# Patient Record
Sex: MALE | Race: WHITE | NOT HISPANIC OR LATINO
[De-identification: names, ages, dates, MRNs, and addresses within clinical notes are randomized per-mention and may not be internally consistent; named-entity substitution may affect disease eponyms.]

---

## 2019-04-29 PROBLEM — Z00.00 ENCOUNTER FOR PREVENTIVE HEALTH EXAMINATION: Status: ACTIVE | Noted: 2019-04-29

## 2019-05-06 ENCOUNTER — APPOINTMENT (OUTPATIENT)
Dept: OTOLARYNGOLOGY | Facility: CLINIC | Age: 84
End: 2019-05-06

## 2019-06-10 ENCOUNTER — INPATIENT (INPATIENT)
Facility: HOSPITAL | Age: 84
LOS: 17 days | Discharge: NURSING, REHAB OR LONG-TERM CARE | DRG: 643 | End: 2019-06-28
Attending: INTERNAL MEDICINE | Admitting: INTERNAL MEDICINE
Payer: MEDICARE

## 2019-06-10 VITALS
HEIGHT: 71 IN | SYSTOLIC BLOOD PRESSURE: 101 MMHG | DIASTOLIC BLOOD PRESSURE: 66 MMHG | OXYGEN SATURATION: 99 % | RESPIRATION RATE: 18 BRPM | TEMPERATURE: 98 F | WEIGHT: 138.01 LBS | HEART RATE: 70 BPM

## 2019-06-10 DIAGNOSIS — R19.7 DIARRHEA, UNSPECIFIED: ICD-10-CM

## 2019-06-10 LAB
ALBUMIN SERPL ELPH-MCNC: 3.8 G/DL — SIGNIFICANT CHANGE UP (ref 3.3–5)
ALP SERPL-CCNC: 91 U/L — SIGNIFICANT CHANGE UP (ref 40–120)
ALT FLD-CCNC: 16 U/L — SIGNIFICANT CHANGE UP (ref 10–45)
ANION GAP SERPL CALC-SCNC: 17 MMOL/L — SIGNIFICANT CHANGE UP (ref 5–17)
APTT BLD: 32.1 SEC — SIGNIFICANT CHANGE UP (ref 27.5–36.3)
AST SERPL-CCNC: 34 U/L — SIGNIFICANT CHANGE UP (ref 10–40)
BASOPHILS # BLD AUTO: 0 K/UL — SIGNIFICANT CHANGE UP (ref 0–0.2)
BASOPHILS NFR BLD AUTO: 0.3 % — SIGNIFICANT CHANGE UP (ref 0–2)
BILIRUB SERPL-MCNC: 0.3 MG/DL — SIGNIFICANT CHANGE UP (ref 0.2–1.2)
BLD GP AB SCN SERPL QL: NEGATIVE — SIGNIFICANT CHANGE UP
BUN SERPL-MCNC: 62 MG/DL — HIGH (ref 7–23)
CALCIUM SERPL-MCNC: 10.7 MG/DL — HIGH (ref 8.4–10.5)
CHLORIDE SERPL-SCNC: 109 MMOL/L — HIGH (ref 96–108)
CO2 SERPL-SCNC: 11 MMOL/L — LOW (ref 22–31)
CREAT SERPL-MCNC: 2.76 MG/DL — HIGH (ref 0.5–1.3)
EOSINOPHIL # BLD AUTO: 0 K/UL — SIGNIFICANT CHANGE UP (ref 0–0.5)
EOSINOPHIL NFR BLD AUTO: 0.3 % — SIGNIFICANT CHANGE UP (ref 0–6)
GLUCOSE SERPL-MCNC: 117 MG/DL — HIGH (ref 70–99)
HCT VFR BLD CALC: 30.8 % — LOW (ref 39–50)
HGB BLD-MCNC: 10.2 G/DL — LOW (ref 13–17)
INR BLD: 0.97 RATIO — SIGNIFICANT CHANGE UP (ref 0.88–1.16)
LYMPHOCYTES # BLD AUTO: 1.9 K/UL — SIGNIFICANT CHANGE UP (ref 1–3.3)
LYMPHOCYTES # BLD AUTO: 28.1 % — SIGNIFICANT CHANGE UP (ref 13–44)
MCHC RBC-ENTMCNC: 32.9 PG — SIGNIFICANT CHANGE UP (ref 27–34)
MCHC RBC-ENTMCNC: 33.3 GM/DL — SIGNIFICANT CHANGE UP (ref 32–36)
MCV RBC AUTO: 98.7 FL — SIGNIFICANT CHANGE UP (ref 80–100)
MONOCYTES # BLD AUTO: 0.6 K/UL — SIGNIFICANT CHANGE UP (ref 0–0.9)
MONOCYTES NFR BLD AUTO: 8.1 % — SIGNIFICANT CHANGE UP (ref 2–14)
NEUTROPHILS # BLD AUTO: 4.3 K/UL — SIGNIFICANT CHANGE UP (ref 1.8–7.4)
NEUTROPHILS NFR BLD AUTO: 63.2 % — SIGNIFICANT CHANGE UP (ref 43–77)
PLATELET # BLD AUTO: 204 K/UL — SIGNIFICANT CHANGE UP (ref 150–400)
POTASSIUM SERPL-MCNC: 4.2 MMOL/L — SIGNIFICANT CHANGE UP (ref 3.5–5.3)
POTASSIUM SERPL-SCNC: 4.2 MMOL/L — SIGNIFICANT CHANGE UP (ref 3.5–5.3)
PROT SERPL-MCNC: 6.7 G/DL — SIGNIFICANT CHANGE UP (ref 6–8.3)
PROTHROM AB SERPL-ACNC: 11.2 SEC — SIGNIFICANT CHANGE UP (ref 10–12.9)
RBC # BLD: 3.12 M/UL — LOW (ref 4.2–5.8)
RBC # FLD: 15.6 % — HIGH (ref 10.3–14.5)
RH IG SCN BLD-IMP: POSITIVE — SIGNIFICANT CHANGE UP
RH IG SCN BLD-IMP: POSITIVE — SIGNIFICANT CHANGE UP
SODIUM SERPL-SCNC: 137 MMOL/L — SIGNIFICANT CHANGE UP (ref 135–145)
WBC # BLD: 6.8 K/UL — SIGNIFICANT CHANGE UP (ref 3.8–10.5)
WBC # FLD AUTO: 6.8 K/UL — SIGNIFICANT CHANGE UP (ref 3.8–10.5)

## 2019-06-10 PROCEDURE — 99223 1ST HOSP IP/OBS HIGH 75: CPT

## 2019-06-10 PROCEDURE — 93010 ELECTROCARDIOGRAM REPORT: CPT

## 2019-06-10 PROCEDURE — 99285 EMERGENCY DEPT VISIT HI MDM: CPT | Mod: 25

## 2019-06-10 PROCEDURE — 71045 X-RAY EXAM CHEST 1 VIEW: CPT | Mod: 26

## 2019-06-10 RX ORDER — SODIUM CHLORIDE 9 MG/ML
1000 INJECTION, SOLUTION INTRAVENOUS ONCE
Refills: 0 | Status: COMPLETED | OUTPATIENT
Start: 2019-06-10 | End: 2019-06-10

## 2019-06-10 RX ADMIN — SODIUM CHLORIDE 1000 MILLILITER(S): 9 INJECTION, SOLUTION INTRAVENOUS at 23:16

## 2019-06-10 NOTE — ED PROVIDER NOTE - OBJECTIVE STATEMENT
85 yo male with PMHx of carcinoid tumor (lung) s/p resection (2007) p/w diarrhea and weakness.  Patient reports that he has had persistent watery diarrhea since april.  Diarrhea is associated with poor appetite and 20lb unintentional weight loss.  Patient had a follow up chest ct at the end of may which showed liver lesions suspicious for mets.  PET scan last weak again showing the same thing.  no primary identified.  Follow up with GI, Dr. Guerrero who sent the patient to the ER for admission for liver biopsy and further work up. Patient denies fevers/chills, CP, SOB, abd pain, nausea, vomiting, dysuria.

## 2019-06-10 NOTE — ED ADULT NURSE REASSESSMENT NOTE - NS ED NURSE REASSESS COMMENT FT1
Patient report received from Ananya URBINA. Patient is a/ox4, c/o 7/10 lower back and knee pain. Patient O2 sat dropping to 88% on room air. Patient states when he is having a crisis his oxygen saturation drops. Denies shortness of breath or chest pain. Patient admitted awaiting bed at this time. Safety maintained. Placed on 2L NC o2.

## 2019-06-10 NOTE — ED PROVIDER NOTE - PHYSICAL EXAMINATION
Gen: AAO x 3, NAD  Skin: No rashes or lesions  HEENT: NC/AT, PERRLA, EOMI, dry mucosa  Resp: unlabored CTAB  Cardiac: rrr s1s2, no murmurs, rubs or gallops  GI: ND, +BS, Soft, NT  Ext: no pedal edema, FROM in all extremities  Neuro: no focal deficits

## 2019-06-10 NOTE — ED PROVIDER NOTE - ATTENDING CONTRIBUTION TO CARE
Dr Crowder Note: 87 yo M w pmhx carcinoid tumor to lung s/p resection (2007) p/w diarrhea and weakness, sent in for admission.  Pt has had diarrhea x 2 months, with decreased appetite and weight loss.  Recent CT chest showed liver lesions suspicious for mets, recent PET scan with same findings. Dr Cesar GAFFNEY sent pt in for further workup, and admission for liver biopsy.      Pt denies any cp, sob, abdominal pain, fevers, chills, N/V    Gen: no acute distress non toxic alert and coherent, no cyanosis   HEENT: atraumatic,  no scleral icterus  EOMI   Neck: no midline tenderness, supple  Lungs: Air entry good, clear to auscultation and percussion   CVS: reg HR S1/S2 no murmur no gallop   ABD: +BS in all 4 quadrants, soft, non tender,  non distended, non palpable liver and spleen and no other masses. no hernias.  Back: no midline tenderness   Extremities: No deformities, no edema, no calf tenderness  Neuro: Awake, alert no focal deficits    Plan for ekg, labs, fluids, admission   Pt and family aware of plan and need for admission for further workup of chronic diarrhea and new lesions on liver   Pt stable in ED

## 2019-06-10 NOTE — ED CLERICAL - NS ED CLERK NOTE PRE-ARRIVAL INFORMATION; ADDITIONAL PRE-ARRIVAL INFORMATION
CC/Reason For referral: dehydration, liver biopsy, carcinoid tumor.   Preferred Consultant(if applicable):  Who admits for you (if needed): abrudescu  Do you have documents you would like to fax over?  Would you still like to speak to an ED attending? no

## 2019-06-10 NOTE — ED ADULT NURSE NOTE - OBJECTIVE STATEMENT
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 Patient was sent by GI MD to have workup and liver biopsy. Patient is a/ox4, family at bedside. Reports he has had diarrhea since April, nonbloody, no pain associated with symptoms. Patient also reports 20lb weight loss. Patient reports 6 episodes over 24hrs. Patient also stating he has shortness of breath x 2 months. No labored breathing, dyspnea, or pursed lip breathing. Patient denies fever/chills, chest pain, abdomen soft NT/ND. No n/v. Patient reports he self catheterizes himself to void. Patient oral mucosa moist.

## 2019-06-10 NOTE — ED PROVIDER NOTE - NS ED ATTENDING STATEMENT MOD
Patient was just recently discharged from [facility] the day prior to presentation and was found to be dyspneic at the nursing home with hypoxia.  She was transferred to this facility where her initial ambient air oxygen saturation was 77%.  She was placed on NIPPV with BPAP with improvement of her oxygen saturation.  CXR w/ pulmonary edema. She was only mildly hypertensive so unlikely hypertensive emergency.  An ABG was obtained which was revealing for 7.302  61.0  44  30.2 on BPAP 10  5  40%.  She did have a elevated BNP of 796 which is higher than her previous measurement.  She also has pulmonary hypertension with a EPAP of 74 mmHg.  Started intravenous diuresis with careful monitoring of her intake/output.  Of note, she is DNR. Fluid restriction added 4/12/2019 and diuretics increased with a significant improvement in negative fluid balance. Appeared euvolemic and transitioned to PO regimen 4/15/2019.  Doing better, but remains on 5L oxygen.  Will try to wean down as much as possible and SNF soon.   I have personally performed a face to face diagnostic evaluation on this patient. I have reviewed the ACP note and agree with the history, exam and plan of care, except as noted.

## 2019-06-11 DIAGNOSIS — E87.2 ACIDOSIS: ICD-10-CM

## 2019-06-11 DIAGNOSIS — E34.0 CARCINOID SYNDROME: ICD-10-CM

## 2019-06-11 DIAGNOSIS — K76.9 LIVER DISEASE, UNSPECIFIED: ICD-10-CM

## 2019-06-11 DIAGNOSIS — E83.52 HYPERCALCEMIA: ICD-10-CM

## 2019-06-11 DIAGNOSIS — Z29.9 ENCOUNTER FOR PROPHYLACTIC MEASURES, UNSPECIFIED: ICD-10-CM

## 2019-06-11 DIAGNOSIS — N17.9 ACUTE KIDNEY FAILURE, UNSPECIFIED: ICD-10-CM

## 2019-06-11 DIAGNOSIS — N18.9 CHRONIC KIDNEY DISEASE, UNSPECIFIED: ICD-10-CM

## 2019-06-11 DIAGNOSIS — I31.3 PERICARDIAL EFFUSION (NONINFLAMMATORY): ICD-10-CM

## 2019-06-11 DIAGNOSIS — R33.9 RETENTION OF URINE, UNSPECIFIED: ICD-10-CM

## 2019-06-11 DIAGNOSIS — Z86.39 PERSONAL HISTORY OF OTHER ENDOCRINE, NUTRITIONAL AND METABOLIC DISEASE: Chronic | ICD-10-CM

## 2019-06-11 DIAGNOSIS — Z90.79 ACQUIRED ABSENCE OF OTHER GENITAL ORGAN(S): Chronic | ICD-10-CM

## 2019-06-11 LAB
24R-OH-CALCIDIOL SERPL-MCNC: 9.7 NG/ML — LOW (ref 30–80)
ANION GAP SERPL CALC-SCNC: 13 MMOL/L — SIGNIFICANT CHANGE UP (ref 5–17)
APPEARANCE UR: ABNORMAL
APTT BLD: 30.9 SEC — SIGNIFICANT CHANGE UP (ref 27.5–36.3)
BILIRUB UR-MCNC: NEGATIVE — SIGNIFICANT CHANGE UP
BUN SERPL-MCNC: 62 MG/DL — HIGH (ref 7–23)
CALCIUM SERPL-MCNC: 10.4 MG/DL — SIGNIFICANT CHANGE UP (ref 8.4–10.5)
CALCIUM SERPL-MCNC: 10.4 MG/DL — SIGNIFICANT CHANGE UP (ref 8.4–10.5)
CHLORIDE SERPL-SCNC: 109 MMOL/L — HIGH (ref 96–108)
CO2 SERPL-SCNC: 13 MMOL/L — LOW (ref 22–31)
COLOR SPEC: YELLOW — SIGNIFICANT CHANGE UP
CREAT ?TM UR-MCNC: 141 MG/DL — SIGNIFICANT CHANGE UP
CREAT SERPL-MCNC: 2.74 MG/DL — HIGH (ref 0.5–1.3)
DIFF PNL FLD: ABNORMAL
GAS PNL BLDV: SIGNIFICANT CHANGE UP
GLUCOSE SERPL-MCNC: 92 MG/DL — SIGNIFICANT CHANGE UP (ref 70–99)
GLUCOSE UR QL: NEGATIVE — SIGNIFICANT CHANGE UP
INR BLD: 0.99 RATIO — SIGNIFICANT CHANGE UP (ref 0.88–1.16)
KETONES UR-MCNC: NEGATIVE — SIGNIFICANT CHANGE UP
LACTATE BLDV-MCNC: 1.6 MMOL/L — SIGNIFICANT CHANGE UP (ref 0.7–2)
LEUKOCYTE ESTERASE UR-ACNC: ABNORMAL
NITRITE UR-MCNC: NEGATIVE — SIGNIFICANT CHANGE UP
OSMOLALITY UR: 508 MOSM/KG — SIGNIFICANT CHANGE UP (ref 50–1200)
PH UR: 5.5 — SIGNIFICANT CHANGE UP (ref 5–8)
POTASSIUM SERPL-MCNC: 4.1 MMOL/L — SIGNIFICANT CHANGE UP (ref 3.5–5.3)
POTASSIUM SERPL-SCNC: 4.1 MMOL/L — SIGNIFICANT CHANGE UP (ref 3.5–5.3)
POTASSIUM UR-SCNC: 17 MMOL/L — SIGNIFICANT CHANGE UP
PROT ?TM UR-MCNC: 96 MG/DL — HIGH (ref 0–12)
PROT UR-MCNC: ABNORMAL
PROT/CREAT UR-RTO: 0.7 RATIO — HIGH (ref 0–0.2)
PROTHROM AB SERPL-ACNC: 11.2 SEC — SIGNIFICANT CHANGE UP (ref 10–13.1)
PTH-INTACT FLD-MCNC: 15 PG/ML — SIGNIFICANT CHANGE UP (ref 15–65)
SODIUM SERPL-SCNC: 135 MMOL/L — SIGNIFICANT CHANGE UP (ref 135–145)
SODIUM UR-SCNC: <20 MMOL/L — SIGNIFICANT CHANGE UP
SP GR SPEC: 1.02 — SIGNIFICANT CHANGE UP (ref 1.01–1.02)
UROBILINOGEN FLD QL: SIGNIFICANT CHANGE UP

## 2019-06-11 PROCEDURE — 76770 US EXAM ABDO BACK WALL COMP: CPT | Mod: 26

## 2019-06-11 PROCEDURE — 74150 CT ABDOMEN W/O CONTRAST: CPT | Mod: 26

## 2019-06-11 RX ORDER — ERGOCALCIFEROL 1.25 MG/1
50000 CAPSULE ORAL
Refills: 0 | Status: DISCONTINUED | OUTPATIENT
Start: 2019-06-11 | End: 2019-06-28

## 2019-06-11 RX ORDER — SODIUM BICARBONATE 1 MEQ/ML
650 SYRINGE (ML) INTRAVENOUS EVERY 6 HOURS
Refills: 0 | Status: DISCONTINUED | OUTPATIENT
Start: 2019-06-11 | End: 2019-06-16

## 2019-06-11 RX ORDER — OCTREOTIDE ACETATE 200 UG/ML
100 INJECTION, SOLUTION INTRAVENOUS; SUBCUTANEOUS THREE TIMES A DAY
Refills: 0 | Status: DISCONTINUED | OUTPATIENT
Start: 2019-06-11 | End: 2019-06-13

## 2019-06-11 RX ORDER — SODIUM CHLORIDE 9 MG/ML
1000 INJECTION INTRAMUSCULAR; INTRAVENOUS; SUBCUTANEOUS
Refills: 0 | Status: DISCONTINUED | OUTPATIENT
Start: 2019-06-11 | End: 2019-06-12

## 2019-06-11 RX ORDER — SODIUM CHLORIDE 9 MG/ML
1000 INJECTION, SOLUTION INTRAVENOUS
Refills: 0 | Status: DISCONTINUED | OUTPATIENT
Start: 2019-06-11 | End: 2019-06-11

## 2019-06-11 RX ORDER — HEPARIN SODIUM 5000 [USP'U]/ML
5000 INJECTION INTRAVENOUS; SUBCUTANEOUS EVERY 8 HOURS
Refills: 0 | Status: DISCONTINUED | OUTPATIENT
Start: 2019-06-11 | End: 2019-06-28

## 2019-06-11 RX ADMIN — OCTREOTIDE ACETATE 100 MICROGRAM(S): 200 INJECTION, SOLUTION INTRAVENOUS; SUBCUTANEOUS at 03:06

## 2019-06-11 RX ADMIN — OCTREOTIDE ACETATE 100 MICROGRAM(S): 200 INJECTION, SOLUTION INTRAVENOUS; SUBCUTANEOUS at 22:42

## 2019-06-11 RX ADMIN — SODIUM CHLORIDE 75 MILLILITER(S): 9 INJECTION INTRAMUSCULAR; INTRAVENOUS; SUBCUTANEOUS at 11:43

## 2019-06-11 RX ADMIN — ERGOCALCIFEROL 50000 UNIT(S): 1.25 CAPSULE ORAL at 18:47

## 2019-06-11 RX ADMIN — HEPARIN SODIUM 5000 UNIT(S): 5000 INJECTION INTRAVENOUS; SUBCUTANEOUS at 22:37

## 2019-06-11 RX ADMIN — OCTREOTIDE ACETATE 100 MICROGRAM(S): 200 INJECTION, SOLUTION INTRAVENOUS; SUBCUTANEOUS at 14:44

## 2019-06-11 RX ADMIN — SODIUM CHLORIDE 125 MILLILITER(S): 9 INJECTION, SOLUTION INTRAVENOUS at 03:04

## 2019-06-11 NOTE — H&P ADULT - PROBLEM SELECTOR PLAN 7
VTE ppx: IMPROVE score of 4 (age, dec mobility, suspected malignancy) start heparin subQ  Activity: OOB with assistance, fall precaution  Diet: regular

## 2019-06-11 NOTE — PROGRESS NOTE ADULT - ASSESSMENT
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    Carcinoid syndrome.  -  Patient's flusing and diarrhea are likely due to his carcinoid syndrome, serotonin secretion, and potentially other vasoactive substances.   - Measure urine excretion of 5-HIAA.   - Check chromogranin levels  - octreotide 100mg subQ TID to treat flushing and diarrhea.    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    Hypercalcemia.  - Patient noted to have mild hypercalcemia, may be related to suspected carcinoid tumor. Check PTH, vitamin D level.  - Will continue IVF.  - Repeat calcium in AM.     CONSTANZA (acute kidney injury).   - Patient was noted here to have elevated SCr of 2.6, which is high compared to outpatient SCr of 1.91.  - CONSTANZA is likely pre-renal related to hypovolemia from excessive GI losses.  - check UA, urine protein: creatinine, bladder and kidney US since patient has known history of bladder dysfunction (patient states he has a "weak bladder") to assess for obstruction.   - urology evaluation    Pericardial effusion.  - Patient was found on outpatient CT scan to have a pericardial effusion and was recommended for TTE.  - Currently, the patient is hemodynamically WNL. He denies palpitations and denies lightheadedness while lying supine.  - Will obtain TTE.     Metabolic acidosis, normal anion gap (NAG).   - Patient found to have metabolic acidosis with normal anion gap, hyperchloremia likely due to GI losses.   - Will start LR at 125cc/hr to avoid further worsening of hyperchloremic acidosis.  - Follow up BMP.    Prophylactic measure.  - VTE ppx with start heparin subQ    Activity: OOB with assistance, fall precaution  Diet: regular.   Further action as per clinical course  Phong Dash MD pager [phone] This patient is an 86yoM with 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    Carcinoid syndrome.  -  Patient's flusing and diarrhea are likely due to his carcinoid syndrome, serotonin secretion, and potentially other vasoactive substances.   - Measure urine excretion of 5-HIAA.   - Check chromogranin levels  - octreotide 100mg subQ TID to treat flushing and diarrhea.    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    Hypercalcemia.  - Patient noted to have mild hypercalcemia, may be related to suspected carcinoid tumor. Check PTH, vitamin D level.  - Will continue IVF.  - Repeat calcium in AM.     CONSTANZA (acute kidney injury).   - Patient was noted here to have elevated SCr of 2.6, which is high compared to outpatient SCr of 1.91.  - CONSTANZA is likely pre-renal related to hypovolemia from excessive GI losses.  - check UA, urine protein: creatinine, bladder and kidney US since patient has known history of bladder dysfunction (patient states he has a "weak bladder") to assess for obstruction.   - urology evaluation    Pericardial effusion.  - Patient was found on outpatient CT scan to have a pericardial effusion and was recommended for TTE.  - Currently, the patient is hemodynamically WNL. He denies palpitations and denies lightheadedness while lying supine.  - Will obtain TTE.     Metabolic acidosis, normal anion gap (NAG).   - Patient found to have metabolic acidosis with normal anion gap, hyperchloremia likely due to GI losses.   - continue IVF.  - Follow up BMP.    Prophylactic measure.  - VTE ppx with start heparin subQ    Activity: OOB with assistance, fall precaution  Diet: regular.   Further action as per clinical course  d/w patient and wife  Phong Dash MD pager [phone]

## 2019-06-11 NOTE — CONSULT NOTE ADULT - ASSESSMENT
86yM with neurogenic bladder, urinary retention, BPH    -Can continue clean intermittent catheterization while inpatient  -Hydration  -Pain control  -Bowel regimen  -Avoid anticholinergics, antihistamines, opioids  -Will need outpatient urologic follow up  -Thank you for the consult. Please call with questions 86yM with neurogenic bladder, urinary retention, BPH, renal mass    -Renal mass is either renal cell carcinoma primary versus metastasis to kidney. CT-guided Renal biopsy would be the definitive test to distinguish between these 2 diagnoses. Given evidence of other lesions, more likely metastasis to kidney. Can consider renal mass biopsy as outpatient   -Can continue clean intermittent catheterization while inpatient. If renal function worsening, would place calzada catheter for strict ins/outs and once renal function returns to baseline can return to [facility] as outpatient  -Hydration  -Pain control  -Bowel regimen  -Avoid anticholinergics, antihistamines, opioids  -Will need outpatient urologic follow up  -Thank you for the consult. Please call with questions

## 2019-06-11 NOTE — CONSULT NOTE ADULT - PROBLEM SELECTOR RECOMMENDATION 9
cardiology evaluation  echo to assess extent of perixcardial effusion and see if any cardiac issues associated with carcinoid

## 2019-06-11 NOTE — H&P ADULT - PROBLEM SELECTOR PLAN 5
Patient was found on outpatient CT scan to have a pericardial effusion and was recommended for TTE.  Currently, the patient is hemodynamically WNL. He denies palpitations and denies lightheadedness while lying supine.  Will obtain TTE.

## 2019-06-11 NOTE — H&P ADULT - PROBLEM SELECTOR PLAN 4
Patient was noted here to have elevated SCr of 2.6, which is high compared to outpatient SCr of 1.91.  CONSTANZA is likely pre-renal related to hypovolemia from excessive GI losses.  Will check UA, urine protein: creatinine, bladder and kidney US since patient has known history of bladder dysfunction (patient states he has a "weak bladder") to assess for obstruction.

## 2019-06-11 NOTE — H&P ADULT - ASSESSMENT
Patient was seen and examined at 11:50PM on 6/10/2019.     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 The patient has had about 20 lbs of unintentional weight loss since April 2019, with decline in energy, chills, early satiety and poor appetite and generalized weakness. He has had 7 episodes of watery non-bloody diarrhea daily, and endorses nausea but denies fever, chest pain, palpitations, abdominal pain or vomiting. He has had difficulty ambulating due to his progressive weakness. He notes that he had visited Dr. Houston and Dr. Guerrero for his persistent diarrhea. He had stool testing which was reportedly negative for bacteria and parasites. He developed jen complexion, facial puffiness and excessive eye lacrimation. He Has not had SOB or cough. The patient had outpatient CT and PEG, chromogramin levels checked. He was noted to be hypotensive to 91/49 in the office, and patient had an unsuccessful attempts at venipuncture for labs and hydration, thus recommended to visit ED.     The patient had left lower lobe resection of carcinoid tumor in 2007 at [facility].     In the ED, T 97.5F, HR 70, /66, RR 18, SpO2 99%RA   Pt was given 1 L LR. Patient was seen and examined at 11:50PM on 6/10/2019.     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

## 2019-06-11 NOTE — H&P ADULT - PROBLEM SELECTOR PLAN 6
Patient found to have metabolic acidosis with normal anion gap, hyperchloremia likely due to GI losses.   Will start LR at 125cc/hr to avoid further worsening of hyperchloremic acidosis.  Follow up BMP.

## 2019-06-11 NOTE — H&P ADULT - NSHPREVIEWOFSYSTEMS_GEN_ALL_CORE
REVIEW OF SYSTEMS  CONSTITUTIONAL: No fever, + chills, + fatigue  EYES: No eye pain, no vision changes  ENMT:  No difficulty hearing, no throat pain  RESPIRATORY: No cough, no wheezing, no sputum production; No shortness of breath  CARDIOVASCULAR: No chest pain, no palpitations, no TRAN, no leg swelling  GASTROINTESTINAL: No abdominal pain, no nausea, no vomiting, no hematemesis, no diarrhea, no constipation, no melena, no hematochezia.  GENITOURINARY: No dysuria, no hematuria  NEUROLOGICAL: No headaches, no loss of strength, no numbness  SKIN: No itching, no rashes, no lesions   MUSCULOSKELETAL: No joint pain, no joint swelling; No muscle pain  HEME/LYMPH: No easy bruising, bleeding REVIEW OF SYSTEMS  CONSTITUTIONAL: No fever, + chills, + fatigue  EYES: No eye pain, no vision changes, +excessive tearing.   ENMT:  + very hard of hearing, no throat pain  RESPIRATORY: + cough, no wheezing, + sputum production; No shortness of breath  CARDIOVASCULAR: No chest pain, no palpitations, no leg swelling  GASTROINTESTINAL: No abdominal pain, + nausea, no vomiting, + diarrhea, no constipation, no melena, no hematochezia.  GENITOURINARY: No dysuria, no hematuria, +bladder dysfunction  NEUROLOGICAL: No headaches, no loss of strength, no numbness  SKIN: + facial redness, +facial puffiness,   MUSCULOSKELETAL: No joint pain, no joint swelling; No muscle pain  HEME/LYMPH: No easy bruising, bleeding

## 2019-06-11 NOTE — H&P ADULT - PROBLEM SELECTOR PLAN 1
Patient's flusinh and diarrhea are likely due to his carcinoid syndrome, serotonin secretion, and potentially other vasoactive substances.   Measure urine excretion of 5-HIAA, urine serotonin,   Check chromogranin levels  Will start octreotide 100mg subQ TID to treat flushing and diarrhea. Can likely uptitrate in AM, if needed- discuss with oncology  Consult Dr. Houston in AM. 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 Patient's flusing and diarrhea are likely due to his carcinoid syndrome, serotonin secretion, and potentially other vasoactive substances.   Measure urine excretion of 5-HIAA, urine serotonin,   Check chromogranin levels  Will start octreotide 100mg subQ TID to treat flushing and diarrhea. Can likely uptitrate in AM, if needed- discuss with oncology  Consult Dr. Houston in AM. 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 Patient's flusing and diarrhea are likely due to his carcinoid syndrome, serotonin secretion, and potentially other vasoactive substances.   Measure urine excretion of 5-HIAA. May need to send out urine serotonin in AM.   Check chromogranin levels  Will start octreotide 100mg subQ TID to treat flushing and diarrhea. Can likely uptitrate in AM, if needed- discuss with oncology in AM.   Consult Dr. Houston in AM. 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

## 2019-06-11 NOTE — H&P ADULT - NSICDXPASTMEDICALHX_GEN_ALL_CORE_FT
PAST MEDICAL HISTORY:  History of carcinoid syndrome     Kidney lesion partial resction- reportedly "not active lesion"    Neck mass was resected, reportedly bening

## 2019-06-11 NOTE — CONSULT NOTE ADULT - PROBLEM SELECTOR RECOMMENDATION 2
ct octreotide injection  monitor for side effects  we are working on getting im sandostatin long acting approved as outpatient  live lesion photopenic on pet dotatate - plan for biopsy to confirm lesion due to carcinoid vs alternate etiology

## 2019-06-11 NOTE — CONSULT NOTE ADULT - SUBJECTIVE AND OBJECTIVE BOX
Patient is a 86y old  Male who presents with a chief complaint of diarrhea (11 Jun 2019 00:16)      HPI:  Patient was seen and examined at 11:50PM on 6/10/2019.     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 The patient has had about 20 lbs of unintentional weight loss since April 2019, with decline in energy, chills, early satiety and poor appetite and generalized weakness. He has had 7 episodes of watery non-bloody diarrhea daily, and endorses nausea but denies fever, chest pain, palpitations, abdominal pain or vomiting. He has had difficulty ambulating due to his progressive weakness. He notes that he had visited Dr. Houston and Dr. Guerrero for his persistent diarrhea. He had stool testing which was reportedly negative for bacteria and parasites. He developed jen complexion, facial puffiness and excessive eye lacrimation. He Has not had SOB or cough. The patient had outpatient CT and PEG, chromogramin levels checked. He was noted to be hypotensive to 91/49 in the office, and patient had an unsuccessful attempts at venipuncture for labs and hydration, thus recommended to visit ED.     The patient had left lower lobe resection of carcinoid tumor in 2007 at [facility].     In the ED, T 97.5F, HR 70, /66, RR 18, SpO2 99%RA   Pt was given 1 L LR. (11 Jun 2019 00:16)       ROS:  Negative except for:    PAST MEDICAL & SURGICAL HISTORY:  Neck mass: was resected, reportedly bening  Kidney lesion: partial resction- reportedly &quot;not active lesion&quot;  History of carcinoid syndrome  H/O carcinoid syndrome  S/P TURP      SOCIAL HISTORY:    FAMILY HISTORY:  FH: lung cancer: sister  Family history of nasopharyngeal cancer: father      MEDICATIONS  (STANDING):  lactated ringers. 1000 milliLiter(s) (125 mL/Hr) IV Continuous <Continuous>  octreotide  Injectable 100 MICROGram(s) SubCutaneous three times a day    MEDICATIONS  (PRN):      Allergies    penicillin (Swelling)    Intolerances        Vital Signs Last 24 Hrs  T(C): 36.8 (11 Jun 2019 07:50), Max: 36.8 (11 Jun 2019 01:43)  T(F): 98.2 (11 Jun 2019 07:50), Max: 98.3 (11 Jun 2019 01:43)  HR: 81 (11 Jun 2019 07:50) (70 - 84)  BP: 101/63 (11 Jun 2019 07:50) (96/60 - 113/70)  BP(mean): --  RR: 18 (11 Jun 2019 07:50) (16 - 19)  SpO2: 98% (11 Jun 2019 07:50) (98% - 100%)    REVIEW OF SYSTEMS:    CONSTITUTIONAL: No weakness, fevers or chills  EYES/ENT: No visual changes;  No vertigo or throat pain   NECK: No pain or stiffness  RESPIRATORY: No cough, wheezing, hemoptysis; No shortness of breath  CARDIOVASCULAR: No chest pain or palpitations  GASTROINTESTINAL: No abdominal or epigastric pain. No nausea, vomiting, or hematemesis; No diarrhea or constipation. No melena or hematochezia.  GENITOURINARY: No dysuria, frequency or hematuria  NEUROLOGICAL: No numbness or weakness  SKIN: No itching, burning, rashes, or lesions     PHYSICAL EXAM  General: adult in NAD  HEENT: clear oropharynx, anicteric sclera, pink conjunctiva  Neck: supple  CV: normal S1/S2 with no murmur rubs or gallops  Lungs: positive air movement b/l ant lungs,clear to auscultation, no wheezes, no rales  Abdomen: soft non-tender non-distended, no hepatosplenomegaly  Ext: no clubbing cyanosis or edema  Skin: no rashes and no petechiae  Neuro: alert and oriented X 4, no focal deficits      LABS:                          10.2   6.8   )-----------( 204      ( 10 Jean 2019 21:14 )             30.8         Mean Cell Volume : 98.7 fl  Mean Cell Hemoglobin : 32.9 pg  Mean Cell Hemoglobin Concentration : 33.3 gm/dL  Auto Neutrophil # : 4.3 K/uL  Auto Lymphocyte # : 1.9 K/uL  Auto Monocyte # : 0.6 K/uL  Auto Eosinophil # : 0.0 K/uL  Auto Basophil # : 0.0 K/uL  Auto Neutrophil % : 63.2 %  Auto Lymphocyte % : 28.1 %  Auto Monocyte % : 8.1 %  Auto Eosinophil % : 0.3 %  Auto Basophil % : 0.3 %      06-11    135  |  109<H>  |  62<H>  ----------------------------<  92  4.1   |  13<L>  |  2.74<H>    Ca    10.4      11 Jun 2019 06:17    TPro  6.7  /  Alb  3.8  /  TBili  0.3  /  DBili  x   /  AST  34  /  ALT  16  /  AlkPhos  91  06-10      PT/INR - ( 11 Jun 2019 08:20 )   PT: 11.2 sec;   INR: 0.99 ratio         PTT - ( 11 Jun 2019 08:20 )  PTT:30.9 sec Patient is a 86y old  Male who presents with a chief complaint of diarrhea (11 Jun 2019 00:16)      HPI:  Patient was seen and examined at 11:50PM on 6/10/2019.     This patient is an 86yoM with PMH of carcinoid syndrome s/p left lower lobe resection, bladder disease requiring intermittent straight cath who presents to the ED with complaint of persistent diarrhea, fatigue, decreased po intake  and weight loss. The patient has had about 20 lbs of unintentional weight loss since April 2019, with decline in energy, chills, early satiety and poor appetite and generalized weakness. He has had 7 episodes of watery non-bloody diarrhea daily, and endorses nausea but denies fever, chest pain, palpitations, abdominal pain or vomiting. He has had difficulty ambulating due to his progressive weakness. He notes that he had visited Dr. Houston and Dr. Guerrero for his persistent diarrhea. He had stool testing which was reportedly negative for bacteria and parasites. He developed jen complexion, facial puffiness and excessive eye lacrimation. He Has not had SOB or cough. The patient had outpatient CT 5/15/19 - right hepatic lobe lesion with multiple small lesions in liver, small to moderate pericardial effusion.  PET dotatate scan 5/16/19 - photopenic defects corresponding to large right hepatic, chromogramin levels checked. He was noted to be hypotensive to 91/49 in the office, and patient had an unsuccessful attempts at venipuncture for labs and hydration, thus recommended to visit ED.     The patient had left lower lobe resection of carcinoid tumor in 2007 at [facility].     In the ED, T 97.5F, HR 70, /66, RR 18, SpO2 99%RA   Pt was given 1 L LR. (11 Jun 2019 00:16)       ROS:  Negative except for:    PAST MEDICAL & SURGICAL HISTORY:  Neck mass: was resected, reportedly bening  Kidney lesion: partial resction- reportedly &quot;not active lesion&quot;  History of carcinoid syndrome  H/O carcinoid syndrome  S/P TURP      SOCIAL HISTORY:    FAMILY HISTORY:  FH: lung cancer: sister  Family history of nasopharyngeal cancer: father      MEDICATIONS  (STANDING):  lactated ringers. 1000 milliLiter(s) (125 mL/Hr) IV Continuous <Continuous>  octreotide  Injectable 100 MICROGram(s) SubCutaneous three times a day    MEDICATIONS  (PRN):      Allergies    penicillin (Swelling)    Intolerances        Vital Signs Last 24 Hrs  T(C): 36.8 (11 Jun 2019 07:50), Max: 36.8 (11 Jun 2019 01:43)  T(F): 98.2 (11 Jun 2019 07:50), Max: 98.3 (11 Jun 2019 01:43)  HR: 81 (11 Jun 2019 07:50) (70 - 84)  BP: 101/63 (11 Jun 2019 07:50) (96/60 - 113/70)  BP(mean): --  RR: 18 (11 Jun 2019 07:50) (16 - 19)  SpO2: 98% (11 Jun 2019 07:50) (98% - 100%)    REVIEW OF SYSTEMS:    CONSTITUTIONAL: No weakness, fevers or chills  EYES/ENT: No visual changes;  No vertigo or throat pain   NECK: No pain or stiffness  RESPIRATORY: No cough, wheezing, hemoptysis; No shortness of breath  CARDIOVASCULAR: No chest pain or palpitations  GASTROINTESTINAL: No abdominal or epigastric pain. No nausea, vomiting, or hematemesis; No diarrhea or constipation. No melena or hematochezia.  GENITOURINARY: No dysuria, frequency or hematuria  NEUROLOGICAL: No numbness or weakness  SKIN: No itching, burning, rashes, or lesions     PHYSICAL EXAM  General: adult in NAD  HEENT: clear oropharynx, anicteric sclera, pink conjunctiva  Neck: supple  CV: normal S1/S2 with no murmur rubs or gallops  Lungs: positive air movement b/l ant lungs,clear to auscultation, no wheezes, no rales  Abdomen: soft non-tender non-distended, no hepatosplenomegaly  Ext: no clubbing cyanosis or edema  Skin: no rashes and no petechiae  Neuro: alert and oriented X 4, no focal deficits      LABS:                          10.2   6.8   )-----------( 204      ( 10 Jean 2019 21:14 )             30.8         Mean Cell Volume : 98.7 fl  Mean Cell Hemoglobin : 32.9 pg  Mean Cell Hemoglobin Concentration : 33.3 gm/dL  Auto Neutrophil # : 4.3 K/uL  Auto Lymphocyte # : 1.9 K/uL  Auto Monocyte # : 0.6 K/uL  Auto Eosinophil # : 0.0 K/uL  Auto Basophil # : 0.0 K/uL  Auto Neutrophil % : 63.2 %  Auto Lymphocyte % : 28.1 %  Auto Monocyte % : 8.1 %  Auto Eosinophil % : 0.3 %  Auto Basophil % : 0.3 %      06-11    135  |  109<H>  |  62<H>  ----------------------------<  92  4.1   |  13<L>  |  2.74<H>    Ca    10.4      11 Jun 2019 06:17    TPro  6.7  /  Alb  3.8  /  TBili  0.3  /  DBili  x   /  AST  34  /  ALT  16  /  AlkPhos  91  06-10      PT/INR - ( 11 Jun 2019 08:20 )   PT: 11.2 sec;   INR: 0.99 ratio         PTT - ( 11 Jun 2019 08:20 )  PTT:30.9 sec Patient is a 86y old  Male who presents with a chief complaint of diarrhea (11 Jun 2019 00:16)      HPI:  Patient was seen and examined at 11:50PM on 6/10/2019.     This patient is an 86yoM with PMH of carcinoid syndrome s/p left lower lobe resection, bladder disease requiring intermittent straight cath who presents to the ED with complaint of persistent diarrhea, fatigue, decreased po intake  and weight loss. The patient has had about 20 lbs of unintentional weight loss since April 2019, with decline in energy, chills, early satiety and poor appetite and generalized weakness. He has had 7 episodes of watery non-bloody diarrhea daily, and endorses nausea but denies fever, chest pain, palpitations, abdominal pain or vomiting. He has had difficulty ambulating due to his progressive weakness. He notes that he had visited Dr. Houston and Dr. Guerrero for his persistent diarrhea. He had stool testing which was reportedly negative for bacteria and parasites. He developed jen complexion, facial puffiness and excessive eye lacrimation. He Has not had SOB or cough. The patient had outpatient CT 5/15/19 - right hepatic lobe lesion with multiple small lesions in liver, small to moderate pericardial effusion.  PET dotatate scan 5/16/19 - photopenic defects corresponding to large right hepatic, chromogramin levels  320, 5HIAA 47, creatinine 1.9,normal CEA 5/30/19. He was noted to be hypotensive to 91/49 in the office, and patient had an unsuccessful attempts at venipuncture for labs and hydration, thus recommended to visit ED.     The patient had left lower lobe resection of carcinoid tumor in 2007 at [facility].   reports no diarrhea since yeterday       ROS:  Negative except for:    PAST MEDICAL & SURGICAL HISTORY:  Neck mass: was resected, reportedly bening  Kidney lesion: partial resction- reportedly &quot;not active lesion&quot;  History of carcinoid syndrome  H/O carcinoid syndrome  S/P TURP      SOCIAL HISTORY:    FAMILY HISTORY:  FH: lung cancer: sister  Family history of nasopharyngeal cancer: father      MEDICATIONS  (STANDING):  lactated ringers. 1000 milliLiter(s) (125 mL/Hr) IV Continuous <Continuous>  octreotide  Injectable 100 MICROGram(s) SubCutaneous three times a day    MEDICATIONS  (PRN):      Allergies    penicillin (Swelling)    Intolerances        Vital Signs Last 24 Hrs  T(C): 36.8 (11 Jun 2019 07:50), Max: 36.8 (11 Jun 2019 01:43)  T(F): 98.2 (11 Jun 2019 07:50), Max: 98.3 (11 Jun 2019 01:43)  HR: 81 (11 Jun 2019 07:50) (70 - 84)  BP: 101/63 (11 Jun 2019 07:50) (96/60 - 113/70)  BP(mean): --  RR: 18 (11 Jun 2019 07:50) (16 - 19)  SpO2: 98% (11 Jun 2019 07:50) (98% - 100%)    REVIEW OF SYSTEMS:    CONSTITUTIONAL: No weakness, fevers or chills  EYES/ENT: No visual changes;  No vertigo or throat pain   NECK: No pain or stiffness  RESPIRATORY: No cough, wheezing, hemoptysis; No shortness of breath  CARDIOVASCULAR: No chest pain or palpitations  GASTROINTESTINAL: No abdominal or epigastric pain. No nausea, vomiting, or hematemesis; No diarrhea or constipation. No melena or hematochezia.  GENITOURINARY: No dysuria, frequency or hematuria  NEUROLOGICAL: No numbness or weakness  SKIN: No itching, burning, rashes, or lesions     PHYSICAL EXAM  General: adult in NAD  HEENT: clear oropharynx, anicteric sclera, pink conjunctiva  Neck: supple  CV: normal S1/S2 with no murmur rubs or gallops  Lungs: positive air movement b/l ant lungs,clear to auscultation, no wheezes, no rales  Abdomen: soft non-tender non-distended, no hepatosplenomegaly  Ext: no clubbing cyanosis or edema  Skin: no rashes and no petechiae  Neuro: alert and oriented X 4, no focal deficits      LABS:                          10.2   6.8   )-----------( 204      ( 10 Jean 2019 21:14 )             30.8         Mean Cell Volume : 98.7 fl  Mean Cell Hemoglobin : 32.9 pg  Mean Cell Hemoglobin Concentration : 33.3 gm/dL  Auto Neutrophil # : 4.3 K/uL  Auto Lymphocyte # : 1.9 K/uL  Auto Monocyte # : 0.6 K/uL  Auto Eosinophil # : 0.0 K/uL  Auto Basophil # : 0.0 K/uL  Auto Neutrophil % : 63.2 %  Auto Lymphocyte % : 28.1 %  Auto Monocyte % : 8.1 %  Auto Eosinophil % : 0.3 %  Auto Basophil % : 0.3 %      06-11    135  |  109<H>  |  62<H>  ----------------------------<  92  4.1   |  13<L>  |  2.74<H>    Ca    10.4      11 Jun 2019 06:17    TPro  6.7  /  Alb  3.8  /  TBili  0.3  /  DBili  x   /  AST  34  /  ALT  16  /  AlkPhos  91  06-10      PT/INR - ( 11 Jun 2019 08:20 )   PT: 11.2 sec;   INR: 0.99 ratio         PTT - ( 11 Jun 2019 08:20 )  PTT:30.9 sec Patient is a 86y old  Male who presents with a chief complaint of diarrhea (11 Jun 2019 00:16)      HPI:  Patient was seen and examined at 11:50PM on 6/10/2019.     This patient is an 86yoM with PMH of carcinoid syndrome s/p left lower lobe resection, bladder disease requiring intermittent straight cath who presents to the ED with complaint of persistent diarrhea, fatigue, decreased po intake  and weight loss. The patient has had about 20 lbs of unintentional weight loss since April 2019, with decline in energy, chills, early satiety and poor appetite and generalized weakness. He has had 7 episodes of watery non-bloody diarrhea daily, and endorses nausea but denies fever, chest pain, palpitations, abdominal pain or vomiting. He has had difficulty ambulating due to his progressive weakness. He notes that he had visited Dr. Houston and Dr. Guerrero for his persistent diarrhea. He had stool testing which was reportedly negative for bacteria and parasites. He developed jen complexion, facial puffiness and excessive eye lacrimation. He Has not had SOB or cough. The patient had outpatient CT 5/15/19 - right hepatic lobe lesion with multiple small lesions in liver, small to moderate pericardial effusion.  PET dotatate scan 5/16/19 - photopenic defects corresponding to large right hepatic, chromogramin levels  320, 5HIAA 47, creatinine 1.9,normal CEA 5/30/19. He was noted to be hypotensive to 91/49 in the office, and patient had an unsuccessful attempts at venipuncture for labs and hydration, thus recommended to visit ED.     The patient had left lower lobe resection of carcinoid tumor in 2007 at [facility].   reports no diarrhea since yeterday  c/o poor voiding - negative result of straight /self catheterization       PAST MEDICAL & SURGICAL HISTORY:  Neck mass: was resected, reportedly benign  Kidney lesion: partial rescetion  lung resection - carcinoid  S/P TURP      SOCIAL HISTORY:  lives with wife  non smoker    FAMILY HISTORY:  FH: lung cancer: sister  Family history of nasopharyngeal cancer: father      MEDICATIONS  (STANDING):  lactated ringers. 1000 milliLiter(s) (125 mL/Hr) IV Continuous <Continuous>  octreotide  Injectable 100 MICROGram(s) SubCutaneous three times a day    MEDICATIONS  (PRN):      Allergies    penicillin (Swelling)    Intolerances        Vital Signs Last 24 Hrs  T(C): 36.8 (11 Jun 2019 07:50), Max: 36.8 (11 Jun 2019 01:43)  T(F): 98.2 (11 Jun 2019 07:50), Max: 98.3 (11 Jun 2019 01:43)  HR: 81 (11 Jun 2019 07:50) (70 - 84)  BP: 101/63 (11 Jun 2019 07:50) (96/60 - 113/70)  BP(mean): --  RR: 18 (11 Jun 2019 07:50) (16 - 19)  SpO2: 98% (11 Jun 2019 07:50) (98% - 100%)    REVIEW OF SYSTEMS:    CONSTITUTIONAL: weakness +,no fevers or chills  EYES/ENT: No visual changes;  No vertigo or throat pain   NECK: No pain or stiffness  RESPIRATORY: No cough, wheezing, hemoptysis; chr shortness of breath  CARDIOVASCULAR: No chest pain or palpitations  GASTROINTESTINAL: No abdominal or epigastric pain. No nausea, vomiting, or hematemesis; diarrhea , anorexia + No melena or hematochezia.  GENITOURINARY: No dysuria, frequency or hematuria. poor voiding +  NEUROLOGICAL: No numbness or weakness  SKIN: No itching, burning, rashes, or lesions . flushing +    PHYSICAL EXAM  General: adult chronically ill, face flushed  HEENT: clear oropharynx, anicteric sclera, pink conjunctiva  Neck: supple  CV: normal S1/S2 with no murmur rubs or gallops  Lungs: positive air movement b/l ant lungs,clear to auscultation, no wheezes, no rales  Abdomen: soft non-tender non-distended,  hepatomegaly +  Ext: no clubbing cyanosis, 1+ edema  Skin: no rashes and no petechiae  Neuro: alert and oriented X 4, no focal deficits      LABS:                          10.2   6.8   )-----------( 204      ( 10 Jean 2019 21:14 )             30.8         Mean Cell Volume : 98.7 fl  Mean Cell Hemoglobin : 32.9 pg  Mean Cell Hemoglobin Concentration : 33.3 gm/dL  Auto Neutrophil # : 4.3 K/uL  Auto Lymphocyte # : 1.9 K/uL  Auto Monocyte # : 0.6 K/uL  Auto Eosinophil # : 0.0 K/uL  Auto Basophil # : 0.0 K/uL  Auto Neutrophil % : 63.2 %  Auto Lymphocyte % : 28.1 %  Auto Monocyte % : 8.1 %  Auto Eosinophil % : 0.3 %  Auto Basophil % : 0.3 %      06-11    135  |  109<H>  |  62<H>  ----------------------------<  92  4.1   |  13<L>  |  2.74<H>    Ca    10.4      11 Jun 2019 06:17    TPro  6.7  /  Alb  3.8  /  TBili  0.3  /  DBili  x   /  AST  34  /  ALT  16  /  AlkPhos  91  06-10      PT/INR - ( 11 Jun 2019 08:20 )   PT: 11.2 sec;   INR: 0.99 ratio         PTT - ( 11 Jun 2019 08:20 )  PTT:30.9 sec

## 2019-06-11 NOTE — H&P ADULT - PROBLEM SELECTOR PLAN 3
Patient noted to have mild hypercalcemia, may be related to suspected carcinoid tumor. Check PTH, vitamin D level.  Will continue IVF.  Repeat calcium in AM

## 2019-06-11 NOTE — CONSULT NOTE ADULT - SUBJECTIVE AND OBJECTIVE BOX
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 The patient has had about 20 lbs of unintentional weight loss since April 2019, with decline in energy, chills, early satiety and poor appetite and generalized weakness. He has had 7 episodes of watery non-bloody diarrhea daily, and endorses nausea but denies fever, chest pain, palpitations, abdominal pain or vomiting. He has had difficulty ambulating due to his progressive weakness. He notes that he had visited Dr. Houston and Dr. Guerrero for his persistent diarrhea. He had stool testing which was reportedly negative for bacteria and parasites. He developed jen complexion, facial puffiness and excessive eye lacrimation. He Has not had SOB or cough. The patient had outpatient CT 5/15/19 - right hepatic lobe lesion with multiple small lesions in liver, small to moderate pericardial effusion.  PET dotatate scan 5/16/19 - photopenic defects corresponding to large right hepatic, chromogramin levels  320, 5HIAA 47, creatinine 1.9,normal CEA 5/30/19. He was noted to be relative hypotensive to 91/49 in the office, and patient had an unsuccessful attempts at venipuncture for labs and hydration, thus recommended to visit ED.  noticed with cr elevated. renal consult called. in addition had renal us and noticed to have  L renal mass as well. as documented baseline cr 1.9 in past     PAST HISTORY  --------------------------------------------------------------------------------  PAST MEDICAL & SURGICAL HISTORY:  Neck mass: was resected, reportedly bening  Kidney lesion: partial resction- reportedly &quot;not active lesion&quot;  History of carcinoid syndrome  H/O carcinoid syndrome  S/P TURP    FAMILY HISTORY:  FH: lung cancer: sister  Family history of nasopharyngeal cancer: father    PAST SOCIAL HISTORY: no tobacco use     ALLERGIES & MEDICATIONS  --------------------------------------------------------------------------------  Allergies    penicillin (Swelling)    Intolerances      Standing Inpatient Medications  ergocalciferol 26078 Unit(s) Oral every week  heparin  Injectable 5000 Unit(s) SubCutaneous every 8 hours  octreotide  Injectable 100 MICROGram(s) SubCutaneous three times a day  sodium chloride 0.9%. 1000 milliLiter(s) IV Continuous <Continuous>    PRN Inpatient Medications      REVIEW OF SYSTEMS  --------------------------------------------------------------------------------  Gen: No  fevers/chills  + fatigue   Skin: No rashes  Head/Eyes/Ears/Mouth: No headache; + decrease  hearing;  No sinus pain/discomfort, sore throat  Respiratory: No dyspnea, cough, wheezing, hemoptysis  CV: No chest pain, orthopnea  GI: No abdominal pain, + diarrhea,  but no nausea, vomiting, melena, decrease po intake and weight loss   : No dysuria, decrease urination  +  hesitancy urinating  - hematuria, nocturia -   MSK: No joint pain/swelling; no back pain  Neuro: No dizziness/lightheadedness,   also with no edema     All other systems were reviewed and are negative, except as noted.    VITALS/PHYSICAL EXAM  --------------------------------------------------------------------------------  T(C): 36.5 (06-11-19 @ 15:54), Max: 36.9 (06-11-19 @ 14:22)  HR: 80 (06-11-19 @ 15:54) (80 - 84)  BP: 98/60 (06-11-19 @ 15:54) (85/52 - 102/61)  RR: 18 (06-11-19 @ 15:54) (16 - 18)  SpO2: 96% (06-11-19 @ 15:54) (96% - 100%)  Wt(kg): --  Height (cm): 180.34 (06-10-19 @ 16:06)  Weight (kg): 62.6 (06-10-19 @ 16:06)  BMI (kg/m2): 19.2 (06-10-19 @ 16:06)  BSA (m2): 1.8 (06-10-19 @ 16:06)      06-10-19 @ 07:01  -  06-11-19 @ 07:00  --------------------------------------------------------  IN: 0 mL / OUT: 20 mL / NET: -20 mL    06-11-19 @ 07:01  -  06-11-19 @ 23:20  --------------------------------------------------------  IN: 1565 mL / OUT: 650 mL / NET: 915 mL      Physical Exam:  	Gen: Non toxic comfortable appearing  thin muscle wasting   	no jvd ,  	Pulm: decrease bs  no rales or ronchi or wheezing  	CV: RRR, S1S2; no rub  	Back: No CVA tenderness;  	Abd: +BS, soft, nontender/nondistended  	: No suprapubic tenderness  	UE: Warm, no cyanosis  no clubbing,  no edema  	LE: Warm, no cyanosis  no clubbing, no edema  	Neuro: alert and oriented. speech coherent   	skin decrease skin turgor    	    LABS/STUDIES  --------------------------------------------------------------------------------              10.2   6.8   >-----------<  204      [06-10-19 @ 21:14]              30.8     135  |  109  |  62  ----------------------------<  92      [06-11-19 @ 06:17]  4.1   |  13  |  2.74        Ca     10.4     [06-11-19 @ 06:17]    TPro  6.7  /  Alb  3.8  /  TBili  0.3  /  DBili  x   /  AST  34  /  ALT  16  /  AlkPhos  91  [06-10-19 @ 21:14]    PT/INR: PT 11.2 , INR 0.99       [06-11-19 @ 08:20]  PTT: 30.9       [06-11-19 @ 08:20]      Creatinine Trend:  SCr 2.74 [06-11 @ 06:17]  SCr 2.76 [06-10 @ 21:14]    Urinalysis - [06-11-19 @ 07:30]      Color Yellow / Appearance Turbid / SG 1.020 / pH 5.5      Gluc Negative / Ketone Negative  / Bili Negative / Urobili <2 mg/dL       Blood Large / Protein >600 mg/dL / Leuk Est Large / Nitrite Negative      RBC 10 / WBC >720 / Hyaline  / Gran  / Sq Epi  / Non Sq Epi 2 / Bacteria Moderate    Urine Creatinine 141      [06-11-19 @ 11:32]  Urine Protein 96      [06-11-19 @ 11:32]  Urine Sodium <20      [06-11-19 @ 11:32]  Urine Potassium 17      [06-11-19 @ 11:32]  Urine Osmolality 508      [06-11-19 @ 13:38]    PTH -- (Ca 10.4)      [06-11-19 @ 09:51]   15  Vitamin D (25OH) 9.7      [06-11-19 @ 10:02]    < from: CT Abdomen No Cont (06.11.19 @ 21:03) >    ******PRELIMINARY REPORT******    ******PRELIMINARY REPORT******          EXAM:  CT ABDOMEN ONLY                            PROCEDURE DATE:  06/11/2019      ******PRELIMINARY REPORT******    ******PRELIMINARY REPORT******              INTERPRETATION:  High density embolic coils in the region of the right   upper pole of the kidney consistent with 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 Abdominopelvic ascites and mesenteric   infiltration noted. Innumerable hepatic hypodensities suspicious for   metastases. Bilateral renal cysts and nodules.          < end of copied text >    < from: US Kidney and Bladder (06.11.19 @ 16:24) >  EXAM:  US KIDNEYS AND BLADDER                            PROCEDURE DATE:  06/11/2019            INTERPRETATION:  CLINICAL INFORMATION: History of right partial   nephrectomy. Bladder dysfunction, acute renal insufficiency.    COMPARISON: CT chest abdomen pelvis dated 5/16/2019.    TECHNIQUE: Sonography of the kidneys and bladder.     FINDINGS:    Right kidney:  9.0 cm. Status post partial nephrectomy. Upper pole   cortical cyst measures 6.9 x 4.1 cm. Partially exophytic solid mass on   lateral aspect of interpolar region measures 1.5 cm, corresponds to   finding at recent CT. No hydronephrosis.    Left kidney:  11.6 cm. No renal mass, hydronephrosis or calculi. 3.9 cm   simple cortical cyst lower pole.    Urinary bladder: Incompletely distended, mural thickening.    Prostate: 15 cc.    IMPRESSION:     No evidence of postrenal obstruction.    Mural thickening urinary bladder, question chronic cystitis.    1.5 cm solid mass mid right kidney suspicious for malignancy, unchanged   from CT of 5/16/2019.                        BJORN BLISS M.D., ATTENDING RADIOLOGIST  This document has been electronically signed. Jun 11 2019  4:33PM        < end of copied text >

## 2019-06-11 NOTE — H&P ADULT - HISTORY OF PRESENT ILLNESS
Patient was seen and examined at 11:50PM on 6/10/2019.     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 The patient has had about 20 lbs of unintentional weight loss since April 2019, with decline in energy, chills and poor appetite. He has had 7 episodes of watery non-bloody diarrhea daily, and endorses nausea but denies abdominal pain or vomiting. He also complains of persistent c    He had CT abd/pelvis with IV contrast which found a R hepatic lobe 5 cm low-attenuation lesion with numerous small-low attenuation lesionsthrough the liver highly suspicious for metastatic disease. Minimal ascites noted. Small 1.7cm suspected R renal interpolar enhancing lesion noted. Advjacent R renal surgical clips.  Small to moderate pericardial effusion noted. Few scattered tiny pulmonary nodules compatible with calcified granulomas.  PET scan found heterogeneous radiotracer activity in the liver with photopenic defect corresponding to the large right hepatic lobe lesion, not consistent with somatostatin receptor rich tumor, and tissue diagnosis was recommended. Multiple small focal areas of skeletal uptake noted suggesting somatostatin receptor rich osseous metastases Patient was seen and examined at 11:50PM on 6/10/2019.     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 The patient has had about 20 lbs of unintentional weight loss since April 2019, with decline in energy, chills, early satiety and poor appetite and generalized weakness. He has had 7 episodes of watery non-bloody diarrhea daily, and endorses nausea but denies fever, chest pain, palpitations, abdominal pain or vomiting. He has had difficulty ambulating due to his progressive weakness. He notes that he had visited Dr. Houston and Dr. Guerrero for his persistent diarrhea. He had stool testing which was reportedly negative for bacteria and parasites. He developed jen complexion, facial puffiness and excessive eye lacrimation. He Has not had SOB or cough. The patient had outpatient CT and PEG, chromogramin levels checked. He was noted to be hypotensive to 91/49 in the office, and patient had an unsuccessful attempts at venipuncture for labs and hydration, thus recommended to visit ED.     The patient had left lower lobe resection of carcinoid tumor in 2007 at [facility].     In the ED, T 97.5F, HR 70, /66, RR 18, SpO2 99%RA   Pt was given 1 L LR.

## 2019-06-11 NOTE — PROGRESS NOTE ADULT - SUBJECTIVE AND OBJECTIVE BOX
Patient is a 86y old  Male who presents with a chief complaint of diarrhea (11 Jun 2019 09:08)      SUBJECTIVE / OVERNIGHT EVENTS: comfortable. decreased urine output.  Review of Systems  chest pain no  palpitations no  sob no  nausea no  headache no    MEDICATIONS  (STANDING):  lactated ringers. 1000 milliLiter(s) (125 mL/Hr) IV Continuous <Continuous>  octreotide  Injectable 100 MICROGram(s) SubCutaneous three times a day    MEDICATIONS  (PRN):      Vital Signs Last 24 Hrs  T(C): 36.8 (11 Jun 2019 07:50), Max: 36.8 (11 Jun 2019 01:43)  T(F): 98.2 (11 Jun 2019 07:50), Max: 98.3 (11 Jun 2019 01:43)  HR: 81 (11 Jun 2019 07:50) (70 - 84)  BP: 101/63 (11 Jun 2019 07:50) (96/60 - 113/70)  BP(mean): --  RR: 18 (11 Jun 2019 07:50) (16 - 19)  SpO2: 98% (11 Jun 2019 07:50) (98% - 100%)    PHYSICAL EXAM:  GENERAL: NAD, well-developed  HEAD:  Atraumatic, Normocephalic  EYES: EOMI, PERRLA, conjunctiva and sclera clear  NECK: Supple, No JVD  CHEST/LUNG: Clear to auscultation bilaterally; No wheeze  HEART: Regular rate and rhythm; No murmurs, rubs, or gallops  ABDOMEN: Soft, Nontender, Nondistended; Bowel sounds present  EXTREMITIES:  2+ Peripheral Pulses, No clubbing, cyanosis, or edema  PSYCH: AAOx3  NEUROLOGY: non-focal  SKIN: flushing      LABS:                        10.2   6.8   )-----------( 204      ( 10 Jean 2019 21:14 )             30.8     06-11    135  |  109<H>  |  62<H>  ----------------------------<  92  4.1   |  13<L>  |  2.74<H>    Ca    10.4      11 Jun 2019 06:17    TPro  6.7  /  Alb  3.8  /  TBili  0.3  /  DBili  x   /  AST  34  /  ALT  16  /  AlkPhos  91  06-10    PT/INR - ( 11 Jun 2019 08:20 )   PT: 11.2 sec;   INR: 0.99 ratio         PTT - ( 11 Jun 2019 08:20 )  PTT:30.9 sec            RADIOLOGY & ADDITIONAL TESTS:    Imaging Personally Reviewed:    Consultant(s) Notes Reviewed:      Care Discussed with Consultants/Other Providers:

## 2019-06-11 NOTE — CONSULT NOTE ADULT - SUBJECTIVE AND OBJECTIVE BOX
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 Has neurogenic bladder needing straight caths. H/o BPH s/p TURP. Sleeping comfortably this evening.    PAST MEDICAL & SURGICAL HISTORY:  Neck mass: was resected, reportedly bening  Kidney lesion: partial resction- reportedly &quot;not active lesion&quot;  History of carcinoid syndrome  H/O carcinoid syndrome  S/P TURP      FAMILY HISTORY:  FH: lung cancer: sister  Family history of nasopharyngeal cancer: father      Allergies    penicillin (Swelling)    Intolerances        Social History:  Denies tobacco or alcohol use    Review of Systems:   Constitutional: No weight loss, no weakness  HEENT: No visual loss, no hearing loss, no sneezing  Skin: No rash or itching  CV: No chest pain, no chest pressure  Pulm: No shortness of breath, cough, or sputum  GI: No melena, no constipation  : Per HPI  Neuro: No headache, dizziness  MSK: No muscle pain, no joint pain  Heme: No anemia, bruising, or bleeding  Lymphatics: No enlarged nodes, no history of splenectomy  Psych: No depression of anxiety  Endo: No cold or heat intolerance  Allergies: No asthma, hives    Physical Exam:  Vital signs  T(C): 36.5 (19 @ 15:54), Max: 36.9 (19 @ 14:22)  HR: 80 (19 @ 15:54)  BP: 98/60 (19 @ 15:54)  SpO2: 96% (19 @ 15:54)  Wt(kg): --    Gen: No acute distress. Normal mood  HEENT: Normocephalic, neck supple  CV: Hemodynamically stable  Pulm: No increased work of breathing  Abd: soft, non-tender, non-distended  Back: No CVA tenderness, no midline pain  Extremities: No significant deformity or joint abnormality  Neuro: Alert & oriented x3, No focal deficits  Skin: Skin normal color, normal texture  Psych: Normal affect, normal behavior  : Nonpalpable bladder      Labs:       @ 06:17    WBC --    / Hct --    / SCr 2.74     06-10 @ 21:14    WBC 6.8   / Hct 30.8  / SCr 2.76     06-11    135  |  109<H>  |  62<H>  ----------------------------<  92  4.1   |  13<L>  |  2.74<H>    Ca    10.4      2019 06:17    TPro  6.7  /  Alb  3.8  /  TBili  0.3  /  DBili  x   /  AST  34  /  ALT  16  /  AlkPhos  91  06-10    PT/INR - ( 2019 08:20 )   PT: 11.2 sec;   INR: 0.99 ratio         PTT - ( 2019 08:20 )  PTT:30.9 sec  Urinalysis Basic - ( 2019 07:30 )    Color: Yellow / Appearance: Turbid / S.020 / pH: x  Gluc: x / Ketone: Negative  / Bili: Negative / Urobili: <2 mg/dL   Blood: x / Protein: >600 mg/dL / Nitrite: Negative   Leuk Esterase: Large / RBC: 10 /HPF / WBC >720 /HPF   Sq Epi: x / Non Sq Epi: 2 /HPF / Bacteria: Moderate Urology Consult Note    Chief Complaint:  urinary retention, neurogenic bladder    History of Present Illness:  86yoM with 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 Has neurogenic bladder needing straight caths. H/o BPH s/p TURP. Sleeping comfortably this evening. Also has a recently identified renal mass.    PAST MEDICAL & SURGICAL HISTORY:  Neck mass: was resected, reportedly bening  Kidney lesion: partial resction- reportedly &quot;not active lesion&quot;  History of carcinoid syndrome  H/O carcinoid syndrome  S/P TURP      FAMILY HISTORY:  FH: lung cancer: sister  Family history of nasopharyngeal cancer: father      Allergies    penicillin (Swelling)    Intolerances        Social History:  Denies tobacco or alcohol use    Review of Systems:   Constitutional: No weight loss, no weakness  HEENT: No visual loss, no hearing loss, no sneezing  Skin: No rash or itching  CV: No chest pain, no chest pressure  Pulm: No shortness of breath, cough, or sputum  GI: No melena, no constipation  : Per HPI  Neuro: No headache, dizziness  MSK: No muscle pain, no joint pain  Heme: No anemia, bruising, or bleeding  Lymphatics: No enlarged nodes, no history of splenectomy  Psych: No depression of anxiety  Endo: No cold or heat intolerance  Allergies: No asthma, hives    Physical Exam:  Vital signs  T(C): 36.5 (19 @ 15:54), Max: 36.9 (19 @ 14:22)  HR: 80 (19 @ 15:54)  BP: 98/60 (19 @ 15:54)  SpO2: 96% (19 @ 15:54)  Wt(kg): --    Gen: No acute distress. Normal mood  HEENT: Normocephalic, neck supple  CV: Hemodynamically stable  Pulm: No increased work of breathing  Abd: soft, non-tender, non-distended  Back: No CVA tenderness, no midline pain  Extremities: No significant deformity or joint abnormality  Neuro: Alert & oriented x3, No focal deficits  Skin: Skin normal color, normal texture  Psych: Normal affect, normal behavior  : Nonpalpable bladder      Labs:      06-11 @ 06:17    WBC --    / Hct --    / SCr 2.74     06-10 @ 21:14    WBC 6.8   / Hct 30.8  / SCr 2.76         135  |  109<H>  |  62<H>  ----------------------------<  92  4.1   |  13<L>  |  2.74<H>    Ca    10.4      2019 06:17    TPro  6.7  /  Alb  3.8  /  TBili  0.3  /  DBili  x   /  AST  34  /  ALT  16  /  AlkPhos  91  06-10    PT/INR - ( 2019 08:20 )   PT: 11.2 sec;   INR: 0.99 ratio         PTT - ( 2019 08:20 )  PTT:30.9 sec  Urinalysis Basic - ( 2019 07:30 )    Color: Yellow / Appearance: Turbid / S.020 / pH: x  Gluc: x / Ketone: Negative  / Bili: Negative / Urobili: <2 mg/dL   Blood: x / Protein: >600 mg/dL / Nitrite: Negative   Leuk Esterase: Large / RBC: 10 /HPF / WBC >720 /HPF   Sq Epi: x / Non Sq Epi: 2 /HPF / Bacteria: Moderate    Renal US:  1.5 cm solid mass mid right kidney suspicious for malignancy, unchanged   from CT of 2019.

## 2019-06-11 NOTE — H&P ADULT - NSHPSOCIALHISTORY_GEN_ALL_CORE
He denies alcohol or tobacco use.  He lives with his wife.  He does not require use of cane or walker.

## 2019-06-11 NOTE — H&P ADULT - PROBLEM SELECTOR PLAN 2
Patient has palpable hepatomegaly and early satiety, likely related to his hepatic mass. PET scan results were not consistent with somatostatin receptor rich tumor.  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  Obtain CDs of PET and CT to upload for radiology  Will consult IR team in AM to evaluate the patient for IR guided biopsy.  Consult Dr. Guerrero in AM

## 2019-06-11 NOTE — ED ADULT NURSE REASSESSMENT NOTE - NS ED NURSE REASSESS COMMENT FT1
8 Chad Hinojosa contacted about bed assignment. RN told she will call when room is clean and transport can be in progress.

## 2019-06-11 NOTE — H&P ADULT - NSHPLABSRESULTS_GEN_ALL_CORE
Labs, imaging and EKG personally reviewed by me.     CBC found Hgb of 10.2. Coags are WNL.   CMP is notable for chloride elevated at 109.   Serum HCO3 is very low at 11.   Pt BUN and SCr elevated, at 62 and 2.76 respectively.   Calcium is mildly elevated at 10.7.     LABS:                        10.2   6.8   )-----------( 204      ( 10 Jean 2019 21:14 )             30.8     Hgb Trend: 10.2<--  06-10    137  |  109<H>  |  62<H>  ----------------------------<  117<H>  4.2   |  11<L>  |  2.76<H>    Ca    10.7<H>      10 Jean 2019 21:14    TPro  6.7  /  Alb  3.8  /  TBili  0.3  /  DBili  x   /  AST  34  /  ALT  16  /  AlkPhos  91  06-10    Creatinine Trend: 2.76<--  PT/INR - ( 10 Jean 2019 21:14 )   PT: 11.2 sec;   INR: 0.97 ratio         PTT - ( 10 Jean 2019 21:14 )  PTT:32.1 sec Labs, imaging and EKG personally reviewed by me.     CBC found Hgb of 10.2. Coags are WNL.   CMP is notable for chloride elevated at 109.   Serum HCO3 is very low at 11.   Pt BUN and SCr elevated, at 62 and 2.76 respectively.   Calcium is mildly elevated at 10.7.     LABS:                        10.2   6.8   )-----------( 204      ( 10 Jean 2019 21:14 )             30.8     Hgb Trend: 10.2<--  06-10    137  |  109<H>  |  62<H>  ----------------------------<  117<H>  4.2   |  11<L>  |  2.76<H>    Ca    10.7<H>      10 Jean 2019 21:14    TPro  6.7  /  Alb  3.8  /  TBili  0.3  /  DBili  x   /  AST  34  /  ALT  16  /  AlkPhos  91  06-10    Creatinine Trend: 2.76<--  PT/INR - ( 10 Jean 2019 21:14 )   PT: 11.2 sec;   INR: 0.97 ratio         PTT - ( 10 Jean 2019 21:14 )  PTT:32.1 sec    REVIEW OF OUTPATIENT IMAGING:   He had CT abd/pelvis with IV contrast which found a R hepatic lobe 5 cm low-attenuation lesion with numerous small-low attenuation lesions through the liver highly suspicious for metastatic disease. Minimal ascites noted. Small 1.7cm suspected R renal interpolar enhancing lesion noted. Adjacent R renal surgical clips.  Small to moderate pericardial effusion noted. Few scattered tiny pulmonary nodules compatible with calcified granulomas.  PET scan found heterogeneous radiotracer activity in the liver with photopenic defect corresponding to the large right hepatic lobe lesion, not consistent with somatostatin receptor rich tumor, and tissue diagnosis was recommended. Multiple small focal areas of skeletal uptake noted suggesting somatostatin receptor rich osseous metastases      Chromogranin 320, serotonin 8, 5 HIAA 47 Labs, imaging and EKG personally reviewed by me.     CBC found Hgb of 10.2. Coags are WNL.   CMP is notable for chloride elevated at 109.   Serum HCO3 is very low at 11.   Pt BUN and SCr elevated, at 62 and 2.76 respectively.   Calcium is mildly elevated at 10.7.     EKG sinus HR 80s, LAD, 1st deg AVB, widened QRS    LABS:                        10.2   6.8   )-----------( 204      ( 10 Jean 2019 21:14 )             30.8     Hgb Trend: 10.2<--  06-10    137  |  109<H>  |  62<H>  ----------------------------<  117<H>  4.2   |  11<L>  |  2.76<H>    Ca    10.7<H>      10 Jean 2019 21:14    TPro  6.7  /  Alb  3.8  /  TBili  0.3  /  DBili  x   /  AST  34  /  ALT  16  /  AlkPhos  91  06-10    Creatinine Trend: 2.76<--  PT/INR - ( 10 Jean 2019 21:14 )   PT: 11.2 sec;   INR: 0.97 ratio         PTT - ( 10 Jean 2019 21:14 )  PTT:32.1 sec    REVIEW OF OUTPATIENT IMAGING:   He had CT abd/pelvis with IV contrast which found a R hepatic lobe 5 cm low-attenuation lesion with numerous small-low attenuation lesions through the liver highly suspicious for metastatic disease. Minimal ascites noted. Small 1.7cm suspected R renal interpolar enhancing lesion noted. Adjacent R renal surgical clips.  Small to moderate pericardial effusion noted. Few scattered tiny pulmonary nodules compatible with calcified granulomas.  PET scan found heterogeneous radiotracer activity in the liver with photopenic defect corresponding to the large right hepatic lobe lesion, not consistent with somatostatin receptor rich tumor, and tissue diagnosis was recommended. Multiple small focal areas of skeletal uptake noted suggesting somatostatin receptor rich osseous metastases      Chromogranin 320, serotonin 8, 5 HIAA 47

## 2019-06-11 NOTE — CONSULT NOTE ADULT - ASSESSMENT
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   diarrhea is improving  given metastatic disease renal lesion also likely met disease however, if needed to clarify further between renal cell ca . if no contraindication then MRI abd with bernardino may help delineate this lesion further [ to have once cr improves ]   add sodium bicarbonate 650 mg qid  d/w family at bedside

## 2019-06-11 NOTE — H&P ADULT - NSHPPHYSICALEXAM_GEN_ALL_CORE
Vital Signs Last 24 Hrs  T(C): 36.5 (10 Jean 2019 20:56), Max: 36.5 (10 Jean 2019 17:38)  T(F): 97.7 (10 Jean 2019 20:56), Max: 97.7 (10 Jean 2019 17:38)  HR: 76 (10 Jean 2019 20:56) (70 - 84)  BP: 113/70 (10 Jean 2019 20:56) (96/60 - 113/70)  BP(mean): --  RR: 18 (10 Jean 2019 20:56) (18 - 19)  SpO2: 99% (10 Jean 2019 20:56) (99% - 100%)    PHYSICAL EXAM:  GENERAL: NAD, well-groomed, well-developed  HEAD:  Atraumatic, Normocephalic, + facial erythema, + facial edema  EYES: EOMI, PERRLA, conjunctiva and sclera clear, +excessive lacrimation  ENMT: + very hard of hearing, No oropharyngeal exudates, erythema or lesions,  Moist mucous membranes  NECK: Supple, no cervical lymphadenopathy  NERVOUS SYSTEM:  Alert & Oriented X3, CN II-XII intact, 5/5 BUE and BLE motor strength, full sensation to light touch   CHEST/LUNG: Clear to auscultation bilaterally; No rales, no  rhonchi, no wheezing  HEART: Regular rate and rhythm; No murmurs, rubs, or gallops  ABDOMEN: Soft, Nontender, Nondistended, +Hepatomegaly  EXTREMITIES:  2+ Peripheral Pulses, No clubbing, cyanosis, or edema  LYMPH: No cervical lymphadenopathy noted  SKIN: + nonblanching macular erythematous rash at ankle bilaterally (patient reports this rash is chronic and had been worked up by dermatologist in the past)

## 2019-06-12 ENCOUNTER — TRANSCRIPTION ENCOUNTER (OUTPATIENT)
Age: 84
End: 2019-06-12

## 2019-06-12 DIAGNOSIS — N28.9 DISORDER OF KIDNEY AND URETER, UNSPECIFIED: ICD-10-CM

## 2019-06-12 PROBLEM — Z86.39 PERSONAL HISTORY OF OTHER ENDOCRINE, NUTRITIONAL AND METABOLIC DISEASE: Chronic | Status: ACTIVE | Noted: 2019-06-11

## 2019-06-12 PROBLEM — R22.1 LOCALIZED SWELLING, MASS AND LUMP, NECK: Chronic | Status: ACTIVE | Noted: 2019-06-11

## 2019-06-12 LAB
ANION GAP SERPL CALC-SCNC: 12 MMOL/L — SIGNIFICANT CHANGE UP (ref 5–17)
APPEARANCE UR: ABNORMAL
BILIRUB UR-MCNC: NEGATIVE — SIGNIFICANT CHANGE UP
BUN SERPL-MCNC: 62 MG/DL — HIGH (ref 7–23)
CALCIUM SERPL-MCNC: 9.3 MG/DL — SIGNIFICANT CHANGE UP (ref 8.4–10.5)
CHLORIDE SERPL-SCNC: 112 MMOL/L — HIGH (ref 96–108)
CO2 SERPL-SCNC: 15 MMOL/L — LOW (ref 22–31)
COLOR SPEC: YELLOW — SIGNIFICANT CHANGE UP
CREAT SERPL-MCNC: 2.63 MG/DL — HIGH (ref 0.5–1.3)
DIFF PNL FLD: SIGNIFICANT CHANGE UP
GLUCOSE SERPL-MCNC: 126 MG/DL — HIGH (ref 70–99)
GLUCOSE UR QL: NEGATIVE — SIGNIFICANT CHANGE UP
KETONES UR-MCNC: NEGATIVE — SIGNIFICANT CHANGE UP
LEUKOCYTE ESTERASE UR-ACNC: ABNORMAL
NITRITE UR-MCNC: POSITIVE
PH UR: 6 — SIGNIFICANT CHANGE UP (ref 5–8)
POTASSIUM SERPL-MCNC: 3.9 MMOL/L — SIGNIFICANT CHANGE UP (ref 3.5–5.3)
POTASSIUM SERPL-SCNC: 3.9 MMOL/L — SIGNIFICANT CHANGE UP (ref 3.5–5.3)
PROT UR-MCNC: ABNORMAL
SODIUM SERPL-SCNC: 139 MMOL/L — SIGNIFICANT CHANGE UP (ref 135–145)
SP GR SPEC: 1.02 — SIGNIFICANT CHANGE UP (ref 1.01–1.02)
UROBILINOGEN FLD QL: SIGNIFICANT CHANGE UP

## 2019-06-12 RX ORDER — SODIUM CHLORIDE 9 MG/ML
1000 INJECTION INTRAMUSCULAR; INTRAVENOUS; SUBCUTANEOUS
Refills: 0 | Status: DISCONTINUED | OUTPATIENT
Start: 2019-06-12 | End: 2019-06-16

## 2019-06-12 RX ADMIN — ERGOCALCIFEROL 50000 UNIT(S): 1.25 CAPSULE ORAL at 17:16

## 2019-06-12 RX ADMIN — OCTREOTIDE ACETATE 100 MICROGRAM(S): 200 INJECTION, SOLUTION INTRAVENOUS; SUBCUTANEOUS at 17:16

## 2019-06-12 RX ADMIN — OCTREOTIDE ACETATE 100 MICROGRAM(S): 200 INJECTION, SOLUTION INTRAVENOUS; SUBCUTANEOUS at 22:40

## 2019-06-12 RX ADMIN — HEPARIN SODIUM 5000 UNIT(S): 5000 INJECTION INTRAVENOUS; SUBCUTANEOUS at 22:43

## 2019-06-12 RX ADMIN — HEPARIN SODIUM 5000 UNIT(S): 5000 INJECTION INTRAVENOUS; SUBCUTANEOUS at 17:15

## 2019-06-12 RX ADMIN — OCTREOTIDE ACETATE 100 MICROGRAM(S): 200 INJECTION, SOLUTION INTRAVENOUS; SUBCUTANEOUS at 06:56

## 2019-06-12 RX ADMIN — Medication 650 MILLIGRAM(S): at 17:16

## 2019-06-12 NOTE — PROGRESS NOTE ADULT - ASSESSMENT
85 yo M with PMH of carcinoid syndrome s/p left lower lobe resection, bladder disease requiring intermittent straight cath who presents to the ED with complaint of persistent diarrhea, fatigue, decreased po intake  and weight loss along with borderline hypotension    1- mia on ckd  2- borderline hypotension  3- carcinoid   4- diarrhea  5- acidosis     mia in setting of volume depletion likely  cr steady at this high level   cont with straight cath   bladder scan  if retention then calzada  urine suspected of UTI    UCx pending   diarrhea  given metastatic disease renal lesion also likely met disease however, if needed to clarify further between renal cell ca . if no contraindication then MRI abd with bernardino may help delineate this lesion further [ to have once cr improves ]    sodium bicarbonate 650 mg qid  d/w family at bedside

## 2019-06-12 NOTE — PROGRESS NOTE ADULT - PROBLEM SELECTOR PLAN 3
Renal consult  Apparently was present and treated several years ago (2007). patient not aware of tissue diagnosis. Would attempt to get records regarding this.  May be a metastasis or second primary.

## 2019-06-12 NOTE — PROVIDER CONTACT NOTE (OTHER) - SITUATION
Pt unable to tolerate calzada catheter insertion  Catheter bubble unable to inflate, resistance met, pt with pain

## 2019-06-12 NOTE — PROGRESS NOTE ADULT - SUBJECTIVE AND OBJECTIVE BOX
Patient is a 86y old  Male who presents with a chief complaint of diarrhea (2019 16:20)      SUBJECTIVE / OVERNIGHT EVENTS: Comfortable without new complaints. Diarrhea improving Wife at bedside.  Review of Systems  chest pain no  palpitations no  sob no  nausea no  headache no    MEDICATIONS  (STANDING):  ergocalciferol 63350 Unit(s) Oral every week  heparin  Injectable 5000 Unit(s) SubCutaneous every 8 hours  octreotide  Injectable 100 MICROGram(s) SubCutaneous three times a day  sodium bicarbonate 650 milliGRAM(s) Oral every 6 hours  sodium chloride 0.9%. 1000 milliLiter(s) (75 mL/Hr) IV Continuous <Continuous>    MEDICATIONS  (PRN):      Vital Signs Last 24 Hrs  T(C): 36.8 (2019 16:51), Max: 36.8 (2019 00:06)  T(F): 98.2 (2019 16:51), Max: 98.3 (2019 00:06)  HR: 76 (2019 16:51) (68 - 83)  BP: 97/59 (2019 16:51) (85/41 - 107/70)  BP(mean): --  RR: 18 (2019 16:51) (18 - 18)  SpO2: 99% (2019 16:51) (97% - 99%)    PHYSICAL EXAM:  GENERAL: NAD, well-developed  HEAD:  Atraumatic, Normocephalic  EYES: EOMI, PERRLA, conjunctiva and sclera clear  NECK: Supple, No JVD  CHEST/LUNG: Clear to auscultation bilaterally; No wheeze  HEART: Regular rate and rhythm; No murmurs, rubs, or gallops  ABDOMEN: Soft, Nontender, Nondistended; Bowel sounds present  EXTREMITIES:  2+ Peripheral Pulses, No clubbing, cyanosis, or edema  PSYCH: AAOx3  NEUROLOGY: non-focal  SKIN: No rashes or lesions    LABS:                        10.2   6.8   )-----------( 204      ( 10 Jean 2019 21:14 )             30.8     06-12    139  |  112<H>  |  62<H>  ----------------------------<  126<H>  3.9   |  15<L>  |  2.63<H>    Ca    9.3      2019 06:48    TPro  6.7  /  Alb  3.8  /  TBili  0.3  /  DBili  x   /  AST  34  /  ALT  16  /  AlkPhos  91  06-10    PT/INR - ( 2019 08:20 )   PT: 11.2 sec;   INR: 0.99 ratio         PTT - ( 2019 08:20 )  PTT:30.9 sec      Urinalysis Basic - ( 2019 09:34 )    Color: Yellow / Appearance: Slightly Turbid / S.019 / pH: x  Gluc: x / Ketone: Negative  / Bili: Negative / Urobili: <2 mg/dL   Blood: x / Protein: 100 mg/dL / Nitrite: Positive   Leuk Esterase: Large / RBC: 6 /HPF /  /HPF   Sq Epi: x / Non Sq Epi: 5 /HPF / Bacteria: TNTC          RADIOLOGY & ADDITIONAL TESTS:    Imaging Personally Reviewed:    Consultant(s) Notes Reviewed:      Care Discussed with Consultants/Other Providers:

## 2019-06-12 NOTE — PROGRESS NOTE ADULT - SUBJECTIVE AND OBJECTIVE BOX
[facility]   [phone]  RENAL FOLLOW UP NOTE  --------------------------------------------------------------------------------  Chief Complaint:    24 hour events/subjective:    seen earlier. wife at bedside   c/o hesitancy urinating    PAST HISTORY  --------------------------------------------------------------------------------  No significant changes to PMH, PSH, FHx, SHx, unless otherwise noted    ALLERGIES & MEDICATIONS  --------------------------------------------------------------------------------  Allergies    penicillin (Swelling)    Intolerances      Standing Inpatient Medications  ergocalciferol 47960 Unit(s) Oral every week  heparin  Injectable 5000 Unit(s) SubCutaneous every 8 hours  octreotide  Injectable 100 MICROGram(s) SubCutaneous three times a day  sodium bicarbonate 650 milliGRAM(s) Oral every 6 hours  sodium chloride 0.9%. 1000 milliLiter(s) IV Continuous <Continuous>    PRN Inpatient Medications      REVIEW OF SYSTEMS  --------------------------------------------------------------------------------    Gen: denies fatigue, fevers/chills,  CVS: denies chest pain/palpitations  Resp: denies SOB/Cough  GI: Denies N/V/Abd pain  : Denies dysuria/hematuria    All other systems were reviewed and are negative, except as noted.    VITALS/PHYSICAL EXAM  --------------------------------------------------------------------------------  T(C): 36.8 (06-12-19 @ 16:51), Max: 36.8 (06-12-19 @ 00:06)  HR: 76 (06-12-19 @ 16:51) (68 - 83)  BP: 97/59 (06-12-19 @ 16:51) (85/41 - 107/70)  RR: 18 (06-12-19 @ 16:51) (18 - 18)  SpO2: 99% (06-12-19 @ 16:51) (97% - 99%)  Wt(kg): --        06-11-19 @ 07:01  -  06-12-19 @ 07:00  --------------------------------------------------------  IN: 2345 mL / OUT: 750 mL / NET: 1595 mL    06-12-19 @ 07:01  -  06-12-19 @ 22:17  --------------------------------------------------------  IN: 120 mL / OUT: 550 mL / NET: -430 mL      Physical Exam:  	  Gen: Non toxic comfortable appearing  thin muscle wasting   	no jvd ,  	Pulm: decrease bs  no rales or ronchi or wheezing  	CV: RRR, S1S2; no rub  	Back: No CVA tenderness;  	Abd: +BS, soft, nontender/nondistended  	: No suprapubic tenderness  	UE: Warm, no cyanosis  no clubbing,  no edema  	LE: Warm, no cyanosis  no clubbing, no edema  	Neuro: alert and oriented. speech coherent       	  LABS/STUDIES  --------------------------------------------------------------------------------    139  |  112  |  62  ----------------------------<  126      [06-12-19 @ 06:48]  3.9   |  15  |  2.63        Ca     9.3     [06-12-19 @ 06:48]      PT/INR: PT 11.2 , INR 0.99       [06-11-19 @ 08:20]  PTT: 30.9       [06-11-19 @ 08:20]      Creatinine Trend:  SCr 2.63 [06-12 @ 06:48]  SCr 2.74 [06-11 @ 06:17]  SCr 2.76 [06-10 @ 21:14]              Urinalysis - [06-12-19 @ 09:34]      Color Yellow / Appearance Slightly Turbid / SG 1.019 / pH 6.0      Gluc Negative / Ketone Negative  / Bili Negative / Urobili <2 mg/dL       Blood Trace / Protein 100 mg/dL / Leuk Est Large / Nitrite Positive      RBC 6 /  / Hyaline 0 / Gran 5 / Sq Epi  / Non Sq Epi 5 / Bacteria TNTC    Urine Creatinine 141      [06-11-19 @ 11:32]  Urine Protein 96      [06-11-19 @ 11:32]  Urine Sodium <20      [06-11-19 @ 11:32]  Urine Potassium 17      [06-11-19 @ 11:32]  Urine Osmolality 508      [06-11-19 @ 13:38]    PTH -- (Ca 10.4)      [06-11-19 @ 09:51]   15  Vitamin D (25OH) 9.7      [06-11-19 @ 10:02]

## 2019-06-12 NOTE — PROGRESS NOTE ADULT - SUBJECTIVE AND OBJECTIVE BOX
Pt is seen and examined  pt is awake and lying in bed/  No BM x 2 days  Very weak, has not been out of bed  Has difficulty with urination, requires self catheterization.  pt seems comfortable and denies any complaints at this time        MEDICATIONS  (STANDING):  ergocalciferol 34004 Unit(s) Oral every week  heparin  Injectable 5000 Unit(s) SubCutaneous every 8 hours  octreotide  Injectable 100 MICROGram(s) SubCutaneous three times a day  sodium bicarbonate 650 milliGRAM(s) Oral every 6 hours  sodium chloride 0.9%. 1000 milliLiter(s) (75 mL/Hr) IV Continuous <Continuous>    MEDICATIONS  (PRN):      Allergies    penicillin (Swelling)    Intolerances        Vital Signs Last 24 Hrs  T(C): 36.8 (12 Jun 2019 08:37), Max: 36.8 (12 Jun 2019 00:06)  T(F): 98.3 (12 Jun 2019 08:37), Max: 98.3 (12 Jun 2019 00:06)  HR: 83 (12 Jun 2019 08:37) (68 - 83)  BP: 107/70 (12 Jun 2019 08:37) (85/41 - 107/70)  BP(mean): --  RR: 18 (12 Jun 2019 08:37) (18 - 18)  SpO2: 97% (12 Jun 2019 08:37) (97% - 98%)    PHYSICAL EXAM  General: adult in NAD  HEENT: , anicteric sclera, pink conjunctiva  Neck: supple  CV: normal S1/S2 with no murmur rubs or gallops  Lungs: positive air movement b/l ant lungs,clear to auscultation, no wheezes, no rales  Abdomen: soft non-tender non-distended, no hepatosplenomegaly  Ext: no clubbing cyanosis or edema  Skin: no rashes and no petechiae  Neuro: alert, hard of hearing and  no focal deficits  LABS:                          10.2   6.8   )-----------( 204      ( 10 Jean 2019 21:14 )             30.8         Mean Cell Volume : 98.7 fl  Mean Cell Hemoglobin : 32.9 pg  Mean Cell Hemoglobin Concentration : 33.3 gm/dL  Auto Neutrophil # : 4.3 K/uL  Auto Lymphocyte # : 1.9 K/uL  Auto Monocyte # : 0.6 K/uL  Auto Eosinophil # : 0.0 K/uL  Auto Basophil # : 0.0 K/uL  Auto Neutrophil % : 63.2 %  Auto Lymphocyte % : 28.1 %  Auto Monocyte % : 8.1 %  Auto Eosinophil % : 0.3 %  Auto Basophil % : 0.3 %      06-12    139  |  112<H>  |  62<H>  ----------------------------<  126<H>  3.9   |  15<L>  |  2.63<H>    Ca    9.3      12 Jun 2019 06:48    TPro  6.7  /  Alb  3.8  /  TBili  0.3  /  DBili  x   /  AST  34  /  ALT  16  /  AlkPhos  91  06-10      PT/INR - ( 11 Jun 2019 08:20 )   PT: 11.2 sec;   INR: 0.99 ratio         PTT - ( 11 Jun 2019 08:20 )  PTT:30.9 sec      RADIOLOGY & ADDITIONAL STUDIES:  < from: CT Abdomen No Cont (06.11.19 @ 21:03) >  EXAM:  CT ABDOMEN ONLY                            PROCEDURE DATE:  06/11/2019            INTERPRETATION:  CLINICAL INFORMATION: Carcinoid tumor status post lung   resection. Recent outpatient CT showed liver lesions. Pending biopsy.     COMPARISON:Outside CT dated 5/16/2019,  PET CT 6/4/2019. Renal   ultrasound 6/11/2019.    PROCEDURE:   CT of the Abdomen was performed without intravenous contrast.   Intravenous contrast: None.  Oral contrast: None.  Sagittal and coronal reformats were performed.    FINDINGS:    Evaluation of the solid organs and vessels is limited without use of IV   contrast.    LOWER CHEST: Bilateral lower lobe subsegmental atelectasis. Aortic valve   calcifications. Moderate pericardial effusion. Hypodense blood pool with   respect to the interventricular septum, suggestive of anemia.    LIVER: Innumerable hypodense foci throughout the liver compatible with   metastatic disease, increased in size and number since 5/16/2019.  BILE DUCTS: Normal caliber.  GALLBLADDER: Within normal limits.  SPLEEN: Within normal limits.  PANCREAS: Mild fatty atrophy.  ADRENALS: Within normal limits.  KIDNEYS/URETERS: Linear metallic density within a vessel supplying the   right upper pole compatible with prior embolization. Solidright renal   mass again noted in the right kidney, better evaluated on ultrasound   dated 6/11/2019. Multiple bilateral renal cysts the largest in the right   upper pole measuring 7 cm.    VISUALIZED PORTIONS:    BOWEL: Visualized portions demonstrate no evidence of obstruction.   PERITONEUM: Trace ascites.  VESSELS: Atherosclerotic changes.  RETROPERITONEUM: No lymphadenopathy.    ABDOMINAL WALL: Within normal limits.  BONES: Multilevel degenerative changes of the spine predominantly   involving the lower thoracic and lumbar vertebral bodies.    IMPRESSION:     Evaluation of the solid organs and vessels is limited without use of IV   contrast.    Innumerable hypodense lesions throughout the liver compatible with   metastatic disease, with progression of disease since 5/16/2019.    Previously noted solid right renal mass is not well evaluated without   intravenous contrast.    Moderate pericardial effusion.    < from: US Kidney and Bladder (06.11.19 @ 16:24) >  EXAM:  US KIDNEYS AND BLADDER                            PROCEDURE DATE:  06/11/2019            INTERPRETATION:  CLINICAL INFORMATION: History of right partial   nephrectomy. Bladder dysfunction, acute renal insufficiency.    COMPARISON: CT chest abdomen pelvis dated 5/16/2019.    TECHNIQUE: Sonography of the kidneys and bladder.     FINDINGS:    Right kidney:  9.0 cm. Status post partial nephrectomy. Upper pole   cortical cyst measures 6.9 x 4.1 cm. Partially exophytic solid mass on   lateral aspect of interpolar region measures 1.5 cm, corresponds to   finding at recent CT. No hydronephrosis.    Left kidney:  11.6 cm. No renal mass, hydronephrosis or calculi. 3.9 cm   simple cortical cyst lower pole.    Urinary bladder: Incompletely distended, mural thickening.    Prostate: 15 cc.    IMPRESSION:     No evidence of postrenal obstruction.    Mural thickening urinary bladder, question chronic cystitis.    1.5 cm solid mass mid right kidney suspicious for malignancy, unchanged   from CT of 5/16/2019.    < from: US Kidney and Bladder (06.11.19 @ 16:24) >  EXAM:  US KIDNEYS AND BLADDER                            PROCEDURE DATE:  06/11/2019            INTERPRETATION:  CLINICAL INFORMATION: History of right partial   nephrectomy. Bladder dysfunction, acute renal insufficiency.    COMPARISON: CT chest abdomen pelvis dated 5/16/2019.    TECHNIQUE: Sonography of the kidneys and bladder.     FINDINGS:    Right kidney:  9.0 cm. Status post partial nephrectomy. Upper pole   cortical cyst measures 6.9 x 4.1 cm. Partially exophytic solid mass on   lateral aspect of interpolar region measures 1.5 cm, corresponds to   finding at recent CT. No hydronephrosis.    Left kidney:  11.6 cm. No renal mass, hydronephrosis or calculi. 3.9 cm   simple cortical cyst lower pole.    Urinary bladder: Incompletely distended, mural thickening.    Prostate: 15 cc.    IMPRESSION:     No evidence of postrenal obstruction.    Mural thickening urinary bladder, question chronic cystitis.    1.5 cm solid mass mid right kidney suspicious for malignancy, unchanged   from CT of 5/16/2019.

## 2019-06-12 NOTE — CHART NOTE - NSCHARTNOTEFT_GEN_A_CORE
Pt is currently retaining, and gets continuous straight cath regularly. Attempted Rae today. However, when nurse inflated balloon in the Rae, patient complained of severe pain, and Rae had to be removed  In the meantime, pt's last straight cath was ~450 mL.   Dr. Goldberg aware, and will have his PA or an associate come and evaluate him for Rae evaluation.    Marta Calvillo, PA-C  #[id number]

## 2019-06-12 NOTE — PROGRESS NOTE ADULT - PROBLEM SELECTOR PLAN 2
Needs liver biopsy as Ct scan suggesting rapid progression and dodatate PET scan was not positive for the liver lesion which are atypical for carcinoid tumors

## 2019-06-12 NOTE — PROGRESS NOTE ADULT - ASSESSMENT
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    Carcinoid syndrome.  -  Patient's flusing and diarrhea are likely due to his carcinoid syndrome, serotonin secretion, and potentially other vasoactive substances.   - octreotide 100mg subQ TID to treat flushing and diarrhea.    - oncology follow.    Liver masses  - Liver bx pending     Hypercalcemia.  - Patient noted to have mild hypercalcemia, may be related to suspected carcinoid tumor. Check PTH, vitamin D level.  - Will continue IVF.    CONSTANZA (acute kidney injury).   - Patient was noted here to have elevated SCr of 2.6, which is high compared to outpatient SCr of 1.91.  - CONSTANZA is likely pre-renal related to hypovolemia from excessive GI losses.  - check UA, urine protein: creatinine, bladder and kidney US since patient has known history of bladder dysfunction (patient states he has a "weak bladder") to assess for obstruction.   - urology evaluation noted.  - Rae.    Pericardial effusion.  - Patient was found on outpatient CT scan to have a pericardial effusion and was recommended for TTE.  - Currently, the patient is hemodynamically WNL. He denies palpitations and denies lightheadedness while lying supine.  - TTE pending .     Metabolic acidosis, normal anion gap (NAG).   - Patient found to have metabolic acidosis with normal anion gap, hyperchloremia likely due to GI losses.   - continue IVF.  - Follow up BMP.  - nephrology follow.    Prophylactic measure.  - VTE ppx with start heparin subQ    Activity: OOB with assistance, fall precaution  Diet: regular.   Further action as per clinical course  d/w patient and wife at bedside  Phong Dash MD pager [phone] This patient is an 86yoM with 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    Carcinoid syndrome.  -  Patient's flusing and diarrhea are likely due to his carcinoid syndrome, serotonin secretion, and potentially other vasoactive substances.   - octreotide 100mg subQ TID to treat flushing and diarrhea.    - oncology follow.    Liver masses  - Liver bx pending     Hypercalcemia.  - Patient noted to have mild hypercalcemia, may be related to suspected carcinoid tumor. Check PTH, vitamin D level.  - Will continue IVF.    CONSTANZA (acute kidney injury).   - Patient was noted here to have elevated SCr of 2.6, which is high compared to outpatient SCr of 1.91.  - CONSTANZA is likely pre-renal related to hypovolemia from excessive GI losses.  - check UA, urine protein: creatinine, bladder and kidney US since patient has known history of bladder dysfunction (patient states he has a "weak bladder") to assess for obstruction.   - urology evaluation noted.  - Rae.    UTI  - chronic. Observe off antibiotics.    Pericardial effusion.  - Patient was found on outpatient CT scan to have a pericardial effusion and was recommended for TTE.  - Currently, the patient is hemodynamically WNL. He denies palpitations and denies lightheadedness while lying supine.  - TTE pending .     Metabolic acidosis, normal anion gap (NAG).   - Patient found to have metabolic acidosis with normal anion gap, hyperchloremia likely due to GI losses.   - continue IVF.  - Follow up BMP.  - nephrology follow.    Prophylactic measure.  - VTE ppx with start heparin subQ    Activity: OOB with assistance, fall precaution  Diet: regular.   Further action as per clinical course  d/w patient and wife at bedside  Phong Dash MD pager [phone]

## 2019-06-13 ENCOUNTER — APPOINTMENT (OUTPATIENT)
Dept: CT IMAGING | Facility: HOSPITAL | Age: 84
End: 2019-06-13

## 2019-06-13 ENCOUNTER — RESULT REVIEW (OUTPATIENT)
Age: 84
End: 2019-06-13

## 2019-06-13 LAB
ANION GAP SERPL CALC-SCNC: 14 MMOL/L — SIGNIFICANT CHANGE UP (ref 5–17)
BLD GP AB SCN SERPL QL: NEGATIVE — SIGNIFICANT CHANGE UP
BUN SERPL-MCNC: 58 MG/DL — HIGH (ref 7–23)
CALCIUM SERPL-MCNC: 9.4 MG/DL — SIGNIFICANT CHANGE UP (ref 8.4–10.5)
CGA FLD-MCNC: HIGH NG/ML
CHLORIDE SERPL-SCNC: 111 MMOL/L — HIGH (ref 96–108)
CO2 SERPL-SCNC: 15 MMOL/L — LOW (ref 22–31)
CREAT SERPL-MCNC: 2.64 MG/DL — HIGH (ref 0.5–1.3)
GLUCOSE SERPL-MCNC: 97 MG/DL — SIGNIFICANT CHANGE UP (ref 70–99)
HCT VFR BLD CALC: 26.8 % — LOW (ref 39–50)
HCT VFR BLD CALC: 28.6 % — LOW (ref 39–50)
HGB BLD-MCNC: 8.7 G/DL — LOW (ref 13–17)
HGB BLD-MCNC: 9.5 G/DL — LOW (ref 13–17)
MCHC RBC-ENTMCNC: 31.8 PG — SIGNIFICANT CHANGE UP (ref 27–34)
MCHC RBC-ENTMCNC: 32.5 GM/DL — SIGNIFICANT CHANGE UP (ref 32–36)
MCHC RBC-ENTMCNC: 32.8 PG — SIGNIFICANT CHANGE UP (ref 27–34)
MCHC RBC-ENTMCNC: 33.2 GM/DL — SIGNIFICANT CHANGE UP (ref 32–36)
MCV RBC AUTO: 97.8 FL — SIGNIFICANT CHANGE UP (ref 80–100)
MCV RBC AUTO: 98.6 FL — SIGNIFICANT CHANGE UP (ref 80–100)
NRBC # BLD: SIGNIFICANT CHANGE UP (ref 0–0)
PLATELET # BLD AUTO: 165 K/UL — SIGNIFICANT CHANGE UP (ref 150–400)
PLATELET # BLD AUTO: 168 K/UL — SIGNIFICANT CHANGE UP (ref 150–400)
POTASSIUM SERPL-MCNC: 3.8 MMOL/L — SIGNIFICANT CHANGE UP (ref 3.5–5.3)
POTASSIUM SERPL-SCNC: 3.8 MMOL/L — SIGNIFICANT CHANGE UP (ref 3.5–5.3)
RBC # BLD: 2.74 M/UL — LOW (ref 4.2–5.8)
RBC # BLD: 2.9 M/UL — LOW (ref 4.2–5.8)
RBC # FLD: 15.3 % — HIGH (ref 10.3–14.5)
RBC # FLD: 17.8 % — HIGH (ref 10.3–14.5)
RH IG SCN BLD-IMP: POSITIVE — SIGNIFICANT CHANGE UP
SODIUM SERPL-SCNC: 140 MMOL/L — SIGNIFICANT CHANGE UP (ref 135–145)
WBC # BLD: 6.47 K/UL — SIGNIFICANT CHANGE UP (ref 3.8–10.5)
WBC # BLD: 7.5 K/UL — SIGNIFICANT CHANGE UP (ref 3.8–10.5)
WBC # FLD AUTO: 6.47 K/UL — SIGNIFICANT CHANGE UP (ref 3.8–10.5)
WBC # FLD AUTO: 7.5 K/UL — SIGNIFICANT CHANGE UP (ref 3.8–10.5)

## 2019-06-13 PROCEDURE — 88342 IMHCHEM/IMCYTCHM 1ST ANTB: CPT | Mod: 26,59

## 2019-06-13 PROCEDURE — 88305 TISSUE EXAM BY PATHOLOGIST: CPT | Mod: 26

## 2019-06-13 PROCEDURE — 88360 TUMOR IMMUNOHISTOCHEM/MANUAL: CPT | Mod: 26

## 2019-06-13 PROCEDURE — 88307 TISSUE EXAM BY PATHOLOGIST: CPT | Mod: 26

## 2019-06-13 PROCEDURE — 76942 ECHO GUIDE FOR BIOPSY: CPT | Mod: 26

## 2019-06-13 PROCEDURE — 47000 NEEDLE BIOPSY OF LIVER PERQ: CPT

## 2019-06-13 PROCEDURE — 88341 IMHCHEM/IMCYTCHM EA ADD ANTB: CPT | Mod: 26,59

## 2019-06-13 RX ORDER — ATROPA BELLADONNA AND OPIUM 16.2; 6 MG/1; MG/1
1 SUPPOSITORY RECTAL ONCE
Refills: 0 | Status: DISCONTINUED | OUTPATIENT
Start: 2019-06-13 | End: 2019-06-13

## 2019-06-13 RX ORDER — OCTREOTIDE ACETATE 200 UG/ML
100 INJECTION, SOLUTION INTRAVENOUS; SUBCUTANEOUS
Refills: 0 | Status: DISCONTINUED | OUTPATIENT
Start: 2019-06-13 | End: 2019-06-15

## 2019-06-13 RX ORDER — ACETAMINOPHEN 500 MG
650 TABLET ORAL ONCE
Refills: 0 | Status: COMPLETED | OUTPATIENT
Start: 2019-06-13 | End: 2019-06-13

## 2019-06-13 RX ORDER — AZTREONAM 2 G
1000 VIAL (EA) INJECTION EVERY 12 HOURS
Refills: 0 | Status: DISCONTINUED | OUTPATIENT
Start: 2019-06-13 | End: 2019-06-19

## 2019-06-13 RX ORDER — AZTREONAM 2 G
1000 VIAL (EA) INJECTION ONCE
Refills: 0 | Status: COMPLETED | OUTPATIENT
Start: 2019-06-13 | End: 2019-06-13

## 2019-06-13 RX ORDER — AZTREONAM 2 G
VIAL (EA) INJECTION
Refills: 0 | Status: DISCONTINUED | OUTPATIENT
Start: 2019-06-13 | End: 2019-06-19

## 2019-06-13 RX ADMIN — SODIUM CHLORIDE 75 MILLILITER(S): 9 INJECTION INTRAMUSCULAR; INTRAVENOUS; SUBCUTANEOUS at 18:00

## 2019-06-13 RX ADMIN — Medication 650 MILLIGRAM(S): at 18:01

## 2019-06-13 RX ADMIN — Medication 650 MILLIGRAM(S): at 22:52

## 2019-06-13 RX ADMIN — SODIUM CHLORIDE 75 MILLILITER(S): 9 INJECTION INTRAMUSCULAR; INTRAVENOUS; SUBCUTANEOUS at 12:30

## 2019-06-13 RX ADMIN — OCTREOTIDE ACETATE 100 MICROGRAM(S): 200 INJECTION, SOLUTION INTRAVENOUS; SUBCUTANEOUS at 06:35

## 2019-06-13 RX ADMIN — HEPARIN SODIUM 5000 UNIT(S): 5000 INJECTION INTRAVENOUS; SUBCUTANEOUS at 22:52

## 2019-06-13 RX ADMIN — OCTREOTIDE ACETATE 100 MICROGRAM(S): 200 INJECTION, SOLUTION INTRAVENOUS; SUBCUTANEOUS at 18:54

## 2019-06-13 RX ADMIN — Medication 50 MILLIGRAM(S): at 18:00

## 2019-06-13 RX ADMIN — Medication 50 MILLIGRAM(S): at 12:30

## 2019-06-13 RX ADMIN — Medication 650 MILLIGRAM(S): at 23:11

## 2019-06-13 NOTE — CONSULT NOTE ADULT - SUBJECTIVE AND OBJECTIVE BOX
HPI:   Patient is a 86y male with    REVIEW OF SYSTEMS:  All other review of systems negative (Comprehensive ROS)    PAST MEDICAL & SURGICAL HISTORY:  Neck mass: was resected, reportedly bening  Kidney lesion: partial resction- reportedly &quot;not active lesion&quot;  History of carcinoid syndrome  H/O carcinoid syndrome  S/P TURP      Allergies    penicillin (Swelling)    Intolerances        Antimicrobials Day #      Other Medications:  ergocalciferol 09139 Unit(s) Oral every week  heparin  Injectable 5000 Unit(s) SubCutaneous every 8 hours  octreotide  Injectable 100 MICROGram(s) SubCutaneous three times a day  sodium bicarbonate 650 milliGRAM(s) Oral every 6 hours  sodium chloride 0.9%. 1000 milliLiter(s) IV Continuous <Continuous>      FAMILY HISTORY:  FH: lung cancer: sister  Family history of nasopharyngeal cancer: father      SOCIAL HISTORY:  Smoking:     ETOH:     Drug Use:     Single     T(F): 98 (19 @ 07:45), Max: 98.4 (19 @ 04:49)  HR: 85 (19 @ 07:45)  BP: 104/67 (19 @ 07:45)  RR: 18 (19 @ 07:45)  SpO2: 97% (19 @ 07:45)  Wt(kg): --    PHYSICAL EXAM:  General: alert, no acute distress  Eyes:  anicteric, no conjunctival injection, no discharge  Oropharynx: no lesions or injection 	  Neck: supple, without adenopathy  Lungs: clear to auscultation  Heart: regular rate and rhythm; no murmur, rubs or gallops  Abdomen: soft, nondistended, nontender, without mass or organomegaly  Skin: no lesions  Extremities: no clubbing, cyanosis, or edema  Neurologic: alert, oriented, moves all extremities    LAB RESULTS:        140  |  111<H>  |  58<H>  ----------------------------<  97  3.8   |  15<L>  |  2.64<H>    Ca    9.4      2019 06:53        Urinalysis Basic - ( 2019 09:34 )    Color: Yellow / Appearance: Slightly Turbid / S.019 / pH: x  Gluc: x / Ketone: Negative  / Bili: Negative / Urobili: <2 mg/dL   Blood: x / Protein: 100 mg/dL / Nitrite: Positive   Leuk Esterase: Large / RBC: 6 /HPF /  /HPF   Sq Epi: x / Non Sq Epi: 5 /HPF / Bacteria: TNTC        MICROBIOLOGY REVIEWED:    RADIOLOGY REVIEWED: HPI:   Patient is a 86y male with hx carcinoid syndrome, LLL resection, retention, requiring intremittent straight cath, presnted with diarrhe aand weight loss, weakness, difficulty ambulating and facial flushing. Pt was seen by urology for neurogenic bladder, hx BPH, TURP, pt now unable to void and had multiple attempts in bladder catheterization and now awaiting cystoscopy. Urine cult sent and now with GNR. Pt has hx of PCN allergy    REVIEW OF SYSTEMS:  All other review of systems negative (Comprehensive ROS)    PAST MEDICAL & SURGICAL HISTORY:  Neck mass: was resected, reportedly bening  Kidney lesion: partial resction- reportedly &quot;not active lesion&quot;  History of carcinoid syndrome  H/O carcinoid syndrome  S/P TURP      Allergies    penicillin (Swelling)    Intolerances        Antimicrobials Day #      Other Medications:  ergocalciferol 16909 Unit(s) Oral every week  heparin  Injectable 5000 Unit(s) SubCutaneous every 8 hours  octreotide  Injectable 100 MICROGram(s) SubCutaneous three times a day  sodium bicarbonate 650 milliGRAM(s) Oral every 6 hours  sodium chloride 0.9%. 1000 milliLiter(s) IV Continuous <Continuous>      FAMILY HISTORY:  FH: lung cancer: sister  Family history of nasopharyngeal cancer: father      SOCIAL HISTORY:  Smoking:     ETOH:     Drug Use:     Single     T(F): 98 (19 @ 07:45), Max: 98.4 (19 @ 04:49)  HR: 85 (19 @ 07:45)  BP: 104/67 (19 @ 07:45)  RR: 18 (19 @ 07:45)  SpO2: 97% (19 @ 07:45)  Wt(kg): --    PHYSICAL EXAM:  General: alert, no acute distress, +facial redness  Eyes:  anicteric, no conjunctival injection, no discharge  Oropharynx: no lesions or injection 	  Neck: supple, without adenopathy  Lungs: clear to auscultation  Heart: regular rate and rhythm; no murmur, rubs or gallops  Abdomen: soft, nondistended, nontender, without mass or organomegaly  Skin: no lesions  Extremities: no clubbing, cyanosis, or edema  Neurologic: alert, oriented, moves all extremities  : dark blood at the meatus    LAB RESULTS:        140  |  111<H>  |  58<H>  ----------------------------<  97  3.8   |  15<L>  |  2.64<H>    Ca    9.4      2019 06:53        Urinalysis Basic - ( 2019 09:34 )    Color: Yellow / Appearance: Slightly Turbid / S.019 / pH: x  Gluc: x / Ketone: Negative  / Bili: Negative / Urobili: <2 mg/dL   Blood: x / Protein: 100 mg/dL / Nitrite: Positive   Leuk Esterase: Large / RBC: 6 /HPF /  /HPF   Sq Epi: x / Non Sq Epi: 5 /HPF / Bacteria: [facility]        MICROBIOLOGY REVIEWED:  Culture Results:   >100,000 CFU/ml Gram Negative Rods (19 @ 09:45)      RADIOLOGY REVIEWED:  < from: CT Abdomen No Cont (19 @ 21:03) >  Innumerable hypodense lesions throughout the liver compatible with   metastatic disease, with progression of disease since 2019.    Previously noted solid right renal mass is not well evaluated without   intravenous contrast.    Moderate pericardial effusion.    < end of copied text >

## 2019-06-13 NOTE — PROGRESS NOTE ADULT - PROBLEM SELECTOR PLAN 1
Diarrhea resolved on Octreotide  Will decrease Octreotide to 100mcg q 12h  Long acting octreotide available in our office - will begin once he is discharged.

## 2019-06-13 NOTE — PROGRESS NOTE ADULT - SUBJECTIVE AND OBJECTIVE BOX
Vascular & Interventional Radiology Post-Procedure Note    Pre-Procedure Diagnosis:  Liver mass  Post-Procedure Diagnosis: Same as pre.  Indications for Procedure:  Liver mass    : HERI Cerda  Assistant(s):    Procedure Details/Findings:   ultrasound guided liver mass core needle biopsy    Complications:  none  Estimated Blood Loss: Minimal  Specimen:  none  Contrast:  none  Sedation:  per anaesthesia  Patient Condition/Disposition:  IRS, floor    Plan:  - f/u results  - 2 hrs in IRS

## 2019-06-13 NOTE — PROGRESS NOTE ADULT - ASSESSMENT
87 yo M with PMH of carcinoid syndrome s/p left lower lobe resection, bladder disease requiring intermittent straight cath who presents to the ED with complaint of persistent diarrhea, fatigue, decreased po intake  and weight loss along with borderline hypotension    1- mia on ckd  2- borderline hypotension  3- carcinoid   4- diarrhea  5- acidosis       cr still elevated   calzada placed good uo   trend cr   cont nahco3- 650mg qid   trend hb

## 2019-06-13 NOTE — PROGRESS NOTE ADULT - SUBJECTIVE AND OBJECTIVE BOX
[facility]   [phone]  RENAL FOLLOW UP NOTE  --------------------------------------------------------------------------------  Chief Complaint:    24 hour events/subjective:    seen earlier.  resting   daughter at bedside   + had stricture uretheral     PAST HISTORY  --------------------------------------------------------------------------------  No significant changes to PMH, PSH, FHx, SHx, unless otherwise noted    ALLERGIES & MEDICATIONS  --------------------------------------------------------------------------------  Allergies    penicillin (Swelling)    Intolerances      Standing Inpatient Medications  aztreonam  IVPB 1000 milliGRAM(s) IV Intermittent every 12 hours  aztreonam  IVPB      ergocalciferol 46162 Unit(s) Oral every week  heparin  Injectable 5000 Unit(s) SubCutaneous every 8 hours  octreotide  Injectable 100 MICROGram(s) SubCutaneous two times a day  sodium bicarbonate 650 milliGRAM(s) Oral every 6 hours  sodium chloride 0.9%. 1000 milliLiter(s) IV Continuous <Continuous>    PRN Inpatient Medications  acetaminophen   Tablet .. 650 milliGRAM(s) Oral once PRN      REVIEW OF SYSTEMS  --------------------------------------------------------------------------------    Gen: denies fevers/chills,  CVS: denies chest pain/palpitations  Resp: denies SOB/Cough  GI: Denies N/V/Abd pain  : Denies dysuria    All other systems were reviewed and are negative, except as noted.    VITALS/PHYSICAL EXAM  --------------------------------------------------------------------------------  T(C): 36.6 (06-13-19 @ 17:11), Max: 36.9 (06-13-19 @ 04:49)  HR: 78 (06-13-19 @ 17:11) (75 - 85)  BP: 99/60 (06-13-19 @ 17:11) (94/59 - 105/63)  RR: 18 (06-13-19 @ 17:11) (18 - 18)  SpO2: 98% (06-13-19 @ 17:11) (97% - 98%)  Wt(kg): --        06-12-19 @ 07:01  -  06-13-19 @ 07:00  --------------------------------------------------------  IN: 1020 mL / OUT: 550 mL / NET: 470 mL    06-13-19 @ 07:01  -  06-13-19 @ 21:40  --------------------------------------------------------  IN: 200 mL / OUT: 0 mL / NET: 200 mL      Physical Exam:  	  Gen: Non toxic comfortable appearing  thin muscle wasting   	no jvd ,  	Pulm: decrease bs  no rales or ronchi or wheezing  	CV: RRR, S1S2; no rub  	Abd: +BS, soft, nontender/nondistended  	: No suprapubic tenderness  	UE: Warm, no cyanosis  no clubbing,  no edema  	LE: Warm, no cyanosis  no clubbing, no edema  	Neuro: alert and oriented. speech coherent         LABS/STUDIES  --------------------------------------------------------------------------------              9.5    7.5   >-----------<  168      [06-13-19 @ 20:16]              28.6     140  |  111  |  58  ----------------------------<  97      [06-13-19 @ 06:53]  3.8   |  15  |  2.64        Ca     9.4     [06-13-19 @ 06:53]            Creatinine Trend:  SCr 2.64 [06-13 @ 06:53]  SCr 2.63 [06-12 @ 06:48]  SCr 2.74 [06-11 @ 06:17]  SCr 2.76 [06-10 @ 21:14]              Urinalysis - [06-12-19 @ 09:34]      Color Yellow / Appearance Slightly Turbid / SG 1.019 / pH 6.0      Gluc Negative / Ketone Negative  / Bili Negative / Urobili <2 mg/dL       Blood Trace / Protein 100 mg/dL / Leuk Est Large / Nitrite Positive      RBC 6 /  / Hyaline 0 / Gran 5 / Sq Epi  / Non Sq Epi 5 / Bacteria TNTC    Urine Creatinine 141      [06-11-19 @ 11:32]  Urine Protein 96      [06-11-19 @ 11:32]  Urine Sodium <20      [06-11-19 @ 11:32]  Urine Potassium 17      [06-11-19 @ 11:32]  Urine Osmolality 508      [06-11-19 @ 13:38]    PTH -- (Ca 10.4)      [06-11-19 @ 09:51]   15  Vitamin D (25OH) 9.7      [06-11-19 @ 10:02]

## 2019-06-13 NOTE — PROGRESS NOTE ADULT - SUBJECTIVE AND OBJECTIVE BOX
Vascular & Interventional Radiology Pre-Procedure Note    Procedure Name: Liver mass biopsy    HPI: 86y Male with innumerable liver lesions and carcinoid syndrome, referred for biopsy.    Allergies: penicillin (Swelling)    Medications:  aztreonam  IVPB: 50 mL/Hr IV Intermittent (06-13 @ 12:30)  heparin  Injectable: 5000 Unit(s) SubCutaneous (06-12 @ 22:43)    Data:    T(C): 36.7  HR: 85  BP: 104/67  RR: 18  SpO2: 97%    -WBC 6.47 / HgB 8.7 / Hct 26.8 / Plt 165  -Na 140 / Cl 111 / BUN 58 / Glucose 97  -K 3.8 / CO2 15 / Cr 2.64  -ALT -- / Alk Phos -- / T.Bili --  -INR0.99    Imaging: CT a/p noncontrast - innumerable hypodense lesions throughout liver    Plan:   86y Male presents for liver mass biopsy.  -Risks/Benefits/alternatives explained with the patient and witnessed informed consent obtained.

## 2019-06-13 NOTE — PROGRESS NOTE ADULT - SUBJECTIVE AND OBJECTIVE BOX
Pt is seen and examined  pt is awake and lying in bed/able to ambulate short distances with assistance  No BM x 3 days.  pt seems comfortable and denies any complaints at this time        MEDICATIONS  (STANDING):  aztreonam  IVPB 1000 milliGRAM(s) IV Intermittent every 12 hours  aztreonam  IVPB      ergocalciferol 28859 Unit(s) Oral every week  heparin  Injectable 5000 Unit(s) SubCutaneous every 8 hours  octreotide  Injectable 100 MICROGram(s) SubCutaneous three times a day  sodium bicarbonate 650 milliGRAM(s) Oral every 6 hours  sodium chloride 0.9%. 1000 milliLiter(s) (75 mL/Hr) IV Continuous <Continuous>    MEDICATIONS  (PRN):      Allergies    penicillin (Swelling)    Intolerances        Vital Signs Last 24 Hrs  T(C): 36.7 (13 Jun 2019 07:45), Max: 36.9 (13 Jun 2019 04:49)  T(F): 98 (13 Jun 2019 07:45), Max: 98.4 (13 Jun 2019 04:49)  HR: 85 (13 Jun 2019 07:45) (75 - 85)  BP: 104/67 (13 Jun 2019 07:45) (94/59 - 105/63)  BP(mean): --  RR: 18 (13 Jun 2019 07:45) (18 - 18)  SpO2: 97% (13 Jun 2019 07:45) (97% - 99%)    PHYSICAL EXAM  General: adult in NAD  HEENT:, anicteric sclera, pink conjunctiva  Neck: supple  CV: normal S1/S2 with no murmur rubs or gallops  Lungs: positive air movement b/l ant lungs,clear to auscultation, no wheezes, no rales  Abdomen: soft non-tender non-distended, no hepatosplenomegaly  Ext: no clubbing cyanosis or edema  Skin: no rashes and no petechiae  Neuro: alert and oriented X 4, no focal deficits  LABS:            06-13    140  |  111<H>  |  58<H>  ----------------------------<  97  3.8   |  15<L>  |  2.64<H>    Ca    9.4      13 Jun 2019 06:53    RADIOLOGY & ADDITIONAL STUDIES:  Scans reviewed - significant progression of liver lesions over 1 month.  Renal lesion largely unchanged

## 2019-06-13 NOTE — PROGRESS NOTE ADULT - ASSESSMENT
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    Carcinoid syndrome.  -  Patient's flusing and diarrhea are likely due to his carcinoid syndrome, serotonin secretion, and potentially other vasoactive substances.   - octreotide 100mg subQ TID to treat flushing and diarrhea.    - oncology follow.    Liver masses  - Liver bx pending     Hypercalcemia.  - Patient noted to have mild hypercalcemia, may be related to suspected carcinoid tumor. Check PTH, vitamin D level.  - Will continue IVF.    CONSTANZA (acute kidney injury).   - Patient was noted here to have elevated SCr of 2.6, which is high compared to outpatient SCr of 1.91.  - CONSTANZA is likely pre-renal related to hypovolemia from excessive GI losses.  - check UA, urine protein: creatinine, bladder and kidney US since patient has known history of bladder dysfunction (patient states he has a "weak bladder") to assess for obstruction.   - urology evaluation noted.  - Rae.    UTI  - antibiotics.    Pericardial effusion.  - Patient was found on outpatient CT scan to have a pericardial effusion and was recommended for TTE.  - Currently, the patient is hemodynamically WNL. He denies palpitations and denies lightheadedness while lying supine.  - TTE pending .     Metabolic acidosis, normal anion gap (NAG).   - Patient found to have metabolic acidosis with normal anion gap, hyperchloremia likely due to GI losses.   - continue IVF.  - Follow up BMP.  - nephrology follow.    Prophylactic measure.  - VTE ppx with start heparin subQ    Activity: OOB with assistance, fall precaution  Diet: regular.   d/w patient and wife at bedside  Phong Dash MD pager [phone]

## 2019-06-13 NOTE — PROGRESS NOTE ADULT - SUBJECTIVE AND OBJECTIVE BOX
Patient is a 86y old  Male who presents with a chief complaint of diarrhea (2019 14:39)      SUBJECTIVE / OVERNIGHT EVENTS: No new complaints.   Review of Systems  chest pain no  palpitations no  sob no  nausea no  headache no    MEDICATIONS  (STANDING):  aztreonam  IVPB 1000 milliGRAM(s) IV Intermittent every 12 hours  aztreonam  IVPB      ergocalciferol 05121 Unit(s) Oral every week  heparin  Injectable 5000 Unit(s) SubCutaneous every 8 hours  octreotide  Injectable 100 MICROGram(s) SubCutaneous two times a day  sodium bicarbonate 650 milliGRAM(s) Oral every 6 hours  sodium chloride 0.9%. 1000 milliLiter(s) (75 mL/Hr) IV Continuous <Continuous>    MEDICATIONS  (PRN):  acetaminophen   Tablet .. 650 milliGRAM(s) Oral once PRN Mild Pain (1 - 3)      Vital Signs Last 24 Hrs  T(C): 36.7 (2019 07:45), Max: 36.9 (2019 04:49)  T(F): 98 (2019 07:45), Max: 98.4 (2019 04:49)  HR: 85 (2019 07:45) (75 - 85)  BP: 104/67 (2019 07:45) (94/59 - 105/63)  BP(mean): --  RR: 18 (2019 07:45) (18 - 18)  SpO2: 97% (2019 07:45) (97% - 99%)    PHYSICAL EXAM:  GENERAL: NAD  HEAD:  Atraumatic, Normocephalic  EYES: EOMI, PERRLA, conjunctiva and sclera clear  NECK: Supple, No JVD  CHEST/LUNG: Clear to auscultation bilaterally; No wheeze  HEART: Regular rate and rhythm; No murmurs, rubs, or gallops  ABDOMEN: Soft, Nontender, Nondistended; Bowel sounds present  EXTREMITIES:  2+ Peripheral Pulses, No clubbing, cyanosis, or edema  PSYCH: AAOx3  NEUROLOGY: non-focal  SKIN: No rashes or lesions    LABS:                        8.7    6.47  )-----------( 165      ( 2019 09:41 )             26.8     06-13    140  |  111<H>  |  58<H>  ----------------------------<  97  3.8   |  15<L>  |  2.64<H>    Ca    9.4      2019 06:53            Urinalysis Basic - ( 2019 09:34 )    Color: Yellow / Appearance: Slightly Turbid / S.019 / pH: x  Gluc: x / Ketone: Negative  / Bili: Negative / Urobili: <2 mg/dL   Blood: x / Protein: 100 mg/dL / Nitrite: Positive   Leuk Esterase: Large / RBC: 6 /HPF /  /HPF   Sq Epi: x / Non Sq Epi: 5 /HPF / Bacteria: TNTC        Culture - Urine (collected 2019 09:45)  Source: .Urine  Preliminary Report (2019 07:07):    >100,000 CFU/ml Gram Negative Rods        RADIOLOGY & ADDITIONAL TESTS:    Imaging Personally Reviewed:    Consultant(s) Notes Reviewed:      Care Discussed with Consultants/Other Providers:

## 2019-06-14 LAB
-  AMIKACIN: SIGNIFICANT CHANGE UP
-  AMPICILLIN/SULBACTAM: SIGNIFICANT CHANGE UP
-  AMPICILLIN: SIGNIFICANT CHANGE UP
-  AZTREONAM: SIGNIFICANT CHANGE UP
-  CEFAZOLIN: SIGNIFICANT CHANGE UP
-  CEFEPIME: SIGNIFICANT CHANGE UP
-  CEFOXITIN: SIGNIFICANT CHANGE UP
-  CEFTRIAXONE: SIGNIFICANT CHANGE UP
-  CIPROFLOXACIN: SIGNIFICANT CHANGE UP
-  ERTAPENEM: SIGNIFICANT CHANGE UP
-  GENTAMICIN: SIGNIFICANT CHANGE UP
-  IMIPENEM: SIGNIFICANT CHANGE UP
-  LEVOFLOXACIN: SIGNIFICANT CHANGE UP
-  MEROPENEM: SIGNIFICANT CHANGE UP
-  NITROFURANTOIN: SIGNIFICANT CHANGE UP
-  PIPERACILLIN/TAZOBACTAM: SIGNIFICANT CHANGE UP
-  TIGECYCLINE: SIGNIFICANT CHANGE UP
-  TOBRAMYCIN: SIGNIFICANT CHANGE UP
-  TRIMETHOPRIM/SULFAMETHOXAZOLE: SIGNIFICANT CHANGE UP
ANION GAP SERPL CALC-SCNC: 9 MMOL/L — SIGNIFICANT CHANGE UP (ref 5–17)
BUN SERPL-MCNC: 54 MG/DL — HIGH (ref 7–23)
CALCIUM SERPL-MCNC: 9.2 MG/DL — SIGNIFICANT CHANGE UP (ref 8.4–10.5)
CHLORIDE SERPL-SCNC: 112 MMOL/L — HIGH (ref 96–108)
CO2 SERPL-SCNC: 17 MMOL/L — LOW (ref 22–31)
CREAT SERPL-MCNC: 2.54 MG/DL — HIGH (ref 0.5–1.3)
CULTURE RESULTS: SIGNIFICANT CHANGE UP
GLUCOSE SERPL-MCNC: 122 MG/DL — HIGH (ref 70–99)
HCT VFR BLD CALC: 26.6 % — LOW (ref 39–50)
HGB BLD-MCNC: 8.6 G/DL — LOW (ref 13–17)
MCHC RBC-ENTMCNC: 32 PG — SIGNIFICANT CHANGE UP (ref 27–34)
MCHC RBC-ENTMCNC: 32.3 GM/DL — SIGNIFICANT CHANGE UP (ref 32–36)
MCV RBC AUTO: 98.9 FL — SIGNIFICANT CHANGE UP (ref 80–100)
METHOD TYPE: SIGNIFICANT CHANGE UP
NRBC # BLD: SIGNIFICANT CHANGE UP (ref 0–0)
ORGANISM # SPEC MICROSCOPIC CNT: SIGNIFICANT CHANGE UP
ORGANISM # SPEC MICROSCOPIC CNT: SIGNIFICANT CHANGE UP
PLATELET # BLD AUTO: 146 K/UL — LOW (ref 150–400)
POTASSIUM SERPL-MCNC: 3.7 MMOL/L — SIGNIFICANT CHANGE UP (ref 3.5–5.3)
POTASSIUM SERPL-SCNC: 3.7 MMOL/L — SIGNIFICANT CHANGE UP (ref 3.5–5.3)
RBC # BLD: 2.69 M/UL — LOW (ref 4.2–5.8)
RBC # FLD: 18 % — HIGH (ref 10.3–14.5)
SODIUM SERPL-SCNC: 138 MMOL/L — SIGNIFICANT CHANGE UP (ref 135–145)
SPECIMEN SOURCE: SIGNIFICANT CHANGE UP
WBC # BLD: 6.81 K/UL — SIGNIFICANT CHANGE UP (ref 3.8–10.5)
WBC # FLD AUTO: 6.81 K/UL — SIGNIFICANT CHANGE UP (ref 3.8–10.5)

## 2019-06-14 PROCEDURE — 99231 SBSQ HOSP IP/OBS SF/LOW 25: CPT

## 2019-06-14 PROCEDURE — 93306 TTE W/DOPPLER COMPLETE: CPT | Mod: 26

## 2019-06-14 PROCEDURE — 99223 1ST HOSP IP/OBS HIGH 75: CPT | Mod: GC

## 2019-06-14 RX ORDER — SODIUM CHLORIDE 9 MG/ML
1000 INJECTION INTRAMUSCULAR; INTRAVENOUS; SUBCUTANEOUS
Refills: 0 | Status: DISCONTINUED | OUTPATIENT
Start: 2019-06-14 | End: 2019-06-16

## 2019-06-14 RX ADMIN — Medication 650 MILLIGRAM(S): at 12:36

## 2019-06-14 RX ADMIN — Medication 50 MILLIGRAM(S): at 05:13

## 2019-06-14 RX ADMIN — ATROPA BELLADONNA AND OPIUM 1 SUPPOSITORY(S): 16.2; 6 SUPPOSITORY RECTAL at 00:30

## 2019-06-14 RX ADMIN — HEPARIN SODIUM 5000 UNIT(S): 5000 INJECTION INTRAVENOUS; SUBCUTANEOUS at 12:36

## 2019-06-14 RX ADMIN — OCTREOTIDE ACETATE 100 MICROGRAM(S): 200 INJECTION, SOLUTION INTRAVENOUS; SUBCUTANEOUS at 18:17

## 2019-06-14 RX ADMIN — Medication 650 MILLIGRAM(S): at 22:07

## 2019-06-14 RX ADMIN — Medication 650 MILLIGRAM(S): at 05:13

## 2019-06-14 RX ADMIN — OCTREOTIDE ACETATE 100 MICROGRAM(S): 200 INJECTION, SOLUTION INTRAVENOUS; SUBCUTANEOUS at 05:13

## 2019-06-14 RX ADMIN — HEPARIN SODIUM 5000 UNIT(S): 5000 INJECTION INTRAVENOUS; SUBCUTANEOUS at 22:06

## 2019-06-14 RX ADMIN — Medication 50 MILLIGRAM(S): at 18:18

## 2019-06-14 RX ADMIN — Medication 650 MILLIGRAM(S): at 01:11

## 2019-06-14 RX ADMIN — ATROPA BELLADONNA AND OPIUM 1 SUPPOSITORY(S): 16.2; 6 SUPPOSITORY RECTAL at 01:00

## 2019-06-14 RX ADMIN — Medication 650 MILLIGRAM(S): at 18:18

## 2019-06-14 RX ADMIN — HEPARIN SODIUM 5000 UNIT(S): 5000 INJECTION INTRAVENOUS; SUBCUTANEOUS at 05:12

## 2019-06-14 NOTE — PROGRESS NOTE ADULT - ASSESSMENT
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    Carcinoid syndrome.  -  Patient's flusing and diarrhea are likely due to his carcinoid syndrome, serotonin secretion, and potentially other vasoactive substances.   - octreotide 100mg subQ TID to treat flushing and diarrhea.    - oncology follow.    Liver masses  - s/p Liver bx  - Path pending      Hypercalcemia.  - Patient noted to have mild hypercalcemia, may be related to suspected carcinoid tumor.  - Will continue IVF.    CONSTANZA (acute kidney injury).   - Patient was noted here to have elevated SCr of 2.6, which is high compared to outpatient SCr of 1.91.  - CONSTANZA is likely pre-renal related to hypovolemia from excessive GI losses.   - urology evaluation noted. S/P cysto and stricture of urethra dilatation.  - Rae.    UTI  - antibiotics. ID follow    Pericardial effusion.  - Patient was found on outpatient CT scan to have a pericardial effusion and was recommended for TTE.  - Currently, the patient is hemodynamically WNL. He denies palpitations and denies lightheadedness while lying supine.  - TTE noted. For repeat in am.  - Cardiology evaluation noted     Metabolic acidosis, normal anion gap (NAG).   - Patient found to have metabolic acidosis with normal anion gap, hyperchloremia likely due to GI losses.   - continue IVF.  - Follow up BMP.  - nephrology follow.    Prophylactic measure.  - VTE ppx with start heparin subQ    Activity: OOB with assistance, fall precaution  Diet: regular.   d/w patient and wife at bedside  Phong Dash MD pager [phone]

## 2019-06-14 NOTE — PROGRESS NOTE ADULT - SUBJECTIVE AND OBJECTIVE BOX
Interventional Radiology Follow- Up Note      This is a 86y Male s/p liver bx on 6/13/19 in Interventional Radiology with Dr. Cerda.      Patient seen and examined @ bedside. Denies abdominal pain, fever, chills. Tolerating diet.     PAST MEDICAL & SURGICAL HISTORY:  Neck mass: was resected, reportedly bening  Kidney lesion: partial resction- reportedly &quot;not active lesion&quot;  History of carcinoid syndrome  H/O carcinoid syndrome  S/P TURP    Allergies: penicillin (Swelling)    LABS:                        9.5    7.5   )-----------( 168      ( 13 Jun 2019 20:16 )             28.6     06-14    138  |  112<H>  |  54<H>  ----------------------------<  122<H>  3.7   |  17<L>  |  2.54<H>    Ca    9.2      14 Jun 2019 09:06    Liver bx results: pending     Vitals: T(F): 97.8 (06-14-19 @ 08:21), Max: 98.6 (06-13-19 @ 23:02)  HR: 83 (06-14-19 @ 08:21) (78 - 88)  BP: 96/60 (06-14-19 @ 08:21) (96/60 - 112/60)  RR: 18 (06-14-19 @ 08:21) (18 - 18)  SpO2: 97% (06-14-19 @ 08:21) (95% - 98%)    PHYSICAL EXAM:  General: Nontoxic, in NAD, A&O x3  Abd: ND, soft, NTTP, bx site with dressing c/d/i (now removed), site soft w/o evidence of hematoma. no purulent d/c or erythema noted.     A/P: 86y Male with h/o carcinoid syndrome s/p left lower lobe resection, bladder disease requiring intermittent straight cath who presented with persistent diarrhea and weight loss. Patient found to have hepatic mass now s/p Liver bx on 6/13/19 in IR with Dr. Cerda.   -Follow up liver bx results  -trend vitals, labs  -Continue global care per primary team  -will discuss with IR attending     Please call IR at extension [phone] with any questions, concerns, or issues regarding above.

## 2019-06-14 NOTE — PROGRESS NOTE ADULT - SUBJECTIVE AND OBJECTIVE BOX
CC: f/u for  cystitis  Patient reports  he is flushed  REVIEW OF SYSTEMS:  All other review of systems negative (Comprehensive ROS)    Antimicrobials Day #  :2/5  aztreonam  IVPB 1000 milliGRAM(s) IV Intermittent every 12 hours  aztreonam  IVPB        Other Medications Reviewed    T(F): 97.8 (06-14-19 @ 08:21), Max: 98.6 (06-13-19 @ 23:02)  HR: 83 (06-14-19 @ 08:21)  BP: 96/60 (06-14-19 @ 08:21)  RR: 18 (06-14-19 @ 08:21)  SpO2: 97% (06-14-19 @ 08:21)  Wt(kg): --    PHYSICAL EXAM:  General: alert, no acute distress, face flushed  Eyes:  anicteric, no conjunctival injection, no discharge  Oropharynx: no lesions or injection 	  Neck: supple, without adenopathy  Lungs: clear to auscultation  Heart: regular rate and rhythm; no murmur, rubs or gallops  Abdomen: soft, nondistended, nontender, without mass or organomegaly  Skin: no lesions  Extremities: no clubbing, cyanosis, or edema  Neurologic: alert, oriented, moves all extremities    LAB RESULTS:                        9.5    7.5   )-----------( 168      ( 13 Jun 2019 20:16 )             28.6     06-14    138  |  112<H>  |  54<H>  ----------------------------<  122<H>  3.7   |  17<L>  |  2.54<H>    Ca    9.2      14 Jun 2019 09:06          MICROBIOLOGY:  RECENT CULTURES:  06-12 @ 09:45 .Urine Citrobacter freundii    >100,000 CFU/ml Citrobacter freundii      Urine Microscopic-Add On (NC) (06.12.19 @ 09:34)    Red Blood Cell - Urine: 6 /HPF    White Blood Cell - Urine: 500 /HPF    Hyaline Casts: 0 /LPF    Bacteria: TNTC    Epithelial Cells: 5 /HPF    Granular Cast: 5 /LPF        RADIOLOGY REVIEWED:  < from: CT Abdomen No Cont (06.11.19 @ 21:03) >  IMPRESSION:     Evaluation of the solid organs and vessels is limited without use of IV   contrast.    Innumerable hypodense lesions throughout the liver compatible with   metastatic disease, with progression of disease since 5/16/2019.    Previously noted solid right renal mass is not well evaluated without   intravenous contrast.    Moderate pericardial effusion.        < end of copied text >      Assessment:  patient with carcinoid syndrome found to have liver mets and renal mass s/p liver bx, had urinary retention and required calzada via cystoscopy and dilation of a urethral sticture. Has component of cystitis  Plan:  complete 3 more days of aztreonam  can change to po cipro 250mg po bid if ready to go home sooner

## 2019-06-14 NOTE — PROGRESS NOTE ADULT - SUBJECTIVE AND OBJECTIVE BOX
Pt is seen and examined  pt is awake and lying in bed  had a BM, no diarrhea. Ambulates short distances with assistance  had liver biopsy, had cystoscopy with calzada placement   pt seems comfortable and denies any complaints at this time        MEDICATIONS  (STANDING):  aztreonam  IVPB 1000 milliGRAM(s) IV Intermittent every 12 hours  aztreonam  IVPB      ergocalciferol 34882 Unit(s) Oral every week  heparin  Injectable 5000 Unit(s) SubCutaneous every 8 hours  octreotide  Injectable 100 MICROGram(s) SubCutaneous two times a day  sodium bicarbonate 650 milliGRAM(s) Oral every 6 hours  sodium chloride 0.9%. 1000 milliLiter(s) (75 mL/Hr) IV Continuous <Continuous>    MEDICATIONS  (PRN):      Allergies    penicillin (Swelling)    Intolerances        Vital Signs Last 24 Hrs  T(C): 36.6 (14 Jun 2019 08:21), Max: 37 (13 Jun 2019 23:02)  T(F): 97.8 (14 Jun 2019 08:21), Max: 98.6 (13 Jun 2019 23:02)  HR: 83 (14 Jun 2019 08:21) (78 - 88)  BP: 96/60 (14 Jun 2019 08:21) (96/60 - 112/60)  BP(mean): --  RR: 18 (14 Jun 2019 08:21) (18 - 18)  SpO2: 97% (14 Jun 2019 08:21) (95% - 98%)    PHYSICAL EXAM  General: adult in NAD  HEENT: , anicteric sclera, pink conjunctiva  CV: normal S1/S2 with no murmur rubs or gallops  Lungs: positive air movement b/l ant lungs,clear to auscultation, no wheezes, no rales  Abdomen: soft non-tender non-distended, no hepatosplenomegaly  Ext: no clubbing cyanosis or edema  Skin: no rashes and no petechiae  Neuro: alert and moderate dementia no focal deficits  LABS:                          9.5    7.5   )-----------( 168      ( 13 Jun 2019 20:16 )             28.6         Mean Cell Volume : 98.6 fl  Mean Cell Hemoglobin : 32.8 pg  Mean Cell Hemoglobin Concentration : 33.2 gm/dL  Auto Neutrophil # : x  Auto Lymphocyte # : x  Auto Monocyte # : x  Auto Eosinophil # : x  Auto Basophil # : x  Auto Neutrophil % : x  Auto Lymphocyte % : x  Auto Monocyte % : x  Auto Eosinophil % : x  Auto Basophil % : x      06-14    138  |  112<H>  |  54<H>  ----------------------------<  122<H>  3.7   |  17<L>  |  2.54<H>    Ca    9.2      14 Jun 2019 09:06    RADIOLOGY & ADDITIONAL STUDIES:

## 2019-06-14 NOTE — PROGRESS NOTE ADULT - PROBLEM SELECTOR PLAN 1
Diarrhea resolved on Octreotide  Will decrease Octreotide to 100mcg q 12h  Long acting octreotide available in our office - will begin once he is discharged.  Discussed with daughter, idealy daily octreotide should overlap first dose long acting octreotide by 2 weeks. Would have to be administered at home once discharged.

## 2019-06-14 NOTE — PROGRESS NOTE ADULT - SUBJECTIVE AND OBJECTIVE BOX
[facility]   [phone]  RENAL FOLLOW UP NOTE  --------------------------------------------------------------------------------  Chief Complaint:    24 hour events/subjective:    seen earlier. states diarrhea has improved     PAST HISTORY  --------------------------------------------------------------------------------  No significant changes to PMH, PSH, FHx, SHx, unless otherwise noted    ALLERGIES & MEDICATIONS  --------------------------------------------------------------------------------  Allergies    penicillin (Swelling)    Intolerances      Standing Inpatient Medications  aztreonam  IVPB 1000 milliGRAM(s) IV Intermittent every 12 hours  aztreonam  IVPB      ergocalciferol 09391 Unit(s) Oral every week  heparin  Injectable 5000 Unit(s) SubCutaneous every 8 hours  octreotide  Injectable 100 MICROGram(s) SubCutaneous two times a day  sodium bicarbonate 650 milliGRAM(s) Oral every 6 hours  sodium chloride 0.9%. 1000 milliLiter(s) IV Continuous <Continuous>    PRN Inpatient Medications      REVIEW OF SYSTEMS  --------------------------------------------------------------------------------    Gen: denies  fevers/chills,  CVS: denies chest pain/palpitations  Resp: denies SOB/Cough  GI: Denies N/V/Abd pain  : Denies dysuria    All other systems were reviewed and are negative, except as noted.    VITALS/PHYSICAL EXAM  --------------------------------------------------------------------------------  T(C): 37.2 (06-14-19 @ 20:33), Max: 37.2 (06-14-19 @ 20:33)  HR: 88 (06-14-19 @ 20:33) (78 - 88)  BP: 101/59 (06-14-19 @ 20:33) (96/60 - 112/60)  RR: 18 (06-14-19 @ 20:33) (17 - 18)  SpO2: 99% (06-14-19 @ 20:33) (95% - 99%)  Wt(kg): --        06-13-19 @ 07:01  -  06-14-19 @ 07:00  --------------------------------------------------------  IN: 200 mL / OUT: 1130 mL / NET: -930 mL    06-14-19 @ 07:01  -  06-14-19 @ 21:24  --------------------------------------------------------  IN: 0 mL / OUT: 375 mL / NET: -375 mL      Physical Exam:  	  Gen: Non toxic comfortable appearing  thin muscle wasting   	no jvd ,  	Pulm: decrease bs  no rales or ronchi or wheezing  	CV: RRR, S1S2; no rub  	Abd: +BS, soft, nontender/nondistended  	: No suprapubic tenderness  	UE: Warm, no cyanosis  no clubbing,  no edema  	LE: Warm, no cyanosis  no clubbing, no edema  	Neuro: alert and oriented. speech coherent   	      LABS/STUDIES  --------------------------------------------------------------------------------              8.6    6.81  >-----------<  146      [06-14-19 @ 10:23]              26.6     138  |  112  |  54  ----------------------------<  122      [06-14-19 @ 09:06]  3.7   |  17  |  2.54        Ca     9.2     [06-14-19 @ 09:06]            Creatinine Trend:  SCr 2.54 [06-14 @ 09:06]  SCr 2.64 [06-13 @ 06:53]  SCr 2.63 [06-12 @ 06:48]  SCr 2.74 [06-11 @ 06:17]  SCr 2.76 [06-10 @ 21:14]              Urinalysis - [06-12-19 @ 09:34]      Color Yellow / Appearance Slightly Turbid / SG 1.019 / pH 6.0      Gluc Negative / Ketone Negative  / Bili Negative / Urobili <2 mg/dL       Blood Trace / Protein 100 mg/dL / Leuk Est Large / Nitrite Positive      RBC 6 /  / Hyaline 0 / Gran 5 / Sq Epi  / Non Sq Epi 5 / Bacteria TNTC    Urine Creatinine 141      [06-11-19 @ 11:32]  Urine Protein 96      [06-11-19 @ 11:32]  Urine Sodium <20      [06-11-19 @ 11:32]  Urine Potassium 17      [06-11-19 @ 11:32]  Urine Osmolality 508      [06-11-19 @ 13:38]    PTH -- (Ca 10.4)      [06-11-19 @ 09:51]   15  Vitamin D (25OH) 9.7      [06-11-19 @ 10:02]

## 2019-06-14 NOTE — CONSULT NOTE ADULT - SUBJECTIVE AND OBJECTIVE BOX
Patient is a 86y old  Male who presents with a chief complaint of diarrhea    HISTORY OF PRESENT ILLNESS:   86yoM with PMH of carcinoid syndrome s/p left lower lobe resection, bladder disease requiring intermittent straight cath who presented with persistent diarrhea and weight loss and generalized weakness. This was attributed his carcinoid syndrome which is currently being treated with octreotide. Patient notes significant improvement in his symptoms since starting the medications. Today echo was done which showed evidence of pericardial effusion with increased pressures but no evidence of tamponade. Patient denies any CP, SOB, palpitations, dizziness, syncope. He has no prior cardiac history. Notes decreased appetite and PO intake recently.      Allergies    penicillin (Swelling)    Intolerances    	    MEDICATIONS:  heparin  Injectable 5000 Unit(s) SubCutaneous every 8 hours    aztreonam  IVPB 1000 milliGRAM(s) IV Intermittent every 12 hours  aztreonam  IVPB      octreotide  Injectable 100 MICROGram(s) SubCutaneous two times a day  ergocalciferol 32665 Unit(s) Oral every week  sodium bicarbonate 650 milliGRAM(s) Oral every 6 hours  sodium chloride 0.9%. 1000 milliLiter(s) IV Continuous <Continuous>      PAST MEDICAL & SURGICAL HISTORY:  Neck mass: was resected, reportedly bening  Kidney lesion: partial resction- reportedly &quot;not active lesion&quot;  History of carcinoid syndrome  H/O carcinoid syndrome  S/P TURP      FAMILY HISTORY:  FH: lung cancer: sister  Family history of nasopharyngeal cancer: father    REVIEW OF SYSTEMS:    CONSTITUTIONAL: No weakness, fevers or chills  EYES/ENT: No visual changes;  No dysphagia  RESPIRATORY: No cough, wheezing, hemoptysis; No shortness of breath  CARDIOVASCULAR: No chest pain or palpitations; No lower extremity edema  GASTROINTESTINAL: No abdominal or epigastric pain. No nausea, vomiting, or hematemesis, +diahrrea  GENITOURINARY: No dysuria, frequency or hematuria  NEUROLOGICAL: No numbness or weakness  SKIN: No itching, burning, rashes, or lesions  HEME: No bleeding or bruising  MSK: No joint pains or muscle pains  All other review of systems is negative unless indicated above.    PHYSICAL EXAM:  T(C): 37 (06-14-19 @ 15:52), Max: 37 (06-13-19 @ 23:02)  HR: 78 (06-14-19 @ 15:52) (78 - 88)  BP: 99/63 (06-14-19 @ 15:52) (96/60 - 112/60)  RR: 17 (06-14-19 @ 15:52) (17 - 18)  SpO2: 95% (06-14-19 @ 15:52) (95% - 97%)  Wt(kg): --  I&O's Summary    13 Jun 2019 07:01  -  14 Jun 2019 07:00  --------------------------------------------------------  IN: 200 mL / OUT: 1130 mL / NET: -930 mL        Appearance: Normal	  HEENT:   Normal oral mucosa  Cardiovascular: Normal S1 S2, No JVD, No murmurs, No edema  Respiratory: Lungs clear to auscultation	  Psychiatry: A & O x 3, Mood & affect appropriate  Gastrointestinal:  Soft, Non-tender, + BS	  Skin: No rashes, No ecchymoses, No cyanosis	  Neurologic: Non-focal  Extremities: Normal range of motion, No clubbing, cyanosis or edema        LABS:	 	    CBC Full  -  ( 14 Jun 2019 10:23 )  WBC Count : 6.81 K/uL  Hemoglobin : 8.6 g/dL  Hematocrit : 26.6 %  Platelet Count - Automated : 146 K/uL  Mean Cell Volume : 98.9 fl  Mean Cell Hemoglobin : 32.0 pg  Mean Cell Hemoglobin Concentration : 32.3 gm/dL  Auto Neutrophil # : x  Auto Lymphocyte # : x  Auto Monocyte # : x  Auto Eosinophil # : x  Auto Basophil # : x  Auto Neutrophil % : x  Auto Lymphocyte % : x  Auto Monocyte % : x  Auto Eosinophil % : x  Auto Basophil % : x    06-14    138  |  112<H>  |  54<H>  ----------------------------<  122<H>  3.7   |  17<L>  |  2.54<H>  06-13    140  |  111<H>  |  58<H>  ----------------------------<  97  3.8   |  15<L>  |  2.64<H>    Ca    9.2      14 Jun 2019 09:06  Ca    9.4      13 Jun 2019 06:53	    PREVIOUS DIAGNOSTIC TESTING:    Echo 6/14/19  Conclusions:  1. Peak transaortic valve gradient equals 25 mm Hg, mean  transaortic valve gradient equals 12 mm Hg, aortic valve  velocity time integral equals 47 cm, consistent with mild  aortic stenosis. Mild-moderate aortic regurgitation.  2. Normal left ventricular internal dimensions and wall  thicknesses.  3. Hyperdynamic left ventricular systolic function.  4. Reversal of the E-A  waves of the mitral inflow pattern  is consistent with diastolic LV dysfunction.  5. Normal right ventricular size and function.  6. Moderate tricuspid regurgitation.  7. Estimated pulmonary artery systolic pressure equals 46  mm Hg, assuming right atrial pressure equals 8 mm Hg,  consistent with mild pulmonary pressures.  8. There is a moderate, circumferential pericardial  effusion. There is evidence of right atrial invagination  during atrial diastole, but no obvious right ventricular  diastolic inversion (notably, patient with mild pulmonary  hypertension). No evidence of significant respirophasic  variation in inflow velocities. IVC appears to collapse  with respiration.  9. Discussed with Dr. Schumacher    	  ASSESSMENT/PLAN: 	  86yoM with PMH of carcinoid syndrome s/p left lower lobe resection, bladder disease requiring intermittent straight cath who presented with persistent diarrhea and weight loss and generalized weakness, undergoing treatment for carcinoid, now with pericardial effusion.    Pericardial effusion  - evidence of increased pressures on echo but no signs of tamponade  - pt is asymptomatic, hemodynamically stable  - no indication for drainage currently, would repeat echo in AM  - recommend hydration w/ IVF      Zuri Lowery MD  Cardiology Fellow   [phone], M-F 7:30A-5P    All Cardiology service information can be found on [email or web address], password: [email or web address]. Patient is a 86y old  Male who presents with a chief complaint of diarrhea    HISTORY OF PRESENT ILLNESS:   86yoM with PMH of carcinoid syndrome s/p left lower lobe resection, bladder disease requiring intermittent straight cath who presented with persistent diarrhea and weight loss and generalized weakness. This was attributed his carcinoid syndrome which is currently being treated with octreotide. Patient notes significant improvement in his symptoms since starting the medications. Today echo was done which showed evidence of pericardial effusion with increased pressures but no evidence of tamponade. Patient denies any CP, SOB, palpitations, dizziness, syncope. He has no prior cardiac history. Notes decreased appetite and PO intake recently.      Allergies    penicillin (Swelling)    Intolerances    	    MEDICATIONS:  heparin  Injectable 5000 Unit(s) SubCutaneous every 8 hours    aztreonam  IVPB 1000 milliGRAM(s) IV Intermittent every 12 hours  aztreonam  IVPB      octreotide  Injectable 100 MICROGram(s) SubCutaneous two times a day  ergocalciferol 01733 Unit(s) Oral every week  sodium bicarbonate 650 milliGRAM(s) Oral every 6 hours  sodium chloride 0.9%. 1000 milliLiter(s) IV Continuous <Continuous>      PAST MEDICAL & SURGICAL HISTORY:  Neck mass: was resected, reportedly bening  Kidney lesion: partial resction- reportedly &quot;not active lesion&quot;  History of carcinoid syndrome  H/O carcinoid syndrome  S/P TURP      FAMILY HISTORY:  FH: lung cancer: sister  Family history of nasopharyngeal cancer: father    REVIEW OF SYSTEMS:    CONSTITUTIONAL: No weakness, fevers or chills  EYES/ENT: No visual changes;  No dysphagia  RESPIRATORY: No cough, wheezing, hemoptysis; No shortness of breath  CARDIOVASCULAR: No chest pain or palpitations; No lower extremity edema  GASTROINTESTINAL: No abdominal or epigastric pain. No nausea, vomiting, or hematemesis, +diahrrea  GENITOURINARY: No dysuria, frequency or hematuria  NEUROLOGICAL: No numbness or weakness  SKIN: No itching, burning, rashes, or lesions  HEME: No bleeding or bruising  MSK: No joint pains or muscle pains  All other review of systems is negative unless indicated above.    PHYSICAL EXAM:  T(C): 37 (06-14-19 @ 15:52), Max: 37 (06-13-19 @ 23:02)  HR: 78 (06-14-19 @ 15:52) (78 - 88)  BP: 99/63 (06-14-19 @ 15:52) (96/60 - 112/60)  RR: 17 (06-14-19 @ 15:52) (17 - 18)  SpO2: 95% (06-14-19 @ 15:52) (95% - 97%)  Wt(kg): --  I&O's Summary    13 Jun 2019 07:01  -  14 Jun 2019 07:00  --------------------------------------------------------  IN: 200 mL / OUT: 1130 mL / NET: -930 mL        Appearance: Normal	  HEENT:   Normal oral mucosa  Cardiovascular: Normal S1 S2, No JVD, No murmurs, No edema  Respiratory: Lungs clear to auscultation	  Psychiatry: A & O x 3, Mood & affect appropriate  Gastrointestinal:  Soft, Non-tender, + BS	  Skin: No rashes, No ecchymoses, No cyanosis	  Neurologic: Non-focal  Extremities: Normal range of motion, No clubbing, cyanosis or edema        LABS:	 	Labs Personally Reviewed 6/14/2019      CBC Full  -  ( 14 Jun 2019 10:23 )  WBC Count : 6.81 K/uL  Hemoglobin : 8.6 g/dL  Hematocrit : 26.6 %  Platelet Count - Automated : 146 K/uL  Mean Cell Volume : 98.9 fl  Mean Cell Hemoglobin : 32.0 pg  Mean Cell Hemoglobin Concentration : 32.3 gm/dL  Auto Neutrophil # : x  Auto Lymphocyte # : x  Auto Monocyte # : x  Auto Eosinophil # : x  Auto Basophil # : x  Auto Neutrophil % : x  Auto Lymphocyte % : x  Auto Monocyte % : x  Auto Eosinophil % : x  Auto Basophil % : x    06-14    138  |  112<H>  |  54<H>  ----------------------------<  122<H>  3.7   |  17<L>  |  2.54<H>  06-13    140  |  111<H>  |  58<H>  ----------------------------<  97  3.8   |  15<L>  |  2.64<H>    Ca    9.2      14 Jun 2019 09:06  Ca    9.4      13 Jun 2019 06:53	    PREVIOUS DIAGNOSTIC TESTING:    Echo 6/14/19  Conclusions:  1. Peak transaortic valve gradient equals 25 mm Hg, mean  transaortic valve gradient equals 12 mm Hg, aortic valve  velocity time integral equals 47 cm, consistent with mild  aortic stenosis. Mild-moderate aortic regurgitation.  2. Normal left ventricular internal dimensions and wall  thicknesses.  3. Hyperdynamic left ventricular systolic function.  4. Reversal of the E-A  waves of the mitral inflow pattern  is consistent with diastolic LV dysfunction.  5. Normal right ventricular size and function.  6. Moderate tricuspid regurgitation.  7. Estimated pulmonary artery systolic pressure equals 46  mm Hg, assuming right atrial pressure equals 8 mm Hg,  consistent with mild pulmonary pressures.  8. There is a moderate, circumferential pericardial  effusion. There is evidence of right atrial invagination  during atrial diastole, but no obvious right ventricular  diastolic inversion (notably, patient with mild pulmonary  hypertension). No evidence of significant respirophasic  variation in inflow velocities. IVC appears to collapse  with respiration.  9. Discussed with Dr. Schumacher    	  ASSESSMENT/PLAN: 	  86yoM with PMH of carcinoid syndrome s/p left lower lobe resection, bladder disease requiring intermittent straight cath who presented with persistent diarrhea and weight loss and generalized weakness, undergoing treatment for carcinoid, now with pericardial effusion.    Pericardial effusion  - evidence of increased pressures on echo but no signs of tamponade  - pt is asymptomatic, hemodynamically stable  - no indication for drainage currently, would repeat echo in AM  - recommend hydration w/ IVF      Zuri Lowery MD  Cardiology Fellow   [phone], M-F 7:30A-5P    All Cardiology service information can be found on [email or web address], password: [email or web address].

## 2019-06-14 NOTE — PROGRESS NOTE ADULT - SUBJECTIVE AND OBJECTIVE BOX
Patient is a 86y old  Male who presents with a chief complaint of diarrhea (14 Jun 2019 21:23)      SUBJECTIVE / OVERNIGHT EVENTS: Comfortable without new complaints.   Review of Systems  chest pain no  palpitations no  sob no  nausea no  headache no    MEDICATIONS  (STANDING):  aztreonam  IVPB 1000 milliGRAM(s) IV Intermittent every 12 hours  aztreonam  IVPB      ergocalciferol 13651 Unit(s) Oral every week  heparin  Injectable 5000 Unit(s) SubCutaneous every 8 hours  octreotide  Injectable 100 MICROGram(s) SubCutaneous two times a day  sodium bicarbonate 650 milliGRAM(s) Oral every 6 hours  sodium chloride 0.9%. 1000 milliLiter(s) (75 mL/Hr) IV Continuous <Continuous>  sodium chloride 0.9%. 1000 milliLiter(s) (75 mL/Hr) IV Continuous <Continuous>    MEDICATIONS  (PRN):      Vital Signs Last 24 Hrs  T(C): 37.2 (14 Jun 2019 20:33), Max: 37.2 (14 Jun 2019 20:33)  T(F): 99 (14 Jun 2019 20:33), Max: 99 (14 Jun 2019 20:33)  HR: 88 (14 Jun 2019 20:33) (78 - 88)  BP: 101/59 (14 Jun 2019 20:33) (96/60 - 112/60)  BP(mean): --  RR: 18 (14 Jun 2019 20:33) (17 - 18)  SpO2: 99% (14 Jun 2019 20:33) (95% - 99%)    PHYSICAL EXAM:  GENERAL: NAD  HEAD:  Atraumatic, Normocephalic Flushed  EYES: EOMI, PERRLA, conjunctiva and sclera clear  NECK: Supple, No JVD  CHEST/LUNG: Clear to auscultation bilaterally; No wheeze  HEART: Regular rate and rhythm; No murmurs, rubs, or gallops  ABDOMEN: Soft, Nontender, Nondistended; Bowel sounds present Rae  EXTREMITIES:  2+bipedal edema  PSYCH: AAOx3  NEUROLOGY: non-focal  SKIN: No rashes or lesions    LABS:                        8.6    6.81  )-----------( 146      ( 14 Jun 2019 10:23 )             26.6     06-14    138  |  112<H>  |  54<H>  ----------------------------<  122<H>  3.7   |  17<L>  |  2.54<H>    Ca    9.2      14 Jun 2019 09:06                Culture - Urine (collected 12 Jun 2019 09:45)  Source: .Urine  Final Report (14 Jun 2019 08:36):    >100,000 CFU/ml Citrobacter freundii  Organism: Citrobacter freundii (14 Jun 2019 08:36)  Organism: Citrobacter freundii (14 Jun 2019 08:36)        RADIOLOGY & ADDITIONAL TESTS:    Imaging Personally Reviewed:    Consultant(s) Notes Reviewed:      Care Discussed with Consultants/Other Providers:

## 2019-06-14 NOTE — PROGRESS NOTE ADULT - SUBJECTIVE AND OBJECTIVE BOX
no overnight complaints  calzada without difficulty  vss afeb    calzada clear urine with minimal debris

## 2019-06-14 NOTE — PROGRESS NOTE ADULT - PROBLEM SELECTOR PLAN 3
Renal consult regarding chronic renal disease  hope for improvement now with better urin output.  reviewed scan with radiology, renal lesion unchanged from prior scans

## 2019-06-14 NOTE — PROGRESS NOTE ADULT - ASSESSMENT
87 yo M with PMH of carcinoid syndrome s/p left lower lobe resection, bladder disease requiring intermittent straight cath who presents to the ED with complaint of persistent diarrhea, fatigue, decreased po intake  and weight loss along with borderline hypotension    1- mia on ckd  2- borderline hypotension  3- carcinoid   4- diarrhea  5- acidosis   6- pericardial effusion       cr still elevated  certainly possible to superimposed worsened by pericardial effusion   gentle ivf   calzada good uo   trend cr   cont nahco3- 650mg qid   trend hb   cards consult noted

## 2019-06-15 LAB
ANION GAP SERPL CALC-SCNC: 13 MMOL/L — SIGNIFICANT CHANGE UP (ref 5–17)
BUN SERPL-MCNC: 57 MG/DL — HIGH (ref 7–23)
CALCIUM SERPL-MCNC: 9.4 MG/DL — SIGNIFICANT CHANGE UP (ref 8.4–10.5)
CHLORIDE SERPL-SCNC: 110 MMOL/L — HIGH (ref 96–108)
CO2 SERPL-SCNC: 16 MMOL/L — LOW (ref 22–31)
CREAT SERPL-MCNC: 2.76 MG/DL — HIGH (ref 0.5–1.3)
GLUCOSE SERPL-MCNC: 103 MG/DL — HIGH (ref 70–99)
HCT VFR BLD CALC: 26.3 % — LOW (ref 39–50)
HGB BLD-MCNC: 8.3 G/DL — LOW (ref 13–17)
MCHC RBC-ENTMCNC: 31.6 GM/DL — LOW (ref 32–36)
MCHC RBC-ENTMCNC: 32.3 PG — SIGNIFICANT CHANGE UP (ref 27–34)
MCV RBC AUTO: 102.3 FL — HIGH (ref 80–100)
NRBC # BLD: 0 /100 WBCS — SIGNIFICANT CHANGE UP (ref 0–0)
PLATELET # BLD AUTO: 149 K/UL — LOW (ref 150–400)
POTASSIUM SERPL-MCNC: 3.6 MMOL/L — SIGNIFICANT CHANGE UP (ref 3.5–5.3)
POTASSIUM SERPL-SCNC: 3.6 MMOL/L — SIGNIFICANT CHANGE UP (ref 3.5–5.3)
RBC # BLD: 2.57 M/UL — LOW (ref 4.2–5.8)
RBC # FLD: 18 % — HIGH (ref 10.3–14.5)
SODIUM SERPL-SCNC: 139 MMOL/L — SIGNIFICANT CHANGE UP (ref 135–145)
WBC # BLD: 7.55 K/UL — SIGNIFICANT CHANGE UP (ref 3.8–10.5)
WBC # FLD AUTO: 7.55 K/UL — SIGNIFICANT CHANGE UP (ref 3.8–10.5)

## 2019-06-15 PROCEDURE — 93321 DOPPLER ECHO F-UP/LMTD STD: CPT | Mod: 26

## 2019-06-15 PROCEDURE — 99232 SBSQ HOSP IP/OBS MODERATE 35: CPT | Mod: GC

## 2019-06-15 PROCEDURE — 93308 TTE F-UP OR LMTD: CPT | Mod: 26

## 2019-06-15 RX ORDER — OCTREOTIDE ACETATE 200 UG/ML
100 INJECTION, SOLUTION INTRAVENOUS; SUBCUTANEOUS THREE TIMES A DAY
Refills: 0 | Status: DISCONTINUED | OUTPATIENT
Start: 2019-06-15 | End: 2019-06-16

## 2019-06-15 RX ORDER — ACETAMINOPHEN 500 MG
650 TABLET ORAL EVERY 6 HOURS
Refills: 0 | Status: DISCONTINUED | OUTPATIENT
Start: 2019-06-15 | End: 2019-06-28

## 2019-06-15 RX ADMIN — Medication 50 MILLIGRAM(S): at 18:08

## 2019-06-15 RX ADMIN — HEPARIN SODIUM 5000 UNIT(S): 5000 INJECTION INTRAVENOUS; SUBCUTANEOUS at 14:46

## 2019-06-15 RX ADMIN — Medication 650 MILLIGRAM(S): at 06:28

## 2019-06-15 RX ADMIN — Medication 50 MILLIGRAM(S): at 06:29

## 2019-06-15 RX ADMIN — OCTREOTIDE ACETATE 100 MICROGRAM(S): 200 INJECTION, SOLUTION INTRAVENOUS; SUBCUTANEOUS at 14:47

## 2019-06-15 RX ADMIN — OCTREOTIDE ACETATE 100 MICROGRAM(S): 200 INJECTION, SOLUTION INTRAVENOUS; SUBCUTANEOUS at 06:28

## 2019-06-15 RX ADMIN — Medication 650 MILLIGRAM(S): at 18:09

## 2019-06-15 RX ADMIN — Medication 650 MILLIGRAM(S): at 22:33

## 2019-06-15 RX ADMIN — HEPARIN SODIUM 5000 UNIT(S): 5000 INJECTION INTRAVENOUS; SUBCUTANEOUS at 22:32

## 2019-06-15 RX ADMIN — HEPARIN SODIUM 5000 UNIT(S): 5000 INJECTION INTRAVENOUS; SUBCUTANEOUS at 06:28

## 2019-06-15 RX ADMIN — Medication 650 MILLIGRAM(S): at 00:54

## 2019-06-15 RX ADMIN — OCTREOTIDE ACETATE 100 MICROGRAM(S): 200 INJECTION, SOLUTION INTRAVENOUS; SUBCUTANEOUS at 22:32

## 2019-06-15 RX ADMIN — Medication 650 MILLIGRAM(S): at 14:46

## 2019-06-15 NOTE — PROGRESS NOTE ADULT - ASSESSMENT
86yM with urethral stricture    -Maintain calzada catheter till Monday then can resume CIC as he was doing prior to admission  -Hydration  -Pain control

## 2019-06-15 NOTE — PROGRESS NOTE ADULT - ASSESSMENT
86y male with hx carcinoid syndrome, LLL resection, retention, requiring intremittent straight cath, presnted with diarrhea and weight loss, weakness, difficulty ambulating and facial flushing.   Pt was seen by urology for neurogenic bladder, hx BPH, TURP, pt now unable to void and had multiple attempts in bladder catheterization and now s/p cystoscopy, stricture dilation with calzada insertion.   Urine cult with Citrobacter, antibiogram reviewed  CT with hypodense lesions in the liver and solid R renal mass, mod pericardial effusion   Pt has hx of PCN allergy    PLAN:    complete 2 more days of aztreonam  can change to po cipro 250mg po bid if ready to go home to complete course

## 2019-06-15 NOTE — PROGRESS NOTE ADULT - ATTENDING COMMENTS
Patient seen and examined; agree with Cardiology Fellow assessment and plan.  --BPs relatively stable albeit on the lower end (high 90s systolic).  --Moderate pericardial effusion with evidence of increased pericardial pressure but no obvious tamponade physiology.  --Please monitor BPs very frequently as well as HR.  --If noted to be more hypotensive or tachycardic, will require stat TTE.  --Check TTE this AM and monitor closely; will follow. Patient seen and examined; agree with Cardiology Fellow assessment and plan.  --BPs relatively stable albeit on the lower end (high 90s systolic).  --Moderate pericardial effusion with evidence of increased pericardial pressure but no obvious tamponade physiology.  --Please monitor BPs very frequently as well as HR.  --If noted to be more hypotensive or tachycardic, will require stat TTE.  --Check repeat limited TTE this AM to assess stability of effusion and monitor closely; will follow.

## 2019-06-15 NOTE — PROGRESS NOTE ADULT - ASSESSMENT
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    Carcinoid syndrome.  -  Patient's flusing and diarrhea are likely due to his carcinoid syndrome, serotonin secretion, and potentially other vasoactive substances.   - octreotide 100mg subQ TID to treat flushing and diarrhea.    - oncology follow.    Liver masses  - s/p Liver bx  - Path pending      Hypercalcemia.  - Patient noted to have mild hypercalcemia, may be related to suspected carcinoid tumor.  - Will continue IVF.    CONSTANZA (acute kidney injury).   - Patient was noted here to have elevated SCr of 2.6, which is high compared to outpatient SCr of 1.91.  - CONSTANZA is likely pre-renal related to hypovolemia from excessive GI losses.   - urology evaluation noted. S/P cysto and stricture of urethra dilatation.  - Rae.    UTI  - antibiotics. ID follow    Pericardial effusion.  - Patient was found on outpatient CT scan to have a pericardial effusion and was recommended for TTE.  - Currently, the patient is hemodynamically WNL. He denies palpitations and denies lightheadedness while lying supine.  - TTE repeat pending   - Cardiology follow     Metabolic acidosis, normal anion gap (NAG).   - Patient found to have metabolic acidosis with normal anion gap, hyperchloremia likely due to GI losses.   - continue IVF.  - Follow up BMP.  - nephrology follow.    Prophylactic measure.  - VTE ppx with start heparin subQ    Activity: OOB with assistance, fall precaution  Diet: regular.   d/w patient and wife at bedside  Phong Dash MD pager [phone]

## 2019-06-15 NOTE — PROGRESS NOTE ADULT - PROBLEM SELECTOR PLAN 3
Renal consult regarding chronic renal disease  hope for improvement now with better urin output.  Renal lesion unchanged from prior scans

## 2019-06-15 NOTE — PROGRESS NOTE ADULT - PROBLEM SELECTOR PLAN 4
Asymptomatic  reviewed echocardiogram - pericardial effusion, no e/o tamponade   cardiology input noted and appreciated  Due for repeat stat echo this AM

## 2019-06-15 NOTE — PHYSICAL THERAPY INITIAL EVALUATION ADULT - FOLLOWS COMMANDS/ANSWERS QUESTIONS, REHAB EVAL
100% of the time/Cahto, communicated with writing down messages as his hearing aids were getting new batteries delivered today

## 2019-06-15 NOTE — PROGRESS NOTE ADULT - SUBJECTIVE AND OBJECTIVE BOX
[facility]   [phone]  RENAL FOLLOW UP NOTE  --------------------------------------------------------------------------------  Chief Complaint:    24 hour events/subjective:    seen   states no sob diarrhea has worsened again     PAST HISTORY  --------------------------------------------------------------------------------  No significant changes to PMH, PSH, FHx, SHx, unless otherwise noted    ALLERGIES & MEDICATIONS  --------------------------------------------------------------------------------  Allergies    penicillin (Swelling)    Intolerances      Standing Inpatient Medications  aztreonam  IVPB 1000 milliGRAM(s) IV Intermittent every 12 hours  aztreonam  IVPB      ergocalciferol 56184 Unit(s) Oral every week  heparin  Injectable 5000 Unit(s) SubCutaneous every 8 hours  octreotide  Injectable 100 MICROGram(s) SubCutaneous three times a day  sodium bicarbonate 650 milliGRAM(s) Oral every 6 hours  sodium chloride 0.9%. 1000 milliLiter(s) IV Continuous <Continuous>  sodium chloride 0.9%. 1000 milliLiter(s) IV Continuous <Continuous>    PRN Inpatient Medications  acetaminophen   Tablet .. 650 milliGRAM(s) Oral every 6 hours PRN      REVIEW OF SYSTEMS  --------------------------------------------------------------------------------    Gen: denies fevers/chills,  CVS: denies chest pain/palpitations  Resp: denies SOB/Cough  GI: Denies N/V/Abd pain  : Denies dysuria/oliguria/hematuria    All other systems were reviewed and are negative, except as noted.    VITALS/PHYSICAL EXAM  --------------------------------------------------------------------------------  T(C): 36.7 (06-15-19 @ 07:23), Max: 37.2 (06-14-19 @ 20:33)  HR: 87 (06-15-19 @ 07:23) (78 - 89)  BP: 97/59 (06-15-19 @ 07:23) (97/59 - 101/59)  RR: 18 (06-15-19 @ 07:23) (17 - 18)  SpO2: 98% (06-15-19 @ 07:23) (95% - 99%)  Wt(kg): --        06-14-19 @ 07:01  -  06-15-19 @ 07:00  --------------------------------------------------------  IN: 860 mL / OUT: 476 mL / NET: 384 mL      Physical Exam:  Gen: Non toxic comfortable appearing  thin muscle wasting   	no jvd ,  	Pulm: decrease bs  no rales or ronchi or wheezing  	CV: RRR, S1S2; no rub  	Abd: +BS, soft, nontender/nondistended  	: No suprapubic tenderness  	UE: Warm, no cyanosis  no clubbing,  no edema  	LE: Warm, no cyanosis  no clubbing, no edema  	Neuro: alert and oriented. speech coherent   			        LABS/STUDIES  --------------------------------------------------------------------------------              8.3    7.55  >-----------<  149      [06-15-19 @ 10:22]              26.3     139  |  110  |  57  ----------------------------<  103      [06-15-19 @ 07:09]  3.6   |  16  |  2.76        Ca     9.4     [06-15-19 @ 07:09]            Creatinine Trend:  SCr 2.76 [06-15 @ 07:09]  SCr 2.54 [06-14 @ 09:06]  SCr 2.64 [06-13 @ 06:53]  SCr 2.63 [06-12 @ 06:48]  SCr 2.74 [06-11 @ 06:17]              Urinalysis - [06-12-19 @ 09:34]      Color Yellow / Appearance Slightly Turbid / SG 1.019 / pH 6.0      Gluc Negative / Ketone Negative  / Bili Negative / Urobili <2 mg/dL       Blood Trace / Protein 100 mg/dL / Leuk Est Large / Nitrite Positive      RBC 6 /  / Hyaline 0 / Gran 5 / Sq Epi  / Non Sq Epi 5 / Bacteria TNTC    Urine Creatinine 141      [06-11-19 @ 11:32]  Urine Protein 96      [06-11-19 @ 11:32]  Urine Sodium <20      [06-11-19 @ 11:32]  Urine Potassium 17      [06-11-19 @ 11:32]  Urine Osmolality 508      [06-11-19 @ 13:38]    PTH -- (Ca 10.4)      [06-11-19 @ 09:51]   15  Vitamin D (25OH) 9.7      [06-11-19 @ 10:02]

## 2019-06-15 NOTE — PROGRESS NOTE ADULT - ASSESSMENT
85 yo M with PMH of carcinoid syndrome s/p left lower lobe resection, bladder disease requiring intermittent straight cath who presents to the ED with complaint of persistent diarrhea, fatigue, decreased po intake  and weight loss along with borderline hypotension    1- mia on ckd  2- borderline hypotension  3- carcinoid   4- diarrhea  5- acidosis   6- pericardial effusion       cr still elevated  certainly possible to superimposed worsened by pericardial effusion /diarrhea  gentle ivf  to cont   calzada good uo   trend cr   cont nahco3- 650mg qid   trend hb   For repeat echo

## 2019-06-15 NOTE — PROGRESS NOTE ADULT - SUBJECTIVE AND OBJECTIVE BOX
Pt is seen and examined  pt is awake and lying in bed  Patient with episode of watery diarrhea last night and again this AM.   Ambulates short distances with assistance  s/p liver biopsy 6/13 - path pending  s/p cystoscopy with calzada placement; remains on antibiotics for cystitis   pt seems comfortable and denies any new complaints at this time. Denies any abdominal pain, fevers, chills, cp, sob, nausea/vomiting, headaches.       MEDICATIONS  (STANDING):  aztreonam  IVPB 1000 milliGRAM(s) IV Intermittent every 12 hours  aztreonam  IVPB      ergocalciferol 35340 Unit(s) Oral every week  heparin  Injectable 5000 Unit(s) SubCutaneous every 8 hours  octreotide  Injectable 100 MICROGram(s) SubCutaneous two times a day  sodium bicarbonate 650 milliGRAM(s) Oral every 6 hours  sodium chloride 0.9%. 1000 milliLiter(s) (75 mL/Hr) IV Continuous <Continuous>  sodium chloride 0.9%. 1000 milliLiter(s) (75 mL/Hr) IV Continuous <Continuous>    MEDICATIONS  (PRN):  acetaminophen   Tablet .. 650 milliGRAM(s) Oral every 6 hours PRN Temp greater or equal to 38C (100.4F), Moderate Pain (4 - 6)      Allergies    penicillin (Swelling)    Intolerances      Vital Signs Last 24 Hrs  T(C): 36.7 (15 Jean 2019 07:23), Max: 37.2 (14 Jun 2019 20:33)  T(F): 98 (15 Jean 2019 07:23), Max: 99 (14 Jun 2019 20:33)  HR: 87 (15 Jean 2019 07:23) (78 - 89)  BP: 97/59 (15 Jean 2019 07:23) (97/59 - 101/59)  BP(mean): --  RR: 18 (15 Jean 2019 07:23) (17 - 18)  SpO2: 98% (15 Jaen 2019 07:23) (95% - 99%)      PHYSICAL EXAM  General: adult in NAD  HEENT: , anicteric sclera, pink conjunctiva  CV: normal S1/S2 with no murmur rubs or gallops  Lungs: positive air movement b/l ant lungs, clear to auscultation, no wheezes, no rales  Abdomen: soft non-tender non-distended, no hepatosplenomegaly  Ext: no clubbing cyanosis or edema  Skin: no rashes and no petechiae  Neuro: alert and moderate dementia no focal deficits      LABS:                                8.6    6.81  )-----------( 146      ( 14 Jun 2019 10:23 )             26.6     Mean Cell Volume : 98.9 fl  Mean Cell Hemoglobin : 32.0 pg  Mean Cell Hemoglobin Concentration : 32.3 gm/dL  Auto Neutrophil # : x  Auto Lymphocyte # : x  Auto Monocyte # : x  Auto Eosinophil # : x  Auto Basophil # : x  Auto Neutrophil % : x  Auto Lymphocyte % : x  Auto Monocyte % : x  Auto Eosinophil % : x  Auto Basophil % : x    06-15    139  |  110<H>  |  57<H>  ----------------------------<  103<H>  3.6   |  16<L>  |  2.76<H>    Ca    9.4      15 Jean 2019 07:09        06-14    138  |  112<H>  |  54<H>  ----------------------------<  122<H>  3.7   |  17<L>  |  2.54<H>    Ca    9.2      14 Jun 2019 09:06    RADIOLOGY & ADDITIONAL STUDIES:    Echo 6/14/19  Conclusions:  1. Peak transaortic valve gradient equals 25 mm Hg, mean  transaortic valve gradient equals 12 mm Hg, aortic valve  velocity time integral equals 47 cm, consistent with mild  aortic stenosis. Mild-moderate aortic regurgitation.  2. Normal left ventricular internal dimensions and wall  thicknesses.  3. Hyperdynamic left ventricular systolic function.  4. Reversal of the E-A  waves of the mitral inflow pattern  is consistent with diastolic LV dysfunction.  5. Normal right ventricular size and function.  6. Moderate tricuspid regurgitation.  7. Estimated pulmonary artery systolic pressure equals 46  mm Hg, assuming right atrial pressure equals 8 mm Hg,  consistent with mild pulmonary pressures.  8. There is a moderate, circumferential pericardial  effusion. There is evidence of right atrial invagination  during atrial diastole, but no obvious right ventricular  diastolic inversion (notably, patient with mild pulmonary  hypertension). No evidence of significant respirophasic  variation in inflow velocities. IVC appears to collapse  with respiration.  9. Discussed with Dr. Schumacher

## 2019-06-15 NOTE — PROGRESS NOTE ADULT - SUBJECTIVE AND OBJECTIVE BOX
Patient is a 86y old  Male who presents with a chief complaint of diarrhea (15 Jean 2019 14:26)      SUBJECTIVE / OVERNIGHT EVENTS: No new complaints. Wife at bedside.  Review of Systems  chest pain no  palpitations no  sob no  nausea no  headache no    MEDICATIONS  (STANDING):  aztreonam  IVPB 1000 milliGRAM(s) IV Intermittent every 12 hours  aztreonam  IVPB      ergocalciferol 26088 Unit(s) Oral every week  heparin  Injectable 5000 Unit(s) SubCutaneous every 8 hours  octreotide  Injectable 100 MICROGram(s) SubCutaneous three times a day  sodium bicarbonate 650 milliGRAM(s) Oral every 6 hours  sodium chloride 0.9%. 1000 milliLiter(s) (75 mL/Hr) IV Continuous <Continuous>  sodium chloride 0.9%. 1000 milliLiter(s) (75 mL/Hr) IV Continuous <Continuous>    MEDICATIONS  (PRN):  acetaminophen   Tablet .. 650 milliGRAM(s) Oral every 6 hours PRN Temp greater or equal to 38C (100.4F), Moderate Pain (4 - 6)      Vital Signs Last 24 Hrs  T(C): 36.6 (15 Jean 2019 15:35), Max: 37.2 (14 Jun 2019 20:33)  T(F): 97.8 (15 Jean 2019 15:35), Max: 99 (14 Jun 2019 20:33)  HR: 85 (15 Jean 2019 15:35) (85 - 89)  BP: 98/62 (15 Jean 2019 15:35) (97/59 - 101/59)  BP(mean): --  RR: 18 (15 Jean 2019 15:35) (17 - 18)  SpO2: 99% (15 Jean 2019 15:35) (95% - 99%)    PHYSICAL EXAM:  GENERAL: NAD  HEAD:  Atraumatic, Normocephalic Flushed.  EYES: EOMI, PERRLA, conjunctiva and sclera clear  NECK: Supple, No JVD  CHEST/LUNG: Clear to auscultation bilaterally; No wheeze  HEART: Regular rate and rhythm; No murmurs, rubs, or gallops  ABDOMEN: Soft, Nontender, Nondistended; Bowel sounds present Rae  EXTREMITIES:  2+ bipedal edema  PSYCH: AAOx3  NEUROLOGY: non-focal  SKIN: No rashes or lesions    LABS:                        8.3    7.55  )-----------( 149      ( 15 Jean 2019 10:22 )             26.3     06-15    139  |  110<H>  |  57<H>  ----------------------------<  103<H>  3.6   |  16<L>  |  2.76<H>    Ca    9.4      15 Jean 2019 07:09                  RADIOLOGY & ADDITIONAL TESTS:    Imaging Personally Reviewed:    Consultant(s) Notes Reviewed:      Care Discussed with Consultants/Other Providers:

## 2019-06-15 NOTE — PROGRESS NOTE ADULT - SUBJECTIVE AND OBJECTIVE BOX
CC: f/u for cystitis  Patient reports no new c/o, no fevers  REVIEW OF SYSTEMS:  All other review of systems negative (Comprehensive ROS)    Antimicrobials Day #  :3/5  aztreonam  IVPB 1000 milliGRAM(s) IV Intermittent every 12 hours  aztreonam  IVPB        Other Medications Reviewed      Vital Signs Last 24 Hrs  T(C): 36.7 (15 Jean 2019 07:23), Max: 37.2 (14 Jun 2019 20:33)  T(F): 98 (15 Jean 2019 07:23), Max: 99 (14 Jun 2019 20:33)  HR: 87 (15 Jean 2019 07:23) (78 - 89)  BP: 97/59 (15 Jean 2019 07:23) (97/59 - 101/59)  BP(mean): --  RR: 18 (15 Jean 2019 07:23) (17 - 18)  SpO2: 98% (15 Jean 2019 07:23) (95% - 99%)  PHYSICAL EXAM:  General: alert, no acute distress, face flushed  Eyes:  anicteric, no conjunctival injection, no discharge  Oropharynx: no lesions or injection 	  Neck: supple, without adenopathy  Lungs: clear to auscultation  Heart: regular rate and rhythm; no murmur, rubs or gallops  Abdomen: soft, nondistended, nontender, without mass or organomegaly  Skin: no lesions  Extremities: no clubbing, cyanosis, or edema  Neurologic: alert, oriented, moves all extremities    LAB RESULTS:                          8.6    6.81  )-----------( 146      ( 14 Jun 2019 10:23 )             26.6   06-15    139  |  110<H>  |  57<H>  ----------------------------<  103<H>  3.6   |  16<L>  |  2.76<H>    Ca    9.4      15 Jean 2019 07:09              MICROBIOLOGY:  RECENT CULTURES:  06-12 @ 09:45 .Urine Citrobacter freundii    >100,000 CFU/ml Citrobacter freundii    Culture - Urine (06.12.19 @ 09:45)    -  Amikacin: S <=16    -  Ampicillin: R <=8 These ampicillin results predict results for amoxicillin    -  Ampicillin/Sulbactam: R <=8/4    -  Aztreonam: S <=4    -  Cefazolin: R <=8    -  Cefepime: S <=4    -  Cefoxitin: R 16    -  Ceftriaxone: S <=1 Enterobacter, Citrobacter, and Serratia may develop resistance during prolonged therapy    -  Ciprofloxacin: S <=1    -  Ertapenem: S <=1    -  Gentamicin: S <=4    -  Imipenem: S <=1    -  Levofloxacin: S <=2    -  Meropenem: S <=1    -  Nitrofurantoin: S <=32 Should not be used to treat pyelonephritis    -  Piperacillin/Tazobactam: S <=16    -  Tigecycline: S <=2    -  Tobramycin: S <=4    -  Trimethoprim/Sulfamethoxazole: S <=2/38    Specimen Source: .Urine    Culture Results:   >100,000 CFU/ml Citrobacter freundii    Organism Identification: Citrobacter freundii    Organism: Citrobacter freundii    Method Type: MAYRA      Urine Microscopic-Add On (NC) (06.12.19 @ 09:34)    Red Blood Cell - Urine: 6 /HPF    White Blood Cell - Urine: 500 /HPF    Hyaline Casts: 0 /LPF    Bacteria: TNTC    Epithelial Cells: 5 /HPF    Granular Cast: 5 /LPF        RADIOLOGY REVIEWED:  < from: CT Abdomen No Cont (06.11.19 @ 21:03) >  IMPRESSION:     Evaluation of the solid organs and vessels is limited without use of IV   contrast.    Innumerable hypodense lesions throughout the liver compatible with   metastatic disease, with progression of disease since 5/16/2019.    Previously noted solid right renal mass is not well evaluated without   intravenous contrast.    Moderate pericardial effusion.        < end of copied text >

## 2019-06-15 NOTE — PHYSICAL THERAPY INITIAL EVALUATION ADULT - PERTINENT HX OF CURRENT PROBLEM, REHAB EVAL
as per chart review: hx carcinoid syndrome, LLL resection, retention, requiring intermittent straight cath, presented with diarrhea and weight loss, weakness, difficulty ambulating and facial flushing.

## 2019-06-15 NOTE — PROGRESS NOTE ADULT - SUBJECTIVE AND OBJECTIVE BOX
Patient seen and examined at bedside.    Overnight Events:         Medications:  acetaminophen   Tablet .. 650 milliGRAM(s) Oral every 6 hours PRN  aztreonam  IVPB 1000 milliGRAM(s) IV Intermittent every 12 hours  aztreonam  IVPB      ergocalciferol 33167 Unit(s) Oral every week  heparin  Injectable 5000 Unit(s) SubCutaneous every 8 hours  octreotide  Injectable 100 MICROGram(s) SubCutaneous two times a day  sodium bicarbonate 650 milliGRAM(s) Oral every 6 hours  sodium chloride 0.9%. 1000 milliLiter(s) IV Continuous <Continuous>  sodium chloride 0.9%. 1000 milliLiter(s) IV Continuous <Continuous>      PAST MEDICAL & SURGICAL HISTORY:  Neck mass: was resected, reportedly bening  Kidney lesion: partial resction- reportedly &quot;not active lesion&quot;  History of carcinoid syndrome  H/O carcinoid syndrome  S/P TURP        Vitals:  T(F): 98 (06-15), Max: 99 (06-14)  HR: 87 (06-15) (78 - 89)  BP: 97/59 (06-15) (97/59 - 101/59)  RR: 18 (06-15)  SpO2: 98% (06-15)  I&O's Summary    14 Jun 2019 07:01  -  15 Jean 2019 07:00  --------------------------------------------------------  IN: 860 mL / OUT: 476 mL / NET: 384 mL        Physical Exam:  Appearance: No acute distress; well appearing  Eyes: PERRL, EOMI, pink conjunctiva  HENT: Normal oral mucosa  Cardiovascular: RRR, S1, S2, no murmurs, rubs, or gallops; no edema; no JVD  Respiratory: Clear to auscultation bilaterally  Gastrointestinal: soft, non-tender, non-distended with normal bowel sounds  Musculoskeletal: No clubbing; no joint deformity   Neurologic: Non-focal  Lymphatic: No lymphadenopathy  Psychiatry: AAOx3, mood & affect appropriate  Skin: No rashes, ecchymoses, or cyanosis                          8.6    6.81  )-----------( 146      ( 14 Jun 2019 10:23 )             26.6     06-15    139  |  110<H>  |  57<H>  ----------------------------<  103<H>  3.6   |  16<L>  |  2.76<H>    Ca    9.4      15 Jean 2019 07:09                    New ECG(s): Personally reviewed    Echo:    Stress Testing:     Cath:    Imaging:    Interpretation of Telemetry:    	  ASSESSMENT/PLAN: 	    86yoM with PMH of carcinoid syndrome s/p left lower lobe resection, bladder disease requiring intermittent straight cath who presented with persistent diarrhea and weight loss and generalized weakness, undergoing treatment for carcinoid, now with pericardial effusion.   Transthoracic echo showed mod pericardial effusion w/ elevated R sided pressures but no obvious evidence of tamponade.  Pt is clinically stable.     RECS  - cont current management  - repeat TTE this morning to re-evaluate; if stable, no need for further repeats.       LAMONT Gaona MD  [phone] Patient seen and examined at bedside.    Overnight Events:         Medications:  acetaminophen   Tablet .. 650 milliGRAM(s) Oral every 6 hours PRN  aztreonam  IVPB 1000 milliGRAM(s) IV Intermittent every 12 hours  aztreonam  IVPB      ergocalciferol 48178 Unit(s) Oral every week  heparin  Injectable 5000 Unit(s) SubCutaneous every 8 hours  octreotide  Injectable 100 MICROGram(s) SubCutaneous two times a day  sodium bicarbonate 650 milliGRAM(s) Oral every 6 hours  sodium chloride 0.9%. 1000 milliLiter(s) IV Continuous <Continuous>  sodium chloride 0.9%. 1000 milliLiter(s) IV Continuous <Continuous>      PAST MEDICAL & SURGICAL HISTORY:  Neck mass: was resected, reportedly bening  Kidney lesion: partial resction- reportedly &quot;not active lesion&quot;  History of carcinoid syndrome  H/O carcinoid syndrome  S/P TURP        Vitals:  T(F): 98 (06-15), Max: 99 (06-14)  HR: 87 (06-15) (78 - 89)  BP: 97/59 (06-15) (97/59 - 101/59)  RR: 18 (06-15)  SpO2: 98% (06-15)  I&O's Summary    14 Jun 2019 07:01  -  15 Jean 2019 07:00  --------------------------------------------------------  IN: 860 mL / OUT: 476 mL / NET: 384 mL        Physical Exam:  Appearance: No acute distress; well appearing  Eyes: PERRL, EOMI, pink conjunctiva  HENT: Normal oral mucosa  Cardiovascular: RRR, S1, S2, no murmurs, rubs, or gallops; no edema; no JVD  Respiratory: Clear to auscultation bilaterally  Gastrointestinal: soft, non-tender, non-distended with normal bowel sounds  Musculoskeletal: No clubbing; no joint deformity   Neurologic: Non-focal  Lymphatic: No lymphadenopathy  Psychiatry: AAOx3, mood & affect appropriate  Skin: No rashes, ecchymoses, or cyanosis                          8.6    6.81  )-----------( 146      ( 14 Jun 2019 10:23 )             26.6     06-15    139  |  110<H>  |  57<H>  ----------------------------<  103<H>  3.6   |  16<L>  |  2.76<H>    Ca    9.4      15 Jean 2019 07:09                    New ECG(s): Personally reviewed    Echo:    Stress Testing:     Cath:    Imaging:    Interpretation of Telemetry:    	  ASSESSMENT/PLAN: 	    86yoM with PMH of carcinoid syndrome s/p left lower lobe resection, bladder disease requiring intermittent straight cath who presented with persistent diarrhea and weight loss and generalized weakness, undergoing treatment for carcinoid, now with pericardial effusion.   Transthoracic echo showed mod pericardial effusion w/ elevated R sided pressures but no obvious evidence of tamponade.  Pt is clinically stable.     RECS  - cont current management  - repeat limited TTE this morning to re-evaluate stability.      LAMONT Gaona MD  [phone]

## 2019-06-15 NOTE — PROGRESS NOTE ADULT - PROBLEM SELECTOR PLAN 1
Octreotide dose reduced to 100 q12h yesterday  Patient with 2 episodes of watery diarrhea since last night  Will increase Octreotide back to previous dose 100mcg q8h  Long acting octreotide available in our office - will begin once he is discharged.  As per Dr Contreras's discussion with daughter, idealy daily octreotide should overlap first dose long acting octreotide by 2 weeks. Would have to be administered at home once discharged.

## 2019-06-15 NOTE — PROGRESS NOTE ADULT - SUBJECTIVE AND OBJECTIVE BOX
Urology Progress Note    Overnight Events:  No acute urologic events overnight. Rae in place with yellow urine    Review of Systems:   Constitutional: No weight loss, no weakness  CV: No chest pain, no chest pressure  Pulm: No shortness of breath, cough, or sputum    Physical Exam:  Vital signs  T(C): 36.7 (06-15-19 @ 07:23), Max: 37.2 (06-14-19 @ 20:33)  HR: 87 (06-15-19 @ 07:23)  BP: 97/59 (06-15-19 @ 07:23)  SpO2: 98% (06-15-19 @ 07:23)  Wt(kg): --    Gen: No acute distress. Normal mood  Abd: soft, non-tender, non-distended  : Non-palpable bladder    Labs:      06-15 @ 07:09    WBC --    / Hct --    / SCr 2.76     06-14 @ 10:23    WBC 6.81  / Hct 26.6  / SCr --       06-15    139  |  110<H>  |  57<H>  ----------------------------<  103<H>  3.6   |  16<L>  |  2.76<H>    Ca    9.4      15 Jean 2019 07:09

## 2019-06-16 LAB
ANION GAP SERPL CALC-SCNC: 11 MMOL/L — SIGNIFICANT CHANGE UP (ref 5–17)
BUN SERPL-MCNC: 59 MG/DL — HIGH (ref 7–23)
CALCIUM SERPL-MCNC: 9.4 MG/DL — SIGNIFICANT CHANGE UP (ref 8.4–10.5)
CHLORIDE SERPL-SCNC: 110 MMOL/L — HIGH (ref 96–108)
CO2 SERPL-SCNC: 12 MMOL/L — LOW (ref 22–31)
CREAT SERPL-MCNC: 2.85 MG/DL — HIGH (ref 0.5–1.3)
GLUCOSE SERPL-MCNC: 126 MG/DL — HIGH (ref 70–99)
HCT VFR BLD CALC: 26.7 % — LOW (ref 39–50)
HGB BLD-MCNC: 8.6 G/DL — LOW (ref 13–17)
MCHC RBC-ENTMCNC: 32 PG — SIGNIFICANT CHANGE UP (ref 27–34)
MCHC RBC-ENTMCNC: 32.2 GM/DL — SIGNIFICANT CHANGE UP (ref 32–36)
MCV RBC AUTO: 99.3 FL — SIGNIFICANT CHANGE UP (ref 80–100)
NRBC # BLD: 0 /100 WBCS — SIGNIFICANT CHANGE UP (ref 0–0)
PLATELET # BLD AUTO: 160 K/UL — SIGNIFICANT CHANGE UP (ref 150–400)
POTASSIUM SERPL-MCNC: 3.3 MMOL/L — LOW (ref 3.5–5.3)
POTASSIUM SERPL-SCNC: 3.3 MMOL/L — LOW (ref 3.5–5.3)
RBC # BLD: 2.69 M/UL — LOW (ref 4.2–5.8)
RBC # FLD: 18.4 % — HIGH (ref 10.3–14.5)
SODIUM SERPL-SCNC: 133 MMOL/L — LOW (ref 135–145)
WBC # BLD: 8.34 K/UL — SIGNIFICANT CHANGE UP (ref 3.8–10.5)
WBC # FLD AUTO: 8.34 K/UL — SIGNIFICANT CHANGE UP (ref 3.8–10.5)

## 2019-06-16 PROCEDURE — 99232 SBSQ HOSP IP/OBS MODERATE 35: CPT | Mod: GC

## 2019-06-16 PROCEDURE — 93321 DOPPLER ECHO F-UP/LMTD STD: CPT | Mod: 26

## 2019-06-16 PROCEDURE — 93308 TTE F-UP OR LMTD: CPT | Mod: 26

## 2019-06-16 RX ORDER — OCTREOTIDE ACETATE 200 UG/ML
100 INJECTION, SOLUTION INTRAVENOUS; SUBCUTANEOUS
Refills: 0 | Status: DISCONTINUED | OUTPATIENT
Start: 2019-06-16 | End: 2019-06-22

## 2019-06-16 RX ORDER — SODIUM CHLORIDE 9 MG/ML
500 INJECTION INTRAMUSCULAR; INTRAVENOUS; SUBCUTANEOUS ONCE
Refills: 0 | Status: COMPLETED | OUTPATIENT
Start: 2019-06-16 | End: 2019-06-16

## 2019-06-16 RX ORDER — POTASSIUM CHLORIDE 20 MEQ
40 PACKET (EA) ORAL ONCE
Refills: 0 | Status: COMPLETED | OUTPATIENT
Start: 2019-06-16 | End: 2019-06-16

## 2019-06-16 RX ORDER — SODIUM BICARBONATE 1 MEQ/ML
0.08 SYRINGE (ML) INTRAVENOUS
Qty: 75 | Refills: 0 | Status: DISCONTINUED | OUTPATIENT
Start: 2019-06-16 | End: 2019-06-28

## 2019-06-16 RX ORDER — SODIUM CHLORIDE 9 MG/ML
1000 INJECTION INTRAMUSCULAR; INTRAVENOUS; SUBCUTANEOUS
Refills: 0 | Status: DISCONTINUED | OUTPATIENT
Start: 2019-06-16 | End: 2019-06-16

## 2019-06-16 RX ADMIN — Medication 50 MILLIGRAM(S): at 19:44

## 2019-06-16 RX ADMIN — Medication 650 MILLIGRAM(S): at 05:42

## 2019-06-16 RX ADMIN — Medication 70 MEQ/KG/HR: at 13:08

## 2019-06-16 RX ADMIN — Medication 1 TABLET(S): at 13:10

## 2019-06-16 RX ADMIN — OCTREOTIDE ACETATE 100 MICROGRAM(S): 200 INJECTION, SOLUTION INTRAVENOUS; SUBCUTANEOUS at 05:42

## 2019-06-16 RX ADMIN — Medication 70 MEQ/KG/HR: at 23:46

## 2019-06-16 RX ADMIN — OCTREOTIDE ACETATE 100 MICROGRAM(S): 200 INJECTION, SOLUTION INTRAVENOUS; SUBCUTANEOUS at 23:44

## 2019-06-16 RX ADMIN — HEPARIN SODIUM 5000 UNIT(S): 5000 INJECTION INTRAVENOUS; SUBCUTANEOUS at 23:44

## 2019-06-16 RX ADMIN — OCTREOTIDE ACETATE 100 MICROGRAM(S): 200 INJECTION, SOLUTION INTRAVENOUS; SUBCUTANEOUS at 19:44

## 2019-06-16 RX ADMIN — HEPARIN SODIUM 5000 UNIT(S): 5000 INJECTION INTRAVENOUS; SUBCUTANEOUS at 15:22

## 2019-06-16 RX ADMIN — Medication 50 MILLIGRAM(S): at 05:43

## 2019-06-16 RX ADMIN — Medication 40 MILLIEQUIVALENT(S): at 13:09

## 2019-06-16 RX ADMIN — SODIUM CHLORIDE 1000 MILLILITER(S): 9 INJECTION INTRAMUSCULAR; INTRAVENOUS; SUBCUTANEOUS at 11:46

## 2019-06-16 RX ADMIN — HEPARIN SODIUM 5000 UNIT(S): 5000 INJECTION INTRAVENOUS; SUBCUTANEOUS at 05:43

## 2019-06-16 RX ADMIN — OCTREOTIDE ACETATE 100 MICROGRAM(S): 200 INJECTION, SOLUTION INTRAVENOUS; SUBCUTANEOUS at 13:09

## 2019-06-16 NOTE — PROGRESS NOTE ADULT - ATTENDING COMMENTS
Patient seen and examined; agree with Cardiology Fellow assessment and plan.  --BPs in the 80s today, relatively lower compared to last few days--Check stat TTE now to rule out tamponade physiology.  --Moderate pericardial effusion with evidence of increased pericardial pressure on repeat TTE from yesterday but no obvious tamponade physiology.  --Please monitor BPs very frequently as well as HR.  --If any evidence of hemodynamic compromise, will require higher level of monitoring in ICU.  --If noted to be more hypotensive or tachycardic, will require stat TTE again.

## 2019-06-16 NOTE — DIETITIAN INITIAL EVALUATION ADULT. - SIGNS/SYMPTOMS
50% weight loss in 2 months, po intake <50% of needs x 2 months, severe muscle/fat depletion 15% weight loss in 2 months, po intake <50% of needs x 2 months, severe muscle/fat depletion

## 2019-06-16 NOTE — DIETITIAN INITIAL EVALUATION ADULT. - NS AS NUTRI INTERV ED CONTENT
Reviewed foods that may help alleviate diarrhea. Recommended eating nutrient dense foods first, in setting of poor appetite./Other (specify)

## 2019-06-16 NOTE — PROGRESS NOTE ADULT - SUBJECTIVE AND OBJECTIVE BOX
Pt is seen and examined  pt is awake and lying in bed  Patient reports 7 episodes of diarrhea yesterday, mostly loose, watery. Also had multiple BM this morning, but not watery.   Octreotide increased back to TID.   Ambulates short distances with assistance  s/p liver biopsy 6/13 - path pending  s/p cystoscopy with calzada placement; remains on antibiotics for cystitis   pt seems comfortable and denies any new complaints at this time. Denies any abdominal pain, fevers, chills, cp, sob, nausea/vomiting, headaches.     MEDICATIONS  (STANDING):  aztreonam  IVPB 1000 milliGRAM(s) IV Intermittent every 12 hours  aztreonam  IVPB      ergocalciferol 82511 Unit(s) Oral every week  heparin  Injectable 5000 Unit(s) SubCutaneous every 8 hours  octreotide  Injectable 100 MICROGram(s) SubCutaneous three times a day  sodium bicarbonate 650 milliGRAM(s) Oral every 6 hours  sodium chloride 0.9%. 1000 milliLiter(s) (75 mL/Hr) IV Continuous <Continuous>  sodium chloride 0.9%. 1000 milliLiter(s) (75 mL/Hr) IV Continuous <Continuous>    MEDICATIONS  (PRN):  acetaminophen   Tablet .. 650 milliGRAM(s) Oral every 6 hours PRN Temp greater or equal to 38C (100.4F), Moderate Pain (4 - 6)      Allergies    penicillin (Swelling)    Intolerances      Vital Signs Last 24 Hrs  T(C): 36.8 (16 Jun 2019 04:51), Max: 37.2 (15 Jean 2019 23:32)  T(F): 98.3 (16 Jun 2019 04:51), Max: 98.9 (15 Jean 2019 23:32)  HR: 106 (16 Jun 2019 04:51) (85 - 106)  BP: 103/60 (16 Jun 2019 04:51) (91/51 - 105/62)  BP(mean): --  RR: 17 (16 Jun 2019 04:51) (17 - 18)  SpO2: 98% (16 Jun 2019 04:51) (95% - 99%)    PHYSICAL EXAM  General: adult in NAD, facial erythema  HEENT: , anicteric sclera, pink conjunctiva  CV: normal S1/S2 with no murmur rubs or gallops  Lungs: positive air movement b/l ant lungs, clear to auscultation, no wheezes, no rales  Abdomen: soft non-tender non-distended, no hepatosplenomegaly  Ext: no clubbing cyanosis or edema  Skin: no rashes and no petechiae  Neuro: alert and moderate dementia no focal deficits      LABS:                             8.3    7.55  )-----------( 149      ( 15 Jean 2019 10:22 )             26.3     Mean Cell Volume : 102.3 fl  Mean Cell Hemoglobin : 32.3 pg  Mean Cell Hemoglobin Concentration : 31.6 gm/dL  Auto Neutrophil # : x  Auto Lymphocyte # : x  Auto Monocyte # : x  Auto Eosinophil # : x  Auto Basophil # : x  Auto Neutrophil % : x  Auto Lymphocyte % : x  Auto Monocyte % : x  Auto Eosinophil % : x  Auto Basophil % : x    06-16    133<L>  |  110<H>  |  59<H>  ----------------------------<  126<H>  3.3<L>   |  12<L>  |  2.85<H>    Ca    9.4      16 Jun 2019 06:42        06-15    139  |  110<H>  |  57<H>  ----------------------------<  103<H>  3.6   |  16<L>  |  2.76<H>    Ca    9.4      15 Jean 2019 07:09        06-14    138  |  112<H>  |  54<H>  ----------------------------<  122<H>  3.7   |  17<L>  |  2.54<H>    Ca    9.2      14 Jun 2019 09:06    RADIOLOGY & ADDITIONAL STUDIES:    Echo 6/14/19  Conclusions:  1. Peak transaortic valve gradient equals 25 mm Hg, mean  transaortic valve gradient equals 12 mm Hg, aortic valve  velocity time integral equals 47 cm, consistent with mild  aortic stenosis. Mild-moderate aortic regurgitation.  2. Normal left ventricular internal dimensions and wall  thicknesses.  3. Hyperdynamic left ventricular systolic function.  4. Reversal of the E-A  waves of the mitral inflow pattern  is consistent with diastolic LV dysfunction.  5. Normal right ventricular size and function.  6. Moderate tricuspid regurgitation.  7. Estimated pulmonary artery systolic pressure equals 46  mm Hg, assuming right atrial pressure equals 8 mm Hg,  consistent with mild pulmonary pressures.  8. There is a moderate, circumferential pericardial  effusion. There is evidence of right atrial invagination  during atrial diastole, but no obvious right ventricular  diastolic inversion (notably, patient with mild pulmonary  hypertension). No evidence of significant respirophasic  variation in inflow velocities. IVC appears to collapse  with respiration.  9. Discussed with Dr. Schumacher        Limited FRANCESCA 6/15/19    Conclusions:  1. Normal left ventricular systolic function. No segmental  wall motion abnormalities.  2. Normal right ventricular size and function.  3. Normal tricuspid valve. Moderate tricuspid  regurgitation.  4. Estimated pulmonary artery systolic pressure equals 44  mm Hg, assuming right atrial pressure equals 8 mm Hg,  consistent with mild pulmonary pressures.  5. Circumferential pericardial effusion which is moderate  in size at the apex, anterior to the right ventricle,  lateral to the right ventricle, and small otherwise.  Thickened pericardium. No echocardiographic evidence of  cardiac tamponade.

## 2019-06-16 NOTE — PROGRESS NOTE ADULT - ASSESSMENT
86yoM with PMH of carcinoid syndrome s/p left lower lobe resection, bladder disease requiring intermittent straight cath who presented with persistent diarrhea and weight loss and generalized weakness, undergoing treatment for carcinoid, now with medium sized pericardial effusion. Patient continues to be hypotensive but has had 7 BMs overnight and is being treated for UTI.     RECS  - repeat TTE today for f/u of pericardial effusion      Papito Porter MD  Cardiology Fellow   [phone]  For all Cardiology service contact information, go to [email or web address] and use "[email or web address]" to login.

## 2019-06-16 NOTE — PROGRESS NOTE ADULT - ASSESSMENT
86y male with hx carcinoid syndrome, LLL resection, retention, requiring intremittent straight cath, presnted with diarrhea and weight loss, weakness, difficulty ambulating and facial flushing.   Pt was seen by urology for neurogenic bladder, hx BPH, TURP, pt now unable to void and had multiple attempts in bladder catheterization and now s/p cystoscopy, stricture dilation with calzada insertion.   Urine cult with Citrobacter, antibiogram reviewed  CT with hypodense lesions in the liver and solid R renal mass, mod pericardial effusion   Pt has hx of PCN allergy    PLAN:    complete 1 more days of aztreonam  can change to po cipro 250mg po bid if ready to go home to complete course  urology f/u appreciated 86y male with hx carcinoid syndrome, LLL resection, retention, requiring intremittent straight cath, presnted with diarrhea and weight loss, weakness, difficulty ambulating and facial flushing.   Pt was seen by urology for neurogenic bladder, hx BPH, TURP, pt now unable to void and had multiple attempts in bladder catheterization and now s/p cystoscopy, stricture dilation with calzada insertion.   Urine cult with Citrobacter, antibiogram reviewed  CT with hypodense lesions in the liver and solid R renal mass, mod pericardial effusion   Pt has hx of PCN allergy    PLAN:    complete 1 more days of aztreonam  can change to po cipro 250mg po bid if ready to go home to complete course  urology f/u appreciated  d/w pt and spouse at bedside

## 2019-06-16 NOTE — DIETITIAN INITIAL EVALUATION ADULT. - PROBLEM SELECTOR PLAN 1
Patient's flusing and diarrhea are likely due to his carcinoid syndrome, serotonin secretion, and potentially other vasoactive substances.   Measure urine excretion of 5-HIAA. May need to send out urine serotonin in AM.   Check chromogranin levels  Will start octreotide 100mg subQ TID to treat flushing and diarrhea. Can likely uptitrate in AM, if needed- discuss with oncology in AM.   Consult Dr. Houston in AM. 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

## 2019-06-16 NOTE — DIETITIAN INITIAL EVALUATION ADULT. - PERTINENT MEDS FT
MEDICATIONS  (STANDING):  aztreonam  IVPB 1000 milliGRAM(s) IV Intermittent every 12 hours  aztreonam  IVPB      ergocalciferol 70357 Unit(s) Oral every week  heparin  Injectable 5000 Unit(s) SubCutaneous every 8 hours  octreotide  Injectable 100 MICROGram(s) SubCutaneous four times a day  potassium chloride    Tablet ER 40 milliEquivalent(s) Oral once  sodium bicarbonate 650 milliGRAM(s) Oral every 6 hours  sodium chloride 0.9%. 1000 milliLiter(s) (75 mL/Hr) IV Continuous <Continuous>  sodium chloride 0.9%. 1000 milliLiter(s) (75 mL/Hr) IV Continuous <Continuous>    MEDICATIONS  (PRN):  acetaminophen   Tablet .. 650 milliGRAM(s) Oral every 6 hours PRN Temp greater or equal to 38C (100.4F), Moderate Pain (4 - 6)

## 2019-06-16 NOTE — PROGRESS NOTE ADULT - ASSESSMENT
85 yo M with PMH of carcinoid syndrome s/p left lower lobe resection, bladder disease requiring intermittent straight cath who presents to the ED with complaint of persistent diarrhea, fatigue, decreased po intake  and weight loss along with borderline hypotension    1- mia on ckd  2-  hypotension  3- carcinoid   4- diarrhea  5- acidosis   6- pericardial effusion       cr still elevated  certainly possible to superimposed worsened by pericardial effusion /diarrhea and now hypotension   NS bolus 500 cc   change ivf to 1/ns + 75 meq bicarbonate /L at 75 cc/hr  calzada to cont    trend cr   trend hb   For repeat echo  d/w cards for follow up

## 2019-06-16 NOTE — PROGRESS NOTE ADULT - PROBLEM SELECTOR PLAN 1
Patient with multiple episodes of loose, watery diarrhea yesterday.   Octreotide dose increased back to 100 mg TID  Stool less watery today as per patient  Will continue to monitor on current octreotide dose and adjust as needed if diarrhea does not improve  Long acting octreotide available in our office - will begin once he is discharged.  As per Dr Contreras's discussion with daughter, ideally daily octreotide should overlap first dose long acting octreotide by 2 weeks. Would have to be administered at home once discharged.

## 2019-06-16 NOTE — DIETITIAN INITIAL EVALUATION ADULT. - NUTRITION INTERVENTION
Medical Food Supplements/Collaboration and Referral of Nutrition Care/Vitamin/Nutrition Education/Meals and Snack

## 2019-06-16 NOTE — DIETITIAN INITIAL EVALUATION ADULT. - NS AS NUTRI INTERV VITAMIN
Recommend add multivitamin as medically appropriate, in setting of chronic diarrhea and concern for malabsorption/Multivitamin/mineral

## 2019-06-16 NOTE — PROGRESS NOTE ADULT - PROBLEM SELECTOR PLAN 3
Renal consult regarding chronic renal disease  Creat increased to 2.85 (baseline from outside labs 1.9); CONSTANZA likely pre-renal related to hypovolemia from excessive GI losses  hope for improvement now with better urin output.  Renal lesion unchanged from prior scans

## 2019-06-16 NOTE — DIETITIAN INITIAL EVALUATION ADULT. - FACTORS AFF FOOD INTAKE
RN and wife report pt is consuming ~20% of meals and picking at food. Daily diarrhea noted; Octreotide Rx increased to 4x/daily. Pt is amenable to Ensure Enlive and high protein gelatin.

## 2019-06-16 NOTE — DIETITIAN INITIAL EVALUATION ADULT. - PHYSICAL APPEARANCE
underweight/emaciated/BMI 19.3Kg/m2. Nutrition Focused Physical Assessment performed with consent: pt noted with muscle wasting of temples, clavicle, deltoid, quadricep region, calf; fat depletion of orbital region, triceps/biceps.

## 2019-06-16 NOTE — PROGRESS NOTE ADULT - SUBJECTIVE AND OBJECTIVE BOX
[facility]   [phone]  RENAL FOLLOW UP NOTE  --------------------------------------------------------------------------------  Chief Complaint:    24 hour events/subjective:    states feels ok. had 6 bm yesterday. per RN small bm      PAST HISTORY  --------------------------------------------------------------------------------  No significant changes to PMH, PSH, FHx, SHx, unless otherwise noted    ALLERGIES & MEDICATIONS  --------------------------------------------------------------------------------  Allergies    penicillin (Swelling)    Intolerances      Standing Inpatient Medications  aztreonam  IVPB 1000 milliGRAM(s) IV Intermittent every 12 hours  aztreonam  IVPB      ergocalciferol 88553 Unit(s) Oral every week  heparin  Injectable 5000 Unit(s) SubCutaneous every 8 hours  multivitamin 1 Tablet(s) Oral daily  octreotide  Injectable 100 MICROGram(s) SubCutaneous four times a day  potassium chloride    Tablet ER 40 milliEquivalent(s) Oral once  sodium bicarbonate 650 milliGRAM(s) Oral every 6 hours  sodium bicarbonate  Infusion 0.084 mEq/kG/Hr IV Continuous <Continuous>    PRN Inpatient Medications  acetaminophen   Tablet .. 650 milliGRAM(s) Oral every 6 hours PRN      REVIEW OF SYSTEMS  --------------------------------------------------------------------------------    Gen: denies  fevers/chills,  CVS: denies chest pain/palpitations  Resp: denies SOB/Cough  GI: Denies N/V/Abd pain + diarrhea  : Denies dysuria    All other systems were reviewed and are negative, except as noted.    VITALS/PHYSICAL EXAM  --------------------------------------------------------------------------------  T(C): 36.7 (06-16-19 @ 09:12), Max: 37.2 (06-15-19 @ 23:32)  HR: 96 (06-16-19 @ 10:45) (85 - 106)  BP: 82/60 (06-16-19 @ 10:45) (82/60 - 105/62)  RR: 18 (06-16-19 @ 09:12) (17 - 18)  SpO2: 94% (06-16-19 @ 09:12) (94% - 99%)  Wt(kg): --        06-15-19 @ 07:01  -  06-16-19 @ 07:00  --------------------------------------------------------  IN: 0 mL / OUT: 455 mL / NET: -455 mL    06-16-19 @ 07:01  -  06-16-19 @ 12:18  --------------------------------------------------------  IN: 360 mL / OUT: 0 mL / NET: 360 mL      Physical Exam:  	  Gen: Non toxic comfortable appearing  thin muscle wasting   	no jvd ,  	Pulm: decrease bs  no rales or ronchi or wheezing  	CV: RRR, S1S2; no rub  	Abd: +BS, soft, nontender/nondistended  	: No suprapubic tenderness  	UE: Warm, no cyanosis  no clubbing,  no edema  	LE: Warm, no cyanosis  no clubbing, no edema  	Neuro: alert and oriented. speech coherent   			  	    LABS/STUDIES  --------------------------------------------------------------------------------              8.3    7.55  >-----------<  149      [06-15-19 @ 10:22]              26.3     133  |  110  |  59  ----------------------------<  126      [06-16-19 @ 06:42]  3.3   |  12  |  2.85        Ca     9.4     [06-16-19 @ 06:42]            Creatinine Trend:  SCr 2.85 [06-16 @ 06:42]  SCr 2.76 [06-15 @ 07:09]  SCr 2.54 [06-14 @ 09:06]  SCr 2.64 [06-13 @ 06:53]  SCr 2.63 [06-12 @ 06:48]              Urinalysis - [06-12-19 @ 09:34]      Color Yellow / Appearance Slightly Turbid / SG 1.019 / pH 6.0      Gluc Negative / Ketone Negative  / Bili Negative / Urobili <2 mg/dL       Blood Trace / Protein 100 mg/dL / Leuk Est Large / Nitrite Positive      RBC 6 /  / Hyaline 0 / Gran 5 / Sq Epi  / Non Sq Epi 5 / Bacteria TNTC    Urine Creatinine 141      [06-11-19 @ 11:32]  Urine Protein 96      [06-11-19 @ 11:32]  Urine Sodium <20      [06-11-19 @ 11:32]  Urine Potassium 17      [06-11-19 @ 11:32]  Urine Osmolality 508      [06-11-19 @ 13:38]    PTH -- (Ca 10.4)      [06-11-19 @ 09:51]   15  Vitamin D (25OH) 9.7      [06-11-19 @ 10:02]

## 2019-06-16 NOTE — PROGRESS NOTE ADULT - ASSESSMENT
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    Carcinoid syndrome.  - continue octreotide 100mg subQ TID to treat flushing and diarrhea.    - oncology follow.    Liver masses  - s/p Liver bx  - Path pending      Hypercalcemia.  - Patient noted to have mild hypercalcemia, may be related to suspected carcinoid tumor.  - Will continue IVF.    CONSTANZA (acute kidney injury).   - Patient was noted here to have elevated SCr of 2.6, which is high compared to outpatient SCr of 1.91.  - CONSTANZA is likely pre-renal related to hypovolemia from excessive GI losses.   - urology evaluation noted. S/P cysto and stricture of urethra dilatation.  - Rae.    UTI  - antibiotics. ID follow    Pericardial effusion.  - Currently, the patient is hemodynamically WNL. He denies palpitations and denies lightheadedness while lying supine.  - TTE repeat pending   - Cardiology follow   - if worse will need CCU evaluation.    Metabolic acidosis, normal anion gap (NAG).   - Patient found to have metabolic acidosis with normal anion gap, hyperchloremia likely due to GI losses.   - continue IVF.  - Follow up BMP.  - nephrology follow.    Prophylactic measure.  - VTE ppx with start heparin subQ    Activity: OOB with assistance, fall precaution  Diet: regular.   d/w patient and wife at bedside  Phong Dash MD pager [phone]

## 2019-06-16 NOTE — DIETITIAN INITIAL EVALUATION ADULT. - ORAL INTAKE PTA
Pt reports very poor po intake x 2 months with decreased appetite and chronic diarrhea. Pt was drinking Gatorade, but taking no other nutrition supplements; reports NKFA./poor

## 2019-06-16 NOTE — DIETITIAN INITIAL EVALUATION ADULT. - ENERGY NEEDS
ht: 5 feet 11 inches, wt: 138pounds, BMI: 19.3 Kg/m2, IBW: 172 pounds (+/- 10%), 80% IBW. Edema: 1+ left/right foot; Skin: no pressure injuries noted per nursing flowsheet.

## 2019-06-16 NOTE — PROGRESS NOTE ADULT - SUBJECTIVE AND OBJECTIVE BOX
Patient is a 86y old  Male who presents with a chief complaint of diarrhea (16 Jun 2019 13:35)      SUBJECTIVE / OVERNIGHT EVENTS: Comfortable without new complaints. Still with diarrhea. Wife at bedside.  Review of Systems  chest pain no  palpitations no  sob no  nausea no  headache no    MEDICATIONS  (STANDING):  aztreonam  IVPB 1000 milliGRAM(s) IV Intermittent every 12 hours  aztreonam  IVPB      ergocalciferol 98318 Unit(s) Oral every week  heparin  Injectable 5000 Unit(s) SubCutaneous every 8 hours  multivitamin 1 Tablet(s) Oral daily  octreotide  Injectable 100 MICROGram(s) SubCutaneous four times a day  sodium bicarbonate  Infusion 0.084 mEq/kG/Hr (70 mL/Hr) IV Continuous <Continuous>    MEDICATIONS  (PRN):  acetaminophen   Tablet .. 650 milliGRAM(s) Oral every 6 hours PRN Temp greater or equal to 38C (100.4F), Moderate Pain (4 - 6)      Vital Signs Last 24 Hrs  T(C): 36.7 (16 Jun 2019 09:12), Max: 37.2 (15 Jean 2019 23:32)  T(F): 98 (16 Jun 2019 09:12), Max: 98.9 (15 Jean 2019 23:32)  HR: 88 (16 Jun 2019 12:21) (87 - 106)  BP: 90/49 (16 Jun 2019 12:21) (82/60 - 105/62)  BP(mean): --  RR: 18 (16 Jun 2019 09:12) (17 - 18)  SpO2: 94% (16 Jun 2019 09:12) (94% - 98%)    PHYSICAL EXAM:  GENERAL: NAD  HEAD:  Atraumatic, Normocephalic Flushed  EYES: EOMI, PERRLA, conjunctiva and sclera clear  NECK: Supple, No JVD  CHEST/LUNG: Clear to auscultation bilaterally; No wheeze  HEART: Regular rate and rhythm; No murmurs, rubs, or gallops  ABDOMEN: Soft, Nontender, Nondistended; Bowel sounds present Rae  EXTREMITIES:  2+ Peripheral Pulses, No clubbing, cyanosis, or edema  PSYCH: AAOx3  NEUROLOGY: non-focal  SKIN: No rashes or lesions    LABS:                        8.6    8.34  )-----------( 160      ( 16 Jun 2019 09:57 )             26.7     06-16    133<L>  |  110<H>  |  59<H>  ----------------------------<  126<H>  3.3<L>   |  12<L>  |  2.85<H>    Ca    9.4      16 Jun 2019 06:42                  RADIOLOGY & ADDITIONAL TESTS:    Imaging Personally Reviewed:    Consultant(s) Notes Reviewed:      Care Discussed with Consultants/Other Providers:

## 2019-06-16 NOTE — DIETITIAN INITIAL EVALUATION ADULT. - OTHER INFO
Nutrition Consult for poor po intake, weight loss, and diarrhea received and appreciated. Per chart, "86yoM with 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" Per chart, pt noted with CONSTANZA, pre-renal, hypovolemic likely secondary to GI losses.

## 2019-06-16 NOTE — PROGRESS NOTE ADULT - SUBJECTIVE AND OBJECTIVE BOX
For all Cardiology service contact information, go to [email or web address] and use "[email or web address]" to login.      Overnight Events: Hypotensive this AM. Had 7 BMs overnight. Denies lightheadedness, CP, SOB.    Medications:  acetaminophen   Tablet .. 650 milliGRAM(s) Oral every 6 hours PRN  aztreonam  IVPB 1000 milliGRAM(s) IV Intermittent every 12 hours  aztreonam  IVPB      ergocalciferol 03957 Unit(s) Oral every week  heparin  Injectable 5000 Unit(s) SubCutaneous every 8 hours  multivitamin 1 Tablet(s) Oral daily  octreotide  Injectable 100 MICROGram(s) SubCutaneous four times a day  sodium bicarbonate  Infusion 0.084 mEq/kG/Hr IV Continuous <Continuous>      REVIEW OF SYSTEMS:  CONSTITUTIONAL: No weakness, fevers or chills  EYES/ENT: No visual changes;  No vertigo or throat pain   NECK: No pain or stiffness  RESPIRATORY: No cough, wheezing, hemoptysis; No shortness of breath  CARDIOVASCULAR: No chest pain or palpitations  GASTROINTESTINAL: No abdominal pain. No nausea, vomiting, or hematemesis; No diarrhea or constipation. No melena or hematochezia.  GENITOURINARY: No dysuria, frequency or hematuria  NEUROLOGICAL: No numbness or weakness  SKIN: No itching or rash  All other review of systems is negative unless indicated above    Vitals:  T(F): 98 (06-16), Max: 98.9 (06-15)  HR: 88 (06-16) (85 - 106)  BP: 90/49 (06-16) (82/60 - 105/62)  RR: 18 (06-16)  SpO2: 94% (06-16)  I&O's Summary    15 Jean 2019 07:01  -  16 Jun 2019 07:00  --------------------------------------------------------  IN: 0 mL / OUT: 455 mL / NET: -455 mL    16 Jun 2019 07:01  -  16 Jun 2019 13:36  --------------------------------------------------------  IN: 360 mL / OUT: 200 mL / NET: 160 mL      Physical Exam:  Appearance: No acute distress; well appearing  Eyes: PERRL, EOMI, pink conjunctiva  HENT: Normal oral muscosa  Cardiovascular: RRR, S1, S2, no murmurs, rubs, or gallops; no edema; no JVD  Respiratory: Clear to auscultation bilaterally  Gastrointestinal: soft, non-tender, non-distended with normal bowel sounds  Musculoskeletal: No clubbing; no joint deformity   Neurologic: Non-focal  Lymphatic: No lymphadenopathy  Psychiatry: AAOx3, mood & affect appropriate  Skin: No rashes, ecchymoses, or cyanosis                          8.6    8.34  )-----------( 160      ( 16 Jun 2019 09:57 )             26.7     06-16    133<L>  |  110<H>  |  59<H>  ----------------------------<  126<H>  3.3<L>   |  12<L>  |  2.85<H>    Ca    9.4      16 Jun 2019 06:42    Echo:  < from: Limited Transthoracic Echo (06.15.19 @ 10:35) >  1. Normal left ventricular systolic function. No segmental  wall motion abnormalities.  2. Normal right ventricular size and function.  3. Normal tricuspid valve. Moderate tricuspid  regurgitation.  4. Estimated pulmonary artery systolic pressure equals 44  mm Hg, assuming right atrial pressure equals 8 mm Hg,  consistent with mild pulmonary pressures.  5. Circumferential pericardial effusion which is moderate  in size at the apex, anterior to the right ventricle,  lateral to the right ventricle, and small otherwise.  Thickened pericardium. No echocardiographic evidence of  cardiac tamponade.    < end of copied text >

## 2019-06-16 NOTE — PROGRESS NOTE ADULT - PROBLEM SELECTOR PLAN 4
Asymptomatic  reviewed repeat FRANCESCA - pericardial effusion, no e/o tamponade   await cardiology input

## 2019-06-16 NOTE — DIETITIAN INITIAL EVALUATION ADULT. - PERTINENT LABORATORY DATA
06-16 @ 06:42: Sodium 133<L>, Potassium 3.3<L>, Chloride 110<H>, Calcium 9.4, Magnesium --, Phosphorus --, BUN 59<H>, Creatinine 2.85<H>, <H>

## 2019-06-16 NOTE — DIETITIAN INITIAL EVALUATION ADULT. - NS AS NUTRI INTERV MEDICAL AND FOOD SUPPLEMENTS
Add 3 servings Ensure Enlive (provides 350cal, 20Gm protein per 8oz serving) daily. RD will add high protein gelatin to meal trays,/Commercial beverage/Modified food

## 2019-06-16 NOTE — PROGRESS NOTE ADULT - SUBJECTIVE AND OBJECTIVE BOX
CC: f/u for cystitis  Patient reports no new c/o, no fevers  REVIEW OF SYSTEMS:  All other review of systems negative (Comprehensive ROS)    Antimicrobials Day #  :4/5  aztreonam  IVPB 1000 milliGRAM(s) IV Intermittent every 12 hours  aztreonam  IVPB              Other Medications Reviewed      Vital Signs Last 24 Hrs  T(C): 36.7 (15 Jean 2019 07:23), Max: 37.2 (14 Jun 2019 20:33)  T(F): 98 (15 Jean 2019 07:23), Max: 99 (14 Jun 2019 20:33)  HR: 87 (15 Jean 2019 07:23) (78 - 89)  BP: 97/59 (15 Jean 2019 07:23) (97/59 - 101/59)  BP(mean): --  RR: 18 (15 Jean 2019 07:23) (17 - 18)  SpO2: 98% (15 Jean 2019 07:23) (95% - 99%)  PHYSICAL EXAM:  General: alert, no acute distress, face flushed  Eyes:  anicteric, no conjunctival injection, no discharge  Oropharynx: no lesions or injection 	  Neck: supple, without adenopathy  Lungs: clear to auscultation  Heart: regular rate and rhythm; no murmur, rubs or gallops  Abdomen: soft, nondistended, nontender, without mass or organomegaly  Skin: no lesions  Extremities: no clubbing, cyanosis, or edema  Neurologic: alert, oriented, moves all extremities    LAB RESULTS:                          8.6    6.81  )-----------( 146      ( 14 Jun 2019 10:23 )             26.6   06-15    139  |  110<H>  |  57<H>  ----------------------------<  103<H>  3.6   |  16<L>  |  2.76<H>    Ca    9.4      15 Jean 2019 07:09              MICROBIOLOGY:  RECENT CULTURES:  06-12 @ 09:45 .Urine Citrobacter freundii    >100,000 CFU/ml Citrobacter freundii    Culture - Urine (06.12.19 @ 09:45)    -  Amikacin: S <=16    -  Ampicillin: R <=8 These ampicillin results predict results for amoxicillin    -  Ampicillin/Sulbactam: R <=8/4    -  Aztreonam: S <=4    -  Cefazolin: R <=8    -  Cefepime: S <=4    -  Cefoxitin: R 16    -  Ceftriaxone: S <=1 Enterobacter, Citrobacter, and Serratia may develop resistance during prolonged therapy    -  Ciprofloxacin: S <=1    -  Ertapenem: S <=1    -  Gentamicin: S <=4    -  Imipenem: S <=1    -  Levofloxacin: S <=2    -  Meropenem: S <=1    -  Nitrofurantoin: S <=32 Should not be used to treat pyelonephritis    -  Piperacillin/Tazobactam: S <=16    -  Tigecycline: S <=2    -  Tobramycin: S <=4    -  Trimethoprim/Sulfamethoxazole: S <=2/38    Specimen Source: .Urine    Culture Results:   >100,000 CFU/ml Citrobacter freundii    Organism Identification: Citrobacter freundii    Organism: Citrobacter freundii    Method Type: MAYRA      Urine Microscopic-Add On (NC) (06.12.19 @ 09:34)    Red Blood Cell - Urine: 6 /HPF    White Blood Cell - Urine: 500 /HPF    Hyaline Casts: 0 /LPF    Bacteria: TNTC    Epithelial Cells: 5 /HPF    Granular Cast: 5 /LPF        RADIOLOGY REVIEWED:  < from: CT Abdomen No Cont (06.11.19 @ 21:03) >  IMPRESSION:     Evaluation of the solid organs and vessels is limited without use of IV   contrast.    Innumerable hypodense lesions throughout the liver compatible with   metastatic disease, with progression of disease since 5/16/2019.    Previously noted solid right renal mass is not well evaluated without   intravenous contrast.    Moderate pericardial effusion.        < end of copied text >

## 2019-06-16 NOTE — CHART NOTE - NSCHARTNOTEFT_GEN_A_CORE
Upon Nutritional Assessment by the Registered Dietitian your patient was determined to meet criteria / has evidence of the following diagnosis/diagnoses:          [ ]  Mild Protein Calorie Malnutrition        [ ]  Moderate Protein Calorie Malnutrition        [X ] Severe Protein Calorie Malnutrition; in context of chronic illness        [ ] Unspecified Protein Calorie Malnutrition        [ ] Underweight / BMI <19        [ ] Morbid Obesity / BMI > 40      Findings as based on:  [X ] Comprehensive nutrition assessment   [ X] Nutrition Focused Physical Exam; muscle wasting of temples, clavicle, deltoid, quadriceps region, calf; fat depletion of orbital region, triceps/biceps.  [ X] Other: 15% weight loss in 2 months, po intake <50% of needs x 2 months        Nutrition Plan/Recommendations:    1. Add 3 servings Ensure Enlive (provides 350cal, 20Gm protein per 8oz serving)  2. Add multivitamin in setting of chronic diarrhea, concern for malabsorption  3. Continue Regular diet  4. RD will add high protein gelatin to meal trays      PROVIDER Section:     By signing this assessment you are acknowledging and agree with the diagnosis/diagnoses assigned by the Registered Dietitian    Comments:

## 2019-06-17 DIAGNOSIS — R19.7 DIARRHEA, UNSPECIFIED: ICD-10-CM

## 2019-06-17 LAB
ANION GAP SERPL CALC-SCNC: 11 MMOL/L — SIGNIFICANT CHANGE UP (ref 5–17)
BUN SERPL-MCNC: 61 MG/DL — HIGH (ref 7–23)
CALCIUM SERPL-MCNC: 9.3 MG/DL — SIGNIFICANT CHANGE UP (ref 8.4–10.5)
CHLORIDE SERPL-SCNC: 110 MMOL/L — HIGH (ref 96–108)
CO2 SERPL-SCNC: 13 MMOL/L — LOW (ref 22–31)
CREAT SERPL-MCNC: 2.85 MG/DL — HIGH (ref 0.5–1.3)
GLUCOSE SERPL-MCNC: 128 MG/DL — HIGH (ref 70–99)
HCT VFR BLD CALC: 27.6 % — LOW (ref 39–50)
HGB BLD-MCNC: 8.6 G/DL — LOW (ref 13–17)
MAGNESIUM SERPL-MCNC: 2.3 MG/DL — SIGNIFICANT CHANGE UP (ref 1.6–2.6)
MCHC RBC-ENTMCNC: 31.2 GM/DL — LOW (ref 32–36)
MCHC RBC-ENTMCNC: 31.4 PG — SIGNIFICANT CHANGE UP (ref 27–34)
MCV RBC AUTO: 100.7 FL — HIGH (ref 80–100)
NON-GYNECOLOGICAL CYTOLOGY STUDY: SIGNIFICANT CHANGE UP
NRBC # BLD: 0 /100 WBCS — SIGNIFICANT CHANGE UP (ref 0–0)
PHOSPHATE SERPL-MCNC: 2.6 MG/DL — SIGNIFICANT CHANGE UP (ref 2.5–4.5)
PLATELET # BLD AUTO: 177 K/UL — SIGNIFICANT CHANGE UP (ref 150–400)
POTASSIUM SERPL-MCNC: 3.5 MMOL/L — SIGNIFICANT CHANGE UP (ref 3.5–5.3)
POTASSIUM SERPL-SCNC: 3.5 MMOL/L — SIGNIFICANT CHANGE UP (ref 3.5–5.3)
RBC # BLD: 2.74 M/UL — LOW (ref 4.2–5.8)
RBC # FLD: 18 % — HIGH (ref 10.3–14.5)
SODIUM SERPL-SCNC: 134 MMOL/L — LOW (ref 135–145)
WBC # BLD: 8.16 K/UL — SIGNIFICANT CHANGE UP (ref 3.8–10.5)
WBC # FLD AUTO: 8.16 K/UL — SIGNIFICANT CHANGE UP (ref 3.8–10.5)

## 2019-06-17 PROCEDURE — 99223 1ST HOSP IP/OBS HIGH 75: CPT

## 2019-06-17 PROCEDURE — 99232 SBSQ HOSP IP/OBS MODERATE 35: CPT | Mod: GC

## 2019-06-17 RX ORDER — SODIUM CHLORIDE 9 MG/ML
250 INJECTION INTRAMUSCULAR; INTRAVENOUS; SUBCUTANEOUS ONCE
Refills: 0 | Status: COMPLETED | OUTPATIENT
Start: 2019-06-17 | End: 2019-06-17

## 2019-06-17 RX ORDER — SODIUM CHLORIDE 9 MG/ML
1000 INJECTION, SOLUTION INTRAVENOUS
Refills: 0 | Status: DISCONTINUED | OUTPATIENT
Start: 2019-06-17 | End: 2019-06-19

## 2019-06-17 RX ORDER — SODIUM CHLORIDE 9 MG/ML
1000 INJECTION INTRAMUSCULAR; INTRAVENOUS; SUBCUTANEOUS
Refills: 0 | Status: DISCONTINUED | OUTPATIENT
Start: 2019-06-17 | End: 2019-06-17

## 2019-06-17 RX ADMIN — Medication 70 MEQ/KG/HR: at 05:46

## 2019-06-17 RX ADMIN — HEPARIN SODIUM 5000 UNIT(S): 5000 INJECTION INTRAVENOUS; SUBCUTANEOUS at 13:08

## 2019-06-17 RX ADMIN — Medication 50 MILLIGRAM(S): at 18:12

## 2019-06-17 RX ADMIN — OCTREOTIDE ACETATE 100 MICROGRAM(S): 200 INJECTION, SOLUTION INTRAVENOUS; SUBCUTANEOUS at 23:51

## 2019-06-17 RX ADMIN — OCTREOTIDE ACETATE 100 MICROGRAM(S): 200 INJECTION, SOLUTION INTRAVENOUS; SUBCUTANEOUS at 18:12

## 2019-06-17 RX ADMIN — OCTREOTIDE ACETATE 100 MICROGRAM(S): 200 INJECTION, SOLUTION INTRAVENOUS; SUBCUTANEOUS at 13:08

## 2019-06-17 RX ADMIN — OCTREOTIDE ACETATE 100 MICROGRAM(S): 200 INJECTION, SOLUTION INTRAVENOUS; SUBCUTANEOUS at 05:45

## 2019-06-17 RX ADMIN — SODIUM CHLORIDE 75 MILLILITER(S): 9 INJECTION, SOLUTION INTRAVENOUS at 23:35

## 2019-06-17 RX ADMIN — HEPARIN SODIUM 5000 UNIT(S): 5000 INJECTION INTRAVENOUS; SUBCUTANEOUS at 05:45

## 2019-06-17 RX ADMIN — SODIUM CHLORIDE 125 MILLILITER(S): 9 INJECTION INTRAMUSCULAR; INTRAVENOUS; SUBCUTANEOUS at 12:00

## 2019-06-17 RX ADMIN — Medication 1 TABLET(S): at 13:08

## 2019-06-17 RX ADMIN — HEPARIN SODIUM 5000 UNIT(S): 5000 INJECTION INTRAVENOUS; SUBCUTANEOUS at 23:08

## 2019-06-17 RX ADMIN — Medication 50 MILLIGRAM(S): at 05:45

## 2019-06-17 NOTE — PROGRESS NOTE ADULT - SUBJECTIVE AND OBJECTIVE BOX
Patient is a 86y old  Male who presents with a chief complaint of diarrhea (16 Jun 2019 16:01)       Pt is seen and examined  pt is awake and lying in bed  pt seems comfortable and denies any complaints at this time; yesterday had 6-7 bm's; today since midnight has had 2 bm's; notes pain in RLQ over last 2 days after eating; as per pt, bm's loose still;       REVIEW OF SYSTEMS:    CONSTITUTIONAL: No weakness, fevers or chills  EYES/ENT: No visual changes;  No vertigo or throat pain   NECK: No pain or stiffness  RESPIRATORY: No cough, wheezing, hemoptysis; No shortness of breath  CARDIOVASCULAR: No chest pain or palpitations  GASTROINTESTINAL: No abdominal or epigastric pain. No nausea, vomiting, or hematemesis; No diarrhea or constipation. No melena or hematochezia.  GENITOURINARY: No dysuria, frequency or hematuria  NEUROLOGICAL: No numbness or weakness  SKIN: No itching, burning, rashes, or lesions     MEDICATIONS  (STANDING):  aztreonam  IVPB 1000 milliGRAM(s) IV Intermittent every 12 hours  aztreonam  IVPB      ergocalciferol 04550 Unit(s) Oral every week  heparin  Injectable 5000 Unit(s) SubCutaneous every 8 hours  multivitamin 1 Tablet(s) Oral daily  octreotide  Injectable 100 MICROGram(s) SubCutaneous four times a day  sodium bicarbonate  Infusion 0.084 mEq/kG/Hr (70 mL/Hr) IV Continuous <Continuous>    MEDICATIONS  (PRN):  acetaminophen   Tablet .. 650 milliGRAM(s) Oral every 6 hours PRN Temp greater or equal to 38C (100.4F), Moderate Pain (4 - 6)      Allergies    penicillin (Swelling)    Intolerances        Vital Signs Last 24 Hrs  T(C): 37 (17 Jun 2019 04:20), Max: 37 (17 Jun 2019 04:20)  T(F): 98.6 (17 Jun 2019 04:20), Max: 98.6 (17 Jun 2019 04:20)  HR: 93 (17 Jun 2019 04:20) (88 - 113)  BP: 93/56 (17 Jun 2019 04:20) (82/60 - 104/61)  BP(mean): --  RR: 17 (17 Jun 2019 04:20) (17 - 18)  SpO2: 97% (17 Jun 2019 04:20) (94% - 98%)    PHYSICAL EXAM  General: adult in NAD  HEENT: clear oropharynx, anicteric sclera, (+) left lateral scleral hemorrhage  Neck: supple  CV: normal S1/S2 with G2/6 Rt ICS murmur, no rubs or gallops  Lungs: positive air movement b/l ant lungs,clear to auscultation, no wheezes, no rales  Abdomen: soft non-tender non-distended, no hepatosplenomegaly  Ext: no clubbing cyanosis or edema  Skin: no rashes and no petechiae  Neuro: alert and oriented X 4, no focal deficits      LABS:                          8.6    8.34  )-----------( 160      ( 16 Jun 2019 09:57 )             26.7         Mean Cell Volume : 99.3 fl  Mean Cell Hemoglobin : 32.0 pg  Mean Cell Hemoglobin Concentration : 32.2 gm/dL      Serial CBC's  06-16 @ 09:57  Hct-26.7 / Hgb-8.6 / Plat-160 / RBC-2.69 / WBC-8.34    Serial CBC's  06-15 @ 10:22  Hct-26.3 / Hgb-8.3 / Plat-149 / RBC-2.57 / WBC-7.55    Serial CBC's  06-14 @ 10:23  Hct-26.6 / Hgb-8.6 / Plat-146 / RBC-2.69 / WBC-6.81    Serial CBC's  06-13 @ 20:16  Hct-28.6 / Hgb-9.5 / Plat-168 / RBC-2.90 / WBC-7.5      Serial CBC's  06-13 @ 09:41  Hct-26.8 / Hgb-8.7 / Plat-165 / RBC-2.74 / WBC-6.47        06-17    134<L>  |  110<H>  |  61<H>  ----------------------------<  128<H>  3.5   |  13<L>  |  2.85<H>    Ca    9.3      17 Jun 2019 07:26  Phos  2.6     06-17  Mg     2.3     06-17          RADIOLOGY & ADDITIONAL STUDIES:  EXAM:  CT ABDOMEN ONLY                            PROCEDURE DATE:  06/11/2019            INTERPRETATION:  CLINICAL INFORMATION: Carcinoid tumor status post lung   resection. Recent outpatient CT showed liver lesions. Pending biopsy.     COMPARISON:Outside CT dated 5/16/2019,  PET CT 6/4/2019. Renal   ultrasound 6/11/2019.    PROCEDURE:   CT of the Abdomen was performed without intravenous contrast.   Intravenous contrast: None.  Oral contrast: None.  Sagittal and coronal reformats were performed.    FINDINGS:    Evaluation of the solid organs and vessels is limited without use of IV   contrast.    LOWER CHEST: Bilateral lower lobe subsegmental atelectasis. Aortic valve   calcifications. Moderate pericardial effusion. Hypodense blood pool with   respect to the interventricular septum, suggestive of anemia.    LIVER: Innumerable hypodense foci throughout the liver compatible with   metastatic disease, increased in size and number since 5/16/2019.  BILE DUCTS: Normal caliber.  GALLBLADDER: Within normal limits.  SPLEEN: Within normal limits.  PANCREAS: Mild fatty atrophy.  ADRENALS: Within normal limits.  KIDNEYS/URETERS: Linear metallic density within a vessel supplying the   right upper pole compatible with prior embolization. Solid right renal   mass again noted in the right kidney, better evaluated on ultrasound   dated 6/11/2019. Multiple bilateral renal cysts the largest in the right   upper pole measuring 7 cm.    VISUALIZED PORTIONS:    BOWEL: Visualized portions demonstrate no evidence of obstruction.   PERITONEUM: Trace ascites.  VESSELS: Atherosclerotic changes.  RETROPERITONEUM: No lymphadenopathy.    ABDOMINAL WALL: Within normal limits.  BONES: Multilevel degenerative changes of the spine predominantly   involving the lower thoracic and lumbar vertebral bodies.    IMPRESSION:     Evaluation of the solid organs and vessels is limited without use of IV   contrast.    Innumerable hypodense lesions throughout the liver compatible with   metastatic disease, with progression of disease since 5/16/2019.    Previously noted solid right renal mass is not well evaluated without   intravenous contrast.    Moderate pericardial effusion.      CAMERON DALTON M.D., RADIOLOGIST RESIDENT  This document has beenelectronically signed.  BRADLEY CRESPO M.D., ATTENDING RADIOLOGIST  This document has been electronically signed. Jun 12 2019 11:02AM        ------------------------------------------------------------------------  PROCEDURE: Limited transthoracic echocardiogram with 2-D.  M-Mode and spectral and color flow Doppler.  INDICATION: Pericardial effusion (noninflammatory) (I31.3)  ------------------------------------------------------------------------  CONCLUSIONS:  Limited echo to re-evaluate effusion.  Estimated pulmonary artery systolic pressure equals 45  mm  Hg, assuming right atrial pressure equals 8 mm Hg,  consistent with mild pulmonary pressures.  Again, Circumferential pericardial effusion which is  moderate in size at the apex and anterior to the right  ventricle, small effusion is lateral to the right  ventricle, and elsewhere. Thickened pericardium. Raised  intrapericardial pressure with RA buckleing  but no RV  collapse seen- correlate clinically.  CCU fellow present at scan.  *** Compared with echocardiogram report of  6/15/2019,effusion is similar  ------------------------------------------------------------------------  Confirmed on  6/16/2019 - 15:08:52 by Jaymie Vieyra M.D.  ----------------------------------------------------------------------              Assessment

## 2019-06-17 NOTE — PROGRESS NOTE ADULT - SUBJECTIVE AND OBJECTIVE BOX
CC: f/u for  cystitis  Patient reports feels ok    REVIEW OF SYSTEMS:  All other review of systems negative (Comprehensive ROS)    Antimicrobials Day #  :5/5  aztreonam  IVPB 1000 milliGRAM(s) IV Intermittent every 12 hours  aztreonam  IVPB        Other Medications Reviewed    T(F): 98.2 (06-17-19 @ 09:23), Max: 98.6 (06-17-19 @ 04:20)  HR: 86 (06-17-19 @ 09:23)  BP: 82/50 (06-17-19 @ 09:22)  RR: 18 (06-17-19 @ 09:23)  SpO2: 98% (06-17-19 @ 09:23)  Wt(kg): --    PHYSICAL EXAM:  General: alert, no acute distress  Eyes:  anicteric, left subconjunctival hemorrhage, no discharge  Oropharynx: no lesions or injection 	  Neck: supple, without adenopathy  Lungs: clear to auscultation  Heart: regular rate and rhythm; no murmur, rubs or gallops  Abdomen: soft, nondistended, nontender, without mass or organomegaly  Skin: no lesions  Extremities: no clubbing, cyanosis, or edema  Neurologic: alert, oriented, moves all extremities    LAB RESULTS:                        8.6    8.34  )-----------( 160      ( 16 Jun 2019 09:57 )             26.7     06-17    134<L>  |  110<H>  |  61<H>  ----------------------------<  128<H>  3.5   |  13<L>  |  2.85<H>    Ca    9.3      17 Jun 2019 07:26  Phos  2.6     06-17  Mg     2.3     06-17          MICROBIOLOGY:  RECENT CULTURES:  Culture - Urine (06.12.19 @ 09:45)    -  Gentamicin: S <=4    -  Imipenem: S <=1    -  Levofloxacin: S <=2    -  Meropenem: S <=1    -  Nitrofurantoin: S <=32 Should not be used to treat pyelonephritis    -  Piperacillin/Tazobactam: S <=16    -  Tigecycline: S <=2    -  Tobramycin: S <=4    -  Trimethoprim/Sulfamethoxazole: S <=2/38    -  Amikacin: S <=16    -  Ampicillin: R <=8 These ampicillin results predict results for amoxicillin    -  Ampicillin/Sulbactam: R <=8/4    -  Aztreonam: S <=4    -  Cefazolin: R <=8    -  Cefepime: S <=4    -  Cefoxitin: R 16    -  Ceftriaxone: S <=1 Enterobacter, Citrobacter, and Serratia may develop resistance during prolonged therapy    -  Ciprofloxacin: S <=1    -  Ertapenem: S <=1    Specimen Source: .Urine    Culture Results:   >100,000 CFU/ml Citrobacter freundii    Organism Identification: Citrobacter freundii    Organism: Citrobacter freundii    Method Type: MAYRA        RADIOLOGY REVIEWED:  < from: CT Abdomen No Cont (06.11.19 @ 21:03) >  IMPRESSION:     Evaluation of the solid organs and vessels is limited without use of IV   contrast.    Innumerable hypodense lesions throughout the liver compatible with   metastatic disease, with progression of disease since 5/16/2019.    Previously noted solid right renal mass is not well evaluated without   intravenous contrast.    Moderate pericardial effusion.        < end of copied text >  Imp: Patient with carcinoid syndrome, urethral stricture with resultant retention, s/p cystoscopic dilation and calzada placement, finishes aztreonam for cystitis today    Plan: stop aztreonam after today's doses  removal of calzada and cic as per gu  call if further input is needed

## 2019-06-17 NOTE — PROGRESS NOTE ADULT - PROBLEM SELECTOR PLAN 1
--- continue octreotide 4 x / day  --- pt and wife state that he has been having loose and frequent movements since admission --- have requested nursing staff and pt to document amounts of bowel   movements so that meds can be adjusted  -- will d/w dr [place] plans for systemic therapy

## 2019-06-17 NOTE — PROGRESS NOTE ADULT - ASSESSMENT
87 yo M with PMH of carcinoid syndrome s/p left lower lobe resection, bladder disease requiring intermittent straight cath who presents to the ED with complaint of persistent diarrhea, fatigue, decreased po intake  and weight loss along with borderline hypotension    1- mia on ckd  2-  hypotension  3- carcinoid   4- diarrhea  5- acidosis   6- pericardial effusion       cr still elevated  certainly possible to superimposed worsened by pericardial effusion /diarrhea and hypotension   change ivf to d5w + 150 meq nahco3-/L @ 75 cc/hr   calzada to cont    trend cr   trend hb   cards follow up noted

## 2019-06-17 NOTE — PROGRESS NOTE ADULT - SUBJECTIVE AND OBJECTIVE BOX
[facility]   [phone]  RENAL FOLLOW UP NOTE  --------------------------------------------------------------------------------  Chief Complaint:    24 hour events/subjective:    seen earlier. wife and daughter at bedside  c/o diarrhea     PAST HISTORY  --------------------------------------------------------------------------------  No significant changes to PMH, PSH, FHx, SHx, unless otherwise noted    ALLERGIES & MEDICATIONS  --------------------------------------------------------------------------------  Allergies    penicillin (Swelling)    Intolerances      Standing Inpatient Medications  aztreonam  IVPB 1000 milliGRAM(s) IV Intermittent every 12 hours  aztreonam  IVPB      ergocalciferol 41410 Unit(s) Oral every week  heparin  Injectable 5000 Unit(s) SubCutaneous every 8 hours  multivitamin 1 Tablet(s) Oral daily  octreotide  Injectable 100 MICROGram(s) SubCutaneous four times a day  sodium bicarbonate  Infusion 0.084 mEq/kG/Hr IV Continuous <Continuous>    PRN Inpatient Medications  acetaminophen   Tablet .. 650 milliGRAM(s) Oral every 6 hours PRN      REVIEW OF SYSTEMS  --------------------------------------------------------------------------------    Gen: denies  fevers/chills,  CVS: denies chest pain/palpitations  Resp: denies SOB/Cough  GI: Denies N/V/Abd pain  : Denies dysuria/oliguria/hematuria    All other systems were reviewed and are negative, except as noted.    VITALS/PHYSICAL EXAM  --------------------------------------------------------------------------------  T(C): 36.4 (06-17-19 @ 16:12), Max: 37 (06-17-19 @ 04:20)  HR: 79 (06-17-19 @ 16:12) (79 - 113)  BP: 94/56 (06-17-19 @ 16:12) (82/50 - 104/61)  RR: 18 (06-17-19 @ 16:12) (17 - 18)  SpO2: 94% (06-17-19 @ 16:12) (94% - 98%)  Wt(kg): --        06-16-19 @ 07:01  -  06-17-19 @ 07:00  --------------------------------------------------------  IN: 1550 mL / OUT: 456 mL / NET: 1094 mL    06-17-19 @ 07:01  -  06-17-19 @ 22:31  --------------------------------------------------------  IN: 400 mL / OUT: 454 mL / NET: -54 mL      Physical Exam:  	  Gen: Non toxic comfortable appearing  thin muscle wasting   	no jvd ,  	Pulm: decrease bs  no rales or ronchi or wheezing  	CV: RRR, S1S2; no rub  	Abd: +BS, soft, nontender/nondistended  	: No suprapubic tenderness  	UE: Warm, no cyanosis  no clubbing,  no edema  	LE: Warm, no cyanosis  no clubbing, no edema  	Neuro: alert and oriented. speech coherent   			      LABS/STUDIES  --------------------------------------------------------------------------------              8.6    8.16  >-----------<  177      [06-17-19 @ 09:17]              27.6     134  |  110  |  61  ----------------------------<  128      [06-17-19 @ 07:26]  3.5   |  13  |  2.85        Ca     9.3     [06-17-19 @ 07:26]      Mg     2.3     [06-17-19 @ 07:26]      Phos  2.6     [06-17-19 @ 07:26]            Creatinine Trend:  SCr 2.85 [06-17 @ 07:26]  SCr 2.85 [06-16 @ 06:42]  SCr 2.76 [06-15 @ 07:09]  SCr 2.54 [06-14 @ 09:06]  SCr 2.64 [06-13 @ 06:53]              Urinalysis - [06-12-19 @ 09:34]      Color Yellow / Appearance Slightly Turbid / SG 1.019 / pH 6.0      Gluc Negative / Ketone Negative  / Bili Negative / Urobili <2 mg/dL       Blood Trace / Protein 100 mg/dL / Leuk Est Large / Nitrite Positive      RBC 6 /  / Hyaline 0 / Gran 5 / Sq Epi  / Non Sq Epi 5 / Bacteria TNTC    Urine Creatinine 141      [06-11-19 @ 11:32]  Urine Protein 96      [06-11-19 @ 11:32]  Urine Sodium <20      [06-11-19 @ 11:32]  Urine Potassium 17      [06-11-19 @ 11:32]  Urine Osmolality 508      [06-11-19 @ 13:38]    PTH -- (Ca 10.4)      [06-11-19 @ 09:51]   15  Vitamin D (25OH) 9.7      [06-11-19 @ 10:02]

## 2019-06-17 NOTE — PROGRESS NOTE ADULT - ASSESSMENT
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    Carcinoid syndrome.  - continue octreotide 100mg subQ QID to treat flushing and diarrhea.    - oncology follow.    Liver masses  - s/p Liver bx  - Path pending      Hypercalcemia.  - Patient noted to have mild hypercalcemia, may be related to suspected carcinoid tumor.  - Will continue IVF.    CONSTANZA (acute kidney injury).   - Patient was noted here to have elevated SCr of 2.6, which is high compared to outpatient SCr of 1.91.  - CONSTANZA is likely pre-renal related to hypovolemia from excessive GI losses.   - urology evaluation noted. S/P cysto and stricture of urethra dilatation.  - Rae.    UTI  - antibiotics. ID follow    Pericardial effusion.  - Currently, the patient is hemodynamically WNL. He denies palpitations and denies lightheadedness while lying supine.  - TTE repeat on 6/20.  - Cardiology follow   - if worse will need CCU evaluation.    Metabolic acidosis, normal anion gap (NAG).   - Patient found to have metabolic acidosis with normal anion gap, hyperchloremia likely due to GI losses.   - continue IVF.  - Follow up BMP.  - nephrology follow.    Prophylactic measure.  - VTE ppx with start heparin subQ    Activity: OOB with assistance, fall precaution  Diet: regular.   d/w patient , wife and daughter at bedside  Phong Dash MD pager [phone]

## 2019-06-17 NOTE — PROGRESS NOTE ADULT - PROBLEM SELECTOR PLAN 4
- eladio Nephrology input  - worsening Cr poss due to hypotension due to diarrhea, pericardial effusion  - s/p IVF bolus

## 2019-06-17 NOTE — PROGRESS NOTE ADULT - SUBJECTIVE AND OBJECTIVE BOX
Patient is a 86y old  Male who presents with a chief complaint of diarrhea (17 Jun 2019 11:01)      SUBJECTIVE / OVERNIGHT EVENTS: Comfortable without new complaints. Still with diarrhea. Wife and daughter at bedside.  Review of Systems  chest pain no  palpitations no  sob no  nausea no  headache no    MEDICATIONS  (STANDING):  aztreonam  IVPB 1000 milliGRAM(s) IV Intermittent every 12 hours  aztreonam  IVPB      ergocalciferol 85812 Unit(s) Oral every week  heparin  Injectable 5000 Unit(s) SubCutaneous every 8 hours  multivitamin 1 Tablet(s) Oral daily  octreotide  Injectable 100 MICROGram(s) SubCutaneous four times a day  sodium bicarbonate  Infusion 0.084 mEq/kG/Hr (70 mL/Hr) IV Continuous <Continuous>    MEDICATIONS  (PRN):  acetaminophen   Tablet .. 650 milliGRAM(s) Oral every 6 hours PRN Temp greater or equal to 38C (100.4F), Moderate Pain (4 - 6)      Vital Signs Last 24 Hrs  T(C): 36.4 (17 Jun 2019 16:12), Max: 37 (17 Jun 2019 04:20)  T(F): 97.6 (17 Jun 2019 16:12), Max: 98.6 (17 Jun 2019 04:20)  HR: 79 (17 Jun 2019 16:12) (79 - 113)  BP: 94/56 (17 Jun 2019 16:12) (82/50 - 104/61)  BP(mean): --  RR: 18 (17 Jun 2019 16:12) (17 - 18)  SpO2: 94% (17 Jun 2019 16:12) (94% - 98%)    PHYSICAL EXAM:  GENERAL: NAD, well-developed  HEAD:  Atraumatic, Normocephalic Flushed  EYES: EOMI, PERRLA, conjunctiva and sclera clear  NECK: Supple, No JVD  CHEST/LUNG: Clear to auscultation bilaterally; No wheeze  HEART: Regular rate and rhythm; No murmurs, rubs, or gallops  ABDOMEN: Soft, Nontender, Nondistended; Bowel sounds present  EXTREMITIES:  2+ Peripheral Pulses, No clubbing, cyanosis, or edema  PSYCH: AAOx3  NEUROLOGY: non-focal  SKIN: No rashes or lesions    LABS:                        8.6    8.16  )-----------( 177      ( 17 Jun 2019 09:17 )             27.6     06-17    134<L>  |  110<H>  |  61<H>  ----------------------------<  128<H>  3.5   |  13<L>  |  2.85<H>    Ca    9.3      17 Jun 2019 07:26  Phos  2.6     06-17  Mg     2.3     06-17                  RADIOLOGY & ADDITIONAL TESTS:    Imaging Personally Reviewed:    Consultant(s) Notes Reviewed:      Care Discussed with Consultants/Other Providers:

## 2019-06-17 NOTE — PROGRESS NOTE ADULT - SUBJECTIVE AND OBJECTIVE BOX
No events overnight. Denies CP, SOB, palp, dizziness. Does have increased diarrhea.    MEDICATIONS:  heparin  Injectable 5000 Unit(s) SubCutaneous every 8 hours  aztreonam  IVPB 1000 milliGRAM(s) IV Intermittent every 12 hours  aztreonam  IVPB      acetaminophen   Tablet .. 650 milliGRAM(s) Oral every 6 hours PRN  octreotide  Injectable 100 MICROGram(s) SubCutaneous four times a day  ergocalciferol 25725 Unit(s) Oral every week  multivitamin 1 Tablet(s) Oral daily  sodium bicarbonate  Infusion 0.084 mEq/kG/Hr IV Continuous <Continuous>  sodium chloride 0.9% Bolus 250 milliLiter(s) IV Bolus once      REVIEW OF SYSTEMS:    CONSTITUTIONAL: No weakness, fevers or chills  EYES/ENT: No visual changes;  No dysphagia  RESPIRATORY: No cough, wheezing, hemoptysis; No shortness of breath  CARDIOVASCULAR: No chest pain or palpitations; No lower extremity edema  GASTROINTESTINAL: No abdominal or epigastric pain. No nausea, vomiting, or hematemesis, +diarrhea  GENITOURINARY: No dysuria, frequency or hematuria  NEUROLOGICAL: No numbness or weakness  SKIN: No itching, burning, rashes, or lesions   HEME: No bleeding or bruising  MSK: No joint pains or muscle pains  All other review of systems is negative unless indicated above.    PHYSICAL EXAM:  T(C): 36.8 (06-17-19 @ 09:23), Max: 37 (06-17-19 @ 04:20)  HR: 86 (06-17-19 @ 09:23) (86 - 113)  BP: 82/50 (06-17-19 @ 09:22) (82/50 - 104/61)  RR: 18 (06-17-19 @ 09:23) (17 - 18)  SpO2: 98% (06-17-19 @ 09:23) (95% - 98%)  Wt(kg): --  I&O's Summary    16 Jun 2019 07:01  -  17 Jun 2019 07:00  --------------------------------------------------------  IN: 1550 mL / OUT: 456 mL / NET: 1094 mL    17 Jun 2019 07:01  -  17 Jun 2019 12:52  --------------------------------------------------------  IN: 0 mL / OUT: 2 mL / NET: -2 mL        Appearance: Normal, flushed skin  HEENT:   Normal oral mucosa  Cardiovascular: Normal S1 S2, No JVD, No murmurs, No edema  Respiratory: Lungs clear to auscultation	  Psychiatry: A & O x 3, Mood & affect appropriate  Gastrointestinal:  Soft, Non-tender, + BS	  Skin: No rashes, No ecchymoses, No cyanosis	  Neurologic: Non-focal  Extremities: Normal range of motion, No clubbing, cyanosis        LABS:	 	    CBC Full  -  ( 17 Jun 2019 09:17 )  WBC Count : 8.16 K/uL  Hemoglobin : 8.6 g/dL  Hematocrit : 27.6 %  Platelet Count - Automated : 177 K/uL  Mean Cell Volume : 100.7 fl  Mean Cell Hemoglobin : 31.4 pg  Mean Cell Hemoglobin Concentration : 31.2 gm/dL  Auto Neutrophil # : x  Auto Lymphocyte # : x  Auto Monocyte # : x  Auto Eosinophil # : x  Auto Basophil # : x  Auto Neutrophil % : x  Auto Lymphocyte % : x  Auto Monocyte % : x  Auto Eosinophil % : x  Auto Basophil % : x    06-17    134<L>  |  110<H>  |  61<H>  ----------------------------<  128<H>  3.5   |  13<L>  |  2.85<H>  06-16    133<L>  |  110<H>  |  59<H>  ----------------------------<  126<H>  3.3<L>   |  12<L>  |  2.85<H>    Ca    9.3      17 Jun 2019 07:26  Ca    9.4      16 Jun 2019 06:42  Phos  2.6     06-17  Mg     2.3     06-17    ASSESSMENT/PLAN: 	  86yoM with PMH of carcinoid syndrome s/p left lower lobe resection, bladder disease requiring intermittent straight cath who presented with persistent diarrhea and weight loss and generalized weakness, undergoing treatment for carcinoid, now with medium sized pericardial effusion. Patient continues to have diarrhea.    - TTE x3 w/ stable pericardial effusion  - patient asymptomatic  - recommend repeat TTE on 6/20 to assess size of effusion  - please call back if any changes on repeat TTE or changes in clinical status      Zuri Lowery MD  Cardiology Fellow, M-F 7:30A-5P  [phone]    All Cardiology service information can be found on [email or web address], password: [email or web address]. No events overnight. Denies CP, SOB, palp, dizziness. Does have increased diarrhea.      MEDICATIONS:  heparin  Injectable 5000 Unit(s) SubCutaneous every 8 hours  aztreonam  IVPB 1000 milliGRAM(s) IV Intermittent every 12 hours  aztreonam  IVPB      acetaminophen   Tablet .. 650 milliGRAM(s) Oral every 6 hours PRN  octreotide  Injectable 100 MICROGram(s) SubCutaneous four times a day  ergocalciferol 13319 Unit(s) Oral every week  multivitamin 1 Tablet(s) Oral daily  sodium bicarbonate  Infusion 0.084 mEq/kG/Hr IV Continuous <Continuous>  sodium chloride 0.9% Bolus 250 milliLiter(s) IV Bolus once      REVIEW OF SYSTEMS:  CONSTITUTIONAL: No weakness, fevers or chills  EYES/ENT: No visual changes;  No dysphagia  RESPIRATORY: No cough, wheezing, hemoptysis; No shortness of breath  CARDIOVASCULAR: No chest pain or palpitations; No lower extremity edema  GASTROINTESTINAL: No abdominal or epigastric pain. No nausea, vomiting, or hematemesis, +diarrhea  GENITOURINARY: No dysuria, frequency or hematuria  NEUROLOGICAL: No numbness or weakness  SKIN: No itching, burning, rashes, or lesions   HEME: No bleeding or bruising  MSK: No joint pains or muscle pains  All other review of systems is negative unless indicated above.      PHYSICAL EXAM:  T(C): 36.8 (06-17-19 @ 09:23), Max: 37 (06-17-19 @ 04:20)  HR: 86 (06-17-19 @ 09:23) (86 - 113)  BP: 82/50 (06-17-19 @ 09:22) (82/50 - 104/61)  RR: 18 (06-17-19 @ 09:23) (17 - 18)  SpO2: 98% (06-17-19 @ 09:23) (95% - 98%)      Appearance: Normal, flushed skin  HEENT:   Normal oral mucosa  Cardiovascular: Normal S1 S2, No JVD, No murmurs, No edema  Respiratory: Lungs clear to auscultation	  Psychiatry: A & O x 3, Mood & affect appropriate  Gastrointestinal:  Soft, Non-tender, + BS	  Skin: No rashes, No ecchymoses, No cyanosis	  Neurologic: Non-focal  Extremities: Normal range of motion, No clubbing, cyanosis      I&O's Summary    16 Jun 2019 07:01  -  17 Jun 2019 07:00  --------------------------------------------------------  IN: 1550 mL / OUT: 456 mL / NET: 1094 mL    17 Jun 2019 07:01  -  17 Jun 2019 12:52  --------------------------------------------------------  IN: 0 mL / OUT: 2 mL / NET: -2 mL      LABS:	 	    CBC Full  -  ( 17 Jun 2019 09:17 )  WBC Count : 8.16 K/uL  Hemoglobin : 8.6 g/dL  Hematocrit : 27.6 %  Platelet Count - Automated : 177 K/uL      06-17  134<L>  |  110<H>  |  61<H>  ----------------------------<  128<H>  3.5   |  13<L>  |  2.85<H>    06-16  133<L>  |  110<H>  |  59<H>  ----------------------------<  126<H>  3.3<L>   |  12<L>  |  2.85<H>    Ca    9.3      17 Jun 2019 07:26  Ca    9.4      16 Jun 2019 06:42    Phos  2.6     06-17  Mg     2.3     06-17      ASSESSMENT/PLAN: 	  86yoM with PMH of carcinoid syndrome s/p left lower lobe resection, bladder disease requiring intermittent straight cath.  Presented with persistent diarrhea and weight loss and generalized weakness, undergoing treatment for carcinoid.  Now with medium sized pericardial effusion. Patient continues to have diarrhea.    REC:  - TTE x3 w/ stable pericardial effusion  - patient asymptomatic  - recommend repeat TTE on 6/20 to assess size of effusion  - please call back if any changes on repeat TTE or changes in clinical status        Zuri Lowery MD  Cardiology Fellow, M-F 7:30A-5P  [phone]    Teodoro Shelton M.D.  Cardiology Attending, Consult Service  [phone]     All Cardiology service information can be found on [email or web address], password: [email or web address].

## 2019-06-18 DIAGNOSIS — N39.0 URINARY TRACT INFECTION, SITE NOT SPECIFIED: ICD-10-CM

## 2019-06-18 LAB
ANION GAP SERPL CALC-SCNC: 10 MMOL/L — SIGNIFICANT CHANGE UP (ref 5–17)
BUN SERPL-MCNC: 70 MG/DL — HIGH (ref 7–23)
CALCIUM SERPL-MCNC: 9.6 MG/DL — SIGNIFICANT CHANGE UP (ref 8.4–10.5)
CHLORIDE SERPL-SCNC: 111 MMOL/L — HIGH (ref 96–108)
CO2 SERPL-SCNC: 12 MMOL/L — LOW (ref 22–31)
CREAT SERPL-MCNC: 2.87 MG/DL — HIGH (ref 0.5–1.3)
GLUCOSE SERPL-MCNC: 122 MG/DL — HIGH (ref 70–99)
HCT VFR BLD CALC: 25.7 % — LOW (ref 39–50)
HGB BLD-MCNC: 8.1 G/DL — LOW (ref 13–17)
MCHC RBC-ENTMCNC: 31.5 GM/DL — LOW (ref 32–36)
MCHC RBC-ENTMCNC: 31.6 PG — SIGNIFICANT CHANGE UP (ref 27–34)
MCV RBC AUTO: 100.4 FL — HIGH (ref 80–100)
NRBC # BLD: SIGNIFICANT CHANGE UP (ref 0–0)
PLATELET # BLD AUTO: 178 K/UL — SIGNIFICANT CHANGE UP (ref 150–400)
POTASSIUM SERPL-MCNC: 3.3 MMOL/L — LOW (ref 3.5–5.3)
POTASSIUM SERPL-SCNC: 3.3 MMOL/L — LOW (ref 3.5–5.3)
RBC # BLD: 2.56 M/UL — LOW (ref 4.2–5.8)
RBC # FLD: 18.2 % — HIGH (ref 10.3–14.5)
SODIUM SERPL-SCNC: 133 MMOL/L — LOW (ref 135–145)
WBC # BLD: SIGNIFICANT CHANGE UP K/UL (ref 3.8–10.5)
WBC # FLD AUTO: SIGNIFICANT CHANGE UP K/UL (ref 3.8–10.5)

## 2019-06-18 RX ORDER — POTASSIUM CHLORIDE 20 MEQ
40 PACKET (EA) ORAL ONCE
Refills: 0 | Status: COMPLETED | OUTPATIENT
Start: 2019-06-18 | End: 2019-06-18

## 2019-06-18 RX ADMIN — Medication 1 TABLET(S): at 12:12

## 2019-06-18 RX ADMIN — OCTREOTIDE ACETATE 100 MICROGRAM(S): 200 INJECTION, SOLUTION INTRAVENOUS; SUBCUTANEOUS at 06:15

## 2019-06-18 RX ADMIN — Medication 1 DROP(S): at 21:39

## 2019-06-18 RX ADMIN — HEPARIN SODIUM 5000 UNIT(S): 5000 INJECTION INTRAVENOUS; SUBCUTANEOUS at 06:15

## 2019-06-18 RX ADMIN — SODIUM CHLORIDE 75 MILLILITER(S): 9 INJECTION, SOLUTION INTRAVENOUS at 12:13

## 2019-06-18 RX ADMIN — HEPARIN SODIUM 5000 UNIT(S): 5000 INJECTION INTRAVENOUS; SUBCUTANEOUS at 21:39

## 2019-06-18 RX ADMIN — Medication 50 MILLIGRAM(S): at 06:15

## 2019-06-18 RX ADMIN — Medication 50 MILLIGRAM(S): at 17:06

## 2019-06-18 RX ADMIN — SODIUM CHLORIDE 75 MILLILITER(S): 9 INJECTION, SOLUTION INTRAVENOUS at 21:39

## 2019-06-18 RX ADMIN — Medication 40 MILLIEQUIVALENT(S): at 12:13

## 2019-06-18 RX ADMIN — OCTREOTIDE ACETATE 100 MICROGRAM(S): 200 INJECTION, SOLUTION INTRAVENOUS; SUBCUTANEOUS at 12:13

## 2019-06-18 RX ADMIN — Medication 1 DROP(S): at 14:03

## 2019-06-18 RX ADMIN — HEPARIN SODIUM 5000 UNIT(S): 5000 INJECTION INTRAVENOUS; SUBCUTANEOUS at 14:04

## 2019-06-18 RX ADMIN — OCTREOTIDE ACETATE 100 MICROGRAM(S): 200 INJECTION, SOLUTION INTRAVENOUS; SUBCUTANEOUS at 17:05

## 2019-06-18 NOTE — PROGRESS NOTE ADULT - SUBJECTIVE AND OBJECTIVE BOX
Patient is a 86y old  Male who presents with a chief complaint of diarrhea (17 Jun 2019 22:30),        Pt is seen and examined  pt is awake and lying in bed;  pt seems comfortable and denies any complaints at this time; had less bowel movements over last 24 hrs;       REVIEW OF SYSTEMS:    CONSTITUTIONAL: No weakness, fevers or chills  EYES/ENT: No visual changes;  No vertigo or throat pain   NECK: No pain or stiffness  RESPIRATORY: No cough, wheezing, hemoptysis; No shortness of breath  CARDIOVASCULAR: No chest pain or palpitations  GASTROINTESTINAL: No abdominal or epigastric pain. No nausea, vomiting, or hematemesis; No diarrhea or constipation. No melena or hematochezia.  GENITOURINARY: No dysuria, frequency or hematuria  NEUROLOGICAL: No numbness or weakness  SKIN: No itching, burning, rashes, or lesions     MEDICATIONS  (STANDING):  aztreonam  IVPB 1000 milliGRAM(s) IV Intermittent every 12 hours  aztreonam  IVPB      dextrose 5% 1000 milliLiter(s) (75 mL/Hr) IV Continuous <Continuous>  ergocalciferol 72612 Unit(s) Oral every week  heparin  Injectable 5000 Unit(s) SubCutaneous every 8 hours  multivitamin 1 Tablet(s) Oral daily  octreotide  Injectable 100 MICROGram(s) SubCutaneous four times a day  sodium bicarbonate  Infusion 0.084 mEq/kG/Hr (70 mL/Hr) IV Continuous <Continuous>    MEDICATIONS  (PRN):  acetaminophen   Tablet .. 650 milliGRAM(s) Oral every 6 hours PRN Temp greater or equal to 38C (100.4F), Moderate Pain (4 - 6)      Allergies    penicillin (Swelling)    Intolerances        Vital Signs Last 24 Hrs  T(C): 36.4 (18 Jun 2019 00:21), Max: 36.8 (17 Jun 2019 09:23)  T(F): 97.6 (18 Jun 2019 00:21), Max: 98.2 (17 Jun 2019 09:23)  HR: 86 (18 Jun 2019 00:21) (79 - 87)  BP: 93/59 (18 Jun 2019 00:21) (82/50 - 94/56)  BP(mean): --  RR: 16 (18 Jun 2019 00:21) (16 - 18)  SpO2: 99% (18 Jun 2019 00:21) (94% - 99%)    PHYSICAL EXAM  General: adult in NAD  HEENT: clear oropharynx, anicteric sclera, pink conjunctiva  Neck: supple  CV: normal S1/S2 with no murmur rubs or gallops  Lungs: positive air movement b/l ant lungs,clear to auscultation, no wheezes, no rales  Abdomen: soft non-tender non-distended, no hepatosplenomegaly  Ext: no clubbing cyanosis or edema  Skin: no rashes and no petechiae  Neuro: alert and oriented X 4, no focal deficits    LABS:                          8.6    8.16  )-----------( 177      ( 17 Jun 2019 09:17 )             27.6         Mean Cell Volume : 100.7 fl  Mean Cell Hemoglobin : 31.4 pg  Mean Cell Hemoglobin Concentration : 31.2 gm/dL      Serial CBC's  06-17 @ 09:17  Hct-27.6 / Hgb-8.6 / Plat-177 / RBC-2.74 / WBC-8.16    Serial CBC's  06-16 @ 09:57  Hct-26.7 / Hgb-8.6 / Plat-160 / RBC-2.69 / WBC-8.34    Serial CBC's  06-15 @ 10:22  Hct-26.3 / Hgb-8.3 / Plat-149 / RBC-2.57 / WBC-7.55    Serial CBC's  06-14 @ 10:23  Hct-26.6 / Hgb-8.6 / Plat-146 / RBC-2.69 / WBC-6.81      06-18    133<L>  |  111<H>  |  70<H>  ----------------------------<  122<H>  3.3<L>   |  12<L>  |  2.87<H>    Ca    9.6      18 Jun 2019 07:23  Phos  2.6     06-17  Mg     2.3     06-17          Assessment

## 2019-06-18 NOTE — PROGRESS NOTE ADULT - ASSESSMENT
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    Carcinoid syndrome.  - continue octreotide 100mg subQ QID to treat flushing and diarrhea.    - oncology follow.    Liver masses  - s/p Liver bx  - Path pending      Hypercalcemia.  - Patient noted to have mild hypercalcemia, may be related to suspected carcinoid tumor.  - Will continue IVF.    CONSTANZA (acute kidney injury).   - Patient was noted here to have elevated SCr of 2.6, which is high compared to outpatient SCr of 1.91.  - CONSTANZA is likely pre-renal related to hypovolemia from excessive GI losses.   - urology evaluation noted. S/P cysto and stricture of urethra dilatation.  - Rae.    UTI  - antibiotics. ID follow    Pericardial effusion.  - Currently, the patient is hemodynamically WNL. He denies palpitations and denies lightheadedness while lying supine.  - TTE repeat on 6/20.  - Cardiology follow   - if worse will need CCU evaluation.    Metabolic acidosis, normal anion gap (NAG).   - Patient found to have metabolic acidosis with normal anion gap, hyperchloremia likely due to GI losses.   - continue IVF.  - Follow up BMP.  - nephrology follow.    Prophylactic measure.  - VTE ppx with start heparin subQ    Activity: OOB with assistance, fall precaution  Diet: regular.   d/w patient , wife  Phong Dash MD pager [phone] This patient is an 86yoM with 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    Carcinoid syndrome.  - continue octreotide 100mg subQ QID to treat flushing and diarrhea.    - oncology follow.    Liver masses  - s/p Liver bx Await Oncology recommendations     Hypercalcemia.  - Patient noted to have mild hypercalcemia, may be related to suspected carcinoid tumor.  - Will continue IVF.    CONSTANZA (acute kidney injury).   - Patient was noted here to have elevated SCr of 2.6, which is high compared to outpatient SCr of 1.91.  - CONSTANZA is likely pre-renal related to hypovolemia from excessive GI losses.   - urology evaluation noted. S/P cysto and stricture of urethra dilatation.  - Rae.    UTI  - antibiotics. ID follow    Pericardial effusion.  - Currently, the patient is hemodynamically WNL. He denies palpitations and denies lightheadedness while lying supine.  - TTE repeat on 6/20.  - Cardiology follow   - if worse will need CCU evaluation.    Metabolic acidosis, normal anion gap (NAG).   - Patient found to have metabolic acidosis with normal anion gap, hyperchloremia likely due to GI losses.   - continue IVF.  - Follow up BMP.  - nephrology follow.    Prophylactic measure.  - VTE ppx with start heparin subQ    Activity: OOB with assistance, fall precaution  Diet: regular.   d/w patient , wife  Phong Dash MD pager [phone]

## 2019-06-18 NOTE — PROGRESS NOTE ADULT - SUBJECTIVE AND OBJECTIVE BOX
[facility]   [phone]  RENAL FOLLOW UP NOTE  --------------------------------------------------------------------------------  Chief Complaint:    24 hour events/subjective:    seen earlier. still with diarrhea     PAST HISTORY  --------------------------------------------------------------------------------  No significant changes to PMH, PSH, FHx, SHx, unless otherwise noted    ALLERGIES & MEDICATIONS  --------------------------------------------------------------------------------  Allergies    penicillin (Swelling)    Intolerances      Standing Inpatient Medications  artificial tears (preservative free) Ophthalmic Solution 1 Drop(s) Both EYES three times a day  aztreonam  IVPB 1000 milliGRAM(s) IV Intermittent every 12 hours  aztreonam  IVPB      dextrose 5% 1000 milliLiter(s) IV Continuous <Continuous>  ergocalciferol 74215 Unit(s) Oral every week  heparin  Injectable 5000 Unit(s) SubCutaneous every 8 hours  multivitamin 1 Tablet(s) Oral daily  octreotide  Injectable 100 MICROGram(s) SubCutaneous four times a day  sodium bicarbonate  Infusion 0.084 mEq/kG/Hr IV Continuous <Continuous>    PRN Inpatient Medications  acetaminophen   Tablet .. 650 milliGRAM(s) Oral every 6 hours PRN      REVIEW OF SYSTEMS  --------------------------------------------------------------------------------    Gen: denies  fevers/chills,  CVS: denies chest pain/palpitations  Resp: denies SOB/Cough  GI: Denies N/V/Abd pain  : Denies dysuria/oliguria/hematuria    All other systems were reviewed and are negative, except as noted.    VITALS/PHYSICAL EXAM  --------------------------------------------------------------------------------  T(C): 36.8 (06-18-19 @ 20:43), Max: 36.8 (06-18-19 @ 20:43)  HR: 89 (06-18-19 @ 20:43) (77 - 89)  BP: 97/59 (06-18-19 @ 20:43) (89/56 - 110/63)  RR: 18 (06-18-19 @ 20:43) (16 - 18)  SpO2: 98% (06-18-19 @ 20:43) (97% - 99%)  Wt(kg): --        06-17-19 @ 07:01  -  06-18-19 @ 07:00  --------------------------------------------------------  IN: 1000 mL / OUT: 654 mL / NET: 346 mL    06-18-19 @ 07:01  -  06-18-19 @ 22:01  --------------------------------------------------------  IN: 1357 mL / OUT: 353 mL / NET: 1004 mL      Physical Exam:  	  	  Gen: Non toxic comfortable appearing  thin muscle wasting   	no jvd ,  	Pulm: decrease bs  no rales or ronchi or wheezing  	CV: RRR, S1S2; no rub  	Abd: +BS, soft, nontender/nondistended  	: No suprapubic tenderness  	UE: Warm, no cyanosis  no clubbing,  no edema  	LE: Warm, no cyanosis  no clubbing, no edema  	Neuro: alert and oriented. speech coherent   			  	    LABS/STUDIES  --------------------------------------------------------------------------------              8.1    Not measured >-----------<  178      [06-18-19 @ 10:05]              25.7     133  |  111  |  70  ----------------------------<  122      [06-18-19 @ 07:23]  3.3   |  12  |  2.87        Ca     9.6     [06-18-19 @ 07:23]      Mg     2.3     [06-17-19 @ 07:26]      Phos  2.6     [06-17-19 @ 07:26]            Creatinine Trend:  SCr 2.87 [06-18 @ 07:23]  SCr 2.85 [06-17 @ 07:26]  SCr 2.85 [06-16 @ 06:42]  SCr 2.76 [06-15 @ 07:09]  SCr 2.54 [06-14 @ 09:06]              Urinalysis - [06-12-19 @ 09:34]      Color Yellow / Appearance Slightly Turbid / SG 1.019 / pH 6.0      Gluc Negative / Ketone Negative  / Bili Negative / Urobili <2 mg/dL       Blood Trace / Protein 100 mg/dL / Leuk Est Large / Nitrite Positive      RBC 6 /  / Hyaline 0 / Gran 5 / Sq Epi  / Non Sq Epi 5 / Bacteria TNTC      PTH -- (Ca 10.4)      [06-11-19 @ 09:51]   15  Vitamin D (25OH) 9.7      [06-11-19 @ 10:02]

## 2019-06-18 NOTE — PROGRESS NOTE ADULT - SUBJECTIVE AND OBJECTIVE BOX
Patient is a 86y old  Male who presents with a chief complaint of diarrhea (18 Jun 2019 11:24)      SUBJECTIVE / OVERNIGHT EVENTS: No new complaints.   Review of Systems  chest pain no  palpitations no  sob no  nausea no  headache no    MEDICATIONS  (STANDING):  artificial tears (preservative free) Ophthalmic Solution 1 Drop(s) Both EYES three times a day  aztreonam  IVPB 1000 milliGRAM(s) IV Intermittent every 12 hours  aztreonam  IVPB      dextrose 5% 1000 milliLiter(s) (75 mL/Hr) IV Continuous <Continuous>  ergocalciferol 95059 Unit(s) Oral every week  heparin  Injectable 5000 Unit(s) SubCutaneous every 8 hours  multivitamin 1 Tablet(s) Oral daily  octreotide  Injectable 100 MICROGram(s) SubCutaneous four times a day  sodium bicarbonate  Infusion 0.084 mEq/kG/Hr (70 mL/Hr) IV Continuous <Continuous>    MEDICATIONS  (PRN):  acetaminophen   Tablet .. 650 milliGRAM(s) Oral every 6 hours PRN Temp greater or equal to 38C (100.4F), Moderate Pain (4 - 6)      Vital Signs Last 24 Hrs  T(C): 36.4 (18 Jun 2019 16:02), Max: 36.6 (18 Jun 2019 10:16)  T(F): 97.6 (18 Jun 2019 16:02), Max: 97.8 (18 Jun 2019 10:16)  HR: 77 (18 Jun 2019 16:02) (77 - 87)  BP: 110/63 (18 Jun 2019 16:02) (89/56 - 110/63)  BP(mean): --  RR: 18 (18 Jun 2019 16:02) (16 - 18)  SpO2: 98% (18 Jun 2019 16:02) (97% - 99%)    PHYSICAL EXAM:  GENERAL: NAD, well-developed  HEAD:  Atraumatic, Normocephalic flushed  EYES: EOMI, PERRLA, conjunctiva and sclera clear  NECK: Supple, No JVD  CHEST/LUNG: Clear to auscultation bilaterally; No wheeze  HEART: Regular rate and rhythm; No murmurs, rubs, or gallops  ABDOMEN: Soft, Nontender, Nondistended; Bowel sounds present Rae  EXTREMITIES:  2+ Peripheral Pulses, No clubbing, cyanosis, or edema  PSYCH: AAOx3  NEUROLOGY: non-focal  SKIN: No rashes or lesions    LABS:                        8.1    Not measured )-----------( 178      ( 18 Jun 2019 10:05 )             25.7     06-18    133<L>  |  111<H>  |  70<H>  ----------------------------<  122<H>  3.3<L>   |  12<L>  |  2.87<H>    Ca    9.6      18 Jun 2019 07:23  Phos  2.6     06-17  Mg     2.3     06-17                  RADIOLOGY & ADDITIONAL TESTS:    Imaging Personally Reviewed:    Consultant(s) Notes Reviewed:      Care Discussed with Consultants/Other Providers: Patient is a 86y old  Male who presents with a chief complaint of diarrhea (18 Jun 2019 11:24)      SUBJECTIVE / OVERNIGHT EVENTS: No new complaints.   Review of Systems  chest pain no  palpitations no  sob no  nausea no  headache no    MEDICATIONS  (STANDING):  artificial tears (preservative free) Ophthalmic Solution 1 Drop(s) Both EYES three times a day  aztreonam  IVPB 1000 milliGRAM(s) IV Intermittent every 12 hours  aztreonam  IVPB      dextrose 5% 1000 milliLiter(s) (75 mL/Hr) IV Continuous <Continuous>  ergocalciferol 73804 Unit(s) Oral every week  heparin  Injectable 5000 Unit(s) SubCutaneous every 8 hours  multivitamin 1 Tablet(s) Oral daily  octreotide  Injectable 100 MICROGram(s) SubCutaneous four times a day  sodium bicarbonate  Infusion 0.084 mEq/kG/Hr (70 mL/Hr) IV Continuous <Continuous>    MEDICATIONS  (PRN):  acetaminophen   Tablet .. 650 milliGRAM(s) Oral every 6 hours PRN Temp greater or equal to 38C (100.4F), Moderate Pain (4 - 6)      Vital Signs Last 24 Hrs  T(C): 36.4 (18 Jun 2019 16:02), Max: 36.6 (18 Jun 2019 10:16)  T(F): 97.6 (18 Jun 2019 16:02), Max: 97.8 (18 Jun 2019 10:16)  HR: 77 (18 Jun 2019 16:02) (77 - 87)  BP: 110/63 (18 Jun 2019 16:02) (89/56 - 110/63)  BP(mean): --  RR: 18 (18 Jun 2019 16:02) (16 - 18)  SpO2: 98% (18 Jun 2019 16:02) (97% - 99%)    PHYSICAL EXAM:  GENERAL: NAD, well-developed  HEAD:  Atraumatic, Normocephalic flushed  EYES: EOMI, PERRLA, conjunctiva and sclera clear  NECK: Supple, No JVD  CHEST/LUNG: Clear to auscultation bilaterally; No wheeze  HEART: Regular rate and rhythm; No murmurs, rubs, or gallops  ABDOMEN: Soft, Nontender, Nondistended; Bowel sounds present Rae  EXTREMITIES:  2+ Peripheral Pulses, No clubbing, cyanosis, or edema  PSYCH: AAOx3  NEUROLOGY: non-focal  SKIN: No rashes or lesions    LABS:                        8.1    Not measured )-----------( 178      ( 18 Jun 2019 10:05 )             25.7     06-18    133<L>  |  111<H>  |  70<H>  ----------------------------<  122<H>  3.3<L>   |  12<L>  |  2.87<H>    Ca    9.6      18 Jun 2019 07:23  Phos  2.6     06-17  Mg     2.3     06-17            Cytopathology - Non Gyn Report (06.13.19 @ 18:00)    Cytopathology - Non Gyn Report:   ACCESSION No:  [id number]    MIKE HARDY                      2        Cytopathology Addendum Report          Addendum  Immunostains results are as follows:  Synaptophysin, chromogranin, CD56: all are positive  Ki67: approximately 5%  Mitotic count:: <  than 2 in 10 HPF.    Based on the Ki67 labaling index the neoplasm is classified as :  Well differentiated neuroendocrine tumor, grade II.    Note:  This case was reviewed for  with GI pathology  staff  who concur(s) withthe diagnosis on  06/18/19.    Verified by: Kedar Jimenez M.D.  (Electronic Signature)  Reported on: 06/18/19 10:53 EDT, [address] [place]  [address]  _________________________________________________________________      Cytopathology Report            Final Diagnosis  LIVER, CORE BIOPSY  POSITIVE FOR NEOPLASM.      Touch Prep slides and core biopsy sections show a monotonously  uniform plasmacytoid cells in discohesive sheets and trabecular  pattern, with eccentric nuclei, coarsely stippled (salt and  pepper) chromatin, and finely granular cytoplasm.  Immunostains pending.    Screened by: Marcela PENA(ASCP)  Verified by: Kedar Jimenez M.D.  (Electronic Signature)  Reported on: 06/17/19 18:16 EDT, [address] [place]  [address]  Cytology technical processing performed at [address] [place] [id number]  _________________________________________________________________              MIKE HARDY                      2        Cytopathology Report            Specimen(s) Submitted  LIVER, CORE BIOPSY      Statement of Adequacy  Immediate cytologic study for adequacy of specimen using a Diff-  Quik stain was performed. FNA acceptable for further evaluation  by JEAN Sanches(ASCP).      Clinical Information  History of carcinoid tumor. Status post left lower lobe resection  in 2007. Liver lesion 5 cm. Rule out metastasis. R19.7      Gross Description  Core Biopsy:  Cytopathology - Non Gyn Report (06.13.19 @ 18:00)    Cytopathology - Non Gyn Report:   ACCESSION No:  [id number]    MIKE HARDY                      2        Cytopathology Addendum Report          Addendum  Immunostains results are as follows:  Synaptophysin, chromogranin, CD56: all are positive  Ki67: approximately 5%  Mitotic count:: <  than 2 in 10 HPF.    Based on the Ki67 labaling index the neoplasm is classified as :  Well differentiated neuroendocrine tumor, grade II.    Note:  This case was reviewed for  with GI pathology  staff  who concur(s) withthe diagnosis on  06/18/19.    Verified by: Kedar Jimenez M.D.  (Electronic Signature)  Reported on: 06/18/19 10:53 EDT, [address] [place] [address]  [id number]  _________________________________________________________________      Cytopathology Report            Final Diagnosis  LIVER, CORE BIOPSY  POSITIVE FOR NEOPLASM.      Touch Prep slides and core biopsy sections show a monotonously  uniform plasmacytoid cells in discohesive sheets and trabecular  pattern, with eccentric nuclei, coarsely stippled (salt and  pepper) chromatin, and finely granular cytoplasm.  Immunostains pending.    Screened by: Marcela PENA(ASCP)  Verified by: Kedar Jimenez M.D.  (Electronic Signature)  Reported on: 06/17/19 18:16 EDT, [address] [facility], [place]  [address]  Cytology technical processing performed at [address] [place] [id number]  _________________________________________________________________              F              RADIOLOGY & ADDITIONAL TESTS:    Imaging Personally Reviewed:    Consultant(s) Notes Reviewed:      Care Discussed with Consultants/Other Providers:

## 2019-06-19 LAB
ALBUMIN SERPL ELPH-MCNC: 2.9 G/DL — LOW (ref 3.3–5)
ALP SERPL-CCNC: 68 U/L — SIGNIFICANT CHANGE UP (ref 40–120)
ALT FLD-CCNC: 11 U/L — SIGNIFICANT CHANGE UP (ref 10–45)
ANION GAP SERPL CALC-SCNC: 14 MMOL/L — SIGNIFICANT CHANGE UP (ref 5–17)
AST SERPL-CCNC: 25 U/L — SIGNIFICANT CHANGE UP (ref 10–40)
BILIRUB SERPL-MCNC: 0.3 MG/DL — SIGNIFICANT CHANGE UP (ref 0.2–1.2)
BUN SERPL-MCNC: 67 MG/DL — HIGH (ref 7–23)
CALCIUM SERPL-MCNC: 9.3 MG/DL — SIGNIFICANT CHANGE UP (ref 8.4–10.5)
CHLORIDE SERPL-SCNC: 107 MMOL/L — SIGNIFICANT CHANGE UP (ref 96–108)
CO2 SERPL-SCNC: 14 MMOL/L — LOW (ref 22–31)
CREAT SERPL-MCNC: 2.7 MG/DL — HIGH (ref 0.5–1.3)
GLUCOSE SERPL-MCNC: 122 MG/DL — HIGH (ref 70–99)
HCT VFR BLD CALC: 25.1 % — LOW (ref 39–50)
HGB BLD-MCNC: 8 G/DL — LOW (ref 13–17)
LACTATE SERPL-SCNC: 2.4 MMOL/L — HIGH (ref 0.7–2)
MCHC RBC-ENTMCNC: 31.9 GM/DL — LOW (ref 32–36)
MCHC RBC-ENTMCNC: 31.9 PG — SIGNIFICANT CHANGE UP (ref 27–34)
MCV RBC AUTO: 100 FL — SIGNIFICANT CHANGE UP (ref 80–100)
NRBC # BLD: 0 /100 WBCS — SIGNIFICANT CHANGE UP (ref 0–0)
PLATELET # BLD AUTO: 171 K/UL — SIGNIFICANT CHANGE UP (ref 150–400)
POTASSIUM SERPL-MCNC: 3.5 MMOL/L — SIGNIFICANT CHANGE UP (ref 3.5–5.3)
POTASSIUM SERPL-SCNC: 3.5 MMOL/L — SIGNIFICANT CHANGE UP (ref 3.5–5.3)
PROT SERPL-MCNC: 5.6 G/DL — LOW (ref 6–8.3)
RBC # BLD: 2.51 M/UL — LOW (ref 4.2–5.8)
RBC # FLD: 18 % — HIGH (ref 10.3–14.5)
SODIUM SERPL-SCNC: 135 MMOL/L — SIGNIFICANT CHANGE UP (ref 135–145)
WBC # BLD: 7.22 K/UL — SIGNIFICANT CHANGE UP (ref 3.8–10.5)
WBC # FLD AUTO: 7.22 K/UL — SIGNIFICANT CHANGE UP (ref 3.8–10.5)

## 2019-06-19 PROCEDURE — 99497 ADVNCD CARE PLAN 30 MIN: CPT

## 2019-06-19 PROCEDURE — 99498 ADVNCD CARE PLAN ADDL 30 MIN: CPT

## 2019-06-19 PROCEDURE — 99233 SBSQ HOSP IP/OBS HIGH 50: CPT

## 2019-06-19 RX ORDER — SODIUM CHLORIDE 9 MG/ML
1000 INJECTION, SOLUTION INTRAVENOUS
Refills: 0 | Status: DISCONTINUED | OUTPATIENT
Start: 2019-06-19 | End: 2019-06-20

## 2019-06-19 RX ADMIN — OCTREOTIDE ACETATE 100 MICROGRAM(S): 200 INJECTION, SOLUTION INTRAVENOUS; SUBCUTANEOUS at 11:51

## 2019-06-19 RX ADMIN — Medication 1 DROP(S): at 05:10

## 2019-06-19 RX ADMIN — Medication 1 DROP(S): at 13:08

## 2019-06-19 RX ADMIN — Medication 1 DROP(S): at 20:59

## 2019-06-19 RX ADMIN — OCTREOTIDE ACETATE 100 MICROGRAM(S): 200 INJECTION, SOLUTION INTRAVENOUS; SUBCUTANEOUS at 05:08

## 2019-06-19 RX ADMIN — Medication 1 TABLET(S): at 11:51

## 2019-06-19 RX ADMIN — OCTREOTIDE ACETATE 100 MICROGRAM(S): 200 INJECTION, SOLUTION INTRAVENOUS; SUBCUTANEOUS at 17:23

## 2019-06-19 RX ADMIN — OCTREOTIDE ACETATE 100 MICROGRAM(S): 200 INJECTION, SOLUTION INTRAVENOUS; SUBCUTANEOUS at 00:28

## 2019-06-19 RX ADMIN — SODIUM CHLORIDE 75 MILLILITER(S): 9 INJECTION, SOLUTION INTRAVENOUS at 11:51

## 2019-06-19 RX ADMIN — OCTREOTIDE ACETATE 100 MICROGRAM(S): 200 INJECTION, SOLUTION INTRAVENOUS; SUBCUTANEOUS at 23:49

## 2019-06-19 RX ADMIN — HEPARIN SODIUM 5000 UNIT(S): 5000 INJECTION INTRAVENOUS; SUBCUTANEOUS at 20:58

## 2019-06-19 RX ADMIN — SODIUM CHLORIDE 100 MILLILITER(S): 9 INJECTION, SOLUTION INTRAVENOUS at 15:53

## 2019-06-19 RX ADMIN — HEPARIN SODIUM 5000 UNIT(S): 5000 INJECTION INTRAVENOUS; SUBCUTANEOUS at 05:10

## 2019-06-19 RX ADMIN — ERGOCALCIFEROL 50000 UNIT(S): 1.25 CAPSULE ORAL at 11:51

## 2019-06-19 RX ADMIN — HEPARIN SODIUM 5000 UNIT(S): 5000 INJECTION INTRAVENOUS; SUBCUTANEOUS at 13:07

## 2019-06-19 RX ADMIN — Medication 50 MILLIGRAM(S): at 05:08

## 2019-06-19 NOTE — PROGRESS NOTE ADULT - ASSESSMENT
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  certainly possible to superimposed worsened by pericardial effusion /diarrhea and hypotension   still with persistent acidosis d5w + 150 meq nahco3-/L @  cc/75hr   monitor bicarb and edema   calzada to cont      trend hb   d/w cards

## 2019-06-19 NOTE — PROGRESS NOTE ADULT - PROBLEM SELECTOR PLAN 2
--- improving with increased sandostatin Improving with increased sandostatin  Iv hydratio per nephrology - f/up per nephrology

## 2019-06-19 NOTE — PROGRESS NOTE ADULT - SUBJECTIVE AND OBJECTIVE BOX
Patient is a 86y old  Male who presents with a chief complaint of diarrhea (17 Jun 2019 22:30),        Pt is seen and examined  pt is awake and lying in bed;  pt seems comfortable and denies any complaints at this time; had less bowel movements over last 24 hrs;         REVIEW OF SYSTEMS:    CONSTITUTIONAL: weakness +,no fevers or chills  EYES/ENT: No visual changes;  No vertigo or throat pain   NECK: No pain or stiffness  RESPIRATORY: No cough, wheezing, hemoptysis; chr shortness of breath  CARDIOVASCULAR: No chest pain or palpitations  GASTROINTESTINAL: No abdominal or epigastric pain. No nausea, vomiting, or hematemesis; diarrhea , anorexia + No melena or hematochezia.  GENITOURINARY: No dysuria, frequency or hematuria. poor voiding +  NEUROLOGICAL: No numbness or weakness  SKIN: No itching, burning, rashes, or lesions . flushing +    PHYSICAL EXAM  General: adult chronically ill, face flushed  HEENT: clear oropharynx, anicteric sclera, pink conjunctiva  Neck: supple  CV: normal S1/S2 with no murmur rubs or gallops  Lungs: positive air movement b/l ant lungs,clear to auscultation, no wheezes, no rales  Abdomen: soft non-tender non-distended,  hepatomegaly +  Ext: no clubbing cyanosis, 1+ edema  Skin: no rashes and no petechiae  Neuro: alert and oriented X 4, no focal deficits        MEDICATIONS  (STANDING):  artificial tears (preservative free) Ophthalmic Solution 1 Drop(s) Both EYES three times a day  aztreonam  IVPB 1000 milliGRAM(s) IV Intermittent every 12 hours  aztreonam  IVPB      dextrose 5% 1000 milliLiter(s) (75 mL/Hr) IV Continuous <Continuous>  ergocalciferol 79622 Unit(s) Oral every week  heparin  Injectable 5000 Unit(s) SubCutaneous every 8 hours  multivitamin 1 Tablet(s) Oral daily  octreotide  Injectable 100 MICROGram(s) SubCutaneous four times a day  sodium bicarbonate  Infusion 0.084 mEq/kG/Hr (70 mL/Hr) IV Continuous <Continuous>    MEDICATIONS  (PRN):  acetaminophen   Tablet .. 650 milliGRAM(s) Oral every 6 hours PRN Temp greater or equal to 38C (100.4F), Moderate Pain (4 - 6)      Allergies    penicillin (Swelling)    Intolerances        Vital Signs Last 24 Hrs  T(C): 36.8 (19 Jun 2019 04:46), Max: 36.8 (18 Jun 2019 20:43)  T(F): 98.2 (19 Jun 2019 04:46), Max: 98.2 (18 Jun 2019 20:43)  HR: 89 (19 Jun 2019 04:46) (77 - 89)  BP: 102/64 (19 Jun 2019 04:46) (94/65 - 110/63)  BP(mean): --  RR: 18 (19 Jun 2019 04:46) (18 - 18)  SpO2: 97% (19 Jun 2019 04:46) (97% - 99%)      LABS:                          8.1    Not measured )-----------( 178      ( 18 Jun 2019 10:05 )             25.7         Mean Cell Volume : 100.4 fl  Mean Cell Hemoglobin : 31.6 pg  Mean Cell Hemoglobin Concentration : 31.5 gm/dL  Auto Neutrophil # : x  Auto Lymphocyte # : x  Auto Monocyte # : x  Auto Eosinophil # : x  Auto Basophil # : x  Auto Neutrophil % : x  Auto Lymphocyte % : x  Auto Monocyte % : x  Auto Eosinophil % : x  Auto Basophil % : x    Serial CBC's  06-18 @ 10:05  Hct-25.7 / Hgb-8.1 / Plat-178 / RBC-2.56 / WBC-Not measured          Serial CBC's  06-17 @ 09:17  Hct-27.6 / Hgb-8.6 / Plat-177 / RBC-2.74 / WBC-8.16          Serial CBC's  06-16 @ 09:57  Hct-26.7 / Hgb-8.6 / Plat-160 / RBC-2.69 / WBC-8.34          Serial CBC's  06-15 @ 10:22  Hct-26.3 / Hgb-8.3 / Plat-149 / RBC-2.57 / WBC-7.55            06-19    135  |  107  |  67<H>  ----------------------------<  122<H>  3.5   |  14<L>  |  2.70<H>    Ca    9.3      19 Jun 2019 06:34    TPro  5.6<L>  /  Alb  2.9<L>  /  TBili  0.3  /  DBili  x   /  AST  25  /  ALT  11  /  AlkPhos  68  06-19    Cytopathology - Non Gyn Report (06.13.19 @ 18:00)    Cytopathology - Non Gyn Report:   ACCESSION No:  [id number]    IGNACIAMIKE TABOR KATTY                      2        Cytopathology Addendum Report          Addendum  Immunostains results are as follows:  Synaptophysin, chromogranin, CD56: all are positive  Ki67: approximately 5%  Mitotic count:: <  than 2 in 10 HPF.    Based on the Ki67 labaling index the neoplasm is classified as :  Well differentiated neuroendocrine tumor, grade II.    Note:  This case was reviewed for  with GI pathology  staff  who concur(s) withthe diagnosis on  06/18/19.    Verified by: Kedar Jimenez M.D.  (Electronic Signature)  Reported on: 06/18/19 10:53 EDT, [address] [place] [address]  _________________________________________________________________      Cytopathology Report            Final Diagnosis  LIVER, CORE BIOPSY  POSITIVE FOR NEOPLASM.      Touch Prep slides and core biopsy sections show a monotonously  uniform plasmacytoid cells in discohesive sheets and trabecular  pattern, with eccentric nuclei, coarsely stippled (salt and  pepper) chromatin, and finely granular cytoplasm.  Immunostains pending.    Chromogranin A (06.11.19 @ 10:43)    Chromogranin A: 54988: Impaired renal or hepatic function or treatment with proton  pump inhibitors may result in artifactual elevations of  Chromogranin A. Patient is a 86y old  Male who presents with a chief complaint of diarrhea (17 Jun 2019 22:30),        Pt is seen and examined  pt is awake and lying in bed;  pt seems comfortable   4 bms last 24 hours  No dysuria  Left eye swelling +    REVIEW OF SYSTEMS:    CONSTITUTIONAL: weakness +,no fevers or chills  EYES/ENT: No visual changes;  No vertigo or throat pain   NECK: No pain or stiffness  RESPIRATORY: No cough, wheezing, hemoptysis; chr shortness of breath  CARDIOVASCULAR: No chest pain or palpitations  GASTROINTESTINAL: No abdominal or epigastric pain. No nausea, vomiting, or hematemesis; diarrhea , anorexia + No melena or hematochezia.  GENITOURINARY: No dysuria, frequency or hematuria. poor voiding +  NEUROLOGICAL: No numbness or weakness  SKIN: No itching, burning, rashes, or lesions . flushing +    PHYSICAL EXAM  General: adult chronically ill, face flushed  HEENT: clear oropharynx, anicteric sclera, left conjunctival injection +  Neck: supple  CV: normal S1/S2 with no murmur rubs or gallops  Lungs: positive air movement b/l ant lungs,clear to auscultation, no wheezes, no rales  Abdomen: soft non-tender non-distended,  hepatomegaly +  Ext: no clubbing cyanosis, 1+ edema  Skin: no rashes and no petechiae  Neuro: alert and oriented X 4, no focal deficits        MEDICATIONS  (STANDING):  artificial tears (preservative free) Ophthalmic Solution 1 Drop(s) Both EYES three times a day  aztreonam  IVPB 1000 milliGRAM(s) IV Intermittent every 12 hours  aztreonam  IVPB      dextrose 5% 1000 milliLiter(s) (75 mL/Hr) IV Continuous <Continuous>  ergocalciferol 24350 Unit(s) Oral every week  heparin  Injectable 5000 Unit(s) SubCutaneous every 8 hours  multivitamin 1 Tablet(s) Oral daily  octreotide  Injectable 100 MICROGram(s) SubCutaneous four times a day  sodium bicarbonate  Infusion 0.084 mEq/kG/Hr (70 mL/Hr) IV Continuous <Continuous>    MEDICATIONS  (PRN):  acetaminophen   Tablet .. 650 milliGRAM(s) Oral every 6 hours PRN Temp greater or equal to 38C (100.4F), Moderate Pain (4 - 6)      Allergies    penicillin (Swelling)    Intolerances        Vital Signs Last 24 Hrs  T(C): 36.8 (19 Jun 2019 04:46), Max: 36.8 (18 Jun 2019 20:43)  T(F): 98.2 (19 Jun 2019 04:46), Max: 98.2 (18 Jun 2019 20:43)  HR: 89 (19 Jun 2019 04:46) (77 - 89)  BP: 102/64 (19 Jun 2019 04:46) (94/65 - 110/63)  BP(mean): --  RR: 18 (19 Jun 2019 04:46) (18 - 18)  SpO2: 97% (19 Jun 2019 04:46) (97% - 99%)      LABS:                          8.1    Not measured )-----------( 178      ( 18 Jun 2019 10:05 )             25.7         Mean Cell Volume : 100.4 fl  Mean Cell Hemoglobin : 31.6 pg  Mean Cell Hemoglobin Concentration : 31.5 gm/dL    06-19    135  |  107  |  67<H>  ----------------------------<  122<H>  3.5   |  14<L>  |  2.70<H>    Ca    9.3      19 Jun 2019 06:34    TPro  5.6<L>  /  Alb  2.9<L>  /  TBili  0.3  /  DBili  x   /  AST  25  /  ALT  11  /  AlkPhos  68  06-19    Cytopathology - Non Gyn Report (06.13.19 @ 18:00)    Cytopathology - Non Gyn Report:   ACCESSION No:  [id number]    MIKE HARDY                      2        Cytopathology Addendum Report          Addendum  Immunostains results are as follows:  Synaptophysin, chromogranin, CD56: all are positive  Ki67: approximately 5%  Mitotic count:: <  than 2 in 10 HPF.    Based on the Ki67 labaling index the neoplasm is classified as :  Well differentiated neuroendocrine tumor, grade II.    Note:  This case was reviewed for  with GI pathology  staff  who concur(s) withthe diagnosis on  06/18/19.    Verified by: Kedar Jimenez M.D.  (Electronic Signature)  Reported on: 06/18/19 10:53 EDT, [address] [place] [address]  [id number]  _________________________________________________________________      Cytopathology Report            Final Diagnosis  LIVER, CORE BIOPSY  POSITIVE FOR NEOPLASM.          Chromogranin A (06.11.19 @ 10:43)    Chromogranin A: 99540: Impaired renal or hepatic function or treatment with proton  pump inhibitors may result in artifactual elevations of  Chromogranin A.

## 2019-06-19 NOTE — PROGRESS NOTE ADULT - PROBLEM SELECTOR PLAN 3
-- unchanged --  -- bp borderline  -- Cardiology input noted --- no need for drainage Cardiology input noted --- no need for drainage

## 2019-06-19 NOTE — PROGRESS NOTE ADULT - PROBLEM SELECTOR PLAN 4
-- eladio ID input  -- abx d/c'd  -- (?) if removal of calzada an option ID input noted  completing abx today

## 2019-06-19 NOTE — PROGRESS NOTE ADULT - SUBJECTIVE AND OBJECTIVE BOX
Patient is a 86y old  Male who presents with a chief complaint of diarrhea (19 Jun 2019 09:23)      SUBJECTIVE / OVERNIGHT EVENTS: still with diarrhea  Review of Systems  chest pain no  palpitations no  sob no  nausea no  headache no    MEDICATIONS  (STANDING):  artificial tears (preservative free) Ophthalmic Solution 1 Drop(s) Both EYES three times a day  dextrose 5% 1000 milliLiter(s) (100 mL/Hr) IV Continuous <Continuous>  ergocalciferol 69599 Unit(s) Oral every week  heparin  Injectable 5000 Unit(s) SubCutaneous every 8 hours  multivitamin 1 Tablet(s) Oral daily  octreotide  Injectable 100 MICROGram(s) SubCutaneous four times a day  sodium bicarbonate  Infusion 0.084 mEq/kG/Hr (70 mL/Hr) IV Continuous <Continuous>    MEDICATIONS  (PRN):  acetaminophen   Tablet .. 650 milliGRAM(s) Oral every 6 hours PRN Temp greater or equal to 38C (100.4F), Moderate Pain (4 - 6)      Vital Signs Last 24 Hrs  T(C): 36.5 (19 Jun 2019 16:23), Max: 36.8 (18 Jun 2019 20:43)  T(F): 97.7 (19 Jun 2019 16:23), Max: 98.2 (18 Jun 2019 20:43)  HR: 87 (19 Jun 2019 16:23) (63 - 89)  BP: 103/64 (19 Jun 2019 16:23) (97/59 - 107/60)  BP(mean): --  RR: 18 (19 Jun 2019 16:23) (18 - 18)  SpO2: 97% (19 Jun 2019 16:23) (95% - 98%)    PHYSICAL EXAM:  GENERAL: NAD  HEAD:  Atraumatic, Normocephalic Flushed  EYES: EOMI, PERRLA, conjunctiva and sclera clear  NECK: Supple, No JVD  CHEST/LUNG: Clear to auscultation bilaterally; No wheeze  HEART: Regular rate and rhythm; No murmurs, rubs, or gallops  ABDOMEN: Soft, Nontender, Nondistended; Bowel sounds present  EXTREMITIES:  2+ Peripheral Pulses, No clubbing, cyanosis, or edema  PSYCH: AAOx3  NEUROLOGY: non-focal  SKIN: No rashes or lesions    LABS:                        8.0    7.22  )-----------( 171      ( 19 Jun 2019 09:52 )             25.1     06-19    135  |  107  |  67<H>  ----------------------------<  122<H>  3.5   |  14<L>  |  2.70<H>    Ca    9.3      19 Jun 2019 06:34    TPro  5.6<L>  /  Alb  2.9<L>  /  TBili  0.3  /  DBili  x   /  AST  25  /  ALT  11  /  AlkPhos  68  06-19                RADIOLOGY & ADDITIONAL TESTS:    Imaging Personally Reviewed:    Consultant(s) Notes Reviewed:      Care Discussed with Consultants/Other Providers:

## 2019-06-19 NOTE — CHART NOTE - NSCHARTNOTEFT_GEN_A_CORE
Pt seen for malnutrition follow up. Pt with carcinoid syndrome admitted with persistent diarrhea and weight loss, pt also found to have hepatic mass-S/P biopsy. Pt initiated on sandostatin, diarrhea noted to be improving.     Source: Patient [ ]    Family [x ]     other [ ] Wife Essence at bedside, pt resting comfortably in bed    Diet : Regular diet with ensure enlive 3 daily       Per wife pt with no N+V, BMs improved from 6 6/17->4 6/18-> 3 thus far today     PO intake:  Per wife pt's intake has been gradually improving, pt still consuming <50% of meals but improved from low point per wife. Pt had a portion of beef fajita plate for lunch today and fruit for breakfast, stated pt will typically get hungry around 10pm and family will get pt food from [employer] at times. Per wife pt has not been taking much ensure enlive and prefers ensure original brought in by daughter, per wife willing to try alternate flavor.       Current Weight: 145.7 lbs (6/12), 168.4 lbs (6/19), weight increased from admission   % Weight Change    Pertinent Medications: MEDICATIONS  (STANDING):  artificial tears (preservative free) Ophthalmic Solution 1 Drop(s) Both EYES three times a day  dextrose 5% 1000 milliLiter(s) (75 mL/Hr) IV Continuous <Continuous>  ergocalciferol 13597 Unit(s) Oral every week  heparin  Injectable 5000 Unit(s) SubCutaneous every 8 hours  multivitamin 1 Tablet(s) Oral daily  octreotide  Injectable 100 MICROGram(s) SubCutaneous four times a day  sodium bicarbonate  Infusion 0.084 mEq/kG/Hr (70 mL/Hr) IV Continuous <Continuous>    MEDICATIONS  (PRN):  acetaminophen   Tablet .. 650 milliGRAM(s) Oral every 6 hours PRN Temp greater or equal to 38C (100.4F), Moderate Pain (4 - 6)    Pertinent Labs:  06-19 Na135 mmol/L Glu 122 mg/dL<H> K+ 3.5 mmol/L Cr  2.70 mg/dL<H> BUN 67 mg/dL<H> 06-17 Phos 2.6 mg/dL 06-19 Alb 2.9 g/dL<L>      Skin: No edema, skin intact    Estimated Needs:   [ x] no change since previous assessment  [ ] recalculated:       Previous Nutrition Diagnosis:     [ x] Malnutrition (severe)         Nutrition Diagnosis is [x ] ongoing  [ ] resolved [ ] not applicable          New Nutrition Diagnosis: [x ] not applicable       Interventions:     Recommend    1. Continue regular diet as ordered with ensure enlive 3 daily, will provide high protein gelatin once daily  2. Continue to encourage po intake and obtain/honor food preferences as able, RD reviewed binding foods to consume in order to alleviate diarrhea as well as emphasis on nutrient dense foods consisting of protein       Monitoring and Evaluation:     [ x] PO intake [x ] Tolerance to diet prescription [x ] weights [ x] follow up per protocol    [ ] other:

## 2019-06-19 NOTE — PROGRESS NOTE ADULT - SUBJECTIVE AND OBJECTIVE BOX
[facility]   [phone]  RENAL FOLLOW UP NOTE  --------------------------------------------------------------------------------  Chief Complaint:    24 hour events/subjective:    seen earlier. wife at bedside   diarrhea  improving     PAST HISTORY  --------------------------------------------------------------------------------  No significant changes to PMH, PSH, FHx, SHx, unless otherwise noted    ALLERGIES & MEDICATIONS  --------------------------------------------------------------------------------  Allergies    penicillin (Swelling)    Intolerances      Standing Inpatient Medications  artificial tears (preservative free) Ophthalmic Solution 1 Drop(s) Both EYES three times a day  dextrose 5% 1000 milliLiter(s) IV Continuous <Continuous>  ergocalciferol 75270 Unit(s) Oral every week  heparin  Injectable 5000 Unit(s) SubCutaneous every 8 hours  multivitamin 1 Tablet(s) Oral daily  octreotide  Injectable 100 MICROGram(s) SubCutaneous four times a day  sodium bicarbonate  Infusion 0.084 mEq/kG/Hr IV Continuous <Continuous>    PRN Inpatient Medications  acetaminophen   Tablet .. 650 milliGRAM(s) Oral every 6 hours PRN      REVIEW OF SYSTEMS  --------------------------------------------------------------------------------    Gen: denies  fevers/chills,  CVS: denies chest pain/palpitations  Resp: denies SOB/Cough  GI: Denies N/V/Abd pain  : Denies dysuria  All other systems were reviewed and are negative, except as noted.    VITALS/PHYSICAL EXAM  --------------------------------------------------------------------------------  T(C): 36.7 (06-19-19 @ 22:27), Max: 36.8 (06-19-19 @ 04:46)  HR: 89 (06-19-19 @ 22:27) (63 - 89)  BP: 102/62 (06-19-19 @ 22:27) (98/59 - 107/60)  RR: 18 (06-19-19 @ 22:27) (18 - 18)  SpO2: 99% (06-19-19 @ 22:27) (95% - 99%)  Wt(kg): --        06-18-19 @ 07:01  -  06-19-19 @ 07:00  --------------------------------------------------------  IN: 1357 mL / OUT: 655 mL / NET: 702 mL    06-19-19 @ 07:01  -  06-19-19 @ 22:58  --------------------------------------------------------  IN: 1213 mL / OUT: 277 mL / NET: 936 mL      Physical Exam:  	  Gen: Non toxic comfortable appearing  thin muscle wasting   	no jvd ,  	Pulm: decrease bs  no rales or ronchi or wheezing  	CV: RRR, S1S2; no rub  	Abd: +BS, soft, nontender/nondistended  	: No suprapubic tenderness  	UE: Warm, no cyanosis  no clubbing,  no edema  	LE: Warm, no cyanosis  no clubbing, 2+  edema  	Neuro: alert and oriented. speech coherent   			      LABS/STUDIES  --------------------------------------------------------------------------------              8.0    7.22  >-----------<  171      [06-19-19 @ 09:52]              25.1     135  |  107  |  67  ----------------------------<  122      [06-19-19 @ 06:34]  3.5   |  14  |  2.70        Ca     9.3     [06-19-19 @ 06:34]    TPro  5.6  /  Alb  2.9  /  TBili  0.3  /  DBili  x   /  AST  25  /  ALT  11  /  AlkPhos  68  [06-19-19 @ 06:34]          Creatinine Trend:  SCr 2.70 [06-19 @ 06:34]  SCr 2.87 [06-18 @ 07:23]  SCr 2.85 [06-17 @ 07:26]  SCr 2.85 [06-16 @ 06:42]  SCr 2.76 [06-15 @ 07:09]              Urinalysis - [06-12-19 @ 09:34]      Color Yellow / Appearance Slightly Turbid / SG 1.019 / pH 6.0      Gluc Negative / Ketone Negative  / Bili Negative / Urobili <2 mg/dL       Blood Trace / Protein 100 mg/dL / Leuk Est Large / Nitrite Positive      RBC 6 /  / Hyaline 0 / Gran 5 / Sq Epi  / Non Sq Epi 5 / Bacteria TNTC      PTH -- (Ca 10.4)      [06-11-19 @ 09:51]   15  Vitamin D (25OH) 9.7      [06-11-19 @ 10:02]

## 2019-06-19 NOTE — PROGRESS NOTE ADULT - PROBLEM SELECTOR PLAN 1
--- cont sandostatin cont sandostatin  Monitor diarrhea  reviewed biopsy report with patient and wife

## 2019-06-19 NOTE — PROGRESS NOTE ADULT - ASSESSMENT
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    Carcinoid syndrome.  - continue octreotide 100mg subQ QID to treat flushing and diarrhea.    - oncology follow.    Liver masses  - s/p Liver bx with metastatic neuroendocrine tumor.  Await Oncology recommendations     Hypercalcemia.  - Patient noted to have mild hypercalcemia, may be related to suspected carcinoid tumor.  - Will continue IVF.    CONSTANZA (acute kidney injury).   - Patient was noted here to have elevated SCr of 2.6, which is high compared to outpatient SCr of 1.91.  - CONSTANZA is likely pre-renal related to hypovolemia from excessive GI losses.   - urology evaluation noted. S/P cysto and stricture of urethra dilatation.  - Rae.    UTI  - antibiotics. ID follow    Pericardial effusion.  - Currently, the patient is hemodynamically WNL. He denies palpitations and denies lightheadedness while lying supine.  - TTE repeat in am.  - Cardiology follow   - if worse will need CCU evaluation.    Metabolic acidosis, normal anion gap (NAG).   - Patient found to have metabolic acidosis with normal anion gap, hyperchloremia likely due to GI losses.   - continue IVF.  - Follow up BMP.  - nephrology follow.    Prophylactic measure.  - VTE ppx with start heparin subQ    Activity: OOB with assistance, fall precaution  Diet: regular.  PT   DCP in progress   d/w patient , wife  Phong Dash MD pager [phone]

## 2019-06-20 LAB
ALBUMIN SERPL ELPH-MCNC: 3 G/DL — LOW (ref 3.3–5)
ALP SERPL-CCNC: 70 U/L — SIGNIFICANT CHANGE UP (ref 40–120)
ALT FLD-CCNC: 11 U/L — SIGNIFICANT CHANGE UP (ref 10–45)
ANION GAP SERPL CALC-SCNC: 14 MMOL/L — SIGNIFICANT CHANGE UP (ref 5–17)
AST SERPL-CCNC: 30 U/L — SIGNIFICANT CHANGE UP (ref 10–40)
BILIRUB SERPL-MCNC: 0.3 MG/DL — SIGNIFICANT CHANGE UP (ref 0.2–1.2)
BLD GP AB SCN SERPL QL: NEGATIVE — SIGNIFICANT CHANGE UP
BUN SERPL-MCNC: 64 MG/DL — HIGH (ref 7–23)
CALCIUM SERPL-MCNC: 9 MG/DL — SIGNIFICANT CHANGE UP (ref 8.4–10.5)
CHLORIDE SERPL-SCNC: 102 MMOL/L — SIGNIFICANT CHANGE UP (ref 96–108)
CO2 SERPL-SCNC: 17 MMOL/L — LOW (ref 22–31)
CREAT SERPL-MCNC: 2.52 MG/DL — HIGH (ref 0.5–1.3)
GLUCOSE SERPL-MCNC: 140 MG/DL — HIGH (ref 70–99)
HCT VFR BLD CALC: 25.2 % — LOW (ref 39–50)
HCT VFR BLD CALC: 25.6 % — LOW (ref 39–50)
HGB BLD-MCNC: 8.5 G/DL — LOW (ref 13–17)
HGB BLD-MCNC: 8.9 G/DL — LOW (ref 13–17)
LACTATE SERPL-SCNC: 3.5 MMOL/L — HIGH (ref 0.7–2)
MCHC RBC-ENTMCNC: 32.1 PG — SIGNIFICANT CHANGE UP (ref 27–34)
MCHC RBC-ENTMCNC: 33.2 GM/DL — SIGNIFICANT CHANGE UP (ref 32–36)
MCHC RBC-ENTMCNC: 34.7 PG — HIGH (ref 27–34)
MCHC RBC-ENTMCNC: 35.4 GM/DL — SIGNIFICANT CHANGE UP (ref 32–36)
MCV RBC AUTO: 96.6 FL — SIGNIFICANT CHANGE UP (ref 80–100)
MCV RBC AUTO: 97.9 FL — SIGNIFICANT CHANGE UP (ref 80–100)
NRBC # BLD: 0 /100 WBCS — SIGNIFICANT CHANGE UP (ref 0–0)
PLATELET # BLD AUTO: 203 K/UL — SIGNIFICANT CHANGE UP (ref 150–400)
PLATELET # BLD AUTO: 223 K/UL — SIGNIFICANT CHANGE UP (ref 150–400)
POTASSIUM SERPL-MCNC: 3.4 MMOL/L — LOW (ref 3.5–5.3)
POTASSIUM SERPL-SCNC: 3.4 MMOL/L — LOW (ref 3.5–5.3)
PROT SERPL-MCNC: 5.8 G/DL — LOW (ref 6–8.3)
RBC # BLD: 2.57 M/UL — LOW (ref 4.2–5.8)
RBC # BLD: 2.65 M/UL — LOW (ref 4.2–5.8)
RBC # FLD: 15.6 % — HIGH (ref 10.3–14.5)
RBC # FLD: 18.1 % — HIGH (ref 10.3–14.5)
RH IG SCN BLD-IMP: POSITIVE — SIGNIFICANT CHANGE UP
SODIUM SERPL-SCNC: 133 MMOL/L — LOW (ref 135–145)
WBC # BLD: 6.78 K/UL — SIGNIFICANT CHANGE UP (ref 3.8–10.5)
WBC # BLD: 8.1 K/UL — SIGNIFICANT CHANGE UP (ref 3.8–10.5)
WBC # FLD AUTO: 6.78 K/UL — SIGNIFICANT CHANGE UP (ref 3.8–10.5)
WBC # FLD AUTO: 8.1 K/UL — SIGNIFICANT CHANGE UP (ref 3.8–10.5)

## 2019-06-20 PROCEDURE — 93306 TTE W/DOPPLER COMPLETE: CPT | Mod: 26

## 2019-06-20 RX ORDER — POTASSIUM CHLORIDE 20 MEQ
20 PACKET (EA) ORAL ONCE
Refills: 0 | Status: COMPLETED | OUTPATIENT
Start: 2019-06-20 | End: 2019-06-20

## 2019-06-20 RX ORDER — BACITRACIN ZINC 500 UNIT/G
1 OINTMENT IN PACKET (EA) TOPICAL
Refills: 0 | Status: DISCONTINUED | OUTPATIENT
Start: 2019-06-20 | End: 2019-06-28

## 2019-06-20 RX ORDER — SODIUM CHLORIDE 9 MG/ML
1000 INJECTION, SOLUTION INTRAVENOUS
Refills: 0 | Status: DISCONTINUED | OUTPATIENT
Start: 2019-06-20 | End: 2019-06-23

## 2019-06-20 RX ADMIN — Medication 1 TABLET(S): at 13:03

## 2019-06-20 RX ADMIN — SODIUM CHLORIDE 75 MILLILITER(S): 9 INJECTION, SOLUTION INTRAVENOUS at 17:46

## 2019-06-20 RX ADMIN — OCTREOTIDE ACETATE 100 MICROGRAM(S): 200 INJECTION, SOLUTION INTRAVENOUS; SUBCUTANEOUS at 13:47

## 2019-06-20 RX ADMIN — OCTREOTIDE ACETATE 100 MICROGRAM(S): 200 INJECTION, SOLUTION INTRAVENOUS; SUBCUTANEOUS at 06:50

## 2019-06-20 RX ADMIN — Medication 1 APPLICATION(S): at 17:45

## 2019-06-20 RX ADMIN — HEPARIN SODIUM 5000 UNIT(S): 5000 INJECTION INTRAVENOUS; SUBCUTANEOUS at 23:10

## 2019-06-20 RX ADMIN — Medication 1 DROP(S): at 06:50

## 2019-06-20 RX ADMIN — OCTREOTIDE ACETATE 100 MICROGRAM(S): 200 INJECTION, SOLUTION INTRAVENOUS; SUBCUTANEOUS at 23:10

## 2019-06-20 RX ADMIN — Medication 1 DROP(S): at 13:03

## 2019-06-20 RX ADMIN — OCTREOTIDE ACETATE 100 MICROGRAM(S): 200 INJECTION, SOLUTION INTRAVENOUS; SUBCUTANEOUS at 17:45

## 2019-06-20 RX ADMIN — HEPARIN SODIUM 5000 UNIT(S): 5000 INJECTION INTRAVENOUS; SUBCUTANEOUS at 06:49

## 2019-06-20 RX ADMIN — Medication 20 MILLIEQUIVALENT(S): at 13:48

## 2019-06-20 RX ADMIN — Medication 1 DROP(S): at 21:57

## 2019-06-20 RX ADMIN — SODIUM CHLORIDE 75 MILLILITER(S): 9 INJECTION, SOLUTION INTRAVENOUS at 15:50

## 2019-06-20 RX ADMIN — SODIUM CHLORIDE 100 MILLILITER(S): 9 INJECTION, SOLUTION INTRAVENOUS at 13:48

## 2019-06-20 NOTE — CHART NOTE - NSCHARTNOTEFT_GEN_A_CORE
87 y/o M with PMHx of carcinoid syndrome s/p left lower lobe resection, bladder disease requiring intermittent straight cath who presents to the ED with complaint of persistent diarrhea and weight loss, likely due to carcinoid syndrome, also found to have hepatic mass s/p Bx awaiting for results and followed by oncology.     While PCA moved Pt from bed to chair, noticed bright red blood on bed. Pt was sat on the chair and blood dripping from his genital area. Per Pt this never happened before.  Pt found to be asymptomatic at this time. Denies dizziness, SOB, pain.     PE: A&Ox4. No acute distress. Pain 0/10  Vital Signs Last 24 Hrs  T(C): 36.9 (20 Jun 2019 08:00), Max: 36.9 (20 Jun 2019 08:00)  T(F): 98.4 (20 Jun 2019 08:00), Max: 98.4 (20 Jun 2019 08:00)  HR: 90 (20 Jun 2019 08:00) (87 - 90)  BP: 92/52 (20 Jun 2019 12:32) (90/49 - 103/64)  RR: 18 (20 Jun 2019 08:00) (18 - 18)  SpO2: 98% (20 Jun 2019 08:00) (97% - 99%)  Pelvic: Rae in place. Rt posterior testicle with small 1-2cm bleeding superficial tear next to a small skin tag.  Left testicle wnl.     A/P: Rt testicular superficial skin tear likely 2/2 to frail skin and trauma 2/2 to prolonged positioning on the bed with LE crossed.     -Gauze with pressure to establish hemostasis.   -CBC and T&S STAT  -Urology consult placed  -Continue to monitor    Dr. Dash made aware     Will continue to monitor  [phone]

## 2019-06-20 NOTE — PROGRESS NOTE ADULT - SUBJECTIVE AND OBJECTIVE BOX
Urology PA Note    Called by primary team for bleeding from patients scrotum. Patient has a skin tag on right inferior scrotum and nurse noted blanket drenched in blood when trying to move patient from bed to chair. Per RN, patient was oozing in a spot next to skin tag. Upon arrival, hemostasis achieved by RN with manual pressure. 1-2mm superficial skin tear lateral to skin tag (unrelated) with no active bleeding seen. No intervention required. Recommend bacitracin to skin tear twice a day for wound care

## 2019-06-20 NOTE — PROGRESS NOTE ADULT - PROBLEM SELECTOR PLAN 4
-- eladio ID input  -- abx d/c'd  -- as per wife, Urology mentioned that calzada can be removed and pt can return to self-cath --- as per wife, pt self-cath standing up and she is concerned if pt can stand for that long a period of time --- will need to have Physical therapy evaluate pt's capability

## 2019-06-20 NOTE — PROGRESS NOTE ADULT - SUBJECTIVE AND OBJECTIVE BOX
Patient is a 86y old  Male who presents with a chief complaint of diarrhea (20 Jun 2019 12:25)      SUBJECTIVE / OVERNIGHT EVENTS: No new complaints. Had scrotal bleed resolved. Wife at bedside.  Review of Systems  chest pain no  palpitations no  sob no  nausea no  headache no    MEDICATIONS  (STANDING):  artificial tears (preservative free) Ophthalmic Solution 1 Drop(s) Both EYES three times a day  BACItracin   Ointment 1 Application(s) Topical two times a day  dextrose 5% 1000 milliLiter(s) (75 mL/Hr) IV Continuous <Continuous>  ergocalciferol 34652 Unit(s) Oral every week  heparin  Injectable 5000 Unit(s) SubCutaneous every 8 hours  multivitamin 1 Tablet(s) Oral daily  octreotide  Injectable 100 MICROGram(s) SubCutaneous four times a day  sodium bicarbonate  Infusion 0.084 mEq/kG/Hr (70 mL/Hr) IV Continuous <Continuous>    MEDICATIONS  (PRN):  acetaminophen   Tablet .. 650 milliGRAM(s) Oral every 6 hours PRN Temp greater or equal to 38C (100.4F), Moderate Pain (4 - 6)      Vital Signs Last 24 Hrs  T(C): 36.7 (20 Jun 2019 16:09), Max: 36.9 (20 Jun 2019 08:00)  T(F): 98 (20 Jun 2019 16:09), Max: 98.4 (20 Jun 2019 08:00)  HR: 95 (20 Jun 2019 16:09) (89 - 95)  BP: 90/54 (20 Jun 2019 16:09) (90/49 - 102/62)  BP(mean): --  RR: 18 (20 Jun 2019 16:09) (18 - 18)  SpO2: 92% (20 Jun 2019 16:09) (92% - 99%)    PHYSICAL EXAM:  GENERAL: NAD  HEAD:  Atraumatic, Normocephalic  EYES: EOMI, PERRLA, conjunctiva and sclera clear  NECK: Supple, No JVD  CHEST/LUNG: Clear to auscultation bilaterally; No wheeze  HEART: Regular rate and rhythm; No murmurs, rubs, or gallops  ABDOMEN: Soft, Nontender, Nondistended; Bowel sounds present  EXTREMITIES:  2+bipedal edema  PSYCH: AAOx3  NEUROLOGY: non-focal  SKIN: No rashes or lesions    LABS:                        8.9    8.1   )-----------( 223      ( 20 Jun 2019 13:40 )             25.2     06-20    133<L>  |  102  |  64<H>  ----------------------------<  140<H>  3.4<L>   |  17<L>  |  2.52<H>    Ca    9.0      20 Jun 2019 07:05    TPro  5.8<L>  /  Alb  3.0<L>  /  TBili  0.3  /  DBili  x   /  AST  30  /  ALT  11  /  AlkPhos  70  06-20                RADIOLOGY & ADDITIONAL TESTS:    Imaging Personally Reviewed:    Consultant(s) Notes Reviewed:      Care Discussed with Consultants/Other Providers:

## 2019-06-20 NOTE — PROGRESS NOTE ADULT - ASSESSMENT
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    Carcinoid syndrome.  - continue octreotide 100mg subQ QID to treat flushing and diarrhea.    - oncology follow.    Liver masses  - s/p Liver bx with metastatic neuroendocrine tumor.  Await Oncology recommendations     Hypercalcemia.  - Patient noted to have mild hypercalcemia, may be related to suspected carcinoid tumor.  - Will continue IVF.    CONSTANZA (acute kidney injury).   - Patient was noted here to have elevated SCr of 2.6, which is high compared to outpatient SCr of 1.91.  - CONSTANZA is likely pre-renal related to hypovolemia from excessive GI losses.   - urology evaluation noted. S/P cysto and stricture of urethra dilatation.  - Rae. TOV. Self cath.     UTI  - off antibiotics. ID follow    Pericardial effusion.  - Currently, the patient is hemodynamically WNL. He denies palpitations and denies lightheadedness while lying supine.  - TTE repeat unchanged.  - Cardiology follow   - if worse will need CCU evaluation.    Metabolic acidosis, normal anion gap (NAG).   - Patient found to have metabolic acidosis with normal anion gap, hyperchloremia likely due to GI losses.   - continue IVF.  - Follow up BMP.  - nephrology follow.    Prophylactic measure.  - VTE ppx with start heparin subQ    Activity: OOB with assistance, fall precaution  Diet: regular.  PT   DCP in progress to rehab.  d/w patient , wife  Phong Dash MD pager [phone]

## 2019-06-20 NOTE — PROGRESS NOTE ADULT - PROBLEM SELECTOR PLAN 1
--- cont sandostatin 4x/day    Biopsy (+) for Carcinoid --- will make decision re: chemotherapy once pt d/c'd

## 2019-06-20 NOTE — PROGRESS NOTE ADULT - ASSESSMENT
85 yo M with PMH of carcinoid syndrome s/p left lower lobe resection, bladder disease requiring intermittent straight cath who presents to the ED with complaint of persistent diarrhea, fatigue, decreased po intake  and weight loss along with borderline hypotension    1- mia on ckd  2-  hypotension  3- carcinoid   4- diarrhea  5- acidosis   6- pericardial effusion       cr still elevated but cont to improve.  certainly possible to superimposed worsened by pericardial effusion /diarrhea and hypotension   still with persistent acidosis d5w + 150 meq nahco3-/L @  cc/50 cc/hr bicarb improving lactic acid still elevated. ? decrease perfusion vs infection vs due to tumor   monitor bicarb and edema   calzada to cont      trend hb

## 2019-06-20 NOTE — PROGRESS NOTE ADULT - SUBJECTIVE AND OBJECTIVE BOX
[facility]   [phone]  RENAL FOLLOW UP NOTE  --------------------------------------------------------------------------------  Chief Complaint:    24 hour events/subjective:    seen earlier. wife at bedside.   had repeat echo     PAST HISTORY  --------------------------------------------------------------------------------  No significant changes to PMH, PSH, FHx, SHx, unless otherwise noted    ALLERGIES & MEDICATIONS  --------------------------------------------------------------------------------  Allergies    penicillin (Swelling)    Intolerances      Standing Inpatient Medications  artificial tears (preservative free) Ophthalmic Solution 1 Drop(s) Both EYES three times a day  BACItracin   Ointment 1 Application(s) Topical two times a day  dextrose 5% 1000 milliLiter(s) IV Continuous <Continuous>  ergocalciferol 84897 Unit(s) Oral every week  heparin  Injectable 5000 Unit(s) SubCutaneous every 8 hours  multivitamin 1 Tablet(s) Oral daily  octreotide  Injectable 100 MICROGram(s) SubCutaneous four times a day  sodium bicarbonate  Infusion 0.084 mEq/kG/Hr IV Continuous <Continuous>    PRN Inpatient Medications  acetaminophen   Tablet .. 650 milliGRAM(s) Oral every 6 hours PRN      REVIEW OF SYSTEMS  --------------------------------------------------------------------------------    Gen: denies  fevers/chills,  CVS: denies chest pain/palpitations  Resp: denies SOB/Cough  GI: Denies N/V/Abd pain  : Denies dysuria    All other systems were reviewed and are negative, except as noted.    VITALS/PHYSICAL EXAM  --------------------------------------------------------------------------------  T(C): 36.7 (06-20-19 @ 16:09), Max: 36.9 (06-20-19 @ 08:00)  HR: 95 (06-20-19 @ 16:09) (90 - 95)  BP: 90/54 (06-20-19 @ 16:09) (90/49 - 96/52)  RR: 18 (06-20-19 @ 16:09) (18 - 18)  SpO2: 92% (06-20-19 @ 16:09) (92% - 98%)  Wt(kg): --        06-19-19 @ 07:01  -  06-20-19 @ 07:00  --------------------------------------------------------  IN: 1213 mL / OUT: 277 mL / NET: 936 mL    06-20-19 @ 07:01  -  06-20-19 @ 23:28  --------------------------------------------------------  IN: 1127 mL / OUT: 201 mL / NET: 926 mL      Physical Exam:  	Gen: Non toxic comfortable appearing  thin muscle wasting   	no jvd ,  	Pulm: decrease bs  no rales or ronchi or wheezing  	CV: RRR, S1S2; no rub  	Abd: +BS, soft, nontender/nondistended  	: No suprapubic tenderness  	UE: Warm, no cyanosis  no clubbing,  no edema  	LE: Warm, no cyanosis  no clubbing, 2+  edema  	Neuro: alert and oriented. speech coherent   			  	      LABS/STUDIES  --------------------------------------------------------------------------------              8.9    8.1   >-----------<  223      [06-20-19 @ 13:40]              25.2     133  |  102  |  64  ----------------------------<  140      [06-20-19 @ 07:05]  3.4   |  17  |  2.52        Ca     9.0     [06-20-19 @ 07:05]    TPro  5.8  /  Alb  3.0  /  TBili  0.3  /  DBili  x   /  AST  30  /  ALT  11  /  AlkPhos  70  [06-20-19 @ 07:05]          Creatinine Trend:  SCr 2.52 [06-20 @ 07:05]  SCr 2.70 [06-19 @ 06:34]  SCr 2.87 [06-18 @ 07:23]  SCr 2.85 [06-17 @ 07:26]  SCr 2.85 [06-16 @ 06:42]              Urinalysis - [06-12-19 @ 09:34]      Color Yellow / Appearance Slightly Turbid / SG 1.019 / pH 6.0      Gluc Negative / Ketone Negative  / Bili Negative / Urobili <2 mg/dL       Blood Trace / Protein 100 mg/dL / Leuk Est Large / Nitrite Positive      RBC 6 /  / Hyaline 0 / Gran 5 / Sq Epi  / Non Sq Epi 5 / Bacteria TNTC      PTH -- (Ca 10.4)      [06-11-19 @ 09:51]   15  Vitamin D (25OH) 9.7      [06-11-19 @ 10:02]

## 2019-06-20 NOTE — PROGRESS NOTE ADULT - SUBJECTIVE AND OBJECTIVE BOX
Patient is a 86y old  Male who presents with a chief complaint of diarrhea (17 Jun 2019 22:30),        Pt is seen and examined  pt is awake and lying in bed;  pt seems comfortable and denies any complaints at this time; had less bowel movements over last 24 hrs; feeling stronger      REVIEW OF SYSTEMS:    CONSTITUTIONAL: No weakness, fevers or chills  EYES/ENT: No visual changes;  No vertigo or throat pain   NECK: No pain or stiffness  RESPIRATORY: No cough, wheezing, hemoptysis; No shortness of breath  CARDIOVASCULAR: No chest pain or palpitations  GASTROINTESTINAL: No abdominal or epigastric pain. No nausea, vomiting, or hematemesis; Less diarrhea, no constipation. No melena or hematochezia.  GENITOURINARY: No dysuria, frequency or hematuria  NEUROLOGICAL: No numbness or weakness  SKIN: No itching, burning, rashes, or lesions     MEDICATIONS  (STANDING):  aztreonam  IVPB 1000 milliGRAM(s) IV Intermittent every 12 hours  aztreonam  IVPB      dextrose 5% 1000 milliLiter(s) (75 mL/Hr) IV Continuous <Continuous>  ergocalciferol 10941 Unit(s) Oral every week  heparin  Injectable 5000 Unit(s) SubCutaneous every 8 hours  multivitamin 1 Tablet(s) Oral daily  octreotide  Injectable 100 MICROGram(s) SubCutaneous four times a day  sodium bicarbonate  Infusion 0.084 mEq/kG/Hr (70 mL/Hr) IV Continuous <Continuous>    MEDICATIONS  (PRN):  acetaminophen   Tablet .. 650 milliGRAM(s) Oral every 6 hours PRN Temp greater or equal to 38C (100.4F), Moderate Pain (4 - 6)      Allergies    penicillin (Swelling)    Intolerances        Vital Signs Last 24 Hrs  Vital Signs Last 24 Hrs  T(C): 36.9 (20 Jun 2019 08:00), Max: 36.9 (20 Jun 2019 08:00)  T(F): 98.4 (20 Jun 2019 08:00), Max: 98.4 (20 Jun 2019 08:00)  HR: 90 (20 Jun 2019 08:00) (87 - 90)  BP: 96/52 (20 Jun 2019 08:10) (90/49 - 103/64)  BP(mean): --  RR: 18 (20 Jun 2019 08:00) (18 - 18)  SpO2: 98% (20 Jun 2019 08:00) (97% - 99%)    PHYSICAL EXAM  General: adult in NAD  HEENT: clear oropharynx, anicteric sclera, pink conjunctiva  Neck: supple  CV: normal S1/S2 with (+) murmur, no rubs or gallops  Lungs: positive air movement b/l ant lungs,clear to auscultation, no wheezes, no rales  Abdomen: soft non-tender non-distended, no hepatosplenomegaly  Ext: no clubbing cyanosis or edema  Skin: no rashes and no petechiae  Neuro: alert and oriented X 4, no focal deficits    LABS:                          8.0    7.22  )-----------( 171      ( 19 Jun 2019 09:52 )             25.1                           8.6    8.16  )-----------( 177      ( 17 Jun 2019 09:17 )             27.6         Mean Cell Volume : 100.7 fl  Mean Cell Hemoglobin : 31.4 pg  Mean Cell Hemoglobin Concentration : 31.2 gm/dL      Serial CBC's  06-17 @ 09:17  Hct-27.6 / Hgb-8.6 / Plat-177 / RBC-2.74 / WBC-8.16    Serial CBC's  06-16 @ 09:57  Hct-26.7 / Hgb-8.6 / Plat-160 / RBC-2.69 / WBC-8.34    Serial CBC's  06-15 @ 10:22  Hct-26.3 / Hgb-8.3 / Plat-149 / RBC-2.57 / WBC-7.55    Serial CBC's  06-14 @ 10:23  Hct-26.6 / Hgb-8.6 / Plat-146 / RBC-2.69 / WBC-6.81    06-20    133<L>  |  102  |  64<H>  ----------------------------<  140<H>  3.4<L>   |  17<L>  |  2.52<H>    Ca    9.0      20 Jun 2019 07:05    TPro  5.8<L>  /  Alb  3.0<L>  /  TBili  0.3  /  DBili  x   /  AST  30  /  ALT  11  /  AlkPhos  70  06-20 06-18    133<L>  |  111<H>  |  70<H>  ----------------------------<  122<H>  3.3<L>   |  12<L>  |  2.87<H>    Ca    9.6      18 Jun 2019 07:23  Phos  2.6     06-17  Mg     2.3     06-17          Assessment

## 2019-06-21 LAB
ANION GAP SERPL CALC-SCNC: 16 MMOL/L — SIGNIFICANT CHANGE UP (ref 5–17)
BUN SERPL-MCNC: 64 MG/DL — HIGH (ref 7–23)
CALCIUM SERPL-MCNC: 9.3 MG/DL — SIGNIFICANT CHANGE UP (ref 8.4–10.5)
CHLORIDE SERPL-SCNC: 95 MMOL/L — LOW (ref 96–108)
CO2 SERPL-SCNC: 18 MMOL/L — LOW (ref 22–31)
CREAT SERPL-MCNC: 2.59 MG/DL — HIGH (ref 0.5–1.3)
GLUCOSE SERPL-MCNC: 136 MG/DL — HIGH (ref 70–99)
HCT VFR BLD CALC: 26.6 % — LOW (ref 39–50)
HGB BLD-MCNC: 8.8 G/DL — LOW (ref 13–17)
LACTATE SERPL-SCNC: 4.1 MMOL/L — CRITICAL HIGH (ref 0.7–2)
MCHC RBC-ENTMCNC: 31.5 PG — SIGNIFICANT CHANGE UP (ref 27–34)
MCHC RBC-ENTMCNC: 33.1 GM/DL — SIGNIFICANT CHANGE UP (ref 32–36)
MCV RBC AUTO: 95.3 FL — SIGNIFICANT CHANGE UP (ref 80–100)
PLATELET # BLD AUTO: 228 K/UL — SIGNIFICANT CHANGE UP (ref 150–400)
POTASSIUM SERPL-MCNC: 3.4 MMOL/L — LOW (ref 3.5–5.3)
POTASSIUM SERPL-SCNC: 3.4 MMOL/L — LOW (ref 3.5–5.3)
RBC # BLD: 2.79 M/UL — LOW (ref 4.2–5.8)
RBC # FLD: 17.8 % — HIGH (ref 10.3–14.5)
SODIUM SERPL-SCNC: 129 MMOL/L — LOW (ref 135–145)
WBC # BLD: 10.89 K/UL — HIGH (ref 3.8–10.5)
WBC # FLD AUTO: 10.89 K/UL — HIGH (ref 3.8–10.5)

## 2019-06-21 PROCEDURE — 99231 SBSQ HOSP IP/OBS SF/LOW 25: CPT | Mod: GC

## 2019-06-21 RX ORDER — CHLORHEXIDINE GLUCONATE 213 G/1000ML
1 SOLUTION TOPICAL DAILY
Refills: 0 | Status: DISCONTINUED | OUTPATIENT
Start: 2019-06-21 | End: 2019-06-28

## 2019-06-21 RX ORDER — SODIUM CHLORIDE 9 MG/ML
1000 INJECTION INTRAMUSCULAR; INTRAVENOUS; SUBCUTANEOUS
Refills: 0 | Status: DISCONTINUED | OUTPATIENT
Start: 2019-06-21 | End: 2019-06-23

## 2019-06-21 RX ADMIN — HEPARIN SODIUM 5000 UNIT(S): 5000 INJECTION INTRAVENOUS; SUBCUTANEOUS at 06:24

## 2019-06-21 RX ADMIN — Medication 1 APPLICATION(S): at 17:46

## 2019-06-21 RX ADMIN — SODIUM CHLORIDE 75 MILLILITER(S): 9 INJECTION, SOLUTION INTRAVENOUS at 02:10

## 2019-06-21 RX ADMIN — Medication 1 DROP(S): at 06:24

## 2019-06-21 RX ADMIN — SODIUM CHLORIDE 75 MILLILITER(S): 9 INJECTION, SOLUTION INTRAVENOUS at 23:19

## 2019-06-21 RX ADMIN — Medication 1 APPLICATION(S): at 06:25

## 2019-06-21 RX ADMIN — OCTREOTIDE ACETATE 100 MICROGRAM(S): 200 INJECTION, SOLUTION INTRAVENOUS; SUBCUTANEOUS at 06:25

## 2019-06-21 RX ADMIN — SODIUM CHLORIDE 500 MILLILITER(S): 9 INJECTION INTRAMUSCULAR; INTRAVENOUS; SUBCUTANEOUS at 17:10

## 2019-06-21 RX ADMIN — HEPARIN SODIUM 5000 UNIT(S): 5000 INJECTION INTRAVENOUS; SUBCUTANEOUS at 23:21

## 2019-06-21 RX ADMIN — Medication 1 DROP(S): at 13:24

## 2019-06-21 RX ADMIN — HEPARIN SODIUM 5000 UNIT(S): 5000 INJECTION INTRAVENOUS; SUBCUTANEOUS at 13:24

## 2019-06-21 RX ADMIN — OCTREOTIDE ACETATE 100 MICROGRAM(S): 200 INJECTION, SOLUTION INTRAVENOUS; SUBCUTANEOUS at 13:24

## 2019-06-21 RX ADMIN — OCTREOTIDE ACETATE 100 MICROGRAM(S): 200 INJECTION, SOLUTION INTRAVENOUS; SUBCUTANEOUS at 23:19

## 2019-06-21 RX ADMIN — Medication 1 TABLET(S): at 13:25

## 2019-06-21 RX ADMIN — OCTREOTIDE ACETATE 100 MICROGRAM(S): 200 INJECTION, SOLUTION INTRAVENOUS; SUBCUTANEOUS at 17:10

## 2019-06-21 RX ADMIN — Medication 1 DROP(S): at 21:29

## 2019-06-21 NOTE — PROGRESS NOTE ADULT - ASSESSMENT
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    Carcinoid syndrome.  - continue octreotide 100mg subQ QID to treat flushing and diarrhea.    - oncology follow.    Liver masses  - s/p Liver bx with metastatic neuroendocrine tumor.  Await Oncology recommendations     Kidney mass  - pt has cyst as well as a 1.5 cm solid lesion in kidney --- he has coils in that area --- as per outside records, the lesion was felt to be benign.  - Oncology evalluation noted    Hypercalcemia.  - Patient noted to have mild hypercalcemia, may be related to suspected carcinoid tumor.  - Will continue IVF.    CONSTANZA (acute kidney injury).   - Patient was noted here to have elevated SCr of 2.6, which is high compared to outpatient SCr of 1.91.  - CONSTANZA is likely pre-renal related to hypovolemia from excessive GI losses.   - urology evaluation noted. S/P cysto and stricture of urethra dilatation.  - Rae. TOV. Self cath when able to stand..     UTI  - off antibiotics. ID follow    Pericardial effusion.  - Currently, the patient is hemodynamically WNL. He denies palpitations and denies lightheadedness while lying supine.  - TTE repeat unchanged.  - Cardiology follow noted. No further Echo unless symptomatic.  - IVF  - if worse will need CCU evaluation.    Metabolic acidosis, normal anion gap (NAG).   - Patient found to have metabolic acidosis with normal anion gap, hyperchloremia likely due to GI losses.   - continue IVF.  - Follow up BMP.  - nephrology follow.    Prophylactic measure.  - VTE ppx with start heparin subQ        Activity: OOB with assistance, fall precaution  Diet: regular.  PT   DCP in progress to rehab.  d/w patient , wife, son  Phong Dash MD pager [phone] This patient is an 86yoM with 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    Carcinoid syndrome.  - continue octreotide 100mg subQ QID to treat flushing and diarrhea.    - oncology follow.    Liver masses  - s/p Liver bx with metastatic neuroendocrine tumor.  Await Oncology recommendations     Kidney mass  - pt has cyst as well as a 1.5 cm solid lesion in kidney --- he has coils in that area --- as per outside records, the lesion was felt to be benign.  - Oncology evalluation noted    Hypercalcemia.  - Patient noted to have mild hypercalcemia, may be related to suspected carcinoid tumor.  - Will continue IVF.    CONSTANZA (acute kidney injury).   - Patient was noted here to have elevated SCr of 2.6, which is high compared to outpatient SCr of 1.91.  - CONSTANZA is likely pre-renal related to hypovolemia from excessive GI losses.   - urology evaluation noted. S/P cysto and stricture of urethra dilatation.  - Rae. TOV. Self cath when able to stand..     UTI  - off antibiotics. ID follow    Pericardial effusion.  - Currently, the patient is hemodynamically WNL. He denies palpitations and denies lightheadedness while lying supine.  - TTE repeat unchanged.  - Cardiology follow noted. No further Echo unless symptomatic.  - IVF  - if worse will need CCU evaluation.    Metabolic acidosis, normal anion gap (NAG).   - Patient found to have metabolic acidosis with normal anion gap, hyperchloremia likely due to GI losses.   - continue IVF.  - Follow up BMP.  - nephrology follow.    Prophylactic measure.  - VTE ppx with start heparin subQ    Conjunctival erythema  - artificial tears, may need Ophtalmology evaluation    Activity: OOB with assistance, fall precaution  Diet: regular.  PT   DCP in progress to rehab.  d/w patient , wife, son  Phong Dash MD pager [phone]

## 2019-06-21 NOTE — PROGRESS NOTE ADULT - SUBJECTIVE AND OBJECTIVE BOX
Patient is a 86y old  Male who presents with a chief complaint of diarrhea (21 Jun 2019 19:08)      SUBJECTIVE / OVERNIGHT EVENTS: less diarrhea.   Review of Systems  chest pain no  palpitations no  sob no  nausea no  headache no    MEDICATIONS  (STANDING):  artificial tears (preservative free) Ophthalmic Solution 1 Drop(s) Both EYES three times a day  BACItracin   Ointment 1 Application(s) Topical two times a day  chlorhexidine 2% Cloths 1 Application(s) Topical daily  dextrose 5% 1000 milliLiter(s) (75 mL/Hr) IV Continuous <Continuous>  ergocalciferol 23752 Unit(s) Oral every week  heparin  Injectable 5000 Unit(s) SubCutaneous every 8 hours  multivitamin 1 Tablet(s) Oral daily  octreotide  Injectable 100 MICROGram(s) SubCutaneous four times a day  sodium bicarbonate  Infusion 0.084 mEq/kG/Hr (70 mL/Hr) IV Continuous <Continuous>  sodium chloride 0.9%. 1000 milliLiter(s) (500 mL/Hr) IV Continuous <Continuous>    MEDICATIONS  (PRN):  acetaminophen   Tablet .. 650 milliGRAM(s) Oral every 6 hours PRN Temp greater or equal to 38C (100.4F), Moderate Pain (4 - 6)      Vital Signs Last 24 Hrs  T(C): 36.9 (21 Jun 2019 15:55), Max: 36.9 (21 Jun 2019 15:55)  T(F): 98.5 (21 Jun 2019 15:55), Max: 98.5 (21 Jun 2019 15:55)  HR: 95 (21 Jun 2019 15:55) (94 - 104)  BP: 96/52 (21 Jun 2019 15:55) (93/56 - 102/63)  BP(mean): --  RR: 18 (21 Jun 2019 15:55) (18 - 18)  SpO2: 98% (21 Jun 2019 15:55) (95% - 98%)    PHYSICAL EXAM:  GENERAL: NAD  HEAD:  Atraumatic, Normocephalic  EYES: EOMI, PERRLA, erythema of conjunctiva   NECK: Supple, No JVD  CHEST/LUNG: Clear to auscultation bilaterally; No wheeze  HEART: Regular rate and rhythm; No murmurs, rubs, or gallops  ABDOMEN: Soft, Nontender, Nondistended; Bowel sounds present Rae  EXTREMITIES:  2+ Peripheral Pulses, No clubbing, cyanosis, or edema  PSYCH: AAOx3  NEUROLOGY: non-focal  SKIN: No rashes or lesions    LABS:                        8.8    10.89 )-----------( 228      ( 21 Jun 2019 09:23 )             26.6     06-21    129<L>  |  95<L>  |  64<H>  ----------------------------<  136<H>  3.4<L>   |  18<L>  |  2.59<H>    Ca    9.3      21 Jun 2019 06:21    TPro  5.8<L>  /  Alb  3.0<L>  /  TBili  0.3  /  DBili  x   /  AST  30  /  ALT  11  /  AlkPhos  70  06-20                RADIOLOGY & ADDITIONAL TESTS:    Imaging Personally Reviewed:    Consultant(s) Notes Reviewed:      Care Discussed with Consultants/Other Providers:

## 2019-06-21 NOTE — PROGRESS NOTE ADULT - SUBJECTIVE AND OBJECTIVE BOX
Patient is a 86y old  Male who presents with a chief complaint of diarrhea (20 Jun 2019 23:28)       Pt is seen and examined  pt is awake and out of bed to chair  pt seems comfortable and denies any complaints at this time; had 3 bms since midnight but small amounts and last bm was semisolid not liquidy; no abdominal pains      REVIEW OF SYSTEMS:    CONSTITUTIONAL: (+) weakness, No fevers or chills  EYES/ENT: No visual changes;  No vertigo or throat pain   NECK: No pain or stiffness  RESPIRATORY: No cough, wheezing, hemoptysis; No shortness of breath  CARDIOVASCULAR: No chest pain or palpitations  GASTROINTESTINAL: No abdominal or epigastric pain. No nausea, vomiting, or hematemesis; No diarrhea or constipation. No melena or hematochezia.  GENITOURINARY: No dysuria, frequency or hematuria  NEUROLOGICAL: No numbness or weakness  SKIN: No itching, burning, rashes, or lesions     MEDICATIONS  (STANDING):  artificial tears (preservative free) Ophthalmic Solution 1 Drop(s) Both EYES three times a day  BACItracin   Ointment 1 Application(s) Topical two times a day  chlorhexidine 2% Cloths 1 Application(s) Topical daily  dextrose 5% 1000 milliLiter(s) (75 mL/Hr) IV Continuous <Continuous>  ergocalciferol 67675 Unit(s) Oral every week  heparin  Injectable 5000 Unit(s) SubCutaneous every 8 hours  multivitamin 1 Tablet(s) Oral daily  octreotide  Injectable 100 MICROGram(s) SubCutaneous four times a day  sodium bicarbonate  Infusion 0.084 mEq/kG/Hr (70 mL/Hr) IV Continuous <Continuous>    MEDICATIONS  (PRN):  acetaminophen   Tablet .. 650 milliGRAM(s) Oral every 6 hours PRN Temp greater or equal to 38C (100.4F), Moderate Pain (4 - 6)      Allergies    penicillin (Swelling)    Intolerances        Vital Signs Last 24 Hrs  T(C): 36.8 (21 Jun 2019 04:27), Max: 36.8 (20 Jun 2019 23:27)  T(F): 98.3 (21 Jun 2019 04:27), Max: 98.3 (21 Jun 2019 04:27)  HR: 104 (21 Jun 2019 04:27) (95 - 104)  BP: 93/56 (21 Jun 2019 04:27) (90/54 - 98/61)  BP(mean): --  RR: 18 (21 Jun 2019 04:27) (18 - 18)  SpO2: 95% (21 Jun 2019 04:27) (92% - 98%)    PHYSICAL EXAM  General: adult in NAD  HEENT: clear oropharynx,  sclera injected, (+) blood in sclra, edema, pink conjunctiva  Neck: supple  CV: normal S1/S2 with (+) murmur, No rubs or gallops  Lungs: positive air movement b/l ant lungs, clear to auscultation, no wheezes, no rales  Abdomen: soft non-tender non-distended, no hepatosplenomegaly  Ext: no clubbing cyanosis or edema  Skin: no rashes and no petechiae; multiple bruises over upper and lower extremities  Neuro: alert and oriented X 4, no focal deficits      LABS:                          8.9    8.1   )-----------( 223      ( 20 Jun 2019 13:40 )             25.2         Mean Cell Volume : 97.9 fl  Mean Cell Hemoglobin : 34.7 pg  Mean Cell Hemoglobin Concentration : 35.4 gm/dL      Serial CBC's  06-20 @ 13:40  Hct-25.2 / Hgb-8.9 / Plat-223 / RBC-2.57 / WBC-8.1    Serial CBC's  06-20 @ 09:37  Hct-25.6 / Hgb-8.5 / Plat-203 / RBC-2.65 / WBC-6.78    Serial CBC's  06-19 @ 09:52  Hct-25.1 / Hgb-8.0 / Plat-171 / RBC-2.51 / WBC-7.22    Serial CBC's  06-18 @ 10:05  Hct-25.7 / Hgb-8.1 / Plat-178 / RBC-2.56 / WBC-Not measured    Serial CBC's  06-17 @ 09:17  Hct-27.6 / Hgb-8.6 / Plat-177 / RBC-2.74 / WBC-8.16        06-21    129<L>  |  95<L>  |  64<H>  ----------------------------<  136<H>  3.4<L>   |  18<L>  |  2.59<H>    Ca    9.3      21 Jun 2019 06:21    TPro  5.8<L>  /  Alb  3.0<L>  /  TBili  0.3  /  DBili  x   /  AST  30  /  ALT  11  /  AlkPhos  70  06-20      RADIOLOGY & ADDITIONAL STUDIES:    Assessment

## 2019-06-21 NOTE — PROGRESS NOTE ADULT - ASSESSMENT
continue calzada x 1 month or until discharge  he will then resume cic    outpatient [facility]  re-consult prn

## 2019-06-21 NOTE — CHART NOTE - NSCHARTNOTEFT_GEN_A_CORE
Cardiology Update Note      Series of echos reviewed with attending.     Patient has no significant change in his pericardial effusion since 6/14/19. There is no evidence of tamponade or LV systolic or diastolic dysfunction. Noted to have collapsed IVC indicating dehydration.    There is no indication of pericardial fluid drainage at this time. No need for further echocardiograms necessary unless patient develops symptoms consistent w/ tamponade, given this is a clinical diagnosis (tachycardic, hypotensive despite adequate hydration).    Unclear etiology of lactic acidosis, but patient does not have low flow due to LV dysfunction. Recommend further hydration/treatment of underlying diarrhea.    Please call back for any additional questions.    Discussed with Dr. Garcia and Dr. Shelton.      Zuri Lowery MD  Cardiology Fellow Cardiology Update Note      Recent series of echocardiogram reviewed with echo attending.     Patient has no significant change in the pericardial effusion since 6/14/19. There is no evidence of tamponade or LV systolic or diastolic dysfunction. Noted to have collapsed IVC, consistent with dehydration.    There is no indication for pericardial fluid drainage at this time. No need for further echocardiograms necessary unless patient develops symptoms or signs consistent w/ tamponade, such as tachycardia or hypotension despite adequate hydration).    Unclear etiology of lactic acidosis, but patient does not have low flow due to LV dysfunction. Recommend further hydration/treatment of underlying diarrhea.    Please call back for any additional questions.    Discussed with Dr. Garcia and Dr. Shelton.      Zuri Lowery MD  Cardiology Fellow    Teodoro Shelton M.D.  Cardiology Attending, Consult Service  [phone]

## 2019-06-21 NOTE — PROGRESS NOTE ADULT - SUBJECTIVE AND OBJECTIVE BOX
[facility]   [phone]  RENAL FOLLOW UP NOTE  --------------------------------------------------------------------------------  Chief Complaint:    24 hour events/subjective:    overall c/o fatigue   and diarrhea 5 bm so far when seen     PAST HISTORY  --------------------------------------------------------------------------------  No significant changes to PMH, PSH, FHx, SHx, unless otherwise noted    ALLERGIES & MEDICATIONS  --------------------------------------------------------------------------------  Allergies    penicillin (Swelling)    Intolerances      Standing Inpatient Medications  artificial tears (preservative free) Ophthalmic Solution 1 Drop(s) Both EYES three times a day  BACItracin   Ointment 1 Application(s) Topical two times a day  chlorhexidine 2% Cloths 1 Application(s) Topical daily  dextrose 5% 1000 milliLiter(s) IV Continuous <Continuous>  ergocalciferol 64304 Unit(s) Oral every week  heparin  Injectable 5000 Unit(s) SubCutaneous every 8 hours  multivitamin 1 Tablet(s) Oral daily  octreotide  Injectable 100 MICROGram(s) SubCutaneous four times a day  sodium bicarbonate  Infusion 0.084 mEq/kG/Hr IV Continuous <Continuous>  sodium chloride 0.9%. 1000 milliLiter(s) IV Continuous <Continuous>    PRN Inpatient Medications  acetaminophen   Tablet .. 650 milliGRAM(s) Oral every 6 hours PRN      REVIEW OF SYSTEMS  --------------------------------------------------------------------------------    Gen: denies fatigue, fevers/chills,  CVS: denies chest pain/palpitations  Resp: denies SOB/Cough  GI: Denies N/V/Abd pain  : Denies dysuria    All other systems were reviewed and are negative, except as noted.    VITALS/PHYSICAL EXAM  --------------------------------------------------------------------------------  T(C): 36.9 (06-21-19 @ 15:55), Max: 36.9 (06-21-19 @ 15:55)  HR: 95 (06-21-19 @ 15:55) (94 - 104)  BP: 96/52 (06-21-19 @ 15:55) (93/56 - 102/63)  RR: 18 (06-21-19 @ 15:55) (18 - 18)  SpO2: 98% (06-21-19 @ 15:55) (95% - 98%)  Wt(kg): --        06-20-19 @ 07:01  -  06-21-19 @ 07:00  --------------------------------------------------------  IN: 1947 mL / OUT: 354 mL / NET: 1593 mL      Physical Exam:  	    	Gen: Non toxic comfortable appearing  thin muscle wasting   	no jvd ,  	Pulm: decrease bs  no rales or ronchi or wheezing  	CV: RRR, S1S2; no rub  	Abd: +BS, soft, nontender/nondistended  	: No suprapubic tenderness  	UE: Warm, no cyanosis  no clubbing,  no edema  	LE: Warm, no cyanosis  no clubbing, 2+  edema  	Neuro: alert and oriented. speech coherent   			  	  LABS/STUDIES  --------------------------------------------------------------------------------              8.8    10.89 >-----------<  228      [06-21-19 @ 09:23]              26.6     129  |  95  |  64  ----------------------------<  136      [06-21-19 @ 06:21]  3.4   |  18  |  2.59        Ca     9.3     [06-21-19 @ 06:21]    TPro  5.8  /  Alb  3.0  /  TBili  0.3  /  DBili  x   /  AST  30  /  ALT  11  /  AlkPhos  70  [06-20-19 @ 07:05]          Creatinine Trend:  SCr 2.59 [06-21 @ 06:21]  SCr 2.52 [06-20 @ 07:05]  SCr 2.70 [06-19 @ 06:34]  SCr 2.87 [06-18 @ 07:23]  SCr 2.85 [06-17 @ 07:26]              Urinalysis - [06-12-19 @ 09:34]      Color Yellow / Appearance Slightly Turbid / SG 1.019 / pH 6.0      Gluc Negative / Ketone Negative  / Bili Negative / Urobili <2 mg/dL       Blood Trace / Protein 100 mg/dL / Leuk Est Large / Nitrite Positive      RBC 6 /  / Hyaline 0 / Gran 5 / Sq Epi  / Non Sq Epi 5 / Bacteria TNTC      PTH -- (Ca 10.4)      [06-11-19 @ 09:51]   15  Vitamin D (25OH) 9.7      [06-11-19 @ 10:02]

## 2019-06-21 NOTE — PROGRESS NOTE ADULT - NSHPATTENDINGPLANDISCUSS_GEN_ALL_CORE
Plan discussed with cardiology fellow, Dr. Lowery; patient seen and examined.       I was physically present for the key portions of the evaluation and management (E/M) service provided.    I agree with the above history, physical, and plan which I have reviewed and edited where appropriate.
pt's wife
Dr Dash

## 2019-06-21 NOTE — PROGRESS NOTE ADULT - ASSESSMENT
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   case d/w cards been informed pt also with iv collapse?? intravascular volume depletion will give additional one liter fluid   still with persistent acidosis d5w + 150 meq nahco3-/L @  cc/50 cc/hr bicarb improving  . ? decrease perfusion vs infection vs due to tumor   d/w cards and onc as well as Dr. Dash  monitor bicarb and edema   calzada to cont      trend hb

## 2019-06-21 NOTE — PROGRESS NOTE ADULT - PROBLEM SELECTOR PLAN 4
-- eladio ID input  -- abx d/c'd  -- as per wife, Urology mentioned that calzada can be removed and pt can return to self-cath --- as per wife, pt self-cath standing up and she is concerned if pt can stand for that long a period of time --- will need to have Physical therapy evaluate pt's capability -- d/w dr Dash

## 2019-06-22 LAB
ANION GAP SERPL CALC-SCNC: 17 MMOL/L — SIGNIFICANT CHANGE UP (ref 5–17)
BUN SERPL-MCNC: 64 MG/DL — HIGH (ref 7–23)
CALCIUM SERPL-MCNC: 8.8 MG/DL — SIGNIFICANT CHANGE UP (ref 8.4–10.5)
CHLORIDE SERPL-SCNC: 97 MMOL/L — SIGNIFICANT CHANGE UP (ref 96–108)
CO2 SERPL-SCNC: 18 MMOL/L — LOW (ref 22–31)
CREAT SERPL-MCNC: 2.43 MG/DL — HIGH (ref 0.5–1.3)
GLUCOSE SERPL-MCNC: 138 MG/DL — HIGH (ref 70–99)
LACTATE SERPL-SCNC: 4.1 MMOL/L — CRITICAL HIGH (ref 0.7–2)
MAGNESIUM SERPL-MCNC: 2.1 MG/DL — SIGNIFICANT CHANGE UP (ref 1.6–2.6)
POTASSIUM SERPL-MCNC: 3.6 MMOL/L — SIGNIFICANT CHANGE UP (ref 3.5–5.3)
POTASSIUM SERPL-SCNC: 3.6 MMOL/L — SIGNIFICANT CHANGE UP (ref 3.5–5.3)
SODIUM SERPL-SCNC: 132 MMOL/L — LOW (ref 135–145)

## 2019-06-22 PROCEDURE — 99233 SBSQ HOSP IP/OBS HIGH 50: CPT

## 2019-06-22 RX ORDER — ERYTHROMYCIN BASE 5 MG/GRAM
1 OINTMENT (GRAM) OPHTHALMIC (EYE) ONCE
Refills: 0 | Status: COMPLETED | OUTPATIENT
Start: 2019-06-22 | End: 2019-06-22

## 2019-06-22 RX ORDER — LOPERAMIDE HCL 2 MG
2 TABLET ORAL
Refills: 0 | Status: DISCONTINUED | OUTPATIENT
Start: 2019-06-22 | End: 2019-06-28

## 2019-06-22 RX ORDER — OCTREOTIDE ACETATE 200 UG/ML
150 INJECTION, SOLUTION INTRAVENOUS; SUBCUTANEOUS
Refills: 0 | Status: DISCONTINUED | OUTPATIENT
Start: 2019-06-22 | End: 2019-06-28

## 2019-06-22 RX ADMIN — SODIUM CHLORIDE 75 MILLILITER(S): 9 INJECTION, SOLUTION INTRAVENOUS at 15:48

## 2019-06-22 RX ADMIN — HEPARIN SODIUM 5000 UNIT(S): 5000 INJECTION INTRAVENOUS; SUBCUTANEOUS at 05:18

## 2019-06-22 RX ADMIN — Medication 1 DROP(S): at 05:18

## 2019-06-22 RX ADMIN — HEPARIN SODIUM 5000 UNIT(S): 5000 INJECTION INTRAVENOUS; SUBCUTANEOUS at 21:59

## 2019-06-22 RX ADMIN — Medication 1 APPLICATION(S): at 18:40

## 2019-06-22 RX ADMIN — OCTREOTIDE ACETATE 150 MICROGRAM(S): 200 INJECTION, SOLUTION INTRAVENOUS; SUBCUTANEOUS at 21:59

## 2019-06-22 RX ADMIN — CHLORHEXIDINE GLUCONATE 1 APPLICATION(S): 213 SOLUTION TOPICAL at 11:30

## 2019-06-22 RX ADMIN — Medication 1 TABLET(S): at 15:47

## 2019-06-22 RX ADMIN — OCTREOTIDE ACETATE 100 MICROGRAM(S): 200 INJECTION, SOLUTION INTRAVENOUS; SUBCUTANEOUS at 05:17

## 2019-06-22 RX ADMIN — Medication 1 APPLICATION(S): at 06:44

## 2019-06-22 RX ADMIN — HEPARIN SODIUM 5000 UNIT(S): 5000 INJECTION INTRAVENOUS; SUBCUTANEOUS at 15:48

## 2019-06-22 RX ADMIN — Medication 1 DROP(S): at 18:39

## 2019-06-22 RX ADMIN — Medication 1 DROP(S): at 22:00

## 2019-06-22 RX ADMIN — Medication 1 DROP(S): at 15:47

## 2019-06-22 RX ADMIN — Medication 1 APPLICATION(S): at 05:18

## 2019-06-22 NOTE — PROGRESS NOTE ADULT - ASSESSMENT
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   lactic acid level high and persists   echo revealed pericardial effusion and RA > 1/3 cycle collpase.   still with persistent acidosis d5w + 150 meq nahco3-/L @  cc/50 cc/hr bicarb improving  .lactic acid  ? decrease perfusion vs infection vs due to tumor   concern of worsening edema  calzada to cont

## 2019-06-22 NOTE — PROGRESS NOTE ADULT - PROBLEM SELECTOR PLAN 5
management as per urology  Continue antibiotics as directed
-- pt has cyst as well as a 1.5 cm solid lesion in kidney --- he has coils in that area --- as per outside records, the lesion was felt to be benign -- will investigate further  --- reviewed both Nephrology and Urology notes from early in admission and pt would require MRI w/ contrast (contraindicated at this time due to renal insuff) and biopsy (would defer at present time)
 consult  strait cath versus calzada catheter placement
 consult - cystoscopy planned  strait cath versus calzada catheter placement
-- pt has cyst as well as a 1.5 cm solid lesion in kidney --- he has coils in that area --- as per outside records, the lesion was felt to be benign -- will investigate further  --- reviewed both Nephrology and Urology notes from early in admission and pt would require MRI w/ contrast (contraindicated at this time due to renal insuff) and biopsy (would defer at present time)
management as per urology
management as per urology  Continue antibiotics as directed

## 2019-06-22 NOTE — PROGRESS NOTE ADULT - ASSESSMENT
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    Carcinoid syndrome.  - increase octreotide 150mg subQ QID to treat flushing and diarrhea.    - oncology follow.    Liver masses  - s/p Liver bx with metastatic neuroendocrine tumor.      Kidney mass  - pt has cyst as well as a 1.5 cm solid lesion in kidney --- he has coils in that area --- as per outside records, the lesion was felt to be benign.  - Oncology evalluation noted    Hypercalcemia.  - Patient noted to have mild hypercalcemia, may be related to suspected carcinoid tumor.  - Will continue IVF.    CONSTANZA (acute kidney injury).   - Patient was noted here to have elevated SCr of 2.6, which is high compared to outpatient SCr of 1.91.  - CONSTANZA is likely pre-renal related to hypovolemia from excessive GI losses.   - urology evaluation noted. S/P cysto and stricture of urethra dilatation.  - Rae. TOV. Self cath when able to stand..    Lactate increased. Follow.     UTI  - off antibiotics. ID follow    Pericardial effusion.  - Currently, the patient is hemodynamically WNL. He denies palpitations and denies lightheadedness while lying supine.  - TTE repeat unchanged.  - Cardiology follow noted. No further Echo unless symptomatic.  - IVF  - if worse will need CCU evaluation.    Metabolic acidosis, normal anion gap (NAG).   - Patient found to have metabolic acidosis with normal anion gap, hyperchloremia likely due to GI losses.   - continue IVF.  - Follow up BMP.  - nephrology follow.    Prophylactic measure.  - VTE ppx with start heparin subQ    Conjunctival erythema  - artificial tears, may need Ophtalmology evaluation    Activity: OOB with assistance, fall precaution  Diet: regular.  PT   DCP in progress to rehab.  d/w patient , wife, son at length  Phong Dash MD pager [phone] This patient is an 86yoM with 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    Carcinoid syndrome.  - increase octreotide 150mg subQ QID to treat flushing and diarrhea.    - oncology follow.    Liver masses  - s/p Liver bx with metastatic neuroendocrine tumor.      Kidney mass  - pt has cyst as well as a 1.5 cm solid lesion in kidney --- he has coils in that area --- as per outside records, the lesion was felt to be benign.  - Oncology evalluation noted    Hypercalcemia.  - Patient noted to have mild hypercalcemia, may be related to suspected carcinoid tumor.  - Will continue IVF.    CONSTANZA (acute kidney injury).   - Patient was noted here to have elevated SCr of 2.6, which is high compared to outpatient SCr of 1.91.  - CONSTANZA is likely pre-renal related to hypovolemia from excessive GI losses.   - urology evaluation noted. S/P cysto and stricture of urethra dilatation.  - Rae. TOV. Self cath when able to stand..    Lactate increased. Follow.     UTI  - off antibiotics. ID follow    Pericardial effusion.  - Currently, the patient is hemodynamically WNL. He denies palpitations and denies lightheadedness while lying supine.  - TTE repeat unchanged.  - Cardiology follow noted. No further Echo unless symptomatic.  - IVF  - if worse will need CCU evaluation.    Metabolic acidosis, normal anion gap (NAG).   - Patient found to have metabolic acidosis with normal anion gap, hyperchloremia likely due to GI losses.   - continue IVF.  - Follow up BMP.  - nephrology follow.    Prophylactic measure.  - VTE ppx with start heparin subQ    Conjunctival erythema  - artificial tears,   - Opthalmology evaluation    Activity: OOB with assistance, fall precaution  Diet: regular.  PT   DCP in progress to rehab.  d/w patient , wife, son at length  Phong Dash MD pager [phone]

## 2019-06-22 NOTE — PROGRESS NOTE ADULT - SUBJECTIVE AND OBJECTIVE BOX
Patient is a 86y old  Male who presents with a chief complaint of diarrhea (22 Jun 2019 16:17)      SUBJECTIVE / OVERNIGHT EVENTS: still with diarrhea. Family at bedside.   Review of Systems  chest pain no  palpitations no  sob no  nausea no  headache no    MEDICATIONS  (STANDING):  artificial tears (preservative free) Ophthalmic Solution 1 Drop(s) Both EYES every 2 hours  BACItracin   Ointment 1 Application(s) Topical two times a day  chlorhexidine 2% Cloths 1 Application(s) Topical daily  dextrose 5% 1000 milliLiter(s) (75 mL/Hr) IV Continuous <Continuous>  ergocalciferol 21928 Unit(s) Oral every week  heparin  Injectable 5000 Unit(s) SubCutaneous every 8 hours  multivitamin 1 Tablet(s) Oral daily  octreotide  Injectable 150 MICROGram(s) SubCutaneous four times a day  sodium bicarbonate  Infusion 0.084 mEq/kG/Hr (70 mL/Hr) IV Continuous <Continuous>  sodium chloride 0.9%. 1000 milliLiter(s) (500 mL/Hr) IV Continuous <Continuous>    MEDICATIONS  (PRN):  acetaminophen   Tablet .. 650 milliGRAM(s) Oral every 6 hours PRN Temp greater or equal to 38C (100.4F), Moderate Pain (4 - 6)  loperamide 2 milliGRAM(s) Oral four times a day PRN Diarrhea      Vital Signs Last 24 Hrs  T(C): 36.6 (22 Jun 2019 16:14), Max: 36.9 (22 Jun 2019 00:36)  T(F): 97.9 (22 Jun 2019 16:14), Max: 98.4 (22 Jun 2019 00:36)  HR: 105 (22 Jun 2019 16:14) (93 - 112)  BP: 96/57 (22 Jun 2019 16:14) (90/55 - 97/57)  BP(mean): --  RR: 18 (22 Jun 2019 16:14) (18 - 18)  SpO2: 98% (22 Jun 2019 16:14) (94% - 98%)    PHYSICAL EXAM:  GENERAL: NAD  HEAD:  Atraumatic, Normocephalic Flushed  EYES: EOMI, PERRLA, L eye erythema of conjunctiva  NECK: Supple, No JVD  CHEST/LUNG: Clear to auscultation bilaterally; No wheeze  HEART: Regular rate and rhythm; No murmurs, rubs, or gallops  ABDOMEN: Soft, Nontender, Nondistended; Bowel sounds present  EXTREMITIES:  2+ bipedal edema  PSYCH: AAOx3  NEUROLOGY: non-focal  SKIN: No rashes or lesions    LABS:                        8.8    10.89 )-----------( 228      ( 21 Jun 2019 09:23 )             26.6     06-22    132<L>  |  97  |  64<H>  ----------------------------<  138<H>  3.6   |  18<L>  |  2.43<H>    Ca    8.8      22 Jun 2019 07:36  Mg     2.1     06-22                  RADIOLOGY & ADDITIONAL TESTS:    Imaging Personally Reviewed:    Consultant(s) Notes Reviewed:      Care Discussed with Consultants/Other Providers:

## 2019-06-22 NOTE — PROGRESS NOTE ADULT - PROBLEM SELECTOR PLAN 3
-- unchanged --  -- bp borderline  -- TTE stable --- no intervention as pr Cardiology TTE stable --- no intervention as pr Cardiology

## 2019-06-22 NOTE — PROGRESS NOTE ADULT - SUBJECTIVE AND OBJECTIVE BOX
[facility]   [phone]  RENAL FOLLOW UP NOTE  --------------------------------------------------------------------------------  Chief Complaint:    24 hour events/subjective:    seen earlier. c/o diarrhea. no sob     PAST HISTORY  --------------------------------------------------------------------------------  No significant changes to PMH, PSH, FHx, SHx, unless otherwise noted    ALLERGIES & MEDICATIONS  --------------------------------------------------------------------------------  Allergies    penicillin (Swelling)    Intolerances      Standing Inpatient Medications  artificial tears (preservative free) Ophthalmic Solution 1 Drop(s) Both EYES every 2 hours  BACItracin   Ointment 1 Application(s) Topical two times a day  chlorhexidine 2% Cloths 1 Application(s) Topical daily  dextrose 5% 1000 milliLiter(s) IV Continuous <Continuous>  ergocalciferol 42158 Unit(s) Oral every week  heparin  Injectable 5000 Unit(s) SubCutaneous every 8 hours  multivitamin 1 Tablet(s) Oral daily  octreotide  Injectable 150 MICROGram(s) SubCutaneous four times a day  sodium bicarbonate  Infusion 0.084 mEq/kG/Hr IV Continuous <Continuous>  sodium chloride 0.9%. 1000 milliLiter(s) IV Continuous <Continuous>    PRN Inpatient Medications  acetaminophen   Tablet .. 650 milliGRAM(s) Oral every 6 hours PRN  loperamide 2 milliGRAM(s) Oral four times a day PRN      REVIEW OF SYSTEMS  --------------------------------------------------------------------------------    Gen: denies fevers/chills,  CVS: denies chest pain/palpitations  Resp: denies SOB/Cough  GI: Denies N/V/Abd pain  : Denies dysuria/oliguria/hematuria    All other systems were reviewed and are negative, except as noted.    VITALS/PHYSICAL EXAM  --------------------------------------------------------------------------------  T(C): 36.6 (06-22-19 @ 16:14), Max: 36.9 (06-22-19 @ 00:36)  HR: 105 (06-22-19 @ 16:14) (93 - 112)  BP: 96/57 (06-22-19 @ 16:14) (90/55 - 97/57)  RR: 18 (06-22-19 @ 16:14) (18 - 18)  SpO2: 98% (06-22-19 @ 16:14) (94% - 98%)  Wt(kg): --        06-22-19 @ 07:01  -  06-22-19 @ 16:18  --------------------------------------------------------  IN: 0 mL / OUT: 2 mL / NET: -2 mL      Physical Exam:  	  	    	Gen: Non toxic comfortable appearing  thin muscle wasting   	no jvd ,  	Pulm: decrease bs  no rales or ronchi or wheezing  	CV: RRR, S1S2; no rub  	Abd: +BS, soft, nontender/nondistended  	: No suprapubic tenderness  	UE: Warm, no cyanosis  no clubbing,  no edema  	LE: Warm, no cyanosis  no clubbing, 3+  edema  	Neuro: alert and oriented.  [place] speech slow but coherent   			        LABS/STUDIES  --------------------------------------------------------------------------------              8.8    10.89 >-----------<  228      [06-21-19 @ 09:23]              26.6     132  |  97  |  64  ----------------------------<  138      [06-22-19 @ 07:36]  3.6   |  18  |  2.43        Ca     8.8     [06-22-19 @ 07:36]      Mg     2.1     [06-22-19 @ 07:36]            Creatinine Trend:  SCr 2.43 [06-22 @ 07:36]  SCr 2.59 [06-21 @ 06:21]  SCr 2.52 [06-20 @ 07:05]  SCr 2.70 [06-19 @ 06:34]  SCr 2.87 [06-18 @ 07:23]              Urinalysis - [06-12-19 @ 09:34]      Color Yellow / Appearance Slightly Turbid / SG 1.019 / pH 6.0      Gluc Negative / Ketone Negative  / Bili Negative / Urobili <2 mg/dL       Blood Trace / Protein 100 mg/dL / Leuk Est Large / Nitrite Positive      RBC 6 /  / Hyaline 0 / Gran 5 / Sq Epi  / Non Sq Epi 5 / Bacteria TNTC      PTH -- (Ca 10.4)      [06-11-19 @ 09:51]   15  Vitamin D (25OH) 9.7      [06-11-19 @ 10:02]

## 2019-06-22 NOTE — PROGRESS NOTE ADULT - PROBLEM SELECTOR PLAN 4
-- eladio ID input  -- abx d/c'd  -- as per wife, Urology mentioned that calzada can be removed and pt can return to self-cath --- as per wife, pt self-cath standing up and she is concerned if pt can stand for that long a period of time --- will need to have Physical therapy evaluate pt's capability -- d/w dr Dash eladio ID input  anish d/c'd  cts to have zheng

## 2019-06-22 NOTE — CHART NOTE - NSCHARTNOTEFT_GEN_A_CORE
Patient with L eye worsening redness, opthalmology consult called  as per Dr Dash. Spoke with Dr Forbes (opth) over phone  recommendation as below:  --cool compress  --Change preservative free eye drop to Q 2hrs  --Lacrilube ointment Q hs  If no improvement call again on Monday  Currently patient with no C/O vision changes, or itching or pain  there is no discharge noted  Discussed with Dr Hardy Valerio NP  [phone]

## 2019-06-22 NOTE — CHART NOTE - NSCHARTNOTEFT_GEN_A_CORE
Patient with carcinoid syndrome, with severe diarrhea  .  Lactate level is been high for the past few days.  Will continue IVF for now  Shyla Valerio NP  [phone]  Shyla Valerio NP

## 2019-06-22 NOTE — PROGRESS NOTE ADULT - SUBJECTIVE AND OBJECTIVE BOX
Patient is a 86y old  Male who presents with a chief complaint of diarrhea (20 Jun 2019 23:28)       Pt is seen and examined  pt is awake and out of bed to chair  pt seems comfortable and denies any complaints at this time; had 3 bms since midnight but small amounts and last bm was semisolid not liquidy; no abdominal pains        REVIEW OF SYSTEMS:    CONSTITUTIONAL: No weakness, fevers or chills  EYES/ENT: No visual changes;  No vertigo or throat pain   NECK: No pain or stiffness  RESPIRATORY: No cough, wheezing, hemoptysis; No shortness of breath  CARDIOVASCULAR: No chest pain or palpitations  GASTROINTESTINAL: No abdominal or epigastric pain. No nausea, vomiting, or hematemesis; Less diarrhea, no constipation. No melena or hematochezia.  GENITOURINARY: No dysuria, frequency or hematuria  NEUROLOGICAL: No numbness or weakness  SKIN: No itching, burning, rashes, or lesions     PHYSICAL EXAM  General: adult in NAD  HEENT: clear oropharynx, anicteric sclera, pink conjunctiva  Neck: supple  CV: normal S1/S2 with (+) murmur, no rubs or gallops  Lungs: positive air movement b/l ant lungs,clear to auscultation, no wheezes, no rales  Abdomen: soft non-tender non-distended, no hepatosplenomegaly  Ext: no clubbing cyanosis or edema  Skin: no rashes and no petechiae  Neuro: alert and oriented X 4, no focal deficits          MEDICATIONS  (STANDING):  artificial tears (preservative free) Ophthalmic Solution 1 Drop(s) Both EYES every 2 hours  BACItracin   Ointment 1 Application(s) Topical two times a day  chlorhexidine 2% Cloths 1 Application(s) Topical daily  dextrose 5% 1000 milliLiter(s) (75 mL/Hr) IV Continuous <Continuous>  ergocalciferol 88745 Unit(s) Oral every week  heparin  Injectable 5000 Unit(s) SubCutaneous every 8 hours  multivitamin 1 Tablet(s) Oral daily  octreotide  Injectable 100 MICROGram(s) SubCutaneous four times a day  sodium bicarbonate  Infusion 0.084 mEq/kG/Hr (70 mL/Hr) IV Continuous <Continuous>  sodium chloride 0.9%. 1000 milliLiter(s) (500 mL/Hr) IV Continuous <Continuous>    MEDICATIONS  (PRN):  acetaminophen   Tablet .. 650 milliGRAM(s) Oral every 6 hours PRN Temp greater or equal to 38C (100.4F), Moderate Pain (4 - 6)      Allergies    penicillin (Swelling)    Intolerances        Vital Signs Last 24 Hrs  T(C): 36.7 (22 Jun 2019 07:46), Max: 36.9 (21 Jun 2019 15:55)  T(F): 98 (22 Jun 2019 07:46), Max: 98.5 (21 Jun 2019 15:55)  HR: 93 (22 Jun 2019 07:46) (93 - 112)  BP: 91/56 (22 Jun 2019 07:46) (90/55 - 97/57)  BP(mean): --  RR: 18 (22 Jun 2019 07:46) (18 - 18)  SpO2: 98% (22 Jun 2019 07:46) (94% - 98%)      LABS:                          8.8    10.89 )-----------( 228      ( 21 Jun 2019 09:23 )             26.6         Mean Cell Volume : 95.3 fl  Mean Cell Hemoglobin : 31.5 pg  Mean Cell Hemoglobin Concentration : 33.1 gm/dL        06-22    132<L>  |  97  |  64<H>  ----------------------------<  138<H>  3.6   |  18<L>  |  2.43<H>    Ca    8.8      22 Jun 2019 07:36  Mg     2.1     06-22 Patient is a 86y old  Male who presents with a chief complaint of diarrhea (20 Jun 2019 23:28)       Pt is seen and examined  pt is awake and sleeping in bed  Per wife, diarrhea better - per d/w nurse - cts to have liquid stools - 3 since yesterday night  No cp, worseningt sob  anorexia +  no headache  No eye discomfort      REVIEW OF SYSTEMS:    CONSTITUTIONAL: No weakness, fevers or chills  EYES/ENT: No visual changes;  No vertigo or throat pain   NECK: No pain or stiffness  RESPIRATORY: No cough, wheezing, hemoptysis; No shortness of breath  CARDIOVASCULAR: No chest pain or palpitations  GASTROINTESTINAL: No abdominal or epigastric pain. No nausea, vomiting, or hematemesis; Less diarrhea, no constipation. No melena or hematochezia.  GENITOURINARY: No dysuria, frequency or hematuria  NEUROLOGICAL: No numbness or weakness  SKIN: No itching, burning, rashes, or lesions     PHYSICAL EXAM  General: adult in NAD  HEENT: clear oropharynx, anicteric sclera, b/l eye swelling +  Neck: supple  CV: normal S1/S2 with (+) murmur, no rubs or gallops  Lungs: positive air movement b/l ant lungs,clear to auscultation, no wheezes, no rales  Abdomen: soft non-tender non-distended, no hepatosplenomegaly  Ext: no clubbing cyanosis or edema  Skin: no rashes and no petechiae  Neuro: alert and oriented X 4, no focal deficits          MEDICATIONS  (STANDING):  artificial tears (preservative free) Ophthalmic Solution 1 Drop(s) Both EYES every 2 hours  BACItracin   Ointment 1 Application(s) Topical two times a day  chlorhexidine 2% Cloths 1 Application(s) Topical daily  dextrose 5% 1000 milliLiter(s) (75 mL/Hr) IV Continuous <Continuous>  ergocalciferol 08783 Unit(s) Oral every week  heparin  Injectable 5000 Unit(s) SubCutaneous every 8 hours  multivitamin 1 Tablet(s) Oral daily  octreotide  Injectable 100 MICROGram(s) SubCutaneous four times a day  sodium bicarbonate  Infusion 0.084 mEq/kG/Hr (70 mL/Hr) IV Continuous <Continuous>  sodium chloride 0.9%. 1000 milliLiter(s) (500 mL/Hr) IV Continuous <Continuous>    MEDICATIONS  (PRN):  acetaminophen   Tablet .. 650 milliGRAM(s) Oral every 6 hours PRN Temp greater or equal to 38C (100.4F), Moderate Pain (4 - 6)      Allergies    penicillin (Swelling)    Intolerances        Vital Signs Last 24 Hrs  T(C): 36.7 (22 Jun 2019 07:46), Max: 36.9 (21 Jun 2019 15:55)  T(F): 98 (22 Jun 2019 07:46), Max: 98.5 (21 Jun 2019 15:55)  HR: 93 (22 Jun 2019 07:46) (93 - 112)  BP: 91/56 (22 Jun 2019 07:46) (90/55 - 97/57)  BP(mean): --  RR: 18 (22 Jun 2019 07:46) (18 - 18)  SpO2: 98% (22 Jun 2019 07:46) (94% - 98%)      LABS:                          8.8    10.89 )-----------( 228      ( 21 Jun 2019 09:23 )             26.6         Mean Cell Volume : 95.3 fl  Mean Cell Hemoglobin : 31.5 pg  Mean Cell Hemoglobin Concentration : 33.1 gm/dL        06-22    132<L>  |  97  |  64<H>  ----------------------------<  138<H>  3.6   |  18<L>  |  2.43<H>    Ca    8.8      22 Jun 2019 07:36  Mg     2.1     06-22

## 2019-06-22 NOTE — PROGRESS NOTE ADULT - PROBLEM SELECTOR PROBLEM 5
Urinary retention with incomplete bladder emptying
Kidney lesion
Kidney lesion
Urinary retention with incomplete bladder emptying

## 2019-06-22 NOTE — PROGRESS NOTE ADULT - PROBLEM SELECTOR PLAN 1
cont sandostatin 4x/day  Biopsy (+) for Carcinoid cont sandostatin 4x/day - increase dose to 150 mcg  Biopsy (+) for Carcinoid  Nutrition support

## 2019-06-22 NOTE — PROVIDER CONTACT NOTE (OTHER) - BACKGROUND
DX: Diarrhea, kidney lesion, urinary retention   PMH: neck mass, hist of carcinoid syndrome LDH stable.

## 2019-06-23 LAB
ALBUMIN SERPL ELPH-MCNC: 2.8 G/DL — LOW (ref 3.3–5)
ANION GAP SERPL CALC-SCNC: 16 MMOL/L — SIGNIFICANT CHANGE UP (ref 5–17)
BASOPHILS # BLD AUTO: 0.03 K/UL — SIGNIFICANT CHANGE UP (ref 0–0.2)
BASOPHILS NFR BLD AUTO: 0.4 % — SIGNIFICANT CHANGE UP (ref 0–2)
BUN SERPL-MCNC: 67 MG/DL — HIGH (ref 7–23)
CALCIUM SERPL-MCNC: 8.7 MG/DL — SIGNIFICANT CHANGE UP (ref 8.4–10.5)
CHLORIDE SERPL-SCNC: 92 MMOL/L — LOW (ref 96–108)
CO2 SERPL-SCNC: 19 MMOL/L — LOW (ref 22–31)
CREAT SERPL-MCNC: 2.57 MG/DL — HIGH (ref 0.5–1.3)
EOSINOPHIL # BLD AUTO: 0.05 K/UL — SIGNIFICANT CHANGE UP (ref 0–0.5)
EOSINOPHIL NFR BLD AUTO: 0.6 % — SIGNIFICANT CHANGE UP (ref 0–6)
GLUCOSE SERPL-MCNC: 159 MG/DL — HIGH (ref 70–99)
HCT VFR BLD CALC: 23 % — LOW (ref 39–50)
HCT VFR BLD CALC: 23.9 % — LOW (ref 39–50)
HGB BLD-MCNC: 7.6 G/DL — LOW (ref 13–17)
HGB BLD-MCNC: 8.6 G/DL — LOW (ref 13–17)
HYPOCHROMIA BLD QL: SIGNIFICANT CHANGE UP
IMM GRANULOCYTES NFR BLD AUTO: 0.8 % — SIGNIFICANT CHANGE UP (ref 0–1.5)
LACTATE SERPL-SCNC: 4.5 MMOL/L — CRITICAL HIGH (ref 0.7–2)
LYMPHOCYTES # BLD AUTO: 1.84 K/UL — SIGNIFICANT CHANGE UP (ref 1–3.3)
LYMPHOCYTES # BLD AUTO: 21.6 % — SIGNIFICANT CHANGE UP (ref 13–44)
MANUAL SMEAR VERIFICATION: SIGNIFICANT CHANGE UP
MCHC RBC-ENTMCNC: 31.7 PG — SIGNIFICANT CHANGE UP (ref 27–34)
MCHC RBC-ENTMCNC: 33 GM/DL — SIGNIFICANT CHANGE UP (ref 32–36)
MCHC RBC-ENTMCNC: 35.6 PG — HIGH (ref 27–34)
MCHC RBC-ENTMCNC: 36 GM/DL — SIGNIFICANT CHANGE UP (ref 32–36)
MCV RBC AUTO: 95.8 FL — SIGNIFICANT CHANGE UP (ref 80–100)
MCV RBC AUTO: 98.7 FL — SIGNIFICANT CHANGE UP (ref 80–100)
MONOCYTES # BLD AUTO: 0.89 K/UL — SIGNIFICANT CHANGE UP (ref 0–0.9)
MONOCYTES NFR BLD AUTO: 10.4 % — SIGNIFICANT CHANGE UP (ref 2–14)
NEUTROPHILS # BLD AUTO: 5.65 K/UL — SIGNIFICANT CHANGE UP (ref 1.8–7.4)
NEUTROPHILS NFR BLD AUTO: 66.2 % — SIGNIFICANT CHANGE UP (ref 43–77)
PLAT MORPH BLD: NORMAL — SIGNIFICANT CHANGE UP
PLATELET # BLD AUTO: 228 K/UL — SIGNIFICANT CHANGE UP (ref 150–400)
PLATELET # BLD AUTO: 239 K/UL — SIGNIFICANT CHANGE UP (ref 150–400)
POTASSIUM SERPL-MCNC: 2.8 MMOL/L — CRITICAL LOW (ref 3.5–5.3)
POTASSIUM SERPL-SCNC: 2.8 MMOL/L — CRITICAL LOW (ref 3.5–5.3)
RBC # BLD: 2.4 M/UL — LOW (ref 4.2–5.8)
RBC # BLD: 2.42 M/UL — LOW (ref 4.2–5.8)
RBC # FLD: 16.1 % — HIGH (ref 10.3–14.5)
RBC # FLD: 17.8 % — HIGH (ref 10.3–14.5)
RBC BLD AUTO: ABNORMAL
SODIUM SERPL-SCNC: 127 MMOL/L — LOW (ref 135–145)
WBC # BLD: 8.53 K/UL — SIGNIFICANT CHANGE UP (ref 3.8–10.5)
WBC # BLD: 9.7 K/UL — SIGNIFICANT CHANGE UP (ref 3.8–10.5)
WBC # FLD AUTO: 8.53 K/UL — SIGNIFICANT CHANGE UP (ref 3.8–10.5)
WBC # FLD AUTO: 9.7 K/UL — SIGNIFICANT CHANGE UP (ref 3.8–10.5)

## 2019-06-23 RX ORDER — POTASSIUM CHLORIDE 20 MEQ
40 PACKET (EA) ORAL EVERY 4 HOURS
Refills: 0 | Status: COMPLETED | OUTPATIENT
Start: 2019-06-23 | End: 2019-06-23

## 2019-06-23 RX ADMIN — HEPARIN SODIUM 5000 UNIT(S): 5000 INJECTION INTRAVENOUS; SUBCUTANEOUS at 22:04

## 2019-06-23 RX ADMIN — Medication 1 TABLET(S): at 12:45

## 2019-06-23 RX ADMIN — Medication 1 DROP(S): at 04:22

## 2019-06-23 RX ADMIN — Medication 2 MILLIGRAM(S): at 12:44

## 2019-06-23 RX ADMIN — OCTREOTIDE ACETATE 150 MICROGRAM(S): 200 INJECTION, SOLUTION INTRAVENOUS; SUBCUTANEOUS at 12:44

## 2019-06-23 RX ADMIN — Medication 1 DROP(S): at 23:23

## 2019-06-23 RX ADMIN — CHLORHEXIDINE GLUCONATE 1 APPLICATION(S): 213 SOLUTION TOPICAL at 09:09

## 2019-06-23 RX ADMIN — Medication 1 DROP(S): at 05:49

## 2019-06-23 RX ADMIN — Medication 1 DROP(S): at 16:00

## 2019-06-23 RX ADMIN — OCTREOTIDE ACETATE 150 MICROGRAM(S): 200 INJECTION, SOLUTION INTRAVENOUS; SUBCUTANEOUS at 05:49

## 2019-06-23 RX ADMIN — Medication 1 APPLICATION(S): at 18:06

## 2019-06-23 RX ADMIN — Medication 1 DROP(S): at 00:18

## 2019-06-23 RX ADMIN — OCTREOTIDE ACETATE 150 MICROGRAM(S): 200 INJECTION, SOLUTION INTRAVENOUS; SUBCUTANEOUS at 23:23

## 2019-06-23 RX ADMIN — HEPARIN SODIUM 5000 UNIT(S): 5000 INJECTION INTRAVENOUS; SUBCUTANEOUS at 05:49

## 2019-06-23 RX ADMIN — Medication 40 MILLIEQUIVALENT(S): at 18:03

## 2019-06-23 RX ADMIN — Medication 1 DROP(S): at 02:42

## 2019-06-23 RX ADMIN — Medication 40 MILLIEQUIVALENT(S): at 21:59

## 2019-06-23 RX ADMIN — Medication 1 DROP(S): at 12:45

## 2019-06-23 RX ADMIN — Medication 1 DROP(S): at 17:58

## 2019-06-23 RX ADMIN — Medication 1 DROP(S): at 21:59

## 2019-06-23 RX ADMIN — Medication 1 APPLICATION(S): at 05:50

## 2019-06-23 RX ADMIN — OCTREOTIDE ACETATE 150 MICROGRAM(S): 200 INJECTION, SOLUTION INTRAVENOUS; SUBCUTANEOUS at 01:02

## 2019-06-23 RX ADMIN — HEPARIN SODIUM 5000 UNIT(S): 5000 INJECTION INTRAVENOUS; SUBCUTANEOUS at 17:57

## 2019-06-23 NOTE — PROGRESS NOTE ADULT - SUBJECTIVE AND OBJECTIVE BOX
[facility]   [phone]  RENAL FOLLOW UP NOTE  --------------------------------------------------------------------------------  Chief Complaint:    24 hour events/subjective:    4 bm yesterday     PAST HISTORY  --------------------------------------------------------------------------------  No significant changes to PMH, PSH, FHx, SHx, unless otherwise noted    ALLERGIES & MEDICATIONS  --------------------------------------------------------------------------------  Allergies    penicillin (Swelling)    Intolerances      Standing Inpatient Medications  artificial tears (preservative free) Ophthalmic Solution 1 Drop(s) Both EYES every 2 hours  BACItracin   Ointment 1 Application(s) Topical two times a day  chlorhexidine 2% Cloths 1 Application(s) Topical daily  dextrose 5% 1000 milliLiter(s) IV Continuous <Continuous>  ergocalciferol 49493 Unit(s) Oral every week  heparin  Injectable 5000 Unit(s) SubCutaneous every 8 hours  multivitamin 1 Tablet(s) Oral daily  octreotide  Injectable 150 MICROGram(s) SubCutaneous four times a day  potassium chloride    Tablet ER 40 milliEquivalent(s) Oral every 4 hours  sodium bicarbonate  Infusion 0.084 mEq/kG/Hr IV Continuous <Continuous>  sodium chloride 0.9%. 1000 milliLiter(s) IV Continuous <Continuous>    PRN Inpatient Medications  acetaminophen   Tablet .. 650 milliGRAM(s) Oral every 6 hours PRN  loperamide 2 milliGRAM(s) Oral four times a day PRN      REVIEW OF SYSTEMS  --------------------------------------------------------------------------------    Gen: denies fevers/chills,  CVS: denies chest pain/palpitations  Resp: denies SOB/Cough  GI: Denies N/V/Abd pain  : Denies dysuria/oliguria/hematuria    All other systems were reviewed and are negative, except as noted.    VITALS/PHYSICAL EXAM  --------------------------------------------------------------------------------  T(C): 36.4 (06-23-19 @ 12:41), Max: 36.8 (06-23-19 @ 00:25)  HR: 101 (06-23-19 @ 12:41) (95 - 105)  BP: 101/65 (06-23-19 @ 12:41) (90/50 - 101/65)  RR: 18 (06-23-19 @ 12:41) (18 - 18)  SpO2: 100% (06-23-19 @ 12:41) (98% - 100%)  Wt(kg): --        06-22-19 @ 07:01  -  06-23-19 @ 07:00  --------------------------------------------------------  IN: 0 mL / OUT: 2 mL / NET: -2 mL      Physical Exam:  	  	Gen: Non toxic comfortable appearing  thin muscle wasting   	no jvd ,  	Pulm: decrease bs  no rales or ronchi or wheezing  	CV: RRR, S1S2; no rub  	Abd: +BS, soft, nontender/nondistended  	: No suprapubic tenderness  	UE: Warm, no cyanosis  no clubbing,  no edema  	LE: Warm, no cyanosis  no clubbing, 3+  edema  	Neuro: alert and oriented.  [place] speech slow but coherent   			  	      LABS/STUDIES  --------------------------------------------------------------------------------              7.6    8.53  >-----------<  228      [06-23-19 @ 10:28]              23.0     127  |  92  |  67  ----------------------------<  159      [06-23-19 @ 07:50]  2.8   |  19  |  2.57        Ca     8.7     [06-23-19 @ 07:50]      Mg     2.1     [06-22-19 @ 07:36]    TPro  x   /  Alb  2.8  /  TBili  x   /  DBili  x   /  AST  x   /  ALT  x   /  AlkPhos  x   [06-23-19 @ 07:50]          Creatinine Trend:  SCr 2.57 [06-23 @ 07:50]  SCr 2.43 [06-22 @ 07:36]  SCr 2.59 [06-21 @ 06:21]  SCr 2.52 [06-20 @ 07:05]  SCr 2.70 [06-19 @ 06:34]              Urinalysis - [06-12-19 @ 09:34]      Color Yellow / Appearance Slightly Turbid / SG 1.019 / pH 6.0      Gluc Negative / Ketone Negative  / Bili Negative / Urobili <2 mg/dL       Blood Trace / Protein 100 mg/dL / Leuk Est Large / Nitrite Positive      RBC 6 /  / Hyaline 0 / Gran 5 / Sq Epi  / Non Sq Epi 5 / Bacteria TNTC      PTH -- (Ca 10.4)      [06-11-19 @ 09:51]   15  Vitamin D (25OH) 9.7      [06-11-19 @ 10:02]

## 2019-06-23 NOTE — PROGRESS NOTE ADULT - SUBJECTIVE AND OBJECTIVE BOX
Patient is a 86y old  Male who presents with a chief complaint of diarrhea (20 Jun 2019 23:28)       Pt is seen and examined  pt is awake and sleeping in bed  Per wife, diarrhea better - per d/w nurse - cts to have liquid stools - 3 since yesterday night  No cp, worseningt sob  anorexia +  no headache  No eye discomfort      REVIEW OF SYSTEMS:    CONSTITUTIONAL: No weakness, fevers or chills  EYES/ENT: No visual changes;  No vertigo or throat pain   NECK: No pain or stiffness  RESPIRATORY: No cough, wheezing, hemoptysis; No shortness of breath  CARDIOVASCULAR: No chest pain or palpitations  GASTROINTESTINAL: No abdominal or epigastric pain. No nausea, vomiting, or hematemesis; Less diarrhea, no constipation. No melena or hematochezia.  GENITOURINARY: No dysuria, frequency or hematuria  NEUROLOGICAL: No numbness or weakness  SKIN: No itching, burning, rashes, or lesions     PHYSICAL EXAM  General: adult in NAD  HEENT: clear oropharynx, anicteric sclera, b/l eye swelling +  Neck: supple  CV: normal S1/S2 with (+) murmur, no rubs or gallops  Lungs: positive air movement b/l ant lungs,clear to auscultation, no wheezes, no rales  Abdomen: soft non-tender non-distended, no hepatosplenomegaly  Ext: no clubbing cyanosis or edema  Skin: no rashes and no petechiae  Neuro: alert and oriented X 4, no focal deficits        MEDICATIONS  (STANDING):  artificial tears (preservative free) Ophthalmic Solution 1 Drop(s) Both EYES every 2 hours  BACItracin   Ointment 1 Application(s) Topical two times a day  chlorhexidine 2% Cloths 1 Application(s) Topical daily  dextrose 5% 1000 milliLiter(s) (75 mL/Hr) IV Continuous <Continuous>  ergocalciferol 18467 Unit(s) Oral every week  heparin  Injectable 5000 Unit(s) SubCutaneous every 8 hours  multivitamin 1 Tablet(s) Oral daily  octreotide  Injectable 150 MICROGram(s) SubCutaneous four times a day  sodium bicarbonate  Infusion 0.084 mEq/kG/Hr (70 mL/Hr) IV Continuous <Continuous>  sodium chloride 0.9%. 1000 milliLiter(s) (500 mL/Hr) IV Continuous <Continuous>    MEDICATIONS  (PRN):  acetaminophen   Tablet .. 650 milliGRAM(s) Oral every 6 hours PRN Temp greater or equal to 38C (100.4F), Moderate Pain (4 - 6)  loperamide 2 milliGRAM(s) Oral four times a day PRN Diarrhea      Allergies    penicillin (Swelling)    Intolerances        Vital Signs Last 24 Hrs  T(C): 36.5 (23 Jun 2019 07:13), Max: 36.8 (23 Jun 2019 00:25)  T(F): 97.7 (23 Jun 2019 07:13), Max: 98.2 (23 Jun 2019 00:25)  HR: 97 (23 Jun 2019 07:13) (95 - 105)  BP: 90/50 (23 Jun 2019 07:13) (90/50 - 99/64)  BP(mean): --  RR: 18 (23 Jun 2019 07:13) (18 - 18)  SpO2: 99% (23 Jun 2019 07:13) (98% - 100%)      LABS:          Serial CBC's  06-21 @ 09:23  Hct-26.6 / Hgb-8.8 / Plat-228 / RBC-2.79 / WBC-10.89    06-23    127<L>  |  92<L>  |  67<H>  ----------------------------<  159<H>  2.8<LL>   |  19<L>  |  2.57<H>    Ca    8.7      23 Jun 2019 07:50  Mg     2.1     06-22    Chromogranin A (06.11.19 @ 10:43)    Chromogranin A: [id number]    Cytopathology - Non Gyn Report (06.13.19 @ 18:00)    Cytopathology - Non Gyn Report:   ACCESSION No:  [id number]    MIKE HARDY                      2        Cytopathology Addendum Report          Addendum  Immunostains for TTF1 and CX2: negative    In summary:  NO CHANGE OF THE DIAGNOSIS.    Verified by: Kedar Jimenez M.D.  (Electronic Signature)  Reported on: 06/19/19 13:35 EDT, [address] [place] [address]  [id number]  _________________________________________________________________          Addendum  Immunostains results are as follows:  Synaptophysin, chromogranin, CD56: all are positive  Ki67: approximately 5%  Mitotic count:: <  than 2 in 10 HPF.    Based on the Ki67 labaling index the neoplasm is classified as :  Well differentiated neuroendocrine tumor, grade II.    Note:  This case was reviewed for  with GI pathology  staff  who concur(s) with the diagnosis on  06/18/19.    Verified by: Kedar Jimenez M.D.  (Electronic Signature)  Reported on: 06/18/19 10:53 EDT, [address], [place]  [address]  _________________________________________________________________      Cytopathology Report      Final Diagnosis  LIVER, CORE BIOPSY  POSITIVE FOR NEOPLASM.        MIKE HARDY                      2    ytopathology Report    Touch Prep slides and core biopsy sections show a monotonously  uniform plasmacytoid cells in discohesive sheets and trabecular  pattern, with eccentric nuclei, coarsely stippled (salt and  pepper) chromatin, and finely granular cytoplasm.  Immunostains pending.    < from: CT Abdomen No Cont (06.11.19 @ 21:03) >  IMPRESSION:     Evaluation of the solid organs and vessels is limited without use of IV   contrast.    Innumerable hypodense lesions throughout the liver compatible with   metastatic disease, with progression of disease since 5/16/2019.    Previously noted solid right renal mass is not well evaluated without   intravenous contrast.    Moderate pericardial effusion.    < from: Transthoracic Echocardiogram (06.20.19 @ 08:39) >  Conclusions:  Normal left ventricular systolic function. No segmental  wall motion abnormalities.  A moderate circumferential pericardial effusion remains  present.  Right atrial collapse > 1/3 of the cardiac cycle is  present.  No right ventricular diastolic collapse is seen.  No significant respiratory variation in the mitral inflow  is demonstrated in this study, although this was not  extensively evaluated.  Right atrial pressure is low (3-5 mmHg).    < end of copied text > Patient is a 86y old  Male who presents with a chief complaint of diarrhea (20 Jun 2019 23:28)       Pt is seen and examined  pt is awake and siitting in chair  Per wife, diarrhea better -4 yesterday  No cp, worseningt sob  anorexia + - primarily drinking ensure  no headache  No eye discomfort      REVIEW OF SYSTEMS:    CONSTITUTIONAL: No weakness, fevers or chills  EYES/ENT: No visual changes;  No vertigo or throat pain   NECK: No pain or stiffness  RESPIRATORY: No cough, wheezing, hemoptysis; No shortness of breath  CARDIOVASCULAR: No chest pain or palpitations  GASTROINTESTINAL: No abdominal or epigastric pain. No nausea, vomiting, or hematemesis; Less diarrhea, no constipation. No melena or hematochezia.  GENITOURINARY: No dysuria, frequency or hematuria  NEUROLOGICAL: No numbness or weakness  SKIN: No itching, burning, rashes, or lesions     PHYSICAL EXAM  General: adult in NAD  HEENT: clear oropharynx, anicteric sclera, b/l eye swelling + L > R  Neck: supple  CV: normal S1/S2 with (+) murmur, no rubs or gallops  Lungs: positive air movement b/l ant lungs,clear to auscultation, no wheezes, no rales  Abdomen: soft non-tender non-distended, no hepatosplenomegaly  Ext: no clubbing cyanosis, 1+ edema  Skin: no rashes and no petechiae  Neuro: alert and oriented X 4, no focal deficits        MEDICATIONS  (STANDING):  artificial tears (preservative free) Ophthalmic Solution 1 Drop(s) Both EYES every 2 hours  BACItracin   Ointment 1 Application(s) Topical two times a day  chlorhexidine 2% Cloths 1 Application(s) Topical daily  dextrose 5% 1000 milliLiter(s) (75 mL/Hr) IV Continuous <Continuous>  ergocalciferol 00420 Unit(s) Oral every week  heparin  Injectable 5000 Unit(s) SubCutaneous every 8 hours  multivitamin 1 Tablet(s) Oral daily  octreotide  Injectable 150 MICROGram(s) SubCutaneous four times a day  sodium bicarbonate  Infusion 0.084 mEq/kG/Hr (70 mL/Hr) IV Continuous <Continuous>  sodium chloride 0.9%. 1000 milliLiter(s) (500 mL/Hr) IV Continuous <Continuous>    MEDICATIONS  (PRN):  acetaminophen   Tablet .. 650 milliGRAM(s) Oral every 6 hours PRN Temp greater or equal to 38C (100.4F), Moderate Pain (4 - 6)  loperamide 2 milliGRAM(s) Oral four times a day PRN Diarrhea      Allergies    penicillin (Swelling)    Intolerances        Vital Signs Last 24 Hrs  T(C): 36.5 (23 Jun 2019 07:13), Max: 36.8 (23 Jun 2019 00:25)  T(F): 97.7 (23 Jun 2019 07:13), Max: 98.2 (23 Jun 2019 00:25)  HR: 97 (23 Jun 2019 07:13) (95 - 105)  BP: 90/50 (23 Jun 2019 07:13) (90/50 - 99/64)  BP(mean): --  RR: 18 (23 Jun 2019 07:13) (18 - 18)  SpO2: 99% (23 Jun 2019 07:13) (98% - 100%)      LABS:          Serial CBC's  06-21 @ 09:23  Hct-26.6 / Hgb-8.8 / Plat-228 / RBC-2.79 / WBC-10.89    06-23    127<L>  |  92<L>  |  67<H>  ----------------------------<  159<H>  2.8<LL>   |  19<L>  |  2.57<H>    Ca    8.7      23 Jun 2019 07:50  Mg     2.1     06-22    Chromogranin A (06.11.19 @ 10:43)    Chromogranin A: [id number]    Cytopathology - Non Gyn Report (06.13.19 @ 18:00)    Cytopathology - Non Gyn Report:   ACCESSION No:  [id number]    MIKE HARDY                      2        Cytopathology Addendum Report          Addendum  Immunostains for TTF1 and CX2: negative    In summary:  NO CHANGE OF THE DIAGNOSIS.    Verified by: Kedar Jimenez M.D.  (Electronic Signature)  Reported on: 06/19/19 13:35 EDT, [address] [place] [address]  [id number]  _________________________________________________________________          Addendum  Immunostains results are as follows:  Synaptophysin, chromogranin, CD56: all are positive  Ki67: approximately 5%  Mitotic count:: <  than 2 in 10 HPF.    Based on the Ki67 labaling index the neoplasm is classified as :  Well differentiated neuroendocrine tumor, grade II.    Note:  This case was reviewed for  with GI pathology  staff  who concur(s) with the diagnosis on  06/18/19.    Verified by: Kedar Jimenez M.D.  (Electronic Signature)  Reported on: 06/18/19 10:53 EDT, [address], [place]  [address]  _________________________________________________________________      Cytopathology Report      Final Diagnosis  LIVER, CORE BIOPSY  POSITIVE FOR NEOPLASM.        MIKE HARDY                      2    ytopathology Report    Touch Prep slides and core biopsy sections show a monotonously  uniform plasmacytoid cells in discohesive sheets and trabecular  pattern, with eccentric nuclei, coarsely stippled (salt and  pepper) chromatin, and finely granular cytoplasm.  Immunostains pending.    < from: CT Abdomen No Cont (06.11.19 @ 21:03) >  IMPRESSION:     Evaluation of the solid organs and vessels is limited without use of IV   contrast.    Innumerable hypodense lesions throughout the liver compatible with   metastatic disease, with progression of disease since 5/16/2019.    Previously noted solid right renal mass is not well evaluated without   intravenous contrast.    Moderate pericardial effusion.    < from: Transthoracic Echocardiogram (06.20.19 @ 08:39) >  Conclusions:  Normal left ventricular systolic function. No segmental  wall motion abnormalities.  A moderate circumferential pericardial effusion remains  present.  Right atrial collapse > 1/3 of the cardiac cycle is  present.  No right ventricular diastolic collapse is seen.  No significant respiratory variation in the mitral inflow  is demonstrated in this study, although this was not  extensively evaluated.  Right atrial pressure is low (3-5 mmHg).    < end of copied text >

## 2019-06-23 NOTE — PROGRESS NOTE ADULT - ASSESSMENT
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   lactic acid level high and persists  dc bicarb drip   echo revealed pericardial effusion and RA > 1/3 cycle collpase.   replete kcl   trend sodium level   .lactic acid  ? decrease perfusion vs infection vs due to tumor   concern of worsening edema  calzada to cont

## 2019-06-23 NOTE — PROGRESS NOTE ADULT - ASSESSMENT
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    Carcinoid syndrome.  - continue octreotide 150mg subQ QID to treat flushing and diarrhea.    - oncology follow.    Liver masses  - s/p Liver bx with metastatic neuroendocrine tumor.      Kidney mass  - pt has cyst as well as a 1.5 cm solid lesion in kidney --- he has coils in that area --- as per outside records, the lesion was felt to be benign.  - Oncology evalluation noted    Hypercalcemia.  - Patient noted to have mild hypercalcemia, may be related to suspected carcinoid tumor.  - Will continue IVF.    Hypokalemia  - supplement    CONSTANZA (acute kidney injury).   - Patient was noted here to have elevated SCr of 2.6, which is high compared to outpatient SCr of 1.91.  - CONSTANZA is likely pre-renal related to hypovolemia from excessive GI losses.   - urology evaluation noted. S/P cysto and stricture of urethra dilatation.  - Rae. TOV. Self cath when able to stand..    Lactate increased. Follow.     UTI  - off antibiotics. ID follow    Pericardial effusion.  - Currently, the patient is hemodynamically WNL. He denies palpitations and denies lightheadedness while lying supine.  - TTE repeat unchanged.  - Cardiology follow noted. No further Echo unless symptomatic.  - IVF  - if worse will need CCU evaluation.    Metabolic acidosis, normal anion gap (NAG).   - Patient found to have metabolic acidosis with normal anion gap, hyperchloremia likely due to GI losses.   - continue IVF.  - Follow up BMP.  - nephrology follow.    Prophylactic measure.  - VTE ppx with start heparin subQ    Conjunctival erythema  - artificial tears,   - Opthalmology evaluation    Activity: OOB with assistance, fall precaution  Diet: regular.  PT   DCP in progress to rehab.  d/w patient , wife, son at length  Phong Dash MD pager [phone]

## 2019-06-23 NOTE — PROVIDER CONTACT NOTE (CRITICAL VALUE NOTIFICATION) - ACTION/TREATMENT ORDERED:
ELMIRA Meneses made aware. Will continue to monitor and treat as ordered.
no action at this time
no further instruction given
Potassium repletion

## 2019-06-23 NOTE — PROGRESS NOTE ADULT - PROBLEM SELECTOR PLAN 1
cont sandostatin 4x/day - increase dose to 150 mcg  Biopsy (+) for Carcinoid  Nutrition support cont sandostatin 4x/day - increase dose to 150 mcg - d/w wife - to titrate based on diarrhea  Biopsy (+) for Carcinoid  Nutrition support - encouraged patient to inccrease po fluid intake - to try and taper ivf

## 2019-06-23 NOTE — PROGRESS NOTE ADULT - SUBJECTIVE AND OBJECTIVE BOX
Patient is a 86y old  Male who presents with a chief complaint of diarrhea (23 Jun 2019 13:51)      SUBJECTIVE / OVERNIGHT EVENTS: improving diarrhea  Review of Systems  chest pain no  palpitations no  sob no  nausea no  headache no    MEDICATIONS  (STANDING):  artificial tears (preservative free) Ophthalmic Solution 1 Drop(s) Both EYES every 2 hours  BACItracin   Ointment 1 Application(s) Topical two times a day  chlorhexidine 2% Cloths 1 Application(s) Topical daily  ergocalciferol 04900 Unit(s) Oral every week  heparin  Injectable 5000 Unit(s) SubCutaneous every 8 hours  multivitamin 1 Tablet(s) Oral daily  octreotide  Injectable 150 MICROGram(s) SubCutaneous four times a day  potassium chloride    Tablet ER 40 milliEquivalent(s) Oral every 4 hours  sodium bicarbonate  Infusion 0.084 mEq/kG/Hr (70 mL/Hr) IV Continuous <Continuous>    MEDICATIONS  (PRN):  acetaminophen   Tablet .. 650 milliGRAM(s) Oral every 6 hours PRN Temp greater or equal to 38C (100.4F), Moderate Pain (4 - 6)  loperamide 2 milliGRAM(s) Oral four times a day PRN Diarrhea      Vital Signs Last 24 Hrs  T(C): 36.4 (23 Jun 2019 12:41), Max: 36.8 (23 Jun 2019 00:25)  T(F): 97.6 (23 Jun 2019 12:41), Max: 98.2 (23 Jun 2019 00:25)  HR: 101 (23 Jun 2019 12:41) (95 - 105)  BP: 101/65 (23 Jun 2019 12:41) (90/50 - 101/65)  BP(mean): --  RR: 18 (23 Jun 2019 12:41) (18 - 18)  SpO2: 100% (23 Jun 2019 12:41) (98% - 100%)    PHYSICAL EXAM:  GENERAL: NAD  HEAD:  Atraumatic, Normocephalic Flushed  EYES: L eye erythema   NECK: Supple, No JVD  CHEST/LUNG: Clear to auscultation bilaterally; No wheeze  HEART: Regular rate and rhythm; No murmurs, rubs, or gallops  ABDOMEN: Soft, Nontender, Nondistended; Bowel sounds present  EXTREMITIES:  2+ Peripheral Pulses, No clubbing, cyanosis, or edema  PSYCH: AAOx3  NEUROLOGY: non-focal  SKIN: No rashes or lesions    LABS:                        7.6    8.53  )-----------( 228      ( 23 Jun 2019 10:28 )             23.0     06-23    127<L>  |  92<L>  |  67<H>  ----------------------------<  159<H>  2.8<LL>   |  19<L>  |  2.57<H>    Ca    8.7      23 Jun 2019 07:50  Mg     2.1     06-22    TPro  x   /  Alb  2.8<L>  /  TBili  x   /  DBili  x   /  AST  x   /  ALT  x   /  AlkPhos  x   06-23                RADIOLOGY & ADDITIONAL TESTS:    Imaging Personally Reviewed:    Consultant(s) Notes Reviewed:      Care Discussed with Consultants/Other Providers:

## 2019-06-24 LAB
ALBUMIN SERPL ELPH-MCNC: 2.9 G/DL — LOW (ref 3.3–5)
ALP SERPL-CCNC: 79 U/L — SIGNIFICANT CHANGE UP (ref 40–120)
ALT FLD-CCNC: 14 U/L — SIGNIFICANT CHANGE UP (ref 10–45)
ANION GAP SERPL CALC-SCNC: 14 MMOL/L — SIGNIFICANT CHANGE UP (ref 5–17)
AST SERPL-CCNC: 42 U/L — HIGH (ref 10–40)
BILIRUB SERPL-MCNC: 0.4 MG/DL — SIGNIFICANT CHANGE UP (ref 0.2–1.2)
BUN SERPL-MCNC: 67 MG/DL — HIGH (ref 7–23)
CALCIUM SERPL-MCNC: 8.9 MG/DL — SIGNIFICANT CHANGE UP (ref 8.4–10.5)
CGA FLD-MCNC: HIGH NG/ML
CHLORIDE SERPL-SCNC: 92 MMOL/L — LOW (ref 96–108)
CO2 SERPL-SCNC: 20 MMOL/L — LOW (ref 22–31)
CREAT SERPL-MCNC: 2.62 MG/DL — HIGH (ref 0.5–1.3)
GLUCOSE SERPL-MCNC: 122 MG/DL — HIGH (ref 70–99)
HCT VFR BLD CALC: 23.1 % — LOW (ref 39–50)
HGB BLD-MCNC: 7.6 G/DL — LOW (ref 13–17)
LACTATE SERPL-SCNC: 3.3 MMOL/L — HIGH (ref 0.7–2)
MCHC RBC-ENTMCNC: 31.7 PG — SIGNIFICANT CHANGE UP (ref 27–34)
MCHC RBC-ENTMCNC: 32.9 GM/DL — SIGNIFICANT CHANGE UP (ref 32–36)
MCV RBC AUTO: 96.3 FL — SIGNIFICANT CHANGE UP (ref 80–100)
NRBC # BLD: 0 /100 WBCS — SIGNIFICANT CHANGE UP (ref 0–0)
PLATELET # BLD AUTO: 195 K/UL — SIGNIFICANT CHANGE UP (ref 150–400)
POTASSIUM SERPL-MCNC: 3.8 MMOL/L — SIGNIFICANT CHANGE UP (ref 3.5–5.3)
POTASSIUM SERPL-SCNC: 3.8 MMOL/L — SIGNIFICANT CHANGE UP (ref 3.5–5.3)
PROT SERPL-MCNC: 5.6 G/DL — LOW (ref 6–8.3)
RBC # BLD: 2.4 M/UL — LOW (ref 4.2–5.8)
RBC # FLD: 18.2 % — HIGH (ref 10.3–14.5)
SODIUM SERPL-SCNC: 126 MMOL/L — LOW (ref 135–145)
WBC # BLD: 8 K/UL — SIGNIFICANT CHANGE UP (ref 3.8–10.5)
WBC # FLD AUTO: 8 K/UL — SIGNIFICANT CHANGE UP (ref 3.8–10.5)

## 2019-06-24 PROCEDURE — 99233 SBSQ HOSP IP/OBS HIGH 50: CPT | Mod: GC

## 2019-06-24 RX ADMIN — Medication 1 DROP(S): at 17:00

## 2019-06-24 RX ADMIN — HEPARIN SODIUM 5000 UNIT(S): 5000 INJECTION INTRAVENOUS; SUBCUTANEOUS at 13:37

## 2019-06-24 RX ADMIN — HEPARIN SODIUM 5000 UNIT(S): 5000 INJECTION INTRAVENOUS; SUBCUTANEOUS at 04:37

## 2019-06-24 RX ADMIN — Medication 1 DROP(S): at 13:37

## 2019-06-24 RX ADMIN — Medication 1 DROP(S): at 18:26

## 2019-06-24 RX ADMIN — OCTREOTIDE ACETATE 150 MICROGRAM(S): 200 INJECTION, SOLUTION INTRAVENOUS; SUBCUTANEOUS at 04:38

## 2019-06-24 RX ADMIN — Medication 1 DROP(S): at 10:08

## 2019-06-24 RX ADMIN — OCTREOTIDE ACETATE 150 MICROGRAM(S): 200 INJECTION, SOLUTION INTRAVENOUS; SUBCUTANEOUS at 18:27

## 2019-06-24 RX ADMIN — Medication 1 DROP(S): at 08:54

## 2019-06-24 RX ADMIN — Medication 1 APPLICATION(S): at 04:38

## 2019-06-24 RX ADMIN — Medication 1 APPLICATION(S): at 17:21

## 2019-06-24 RX ADMIN — OCTREOTIDE ACETATE 150 MICROGRAM(S): 200 INJECTION, SOLUTION INTRAVENOUS; SUBCUTANEOUS at 12:36

## 2019-06-24 RX ADMIN — Medication 1 DROP(S): at 21:02

## 2019-06-24 RX ADMIN — Medication 1 TABLET(S): at 12:34

## 2019-06-24 RX ADMIN — Medication 1 DROP(S): at 04:32

## 2019-06-24 RX ADMIN — OCTREOTIDE ACETATE 150 MICROGRAM(S): 200 INJECTION, SOLUTION INTRAVENOUS; SUBCUTANEOUS at 23:54

## 2019-06-24 RX ADMIN — Medication 1 DROP(S): at 12:32

## 2019-06-24 RX ADMIN — CHLORHEXIDINE GLUCONATE 1 APPLICATION(S): 213 SOLUTION TOPICAL at 13:16

## 2019-06-24 RX ADMIN — HEPARIN SODIUM 5000 UNIT(S): 5000 INJECTION INTRAVENOUS; SUBCUTANEOUS at 21:03

## 2019-06-24 RX ADMIN — Medication 2 MILLIGRAM(S): at 12:36

## 2019-06-24 RX ADMIN — Medication 1 DROP(S): at 23:54

## 2019-06-24 NOTE — PROGRESS NOTE ADULT - PROBLEM SELECTOR PLAN 4
eladio ID input  anish d/c'd  cts to have zheng eladio ID input  abx d/c'd  cts to have calzada - urology input re. resumption of self catheterization

## 2019-06-24 NOTE — PROGRESS NOTE ADULT - ASSESSMENT
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    Carcinoid syndrome.  - continue octreotide 150mg subQ QID to treat flushing and diarrhea.    - oncology follow.    Liver masses  - s/p Liver bx with metastatic neuroendocrine tumor.      Kidney mass  - pt has cyst as well as a 1.5 cm solid lesion in kidney --- he has coils in that area --- as per outside records, the lesion was felt to be benign.  - Oncology evalluation noted    Hypercalcemia.  - Patient noted to have mild hypercalcemia, may be related to suspected carcinoid tumor.  - Will continue IVF.    Hypokalemia  - supplement    CONSTANZA (acute kidney injury).   - Patient was noted here to have elevated SCr of 2.6, which is high compared to outpatient SCr of 1.91.  - CONSTANZA is likely pre-renal related to hypovolemia from excessive GI losses.     Urethral stricture  - urology evaluation noted. S/P cysto and stricture of urethra dilatation.  - Rae. TOV in am . Self cath requested by patient and wife.    Lactate increased. Follow.     UTI  - off antibiotics. ID follow    Pericardial effusion.  - Currently, the patient is hemodynamically WNL. He denies palpitations and denies lightheadedness while lying supine.  - TTE repeat unchanged.  - Cardiology follow noted. No further Echo unless symptomatic.  - IVF  - if worse will need CCU evaluation.    Metabolic acidosis, normal anion gap (NAG).   - Patient found to have metabolic acidosis with normal anion gap, hyperchloremia likely due to GI losses.   - continue IVF.  - Follow up BMP.  - nephrology follow.    Prophylactic measure.  - VTE ppx with start heparin subQ    Conjunctival erythema  - artificial tears,   - Opthalmology evaluation    Activity: OOB with assistance, fall precaution  Diet: regular.  PT   DCP in progress to rehab.  d/w patient , wife at length  Phong Dash MD pager [phone]

## 2019-06-24 NOTE — PROGRESS NOTE ADULT - SUBJECTIVE AND OBJECTIVE BOX
Patient is a 86y old  Male who presents with a chief complaint of diarrhea (24 Jun 2019 09:57)    diarrhea  SUBJECTIVE / OVERNIGHT EVENTS: No new complaints. improving diarrhea. Wife at bedside.  Review of Systems  chest pain no  palpitations no  sob no  nausea no  headache no    MEDICATIONS  (STANDING):  artificial tears (preservative free) Ophthalmic Solution 1 Drop(s) Both EYES every 2 hours  BACItracin   Ointment 1 Application(s) Topical two times a day  chlorhexidine 2% Cloths 1 Application(s) Topical daily  ergocalciferol 38961 Unit(s) Oral every week  heparin  Injectable 5000 Unit(s) SubCutaneous every 8 hours  multivitamin 1 Tablet(s) Oral daily  octreotide  Injectable 150 MICROGram(s) SubCutaneous four times a day  sodium bicarbonate  Infusion 0.084 mEq/kG/Hr (70 mL/Hr) IV Continuous <Continuous>    MEDICATIONS  (PRN):  acetaminophen   Tablet .. 650 milliGRAM(s) Oral every 6 hours PRN Temp greater or equal to 38C (100.4F), Moderate Pain (4 - 6)  loperamide 2 milliGRAM(s) Oral four times a day PRN Diarrhea      Vital Signs Last 24 Hrs  T(C): 37.2 (24 Jun 2019 19:01), Max: 37.2 (24 Jun 2019 19:01)  T(F): 99 (24 Jun 2019 19:01), Max: 99 (24 Jun 2019 19:01)  HR: 101 (24 Jun 2019 19:01) (64 - 105)  BP: 96/67 (24 Jun 2019 19:01) (92/54 - 96/67)  BP(mean): --  RR: 18 (24 Jun 2019 19:01) (18 - 18)  SpO2: 96% (24 Jun 2019 19:01) (94% - 100%)    PHYSICAL EXAM:  GENERAL: NAD  HEAD:  Atraumatic, Normocephalic flushed  EYES: EOMI, PERRLA, Less erythema   NECK: Supple, No JVD  CHEST/LUNG: Clear to auscultation bilaterally; No wheeze  HEART: Regular rate and rhythm; No murmurs, rubs, or gallops  ABDOMEN: Soft, Nontender, Nondistended; Bowel sounds present Rae  EXTREMITIES:  2+ Peripheral Pulses, No clubbing, cyanosis, or edema  PSYCH: AAOx3  NEUROLOGY: non-focal  SKIN: No rashes or lesions    LABS:                        7.6    8.00  )-----------( 195      ( 24 Jun 2019 09:36 )             23.1     06-24    126<L>  |  92<L>  |  67<H>  ----------------------------<  122<H>  3.8   |  20<L>  |  2.62<H>    Ca    8.9      24 Jun 2019 07:15    TPro  5.6<L>  /  Alb  2.9<L>  /  TBili  0.4  /  DBili  x   /  AST  42<H>  /  ALT  14  /  AlkPhos  79  06-24                RADIOLOGY & ADDITIONAL TESTS:    Imaging Personally Reviewed:    Consultant(s) Notes Reviewed:      Care Discussed with Consultants/Other Providers:

## 2019-06-24 NOTE — PROGRESS NOTE ADULT - ASSESSMENT
87 yo M with PMH of carcinoid syndrome s/p left lower lobe resection, bladder disease requiring intermittent straight cath who presents to the ED with complaint of persistent diarrhea, fatigue, decreased po intake  and weight loss along with borderline hypotension    1- mia on ckd  2-  hypotension  3- carcinoid   4- diarrhea  5- acidosis   6- pericardial effusion     cr steady at this range albeit over extended time has been worsening   bicarb steady now   echo revealed pericardial effusion and RA > 1/3 cycle collpase.   repleted k and better now   trend sodium level   concern of worsening edema  borderline low bp and pericardial effusion preventing diuretic use   calzada to change to intermittent cath as of am

## 2019-06-24 NOTE — PROGRESS NOTE ADULT - SUBJECTIVE AND OBJECTIVE BOX
Patient is a 86y old  Male who presents with a chief complaint of diarrhea (20 Jun 2019 23:28)       Pt is seen and examined  pt is awake and siitting in chair  Per wife, diarrhea better -4 yesterday  No cp, worseningt sob  anorexia + - primarily drinking ensure  no headache  No eye discomfort      REVIEW OF SYSTEMS:    CONSTITUTIONAL: No weakness, fevers or chills  EYES/ENT: No visual changes;  No vertigo or throat pain   NECK: No pain or stiffness  RESPIRATORY: No cough, wheezing, hemoptysis; No shortness of breath  CARDIOVASCULAR: No chest pain or palpitations  GASTROINTESTINAL: No abdominal or epigastric pain. No nausea, vomiting, or hematemesis; Less diarrhea, no constipation. No melena or hematochezia.  GENITOURINARY: No dysuria, frequency or hematuria  NEUROLOGICAL: No numbness or weakness  SKIN: No itching, burning, rashes, or lesions     PHYSICAL EXAM  General: adult in NAD  HEENT: clear oropharynx, anicteric sclera, b/l eye swelling + L > R  Neck: supple  CV: normal S1/S2 with (+) murmur, no rubs or gallops  Lungs: positive air movement b/l ant lungs,clear to auscultation, no wheezes, no rales  Abdomen: soft non-tender non-distended, no hepatosplenomegaly  Ext: no clubbing cyanosis, 1+ edema  Skin: no rashes and no petechiae  Neuro: alert and oriented X 4, no focal deficits        MEDICATIONS  (STANDING):  artificial tears (preservative free) Ophthalmic Solution 1 Drop(s) Both EYES every 2 hours  BACItracin   Ointment 1 Application(s) Topical two times a day  chlorhexidine 2% Cloths 1 Application(s) Topical daily  ergocalciferol 12643 Unit(s) Oral every week  heparin  Injectable 5000 Unit(s) SubCutaneous every 8 hours  multivitamin 1 Tablet(s) Oral daily  octreotide  Injectable 150 MICROGram(s) SubCutaneous four times a day  sodium bicarbonate  Infusion 0.084 mEq/kG/Hr (70 mL/Hr) IV Continuous <Continuous>    MEDICATIONS  (PRN):  acetaminophen   Tablet .. 650 milliGRAM(s) Oral every 6 hours PRN Temp greater or equal to 38C (100.4F), Moderate Pain (4 - 6)  loperamide 2 milliGRAM(s) Oral four times a day PRN Diarrhea      Allergies    penicillin (Swelling)    Intolerances        Vital Signs Last 24 Hrs  T(C): 36.6 (24 Jun 2019 07:42), Max: 36.8 (24 Jun 2019 01:09)  T(F): 97.9 (24 Jun 2019 07:42), Max: 98.3 (24 Jun 2019 01:09)  HR: 64 (24 Jun 2019 07:42) (64 - 105)  BP: 93/58 (24 Jun 2019 07:42) (93/58 - 101/65)  BP(mean): --  RR: 18 (24 Jun 2019 07:42) (18 - 18)  SpO2: 100% (24 Jun 2019 07:42) (94% - 100%)      LABS:                          8.6    9.7   )-----------( 239      ( 23 Jun 2019 19:11 )             23.9         Mean Cell Volume : 98.7 fl  Mean Cell Hemoglobin : 35.6 pg  Mean Cell Hemoglobin Concentration : 36.0 gm/dL      06-24    126<L>  |  92<L>  |  67<H>  ----------------------------<  122<H>  3.8   |  20<L>  |  2.62<H>    Ca    8.9      24 Jun 2019 07:15    TPro  5.6<L>  /  Alb  2.9<L>  /  TBili  0.4  /  DBili  x   /  AST  42<H>  /  ALT  14  /  AlkPhos  79  06-24    Chromogranin A (06.11.19 @ 10:43)    Chromogranin A: [id number]    Cytopathology - Non Gyn Report (06.13.19 @ 18:00)    Cytopathology - Non Gyn Report:   ACCESSION No:  [id number]    MIKE HARDY                      2        Cytopathology Addendum Report          Addendum  Immunostains for TTF1 and CX2: negative    In summary:  NO CHANGE OF THE DIAGNOSIS.    Verified by: Kedar Jimenez M.D.  (Electronic Signature)  Reported on: 06/19/19 13:35 EDT, [address] [place] [address]  [id number]  _________________________________________________________________          Addendum  Immunostains results are as follows:  Synaptophysin, chromogranin, CD56: all are positive  Ki67: approximately 5%  Mitotic count:: <  than 2 in 10 HPF.    Based on the Ki67 labaling index the neoplasm is classified as :  Well differentiated neuroendocrine tumor, grade II.    Note:  This case was reviewed for  with GI pathology  staff  who concur(s) with the diagnosis on  06/18/19.    Verified by: Kedar Jimenez M.D.  (Electronic Signature)  Reported on: 06/18/19 10:53 EDT, [address], [place]  [address]  _________________________________________________________________      Cytopathology Report      Final Diagnosis  LIVER, CORE BIOPSY  POSITIVE FOR NEOPLASM.        MIKE HARDY                      2    ytopathology Report    Touch Prep slides and core biopsy sections show a monotonously  uniform plasmacytoid cells in discohesive sheets and trabecular  pattern, with eccentric nuclei, coarsely stippled (salt and  pepper) chromatin, and finely granular cytoplasm.  Immunostains pending.    < from: CT Abdomen No Cont (06.11.19 @ 21:03) >  IMPRESSION:     Evaluation of the solid organs and vessels is limited without use of IV   contrast.    Innumerable hypodense lesions throughout the liver compatible with   metastatic disease, with progression of disease since 5/16/2019.    Previously noted solid right renal mass is not well evaluated without   intravenous contrast.    Moderate pericardial effusion.    < from: Transthoracic Echocardiogram (06.20.19 @ 08:39) >  Conclusions:  Normal left ventricular systolic function. No segmental  wall motion abnormalities.  A moderate circumferential pericardial effusion remains  present.  Right atrial collapse > 1/3 of the cardiac cycle is  present.  No right ventricular diastolic collapse is seen.  No significant respiratory variation in the mitral inflow  is demonstrated in this study, although this was not  extensively evaluated.  Right atrial pressure is low (3-5 mmHg).    < end of copied text > Patient is a 86y old  Male who presents with a chief complaint of diarrhea (20 Jun 2019 23:28)       Pt is seen and examined  pt is awake and siitting in chair  diarrhea persists  No cp, worsening sob  anorexia + - primarily drinking ensure  no headache  No eye discomfort      REVIEW OF SYSTEMS:    CONSTITUTIONAL: No fevers or chills.  weakness +  EYES/ENT: No visual changes;  No vertigo or throat pain   NECK: No pain or stiffness  RESPIRATORY: No cough, wheezing, hemoptysis; No shortness of breath  CARDIOVASCULAR: No chest pain or palpitations  GASTROINTESTINAL: No abdominal or epigastric pain. No nausea, vomiting, or hematemesis; Less diarrhea, no constipation. No melena or hematochezia.  GENITOURINARY: No dysuria, frequency or hematuria  NEUROLOGICAL: No numbness or weakness  SKIN: No itching, burning, rashes, or lesions . flushing +    PHYSICAL EXAM  General: adult in NAD  HEENT: clear oropharynx, anicteric sclera, b/l eye swelling + L > R  Neck: supple  CV: normal S1/S2 with (+) murmur, no rubs or gallops  Lungs: positive air movement b/l ant lungs,clear to auscultation, no wheezes, no rales  Abdomen: soft non-tender non-distended, no hepatosplenomegaly  Ext: no clubbing cyanosis, 1+ edema  Skin: no rashes and no petechiae  Neuro: alert and oriented X 4, no focal deficits        MEDICATIONS  (STANDING):  artificial tears (preservative free) Ophthalmic Solution 1 Drop(s) Both EYES every 2 hours  BACItracin   Ointment 1 Application(s) Topical two times a day  chlorhexidine 2% Cloths 1 Application(s) Topical daily  ergocalciferol 36232 Unit(s) Oral every week  heparin  Injectable 5000 Unit(s) SubCutaneous every 8 hours  multivitamin 1 Tablet(s) Oral daily  octreotide  Injectable 150 MICROGram(s) SubCutaneous four times a day  sodium bicarbonate  Infusion 0.084 mEq/kG/Hr (70 mL/Hr) IV Continuous <Continuous>    MEDICATIONS  (PRN):  acetaminophen   Tablet .. 650 milliGRAM(s) Oral every 6 hours PRN Temp greater or equal to 38C (100.4F), Moderate Pain (4 - 6)  loperamide 2 milliGRAM(s) Oral four times a day PRN Diarrhea      Allergies    penicillin (Swelling)    Intolerances        Vital Signs Last 24 Hrs  T(C): 36.6 (24 Jun 2019 07:42), Max: 36.8 (24 Jun 2019 01:09)  T(F): 97.9 (24 Jun 2019 07:42), Max: 98.3 (24 Jun 2019 01:09)  HR: 64 (24 Jun 2019 07:42) (64 - 105)  BP: 93/58 (24 Jun 2019 07:42) (93/58 - 101/65)  BP(mean): --  RR: 18 (24 Jun 2019 07:42) (18 - 18)  SpO2: 100% (24 Jun 2019 07:42) (94% - 100%)      LABS:                          8.6    9.7   )-----------( 239      ( 23 Jun 2019 19:11 )             23.9         Mean Cell Volume : 98.7 fl  Mean Cell Hemoglobin : 35.6 pg  Mean Cell Hemoglobin Concentration : 36.0 gm/dL      06-24    126<L>  |  92<L>  |  67<H>  ----------------------------<  122<H>  3.8   |  20<L>  |  2.62<H>    Ca    8.9      24 Jun 2019 07:15    TPro  5.6<L>  /  Alb  2.9<L>  /  TBili  0.4  /  DBili  x   /  AST  42<H>  /  ALT  14  /  AlkPhos  79  06-24    Chromogranin A (06.11.19 @ 10:43)    Chromogranin A: [id number]    Cytopathology - Non Gyn Report (06.13.19 @ 18:00)    Cytopathology - Non Gyn Report:   ACCESSION No:  [id number]    MIKE HARDY                      2        Cytopathology Addendum Report          Addendum  Immunostains for TTF1 and CX2: negative    In summary:  NO CHANGE OF THE DIAGNOSIS.    Verified by: Kedar Jimenez M.D.  (Electronic Signature)  Reported on: 06/19/19 13:35 EDT, [address] [place] [address]  [id number]  _________________________________________________________________          Addendum  Immunostains results are as follows:  Synaptophysin, chromogranin, CD56: all are positive  Ki67: approximately 5%  Mitotic count:: <  than 2 in 10 HPF.    Based on the Ki67 labaling index the neoplasm is classified as :  Well differentiated neuroendocrine tumor, grade II.    Note:  This case was reviewed for  with GI pathology  staff  who concur(s) with the diagnosis on  06/18/19.    Verified by: Kedar Jimenez M.D.  (Electronic Signature)  Reported on: 06/18/19 10:53 EDT, [address], [place]  [address]  _________________________________________________________________      Cytopathology Report      Final Diagnosis  LIVER, CORE BIOPSY  POSITIVE FOR NEOPLASM.        MIKE HARDY                      2    Cytopathology Report    Touch Prep slides and core biopsy sections show a monotonously  uniform plasmacytoid cells in discohesive sheets and trabecular  pattern, with eccentric nuclei, coarsely stippled (salt and  pepper) chromatin, and finely granular cytoplasm.  Immunostains pending.    < from: CT Abdomen No Cont (06.11.19 @ 21:03) >  IMPRESSION:     Evaluation of the solid organs and vessels is limited without use of IV   contrast.    Innumerable hypodense lesions throughout the liver compatible with   metastatic disease, with progression of disease since 5/16/2019.    Previously noted solid right renal mass is not well evaluated without   intravenous contrast.    Moderate pericardial effusion.    < from: Transthoracic Echocardiogram (06.20.19 @ 08:39) >  Conclusions:  Normal left ventricular systolic function. No segmental  wall motion abnormalities.  A moderate circumferential pericardial effusion remains  present.  Right atrial collapse > 1/3 of the cardiac cycle is  present.  No right ventricular diastolic collapse is seen.  No significant respiratory variation in the mitral inflow  is demonstrated in this study, although this was not  extensively evaluated.  Right atrial pressure is low (3-5 mmHg).    < end of copied text >

## 2019-06-24 NOTE — PROGRESS NOTE ADULT - SUBJECTIVE AND OBJECTIVE BOX
[facility]   [phone]  RENAL FOLLOW UP NOTE  --------------------------------------------------------------------------------  Chief Complaint:    24 hour events/subjective:    seen. wife at bedside   pt with decrease diarrhea increase fluid intake     PAST HISTORY  --------------------------------------------------------------------------------  No significant changes to PMH, PSH, FHx, SHx, unless otherwise noted    ALLERGIES & MEDICATIONS  --------------------------------------------------------------------------------  Allergies    penicillin (Swelling)    Intolerances      Standing Inpatient Medications  artificial tears (preservative free) Ophthalmic Solution 1 Drop(s) Both EYES every 2 hours  BACItracin   Ointment 1 Application(s) Topical two times a day  chlorhexidine 2% Cloths 1 Application(s) Topical daily  ergocalciferol 47610 Unit(s) Oral every week  heparin  Injectable 5000 Unit(s) SubCutaneous every 8 hours  multivitamin 1 Tablet(s) Oral daily  octreotide  Injectable 150 MICROGram(s) SubCutaneous four times a day  sodium bicarbonate  Infusion 0.084 mEq/kG/Hr IV Continuous <Continuous>    PRN Inpatient Medications  acetaminophen   Tablet .. 650 milliGRAM(s) Oral every 6 hours PRN  loperamide 2 milliGRAM(s) Oral four times a day PRN      REVIEW OF SYSTEMS  --------------------------------------------------------------------------------    Gen: denies fevers/chills,  CVS: denies chest pain/palpitations  Resp: denies SOB/Cough  GI: Denies N/V/Abd pain  : Denies dysuria    All other systems were reviewed and are negative, except as noted.    VITALS/PHYSICAL EXAM  --------------------------------------------------------------------------------  T(C): 37.2 (06-24-19 @ 19:01), Max: 37.2 (06-24-19 @ 19:01)  HR: 101 (06-24-19 @ 19:01) (64 - 105)  BP: 96/67 (06-24-19 @ 19:01) (92/54 - 96/67)  RR: 18 (06-24-19 @ 19:01) (18 - 18)  SpO2: 96% (06-24-19 @ 19:01) (94% - 100%)  Wt(kg): --        06-23-19 @ 07:01  -  06-24-19 @ 07:00  --------------------------------------------------------  IN: 0 mL / OUT: 600 mL / NET: -600 mL    06-24-19 @ 07:01  -  06-24-19 @ 20:56  --------------------------------------------------------  IN: 0 mL / OUT: 200 mL / NET: -200 mL      Physical Exam:  	  Gen: Non toxic comfortable appearing  thin muscle wasting   	no jvd ,  	Pulm: decrease bs  no rales or ronchi or wheezing  	CV: RRR, S1S2; no rub  	Abd: +BS, soft, nontender/nondistended  	: No suprapubic tenderness  	UE: Warm, no cyanosis  no clubbing,  no edema  	LE: Warm, no cyanosis  no clubbing, 3+  edema  	Neuro: alert and oriented.  [place] speech slow but coherent   			  		      LABS/STUDIES  --------------------------------------------------------------------------------              7.6    8.00  >-----------<  195      [06-24-19 @ 09:36]              23.1     126  |  92  |  67  ----------------------------<  122      [06-24-19 @ 07:15]  3.8   |  20  |  2.62        Ca     8.9     [06-24-19 @ 07:15]    TPro  5.6  /  Alb  2.9  /  TBili  0.4  /  DBili  x   /  AST  42  /  ALT  14  /  AlkPhos  79  [06-24-19 @ 07:15]          Creatinine Trend:  SCr 2.62 [06-24 @ 07:15]  SCr 2.57 [06-23 @ 07:50]  SCr 2.43 [06-22 @ 07:36]  SCr 2.59 [06-21 @ 06:21]  SCr 2.52 [06-20 @ 07:05]              Urinalysis - [06-12-19 @ 09:34]      Color Yellow / Appearance Slightly Turbid / SG 1.019 / pH 6.0      Gluc Negative / Ketone Negative  / Bili Negative / Urobili <2 mg/dL       Blood Trace / Protein 100 mg/dL / Leuk Est Large / Nitrite Positive      RBC 6 /  / Hyaline 0 / Gran 5 / Sq Epi  / Non Sq Epi 5 / Bacteria TNTC      PTH -- (Ca 10.4)      [06-11-19 @ 09:51]   15  Vitamin D (25OH) 9.7      [06-11-19 @ 10:02]

## 2019-06-24 NOTE — PROGRESS NOTE ADULT - PROBLEM SELECTOR PLAN 1
cont sandostatin 4x/day - increase dose to 150 mcg - d/w wife - to titrate based on diarrhea  Biopsy (+) for Carcinoid  Nutrition support - encouraged patient to inccrease po fluid intake - to try and taper ivf cont sandostatin 4x/day - increase dose to 150 mcg - d/w wife - to titrate based on diarrhea - may need to increase dose - f/up on chromogranin levels  Biopsy (+) for Carcinoid  d/w Nutritionist to help with improved po intake

## 2019-06-24 NOTE — PROGRESS NOTE ADULT - PROBLEM SELECTOR PLAN 2
increase octreotide dose  trial of  prn imodium may need increase in octreotide if diarrhea does not improve  trial of  prn imodium  prn K supplementation

## 2019-06-25 LAB
ALBUMIN SERPL ELPH-MCNC: 3 G/DL — LOW (ref 3.3–5)
ALP SERPL-CCNC: 85 U/L — SIGNIFICANT CHANGE UP (ref 40–120)
ALT FLD-CCNC: 16 U/L — SIGNIFICANT CHANGE UP (ref 10–45)
ANION GAP SERPL CALC-SCNC: 13 MMOL/L — SIGNIFICANT CHANGE UP (ref 5–17)
AST SERPL-CCNC: 41 U/L — HIGH (ref 10–40)
BILIRUB SERPL-MCNC: 0.4 MG/DL — SIGNIFICANT CHANGE UP (ref 0.2–1.2)
BUN SERPL-MCNC: 69 MG/DL — HIGH (ref 7–23)
CALCIUM SERPL-MCNC: 8.9 MG/DL — SIGNIFICANT CHANGE UP (ref 8.4–10.5)
CHLORIDE SERPL-SCNC: 89 MMOL/L — LOW (ref 96–108)
CO2 SERPL-SCNC: 21 MMOL/L — LOW (ref 22–31)
CREAT SERPL-MCNC: 2.66 MG/DL — HIGH (ref 0.5–1.3)
GLUCOSE SERPL-MCNC: 122 MG/DL — HIGH (ref 70–99)
HCT VFR BLD CALC: 23.1 % — LOW (ref 39–50)
HGB BLD-MCNC: 7.8 G/DL — LOW (ref 13–17)
LACTATE SERPL-SCNC: 2.1 MMOL/L — HIGH (ref 0.7–2)
MCHC RBC-ENTMCNC: 33.5 PG — SIGNIFICANT CHANGE UP (ref 27–34)
MCHC RBC-ENTMCNC: 33.9 GM/DL — SIGNIFICANT CHANGE UP (ref 32–36)
MCV RBC AUTO: 98.7 FL — SIGNIFICANT CHANGE UP (ref 80–100)
PLATELET # BLD AUTO: 261 K/UL — SIGNIFICANT CHANGE UP (ref 150–400)
POTASSIUM SERPL-MCNC: 3.9 MMOL/L — SIGNIFICANT CHANGE UP (ref 3.5–5.3)
POTASSIUM SERPL-SCNC: 3.9 MMOL/L — SIGNIFICANT CHANGE UP (ref 3.5–5.3)
PROT SERPL-MCNC: 5.7 G/DL — LOW (ref 6–8.3)
RBC # BLD: 2.34 M/UL — LOW (ref 4.2–5.8)
RBC # FLD: 16.1 % — HIGH (ref 10.3–14.5)
SODIUM SERPL-SCNC: 123 MMOL/L — LOW (ref 135–145)
WBC # BLD: 9.2 K/UL — SIGNIFICANT CHANGE UP (ref 3.8–10.5)
WBC # FLD AUTO: 9.2 K/UL — SIGNIFICANT CHANGE UP (ref 3.8–10.5)

## 2019-06-25 RX ADMIN — OCTREOTIDE ACETATE 150 MICROGRAM(S): 200 INJECTION, SOLUTION INTRAVENOUS; SUBCUTANEOUS at 18:56

## 2019-06-25 RX ADMIN — Medication 1 DROP(S): at 18:20

## 2019-06-25 RX ADMIN — HEPARIN SODIUM 5000 UNIT(S): 5000 INJECTION INTRAVENOUS; SUBCUTANEOUS at 14:06

## 2019-06-25 RX ADMIN — HEPARIN SODIUM 5000 UNIT(S): 5000 INJECTION INTRAVENOUS; SUBCUTANEOUS at 22:14

## 2019-06-25 RX ADMIN — Medication 1 DROP(S): at 08:07

## 2019-06-25 RX ADMIN — CHLORHEXIDINE GLUCONATE 1 APPLICATION(S): 213 SOLUTION TOPICAL at 12:26

## 2019-06-25 RX ADMIN — Medication 1 DROP(S): at 12:26

## 2019-06-25 RX ADMIN — Medication 1 DROP(S): at 10:09

## 2019-06-25 RX ADMIN — Medication 1 DROP(S): at 03:55

## 2019-06-25 RX ADMIN — Medication 1 DROP(S): at 01:12

## 2019-06-25 RX ADMIN — OCTREOTIDE ACETATE 150 MICROGRAM(S): 200 INJECTION, SOLUTION INTRAVENOUS; SUBCUTANEOUS at 06:56

## 2019-06-25 RX ADMIN — HEPARIN SODIUM 5000 UNIT(S): 5000 INJECTION INTRAVENOUS; SUBCUTANEOUS at 05:29

## 2019-06-25 RX ADMIN — CHLORHEXIDINE GLUCONATE 1 APPLICATION(S): 213 SOLUTION TOPICAL at 14:07

## 2019-06-25 RX ADMIN — Medication 1 DROP(S): at 22:14

## 2019-06-25 RX ADMIN — Medication 1 APPLICATION(S): at 17:31

## 2019-06-25 RX ADMIN — Medication 1 APPLICATION(S): at 05:30

## 2019-06-25 RX ADMIN — Medication 1 TABLET(S): at 14:07

## 2019-06-25 RX ADMIN — Medication 2 MILLIGRAM(S): at 10:29

## 2019-06-25 RX ADMIN — Medication 2 MILLIGRAM(S): at 18:22

## 2019-06-25 RX ADMIN — Medication 1 DROP(S): at 16:30

## 2019-06-25 RX ADMIN — Medication 1 DROP(S): at 14:06

## 2019-06-25 RX ADMIN — OCTREOTIDE ACETATE 150 MICROGRAM(S): 200 INJECTION, SOLUTION INTRAVENOUS; SUBCUTANEOUS at 12:25

## 2019-06-25 RX ADMIN — Medication 1 DROP(S): at 05:29

## 2019-06-25 RX ADMIN — Medication 1 DROP(S): at 20:08

## 2019-06-25 NOTE — PROGRESS NOTE ADULT - SUBJECTIVE AND OBJECTIVE BOX
[facility]   [phone]  RENAL FOLLOW UP NOTE  --------------------------------------------------------------------------------  Chief Complaint:    24 hour events/subjective:    seen earlier. states increase po fluid intake   diarrhea has been improving and minimal now     PAST HISTORY  --------------------------------------------------------------------------------  No significant changes to PMH, PSH, FHx, SHx, unless otherwise noted    ALLERGIES & MEDICATIONS  --------------------------------------------------------------------------------  Allergies    penicillin (Swelling)    Intolerances      Standing Inpatient Medications  artificial tears (preservative free) Ophthalmic Solution 1 Drop(s) Both EYES every 2 hours  BACItracin   Ointment 1 Application(s) Topical two times a day  chlorhexidine 2% Cloths 1 Application(s) Topical daily  ergocalciferol 96763 Unit(s) Oral every week  heparin  Injectable 5000 Unit(s) SubCutaneous every 8 hours  multivitamin 1 Tablet(s) Oral daily  octreotide  Injectable 150 MICROGram(s) SubCutaneous four times a day  sodium bicarbonate  Infusion 0.084 mEq/kG/Hr IV Continuous <Continuous>    PRN Inpatient Medications  acetaminophen   Tablet .. 650 milliGRAM(s) Oral every 6 hours PRN  loperamide 2 milliGRAM(s) Oral four times a day PRN      REVIEW OF SYSTEMS  --------------------------------------------------------------------------------    Gen: denies  fevers/chills,  CVS: denies chest pain/palpitations  Resp: denies SOB/Cough  GI: Denies N/V/Abd pain  : Denies dysuria/oliguria/hematuria    All other systems were reviewed and are negative, except as noted.    VITALS/PHYSICAL EXAM  --------------------------------------------------------------------------------  T(C): 36.7 (06-25-19 @ 16:50), Max: 36.9 (06-25-19 @ 01:34)  HR: 95 (06-25-19 @ 16:50) (92 - 119)  BP: 95/60 (06-25-19 @ 16:50) (91/53 - 100/62)  RR: 19 (06-25-19 @ 16:50) (14 - 19)  SpO2: 98% (06-25-19 @ 16:50) (96% - 100%)  Wt(kg): --        06-24-19 @ 07:01  -  06-25-19 @ 07:00  --------------------------------------------------------  IN: 0 mL / OUT: 850 mL / NET: -850 mL    06-25-19 @ 07:01  -  06-25-19 @ 21:12  --------------------------------------------------------  IN: 420 mL / OUT: 300 mL / NET: 120 mL      Physical Exam:  	  Gen: Non toxic comfortable appearing  thin muscle wasting   	no jvd ,  	Pulm: decrease bs  no rales or ronchi or wheezing  	CV: RRR, S1S2; no rub  	Abd: +BS, soft, nontender/nondistended  	: No suprapubic tenderness  	UE: Warm, no cyanosis  no clubbing,  no edema  	LE: Warm, no cyanosis  no clubbing, 3+  edema  	Neuro: alert and oriented.  [place] speech slow but coherent     	    LABS/STUDIES  --------------------------------------------------------------------------------              7.8    9.2   >-----------<  261      [06-25-19 @ 06:35]              23.1     123  |  89  |  69  ----------------------------<  122      [06-25-19 @ 06:32]  3.9   |  21  |  2.66        Ca     8.9     [06-25-19 @ 06:32]    TPro  5.7  /  Alb  3.0  /  TBili  0.4  /  DBili  x   /  AST  41  /  ALT  16  /  AlkPhos  85  [06-25-19 @ 06:32]          Creatinine Trend:  SCr 2.66 [06-25 @ 06:32]  SCr 2.62 [06-24 @ 07:15]  SCr 2.57 [06-23 @ 07:50]  SCr 2.43 [06-22 @ 07:36]  SCr 2.59 [06-21 @ 06:21]              Urinalysis - [06-12-19 @ 09:34]      Color Yellow / Appearance Slightly Turbid / SG 1.019 / pH 6.0      Gluc Negative / Ketone Negative  / Bili Negative / Urobili <2 mg/dL       Blood Trace / Protein 100 mg/dL / Leuk Est Large / Nitrite Positive      RBC 6 /  / Hyaline 0 / Gran 5 / Sq Epi  / Non Sq Epi 5 / Bacteria TNTC      PTH -- (Ca 10.4)      [06-11-19 @ 09:51]   15  Vitamin D (25OH) 9.7      [06-11-19 @ 10:02]

## 2019-06-25 NOTE — PROGRESS NOTE ADULT - SUBJECTIVE AND OBJECTIVE BOX
Patient is a 86y old  Male who presents with a chief complaint of diarrhea (20 Jun 2019 23:28)       Pt is seen and examined  pt is awake and siitting in chair  diarrhea persists  No cp, worsening sob  anorexia + - primarily drinking ensure  no headache  No eye discomfort      REVIEW OF SYSTEMS:    CONSTITUTIONAL: No fevers or chills.  weakness +  EYES/ENT: No visual changes;  No vertigo or throat pain   NECK: No pain or stiffness  RESPIRATORY: No cough, wheezing, hemoptysis; No shortness of breath  CARDIOVASCULAR: No chest pain or palpitations  GASTROINTESTINAL: No abdominal or epigastric pain. No nausea, vomiting, or hematemesis; Less diarrhea, no constipation. No melena or hematochezia.  GENITOURINARY: No dysuria, frequency or hematuria  NEUROLOGICAL: No numbness or weakness  SKIN: No itching, burning, rashes, or lesions . flushing +    PHYSICAL EXAM  General: adult in NAD  HEENT: clear oropharynx, anicteric sclera, b/l eye swelling + L > R  Neck: supple  CV: normal S1/S2 with (+) murmur, no rubs or gallops  Lungs: positive air movement b/l ant lungs,clear to auscultation, no wheezes, no rales  Abdomen: soft non-tender non-distended, no hepatosplenomegaly  Ext: no clubbing cyanosis, 1+ edema  Skin: no rashes and no petechiae  Neuro: alert and oriented X 4, no focal deficits        MEDICATIONS  (STANDING):  artificial tears (preservative free) Ophthalmic Solution 1 Drop(s) Both EYES every 2 hours  BACItracin   Ointment 1 Application(s) Topical two times a day  chlorhexidine 2% Cloths 1 Application(s) Topical daily  ergocalciferol 35306 Unit(s) Oral every week  heparin  Injectable 5000 Unit(s) SubCutaneous every 8 hours  multivitamin 1 Tablet(s) Oral daily  octreotide  Injectable 150 MICROGram(s) SubCutaneous four times a day  sodium bicarbonate  Infusion 0.084 mEq/kG/Hr (70 mL/Hr) IV Continuous <Continuous>    MEDICATIONS  (PRN):  acetaminophen   Tablet .. 650 milliGRAM(s) Oral every 6 hours PRN Temp greater or equal to 38C (100.4F), Moderate Pain (4 - 6)  loperamide 2 milliGRAM(s) Oral four times a day PRN Diarrhea      Allergies    penicillin (Swelling)    Intolerances        Vital Signs Last 24 Hrs  T(C): 36.8 (25 Jun 2019 07:27), Max: 37.2 (24 Jun 2019 19:01)  T(F): 98.3 (25 Jun 2019 07:27), Max: 99 (24 Jun 2019 19:01)  HR: 101 (25 Jun 2019 07:27) (94 - 101)  BP: 100/61 (25 Jun 2019 07:27) (92/54 - 100/62)  BP(mean): --  RR: 14 (25 Jun 2019 07:27) (14 - 18)  SpO2: 100% (25 Jun 2019 07:27) (96% - 100%)      LABS:                          7.8    9.2   )-----------( 261      ( 25 Jun 2019 06:35 )             23.1         Mean Cell Volume : 98.7 fl  Mean Cell Hemoglobin : 33.5 pg  Mean Cell Hemoglobin Concentration : 33.9 gm/dL      06-25    123<L>  |  89<L>  |  69<H>  ----------------------------<  122<H>  3.9   |  21<L>  |  2.66<H>    Ca    8.9      25 Jun 2019 06:32    TPro  5.7<L>  /  Alb  3.0<L>  /  TBili  0.4  /  DBili  x   /  AST  41<H>  /  ALT  16  /  AlkPhos  85  06-25                    Chromogranin A (06.23.19 @ 11:57)    Chromogranin A: 83932      Chromogranin A: 15544    Cytopathology - Non Gyn Report (06.13.19 @ 18:00)    Cytopathology - Non Gyn Report:   ACCESSION No:  [id number]    MIKE HARDY                      2        Cytopathology Addendum Report          Addendum  Immunostains for TTF1 and CX2: negative    In summary:  NO CHANGE OF THE DIAGNOSIS.    Verified by: Kedar Jimenez M.D.  (Electronic Signature)  Reported on: 06/19/19 13:35 EDT, [address] [place] [address]  [id number]  _________________________________________________________________          Addendum  Immunostains results are as follows:  Synaptophysin, chromogranin, CD56: all are positive  Ki67: approximately 5%  Mitotic count:: <  than 2 in 10 HPF.    Based on the Ki67 labaling index the neoplasm is classified as :  Well differentiated neuroendocrine tumor, grade II.    Note:  This case was reviewed for  with GI pathology  staff  who concur(s) with the diagnosis on  06/18/19.    Verified by: Kedar Jimenez M.D.  (Electronic Signature)  Reported on: 06/18/19 10:53 EDT, [address], [place]  [address]  _________________________________________________________________      Cytopathology Report      Final Diagnosis  LIVER, CORE BIOPSY  POSITIVE FOR NEOPLASM.        MIKE HARDY                      2    Cytopathology Report    Touch Prep slides and core biopsy sections show a monotonously  uniform plasmacytoid cells in discohesive sheets and trabecular  pattern, with eccentric nuclei, coarsely stippled (salt and  pepper) chromatin, and finely granular cytoplasm.  Immunostains pending.    < from: CT Abdomen No Cont (06.11.19 @ 21:03) >  IMPRESSION:     Evaluation of the solid organs and vessels is limited without use of IV   contrast.    Innumerable hypodense lesions throughout the liver compatible with   metastatic disease, with progression of disease since 5/16/2019.    Previously noted solid right renal mass is not well evaluated without   intravenous contrast.    Moderate pericardial effusion.    < from: Transthoracic Echocardiogram (06.20.19 @ 08:39) >  Conclusions:  Normal left ventricular systolic function. No segmental  wall motion abnormalities.  A moderate circumferential pericardial effusion remains  present.  Right atrial collapse > 1/3 of the cardiac cycle is  present.  No right ventricular diastolic collapse is seen.  No significant respiratory variation in the mitral inflow  is demonstrated in this study, although this was not  extensively evaluated.  Right atrial pressure is low (3-5 mmHg).    < end of copied text >

## 2019-06-25 NOTE — PROGRESS NOTE ADULT - ASSESSMENT
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   concern of worsening edema  borderline low bp and pericardial effusion preventing diuretic use   calzada to change to intermittent cath  one liter fluid restrict now that pt diarrhea has improved.

## 2019-06-25 NOTE — PROGRESS NOTE ADULT - SUBJECTIVE AND OBJECTIVE BOX
Patient is a 86y old  Male who presents with a chief complaint of diarrhea (25 Jun 2019 09:09)      SUBJECTIVE / OVERNIGHT EVENTS: No new complaints. Less diarrhea.  Review of Systems  chest pain no  palpitations no  sob no  nausea no  headache no    MEDICATIONS  (STANDING):  artificial tears (preservative free) Ophthalmic Solution 1 Drop(s) Both EYES every 2 hours  BACItracin   Ointment 1 Application(s) Topical two times a day  chlorhexidine 2% Cloths 1 Application(s) Topical daily  ergocalciferol 25714 Unit(s) Oral every week  heparin  Injectable 5000 Unit(s) SubCutaneous every 8 hours  multivitamin 1 Tablet(s) Oral daily  octreotide  Injectable 150 MICROGram(s) SubCutaneous four times a day  sodium bicarbonate  Infusion 0.084 mEq/kG/Hr (70 mL/Hr) IV Continuous <Continuous>    MEDICATIONS  (PRN):  acetaminophen   Tablet .. 650 milliGRAM(s) Oral every 6 hours PRN Temp greater or equal to 38C (100.4F), Moderate Pain (4 - 6)  loperamide 2 milliGRAM(s) Oral four times a day PRN Diarrhea      Vital Signs Last 24 Hrs  T(C): 36.8 (25 Jun 2019 07:27), Max: 37.2 (24 Jun 2019 19:01)  T(F): 98.3 (25 Jun 2019 07:27), Max: 99 (24 Jun 2019 19:01)  HR: 101 (25 Jun 2019 07:27) (94 - 101)  BP: 100/61 (25 Jun 2019 07:27) (92/54 - 100/62)  BP(mean): --  RR: 14 (25 Jun 2019 07:27) (14 - 18)  SpO2: 100% (25 Jun 2019 07:27) (96% - 100%)    PHYSICAL EXAM:  GENERAL: NAD  HEAD:  Atraumatic, Normocephalic, flushed  EYES: EOMI, PERRLA, conjunctiva with improving erythema   NECK: Supple, No JVD  CHEST/LUNG: Clear to auscultation bilaterally; No wheeze  HEART: Regular rate and rhythm; No murmurs, rubs, or gallops  ABDOMEN: Soft, Nontender, Nondistended; Bowel sounds present Rae  EXTREMITIES:  2+ Peripheral Pulses, No clubbing, cyanosis, or edema  PSYCH: AAOx3  NEUROLOGY: non-focal  SKIN: No rashes or lesions    LABS:                        7.8    9.2   )-----------( 261      ( 25 Jun 2019 06:35 )             23.1     06-25    123<L>  |  89<L>  |  69<H>  ----------------------------<  122<H>  3.9   |  21<L>  |  2.66<H>    Ca    8.9      25 Jun 2019 06:32    TPro  5.7<L>  /  Alb  3.0<L>  /  TBili  0.4  /  DBili  x   /  AST  41<H>  /  ALT  16  /  AlkPhos  85  06-25                RADIOLOGY & ADDITIONAL TESTS:    Imaging Personally Reviewed:    Consultant(s) Notes Reviewed:      Care Discussed with Consultants/Other Providers:

## 2019-06-25 NOTE — PROGRESS NOTE ADULT - PROBLEM SELECTOR PLAN 2
may need increase in octreotide if diarrhea does not improve  trial of  prn imodium  prn K supplementation

## 2019-06-25 NOTE — PHARMACOTHERAPY INTERVENTION NOTE - COMMENTS
Discussed discharge medication name, dose, frequency, and side effects.
Recommend discontinuing aztreonam since patient completed duration of therapy per ID note.

## 2019-06-25 NOTE — PROGRESS NOTE ADULT - PROBLEM SELECTOR PLAN 4
eladio ID input  abx d/c'd  cts to have calzada - urology input re. resumption of self catheterization

## 2019-06-25 NOTE — PROVIDER CONTACT NOTE (OTHER) - ACTION/TREATMENT ORDERED:
repeat v/s
NP to call urology team for further assessment
PA notified, ordered erythromycin bilat eyes.
monitor the site for bleeding.
receheck BP in 1 hour, CTM

## 2019-06-25 NOTE — PROVIDER CONTACT NOTE (OTHER) - ASSESSMENT
pt alert not in any distress
Pt AxO3, has swollen eye bilaterally. Redness and clear drainage. No c/o of pain.
Pt experiences pain upon inflation of catheter balloon
pt A&Ox3-4, other VSS. pt denies SOB, CP, pain. denies dizziness, syncope. pt tolerateing NS @ 75ml/hr
pt doesn't complain of pain, stable vitals.

## 2019-06-25 NOTE — PROGRESS NOTE ADULT - ASSESSMENT
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    Carcinoid syndrome.  - continue octreotide 150mg subQ QID to treat flushing and diarrhea.    - oncology follow.    Liver masses  - s/p Liver bx with metastatic neuroendocrine tumor.      Kidney mass  - pt has cyst as well as a 1.5 cm solid lesion in kidney --- he has coils in that area --- as per outside records, the lesion was felt to be benign.  - Oncology evalluation noted    Hypercalcemia.  - Patient noted to have mild hypercalcemia, may be related to suspected carcinoid tumor.  - Will continue IVF.    Hypokalemia  - supplement    CONSTANZA (acute kidney injury).   - Patient was noted here to have elevated SCr of 2.6, which is high compared to outpatient SCr of 1.91.  - CONSTANZA is likely pre-renal related to hypovolemia from excessive GI losses.     Urethral stricture  - urology evaluation noted. S/P cysto and stricture of urethra dilatation.  - Rae. TOV in am . Self cath requested by patient and wife. d/w urology and nephrology.     Lactate increased. Follow.     UTI  - off antibiotics. ID follow    Pericardial effusion.  - Currently, the patient is hemodynamically WNL. He denies palpitations and denies lightheadedness while lying supine.  - TTE repeat unchanged.  - Cardiology follow noted. No further Echo unless symptomatic.  - IVF  - if worse will need CCU evaluation.    Metabolic acidosis, normal anion gap (NAG).   - Patient found to have metabolic acidosis with normal anion gap, hyperchloremia likely due to GI losses.   - continue IVF.  - Follow up BMP.  - nephrology follow.    Prophylactic measure.  - VTE ppx with start heparin subQ    Conjunctival erythema  - artificial tears,   - Opthalmology evaluation    Activity: OOB with assistance, fall precaution  Diet: regular.  PT   DCP in progress to rehab.  d/w patient , wife at length  Phong Dash MD pager [phone]

## 2019-06-25 NOTE — PROGRESS NOTE ADULT - PROBLEM SELECTOR PLAN 1
cont sandostatin 4x/day - increase dose to 150 mcg - d/w wife - to titrate based on diarrhea - may need to increase dose - f/up on chromogranin levels  Biopsy (+) for Carcinoid  d/w Nutritionist to help with improved po intake

## 2019-06-26 ENCOUNTER — TRANSCRIPTION ENCOUNTER (OUTPATIENT)
Age: 84
End: 2019-06-26

## 2019-06-26 LAB
ALBUMIN SERPL ELPH-MCNC: 2.8 G/DL — LOW (ref 3.3–5)
ALP SERPL-CCNC: 80 U/L — SIGNIFICANT CHANGE UP (ref 40–120)
ALT FLD-CCNC: 15 U/L — SIGNIFICANT CHANGE UP (ref 10–45)
ANION GAP SERPL CALC-SCNC: 13 MMOL/L — SIGNIFICANT CHANGE UP (ref 5–17)
AST SERPL-CCNC: 40 U/L — SIGNIFICANT CHANGE UP (ref 10–40)
BILIRUB SERPL-MCNC: 0.5 MG/DL — SIGNIFICANT CHANGE UP (ref 0.2–1.2)
BLD GP AB SCN SERPL QL: NEGATIVE — SIGNIFICANT CHANGE UP
BUN SERPL-MCNC: 73 MG/DL — HIGH (ref 7–23)
CALCIUM SERPL-MCNC: 9 MG/DL — SIGNIFICANT CHANGE UP (ref 8.4–10.5)
CHLORIDE SERPL-SCNC: 92 MMOL/L — LOW (ref 96–108)
CO2 SERPL-SCNC: 22 MMOL/L — SIGNIFICANT CHANGE UP (ref 22–31)
CREAT SERPL-MCNC: 2.63 MG/DL — HIGH (ref 0.5–1.3)
GLUCOSE SERPL-MCNC: 117 MG/DL — HIGH (ref 70–99)
HCT VFR BLD CALC: 22.1 % — LOW (ref 39–50)
HGB BLD-MCNC: 7.6 G/DL — LOW (ref 13–17)
LACTATE SERPL-SCNC: 1.5 MMOL/L — SIGNIFICANT CHANGE UP (ref 0.7–2)
MCHC RBC-ENTMCNC: 34.1 PG — HIGH (ref 27–34)
MCHC RBC-ENTMCNC: 34.2 GM/DL — SIGNIFICANT CHANGE UP (ref 32–36)
MCV RBC AUTO: 99.7 FL — SIGNIFICANT CHANGE UP (ref 80–100)
PLATELET # BLD AUTO: 229 K/UL — SIGNIFICANT CHANGE UP (ref 150–400)
POTASSIUM SERPL-MCNC: 4.1 MMOL/L — SIGNIFICANT CHANGE UP (ref 3.5–5.3)
POTASSIUM SERPL-SCNC: 4.1 MMOL/L — SIGNIFICANT CHANGE UP (ref 3.5–5.3)
PROT SERPL-MCNC: 5.3 G/DL — LOW (ref 6–8.3)
RBC # BLD: 2.22 M/UL — LOW (ref 4.2–5.8)
RBC # FLD: 16.3 % — HIGH (ref 10.3–14.5)
RH IG SCN BLD-IMP: POSITIVE — SIGNIFICANT CHANGE UP
SODIUM SERPL-SCNC: 127 MMOL/L — LOW (ref 135–145)
WBC # BLD: 6.1 K/UL — SIGNIFICANT CHANGE UP (ref 3.8–10.5)
WBC # FLD AUTO: 6.1 K/UL — SIGNIFICANT CHANGE UP (ref 3.8–10.5)

## 2019-06-26 RX ORDER — OCTREOTIDE ACETATE 200 UG/ML
150 INJECTION, SOLUTION INTRAVENOUS; SUBCUTANEOUS
Qty: 1 | Refills: 0
Start: 2019-06-26 | End: 2019-07-09

## 2019-06-26 RX ORDER — BACITRACIN ZINC 500 UNIT/G
1 OINTMENT IN PACKET (EA) TOPICAL
Qty: 0 | Refills: 0 | DISCHARGE
Start: 2019-06-26

## 2019-06-26 RX ORDER — LOPERAMIDE HCL 2 MG
1 TABLET ORAL
Qty: 0 | Refills: 0 | DISCHARGE
Start: 2019-06-26

## 2019-06-26 RX ADMIN — Medication 1 DROP(S): at 06:22

## 2019-06-26 RX ADMIN — Medication 1 DROP(S): at 10:24

## 2019-06-26 RX ADMIN — Medication 1 APPLICATION(S): at 06:23

## 2019-06-26 RX ADMIN — Medication 1 DROP(S): at 04:05

## 2019-06-26 RX ADMIN — Medication 1 DROP(S): at 19:11

## 2019-06-26 RX ADMIN — Medication 1 DROP(S): at 08:05

## 2019-06-26 RX ADMIN — HEPARIN SODIUM 5000 UNIT(S): 5000 INJECTION INTRAVENOUS; SUBCUTANEOUS at 21:49

## 2019-06-26 RX ADMIN — Medication 1 DROP(S): at 21:47

## 2019-06-26 RX ADMIN — Medication 1 TABLET(S): at 11:46

## 2019-06-26 RX ADMIN — Medication 1 DROP(S): at 11:47

## 2019-06-26 RX ADMIN — OCTREOTIDE ACETATE 150 MICROGRAM(S): 200 INJECTION, SOLUTION INTRAVENOUS; SUBCUTANEOUS at 00:17

## 2019-06-26 RX ADMIN — OCTREOTIDE ACETATE 150 MICROGRAM(S): 200 INJECTION, SOLUTION INTRAVENOUS; SUBCUTANEOUS at 21:41

## 2019-06-26 RX ADMIN — OCTREOTIDE ACETATE 150 MICROGRAM(S): 200 INJECTION, SOLUTION INTRAVENOUS; SUBCUTANEOUS at 13:52

## 2019-06-26 RX ADMIN — OCTREOTIDE ACETATE 150 MICROGRAM(S): 200 INJECTION, SOLUTION INTRAVENOUS; SUBCUTANEOUS at 07:00

## 2019-06-26 RX ADMIN — HEPARIN SODIUM 5000 UNIT(S): 5000 INJECTION INTRAVENOUS; SUBCUTANEOUS at 13:55

## 2019-06-26 RX ADMIN — HEPARIN SODIUM 5000 UNIT(S): 5000 INJECTION INTRAVENOUS; SUBCUTANEOUS at 06:23

## 2019-06-26 RX ADMIN — Medication 2 MILLIGRAM(S): at 13:54

## 2019-06-26 RX ADMIN — Medication 1 DROP(S): at 16:45

## 2019-06-26 RX ADMIN — Medication 1 DROP(S): at 20:16

## 2019-06-26 RX ADMIN — Medication 1 DROP(S): at 00:18

## 2019-06-26 RX ADMIN — ERGOCALCIFEROL 50000 UNIT(S): 1.25 CAPSULE ORAL at 11:41

## 2019-06-26 RX ADMIN — CHLORHEXIDINE GLUCONATE 1 APPLICATION(S): 213 SOLUTION TOPICAL at 11:41

## 2019-06-26 RX ADMIN — Medication 1 APPLICATION(S): at 19:10

## 2019-06-26 RX ADMIN — Medication 1 DROP(S): at 14:44

## 2019-06-26 NOTE — DISCHARGE NOTE PROVIDER - PROVIDER TOKENS
PROVIDER:[TOKEN:[[id number]]],PROVIDER:[TOKEN:[[id number]]] PROVIDER:[TOKEN:[[id number]]],PROVIDER:[TOKEN:[[id number]]],PROVIDER:[TOKEN:[[id number]]]

## 2019-06-26 NOTE — DISCHARGE NOTE PROVIDER - CARE PROVIDERS DIRECT ADDRESSES
,DirectAddress_Unknown,DirectAddress_Unknown ,DirectAddress_Unknown,DirectAddress_Unknown,[email or web address]

## 2019-06-26 NOTE — CHART NOTE - NSCHARTNOTEFT_GEN_A_CORE
PT to receive 1 uprbc per Hem rec discussed with both patient and wife both agreeable   Consent signed per policy   discussed above with Dr Dash   Department of Medicine   LYNSEY Ibrahim [facility]-c [id number]

## 2019-06-26 NOTE — DISCHARGE NOTE PROVIDER - NSDCCPCAREPLAN_GEN_ALL_CORE_FT
PRINCIPAL DISCHARGE DIAGNOSIS  Diagnosis: Diarrhea  Assessment and Plan of Treatment: Treated with Octreotide   Hem/Onc following out patient PRINCIPAL DISCHARGE DIAGNOSIS  Diagnosis: Diarrhea  Assessment and Plan of Treatment: improved --Treated with Octreotide and imodium   Hem/Onc following out patient        SECONDARY DISCHARGE DIAGNOSES  Diagnosis: Noninflammatory pericardial effusion  Assessment and Plan of Treatment: - Otherwise, repeat echo as inpatient or outpatient in 1st week of july  Follow-up with Your cardiologist: DR. Dhruv Paz [phone]      Diagnosis: Carcinoid syndrome  Assessment and Plan of Treatment: Follow-up with your Oncologist for further treatment and monitoring    Diagnosis: Urinary retention with incomplete bladder emptying  Assessment and Plan of Treatment: Urethral stricture  - urology evaluation noted. S/P cysto and stricture of urethra dilatation.  - continue Self cath requested by patient and wife. PRINCIPAL DISCHARGE DIAGNOSIS  Diagnosis: Diarrhea  Assessment and Plan of Treatment: improved --Treated with Octreotide and imodium   Hem/Onc following out patient        SECONDARY DISCHARGE DIAGNOSES  Diagnosis: Hyponatremia  Assessment and Plan of Treatment: Contiue salt tabs as prescribed   Follow-up Nephrology ---acll for appointemnt    Diagnosis: Skin rash  Assessment and Plan of Treatment: Monitor for now   Keep skin clean and dry  Follow-up with Dr. Lerma or Dr. Cope [phone]    Diagnosis: Noninflammatory pericardial effusion  Assessment and Plan of Treatment: - Otherwise, repeat echo as inpatient or outpatient in 1st week of july  Follow-up with Your cardiologist: DR. Dhrvu Paz [phone]      Diagnosis: Carcinoid syndrome  Assessment and Plan of Treatment: Follow-up with your Oncologist for further treatment and monitoring    Diagnosis: Urinary retention with incomplete bladder emptying  Assessment and Plan of Treatment: Urethral stricture  - urology evaluation noted. S/P cysto and stricture of urethra dilatation.  - continue Self cath requested by patient and wife.

## 2019-06-26 NOTE — PROGRESS NOTE ADULT - SUBJECTIVE AND OBJECTIVE BOX
[facility]   [phone]  RENAL FOLLOW UP NOTE  --------------------------------------------------------------------------------  Chief Complaint:    24 hour events/subjective:    seen earlier. states only 3 bm yesterday     PAST HISTORY  --------------------------------------------------------------------------------  No significant changes to PMH, PSH, FHx, SHx, unless otherwise noted    ALLERGIES & MEDICATIONS  --------------------------------------------------------------------------------  Allergies    penicillin (Swelling)    Intolerances      Standing Inpatient Medications  artificial tears (preservative free) Ophthalmic Solution 1 Drop(s) Both EYES every 2 hours  BACItracin   Ointment 1 Application(s) Topical two times a day  chlorhexidine 2% Cloths 1 Application(s) Topical daily  ergocalciferol 08267 Unit(s) Oral every week  heparin  Injectable 5000 Unit(s) SubCutaneous every 8 hours  multivitamin 1 Tablet(s) Oral daily  octreotide  Injectable 150 MICROGram(s) SubCutaneous four times a day  sodium bicarbonate  Infusion 0.084 mEq/kG/Hr IV Continuous <Continuous>    PRN Inpatient Medications  acetaminophen   Tablet .. 650 milliGRAM(s) Oral every 6 hours PRN  loperamide 2 milliGRAM(s) Oral four times a day PRN      REVIEW OF SYSTEMS  --------------------------------------------------------------------------------    Gen: denies  fevers/chills,  CVS: denies chest pain/palpitations  Resp: denies SOB/Cough  GI: Denies N/V/Abd painm  : no dysuria     All other systems were reviewed and are negative, except as noted.    VITALS/PHYSICAL EXAM  --------------------------------------------------------------------------------  T(C): 36.8 (06-26-19 @ 19:30), Max: 37 (06-26-19 @ 00:22)  HR: 87 (06-26-19 @ 19:30) (87 - 98)  BP: 103/64 (06-26-19 @ 19:30) (84/51 - 103/64)  RR: 20 (06-26-19 @ 19:30) (18 - 20)  SpO2: 95% (06-26-19 @ 19:30) (95% - 97%)  Wt(kg): --        06-25-19 @ 07:01  -  06-26-19 @ 07:00  --------------------------------------------------------  IN: 420 mL / OUT: 375 mL / NET: 45 mL    06-26-19 @ 07:01  -  06-26-19 @ 20:09  --------------------------------------------------------  IN: 480 mL / OUT: 0 mL / NET: 480 mL      Physical Exam:  	  Gen: Non toxic comfortable appearing  thin muscle wasting   	no jvd ,  	Pulm: decrease bs  no rales or ronchi or wheezing  	CV: RRR, S1S2; no rub  	Abd: +BS, soft, nontender/nondistended  	: No suprapubic tenderness  	UE: Warm, no cyanosis  no clubbing,  no edema  	LE: Warm, no cyanosis  no clubbing, 3+  edema  	Neuro: alert and oriented.  Cheesh-Na speech slow but coherent     	    LABS/STUDIES  --------------------------------------------------------------------------------              7.6    6.1   >-----------<  229      [06-26-19 @ 07:37]              22.1     127  |  92  |  73  ----------------------------<  117      [06-26-19 @ 07:37]  4.1   |  22  |  2.63        Ca     9.0     [06-26-19 @ 07:37]    TPro  5.3  /  Alb  2.8  /  TBili  0.5  /  DBili  x   /  AST  40  /  ALT  15  /  AlkPhos  80  [06-26-19 @ 07:37]          Creatinine Trend:  SCr 2.63 [06-26 @ 07:37]  SCr 2.66 [06-25 @ 06:32]  SCr 2.62 [06-24 @ 07:15]  SCr 2.57 [06-23 @ 07:50]  SCr 2.43 [06-22 @ 07:36]              Urinalysis - [06-12-19 @ 09:34]      Color Yellow / Appearance Slightly Turbid / SG 1.019 / pH 6.0      Gluc Negative / Ketone Negative  / Bili Negative / Urobili <2 mg/dL       Blood Trace / Protein 100 mg/dL / Leuk Est Large / Nitrite Positive      RBC 6 /  / Hyaline 0 / Gran 5 / Sq Epi  / Non Sq Epi 5 / Bacteria TNTC      PTH -- (Ca 10.4)      [06-11-19 @ 09:51]   15  Vitamin D (25OH) 9.7      [06-11-19 @ 10:02]

## 2019-06-26 NOTE — PROGRESS NOTE ADULT - PROBLEM SELECTOR PLAN 1
cont sandostatin 4x/day - increase dose to 150 mcg - d/w wife - to titrate based on diarrhea - may need to increase dose - f/up on chromogranin levels  Biopsy (+) for Carcinoid  Nutrition support  PRBC trannsfusion cont sandostatin 4x/day - increase dose to 150 mcg - d/w wife - to titrate based on diarrhea - may need to increase dose -repeat chromogranin level mildly decreased  Biopsy (+) for Carcinoid  Nutrition support  PRBC transfusion today  D/w pharmacy and cont sandostatin 4x/day - increase dose to 150 mcg - d/w wife - to titrate based on diarrhea-repeat chromogranin level mildly decreased  Biopsy (+) for Carcinoid  Nutrition support  PRBC transfusion today  D/w pharmacy, wife,  - likely will schedule appt with our office for LAR administration on day of d/c

## 2019-06-26 NOTE — PROGRESS NOTE ADULT - ASSESSMENT
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   concern of worsening edema  borderline low bp and pericardial effusion preventing diuretic use   urinary retention  intermittent cath  one liter fluid restrict   prbc for anemia

## 2019-06-26 NOTE — PROGRESS NOTE ADULT - SUBJECTIVE AND OBJECTIVE BOX
Patient is a 86y old  Male who presents with a chief complaint of diarrhea (26 Jun 2019 20:09)      SUBJECTIVE / OVERNIGHT EVENTS: Comfortable without new complaints. Wife at bedside.  Review of Systems  chest pain no  palpitations no  sob no  nausea no  headache no    MEDICATIONS  (STANDING):  artificial tears (preservative free) Ophthalmic Solution 1 Drop(s) Both EYES every 2 hours  BACItracin   Ointment 1 Application(s) Topical two times a day  chlorhexidine 2% Cloths 1 Application(s) Topical daily  ergocalciferol 59217 Unit(s) Oral every week  heparin  Injectable 5000 Unit(s) SubCutaneous every 8 hours  multivitamin 1 Tablet(s) Oral daily  octreotide  Injectable 150 MICROGram(s) SubCutaneous four times a day  sodium bicarbonate  Infusion 0.084 mEq/kG/Hr (70 mL/Hr) IV Continuous <Continuous>    MEDICATIONS  (PRN):  acetaminophen   Tablet .. 650 milliGRAM(s) Oral every 6 hours PRN Temp greater or equal to 38C (100.4F), Moderate Pain (4 - 6)  loperamide 2 milliGRAM(s) Oral four times a day PRN Diarrhea      Vital Signs Last 24 Hrs  T(C): 36.8 (26 Jun 2019 19:30), Max: 37 (26 Jun 2019 00:22)  T(F): 98.3 (26 Jun 2019 19:30), Max: 98.6 (26 Jun 2019 00:22)  HR: 87 (26 Jun 2019 19:30) (87 - 98)  BP: 103/64 (26 Jun 2019 19:30) (84/51 - 103/64)  BP(mean): --  RR: 20 (26 Jun 2019 19:30) (18 - 20)  SpO2: 95% (26 Jun 2019 19:30) (95% - 97%)    PHYSICAL EXAM:  GENERAL: NAD  HEAD:  Atraumatic, Normocephalic Flushed  EYES: EOMI, PERRLA, conjunctiva and sclera clear  NECK: Supple, No JVD  CHEST/LUNG: Clear to auscultation bilaterally; No wheeze  HEART: Regular rate and rhythm; No murmurs, rubs, or gallops  ABDOMEN: Soft, Nontender, Nondistended; Bowel sounds present  EXTREMITIES:  2+ Peripheral Pulses, No clubbing, cyanosis, or edema  PSYCH: AAOx3   NEUROLOGY: non-focal  SKIN: No rashes or lesions    LABS:                        7.6    6.1   )-----------( 229      ( 26 Jun 2019 07:37 )             22.1     06-26    127<L>  |  92<L>  |  73<H>  ----------------------------<  117<H>  4.1   |  22  |  2.63<H>    Ca    9.0      26 Jun 2019 07:37    TPro  5.3<L>  /  Alb  2.8<L>  /  TBili  0.5  /  DBili  x   /  AST  40  /  ALT  15  /  AlkPhos  80  06-26                RADIOLOGY & ADDITIONAL TESTS:    Imaging Personally Reviewed:    Consultant(s) Notes Reviewed:      Care Discussed with Consultants/Other Providers:

## 2019-06-26 NOTE — PROGRESS NOTE ADULT - PROBLEM SELECTOR PLAN 2
may need increase in octreotide if diarrhea does not improve  trial of  prn imodium  prn K supplementation trial of  prn imodium  prn K supplementation trial of  prn imodium  prn K supplementation  check cbc, cmp weekly - labs to dr. Morse and Dr. Houston

## 2019-06-26 NOTE — DISCHARGE NOTE PROVIDER - NSDCCAREPROVSEEN_GEN_ALL_CORE_FT
Phong Dash  [facility] Medicine, Advance PracticeTeam  Candida Phelps Nikhil

## 2019-06-26 NOTE — DISCHARGE NOTE PROVIDER - CARE PROVIDER_API CALL
Candida Phelps (DO)  Nephrology  [address] [facility] [address]  [place] [address]  Phone: [phone]  Fax: [phone]  Follow Up Time:     Claus Houston (MD)  Internal Medicine; Medical Oncology  [address] [facility] [address]  [place] [address]  Phone: [phone]  Fax: [phone]  Follow Up Time: Candida Phelps (DO)  Nephrology  [address] [facility] [address]  [place] [address]  Phone: [phone]  Fax: [phone]  Follow Up Time:     Claus Houston (MD)  Internal Medicine; Medical Oncology  [address] [facility] [address]  [place] [address]  Phone: [phone]  Fax: [phone]  Follow Up Time: Candida Phelps (DO)  Nephrology  [address] [facility] [address]  [place] [address]  Phone: [phone]  Fax: [phone]  Follow Up Time:     Claus Houston)  Internal Medicine; Medical Oncology  [address] [facility]  [place] [address]  Phone: [phone]  Fax: [phone]  Follow Up Time:     Leonard Lerma)  Dermatology; Dermatopathology  [address] [place] [address]  Phone: [phone]  Fax: [phone]  Follow Up Time:

## 2019-06-26 NOTE — CHART NOTE - NSCHARTNOTEFT_GEN_A_CORE
Nutrition Follow Up Note  Patient seen for: Malnutrition Follow Up     Interim events noted. Pt c carcinoid syndrome, +diarrhea (has been on Octreotide, Imodium). Pt c hyponatremia, as per Nephrology, on fluid restriction.     Source: pt, RN, friend at bedside,     Diet : regular, 1000ml fluid restriction, Ensure Enlive x 1 daily     Patient reports: appetite improving, is consuming some of his meals- has hot cereal and yogurt for breakfast. Reports he has been taking Ensure Enlive at least once daily, at times more, at this time only ordered for one daily due to fluid restriction as per NP yesterday, as per , Ensure Enlive may be increased to two daily now. Pt reports diarrhea is improving, has not had a BM yet today. Discussed fluid restriction c pt and family. Discussed importance of PO intake. Pt reports he did like the high protein gelatin-discussed it will be counted as part of his fluid restriction. Discussed nutrient dense snacks. Spouse reports pt previously was taking in a lot of water and ginger ale.        Daily Weight: 6/12: 145.7->6/19: 168.4 pounds- noted pt c +3 otto. leg edema, questionable accuracy of wt     Pertinent Medications: MEDICATIONS  (STANDING):  artificial tears (preservative free) Ophthalmic Solution 1 Drop(s) Both EYES every 2 hours  BACItracin   Ointment 1 Application(s) Topical two times a day  chlorhexidine 2% Cloths 1 Application(s) Topical daily  ergocalciferol 85752 Unit(s) Oral every week  heparin  Injectable 5000 Unit(s) SubCutaneous every 8 hours  multivitamin 1 Tablet(s) Oral daily  octreotide  Injectable 150 MICROGram(s) SubCutaneous four times a day  sodium bicarbonate  Infusion 0.084 mEq/kG/Hr (70 mL/Hr) IV Continuous <Continuous>    MEDICATIONS  (PRN):  acetaminophen   Tablet .. 650 milliGRAM(s) Oral every 6 hours PRN Temp greater or equal to 38C (100.4F), Moderate Pain (4 - 6)  loperamide 2 milliGRAM(s) Oral four times a day PRN Diarrhea    Pertinent Labs: 06-26 @ 07:37: Na 127<L>, BUN 73<H>, Cr 2.63<H>, <H>, K+ 4.1, Phos --, Mg --, Alk Phos 80, ALT/SGPT 15, AST/SGOT 40, HbA1c --    Finger Sticks: None pertinent to address at this time.       Skin per nursing documentation: intact  Edema: +3 otto. leg edema     Estimated Needs:   [x] no change since previous assessment    Previous Nutrition Diagnosis: severe malnutrition  Nutrition Diagnosis continues at this time, addressed c improving PO intake     New Nutrition Diagnosis: None pertinent to address at this time.       Recommend  1) Continue c current diet.   2) Fluid restriction as per medical team.   3) Continue w/ Ensure Enlive- increase to 2 daily.   4) Encouraged PO intake-small, frequent meals and nutrient dense snacks. In house, encouraged pt to order nutrient dense snacks c meals to consume in between meals to mimic small, frequent meals. Discussed protein rich foods available on menu. Discussed consuming protein first at meal times and then eating the other foods.   5) RD to provide food preferences-nutrient dense foods.     Monitoring and Evaluation:     Continue to monitor Nutritional intake, Tolerance to diet prescription, weights, labs, skin integrity    RD remains available upon request and will follow up per protocol  Leila Stout, MS, RD, , [facility] #[id number]

## 2019-06-26 NOTE — DISCHARGE NOTE PROVIDER - HOSPITAL COURSE
87 yo M with PMH of carcinoid syndrome s/p left lower lobe resection bladder disease requiring intermittent straight cath    presents to the ED with complaint of persistent diarrhea, fatigue, decreased po intake  and weight loss    Problem: Carcinoid syndrome.  Plan: cont sandostatin 4x/day - increase dose to 150 mcg - d/w wife - to titrate based on diarrhea-repeat chromogranin level mildly decreased    Biopsy (+) for Carcinoid    Nutrition support    PRBC transfusion today    D/w pharmacy, wife,  - likely will schedule appt with our office for LAR administration on day of d/c.    check cbc, cmp weekly - labs to dr. Morse and Dr. Houston.    DCP with med rec discussed with Dr Dash PT medically cleared for DC to rehab     Follow up with 85 yo M with PMH of carcinoid syndrome s/p left lower lobe resection bladder disease requiring intermittent straight cath    presents to the ED with complaint of persistent diarrhea, fatigue, decreased po intake  and weight loss    Problem: Carcinoid syndrome.  Plan: cont sandostatin 4x/day - increase dose to 150 mcg - d/w wife - to titrate based on diarrhea-repeat chromogranin level mildly decreased    Biopsy (+) for Carcinoid    Nutrition support    PRBC transfusion today    D/w pharmacy, wife,  - likely will schedule appt with our office for LAR administration on day of d/c.    check cbc, cmp weekly - labs to dr. Morse and Dr. Houston.    DCP with med rec discussed with Dr Dash PT medically cleared for DC to rehab

## 2019-06-26 NOTE — PROGRESS NOTE ADULT - ASSESSMENT
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    Carcinoid syndrome.  - continue octreotide 150mg subQ QID to treat flushing and diarrhea.    - oncology follow. For long acting octreotide as per Oncology.    Liver masses  - s/p Liver bx with metastatic neuroendocrine tumor.      Kidney mass  - pt has cyst as well as a 1.5 cm solid lesion in kidney --- he has coils in that area --- as per outside records, the lesion was felt to be benign.  - Oncology evaluation noted    Hypercalcemia.  - Patient noted to have mild hypercalcemia, may be related to suspected carcinoid tumor.  - Will continue IVF.    Hypokalemia  - supplement    CONSTANZA (acute kidney injury).   - Patient was noted here to have elevated SCr of 2.6, which is high compared to outpatient SCr of 1.91.  - CONSTANZA is likely pre-renal related to hypovolemia from excessive GI losses.     Urethral stricture  - urology evaluation noted. S/P cysto and stricture of urethra dilatation.  - Rae. TOV in am . Self cath requested by patient and wife. d/w urology and nephrology.     Lactate increased. Follow.     UTI  - off antibiotics. ID follow    Pericardial effusion.  - Currently, the patient is hemodynamically WNL. He denies palpitations and denies lightheadedness while lying supine.  - TTE repeat unchanged.  - Cardiology follow noted. No further Echo unless symptomatic.  - IVF  - if worse will need CCU evaluation.    Metabolic acidosis, normal anion gap (NAG).   - Patient found to have metabolic acidosis with normal anion gap, hyperchloremia likely due to GI losses.   - continue IVF.  - Follow up BMP.  - nephrology follow.    Prophylactic measure.  - VTE ppx with start heparin subQ    Conjunctival erythema  - artificial tears,   - Opthalmology evaluation    Anemia  - Tx 1 PRBC    Activity: OOB with assistance, fall precaution  Diet: regular.  PT   DCP in progress to rehab.  d/w patient , wife at length  Phong Dash MD pager [phone]

## 2019-06-27 LAB
ALBUMIN SERPL ELPH-MCNC: 3 G/DL — LOW (ref 3.3–5)
ALP SERPL-CCNC: 80 U/L — SIGNIFICANT CHANGE UP (ref 40–120)
ALT FLD-CCNC: 15 U/L — SIGNIFICANT CHANGE UP (ref 10–45)
ANION GAP SERPL CALC-SCNC: 12 MMOL/L — SIGNIFICANT CHANGE UP (ref 5–17)
AST SERPL-CCNC: 37 U/L — SIGNIFICANT CHANGE UP (ref 10–40)
BILIRUB SERPL-MCNC: 0.8 MG/DL — SIGNIFICANT CHANGE UP (ref 0.2–1.2)
BUN SERPL-MCNC: 72 MG/DL — HIGH (ref 7–23)
CALCIUM SERPL-MCNC: 8.8 MG/DL — SIGNIFICANT CHANGE UP (ref 8.4–10.5)
CHLORIDE SERPL-SCNC: 91 MMOL/L — LOW (ref 96–108)
CO2 SERPL-SCNC: 20 MMOL/L — LOW (ref 22–31)
CREAT SERPL-MCNC: 2.57 MG/DL — HIGH (ref 0.5–1.3)
GLUCOSE SERPL-MCNC: 118 MG/DL — HIGH (ref 70–99)
HCT VFR BLD CALC: 25.4 % — LOW (ref 39–50)
HGB BLD-MCNC: 8.8 G/DL — LOW (ref 13–17)
LACTATE SERPL-SCNC: 1.4 MMOL/L — SIGNIFICANT CHANGE UP (ref 0.7–2)
MCHC RBC-ENTMCNC: 33.9 PG — SIGNIFICANT CHANGE UP (ref 27–34)
MCHC RBC-ENTMCNC: 34.6 GM/DL — SIGNIFICANT CHANGE UP (ref 32–36)
MCV RBC AUTO: 98.2 FL — SIGNIFICANT CHANGE UP (ref 80–100)
PLATELET # BLD AUTO: 226 K/UL — SIGNIFICANT CHANGE UP (ref 150–400)
POTASSIUM SERPL-MCNC: 3.9 MMOL/L — SIGNIFICANT CHANGE UP (ref 3.5–5.3)
POTASSIUM SERPL-SCNC: 3.9 MMOL/L — SIGNIFICANT CHANGE UP (ref 3.5–5.3)
PROT SERPL-MCNC: 5.8 G/DL — LOW (ref 6–8.3)
RBC # BLD: 2.59 M/UL — LOW (ref 4.2–5.8)
RBC # FLD: 16.7 % — HIGH (ref 10.3–14.5)
SODIUM SERPL-SCNC: 123 MMOL/L — LOW (ref 135–145)
WBC # BLD: 5.8 K/UL — SIGNIFICANT CHANGE UP (ref 3.8–10.5)
WBC # FLD AUTO: 5.8 K/UL — SIGNIFICANT CHANGE UP (ref 3.8–10.5)

## 2019-06-27 PROCEDURE — 93308 TTE F-UP OR LMTD: CPT | Mod: 26

## 2019-06-27 PROCEDURE — 93321 DOPPLER ECHO F-UP/LMTD STD: CPT | Mod: 26

## 2019-06-27 PROCEDURE — 99233 SBSQ HOSP IP/OBS HIGH 50: CPT

## 2019-06-27 RX ORDER — SODIUM CHLORIDE 9 MG/ML
500 INJECTION INTRAMUSCULAR; INTRAVENOUS; SUBCUTANEOUS ONCE
Refills: 0 | Status: COMPLETED | OUTPATIENT
Start: 2019-06-27 | End: 2019-06-27

## 2019-06-27 RX ORDER — SODIUM CHLORIDE 9 MG/ML
2 INJECTION INTRAMUSCULAR; INTRAVENOUS; SUBCUTANEOUS DAILY
Refills: 0 | Status: DISCONTINUED | OUTPATIENT
Start: 2019-06-27 | End: 2019-06-28

## 2019-06-27 RX ADMIN — Medication 1 APPLICATION(S): at 18:21

## 2019-06-27 RX ADMIN — Medication 1 TABLET(S): at 11:25

## 2019-06-27 RX ADMIN — Medication 1 APPLICATION(S): at 06:59

## 2019-06-27 RX ADMIN — Medication 1 DROP(S): at 21:01

## 2019-06-27 RX ADMIN — OCTREOTIDE ACETATE 150 MICROGRAM(S): 200 INJECTION, SOLUTION INTRAVENOUS; SUBCUTANEOUS at 13:40

## 2019-06-27 RX ADMIN — Medication 1 DROP(S): at 09:01

## 2019-06-27 RX ADMIN — Medication 1 DROP(S): at 13:42

## 2019-06-27 RX ADMIN — Medication 1 DROP(S): at 00:52

## 2019-06-27 RX ADMIN — HEPARIN SODIUM 5000 UNIT(S): 5000 INJECTION INTRAVENOUS; SUBCUTANEOUS at 13:41

## 2019-06-27 RX ADMIN — Medication 1 DROP(S): at 11:08

## 2019-06-27 RX ADMIN — HEPARIN SODIUM 5000 UNIT(S): 5000 INJECTION INTRAVENOUS; SUBCUTANEOUS at 07:00

## 2019-06-27 RX ADMIN — OCTREOTIDE ACETATE 150 MICROGRAM(S): 200 INJECTION, SOLUTION INTRAVENOUS; SUBCUTANEOUS at 00:53

## 2019-06-27 RX ADMIN — Medication 1 DROP(S): at 06:58

## 2019-06-27 RX ADMIN — Medication 1 DROP(S): at 10:11

## 2019-06-27 RX ADMIN — Medication 1 DROP(S): at 16:19

## 2019-06-27 RX ADMIN — Medication 1 DROP(S): at 18:21

## 2019-06-27 RX ADMIN — Medication 2 MILLIGRAM(S): at 18:22

## 2019-06-27 RX ADMIN — Medication 2 MILLIGRAM(S): at 13:41

## 2019-06-27 RX ADMIN — HEPARIN SODIUM 5000 UNIT(S): 5000 INJECTION INTRAVENOUS; SUBCUTANEOUS at 21:03

## 2019-06-27 RX ADMIN — OCTREOTIDE ACETATE 150 MICROGRAM(S): 200 INJECTION, SOLUTION INTRAVENOUS; SUBCUTANEOUS at 07:00

## 2019-06-27 RX ADMIN — OCTREOTIDE ACETATE 150 MICROGRAM(S): 200 INJECTION, SOLUTION INTRAVENOUS; SUBCUTANEOUS at 18:15

## 2019-06-27 RX ADMIN — CHLORHEXIDINE GLUCONATE 1 APPLICATION(S): 213 SOLUTION TOPICAL at 11:08

## 2019-06-27 RX ADMIN — SODIUM CHLORIDE 250 MILLILITER(S): 9 INJECTION INTRAMUSCULAR; INTRAVENOUS; SUBCUTANEOUS at 17:08

## 2019-06-27 NOTE — PROGRESS NOTE ADULT - SUBJECTIVE AND OBJECTIVE BOX
Consulting: Winsome  Reason: Pericardial Effusion  No events overnight. Denies CP, SOB, palp, dizziness.      MEDICATIONS:  heparin  Injectable 5000 Unit(s) SubCutaneous every 8 hours  aztreonam  IVPB 1000 milliGRAM(s) IV Intermittent every 12 hours  aztreonam  IVPB      acetaminophen   Tablet .. 650 milliGRAM(s) Oral every 6 hours PRN  octreotide  Injectable 100 MICROGram(s) SubCutaneous four times a day  ergocalciferol 12485 Unit(s) Oral every week  multivitamin 1 Tablet(s) Oral daily  sodium bicarbonate  Infusion 0.084 mEq/kG/Hr IV Continuous <Continuous>  sodium chloride 0.9% Bolus 250 milliLiter(s) IV Bolus once      REVIEW OF SYSTEMS:  CONSTITUTIONAL: No weakness, fevers or chills  EYES/ENT: No visual changes;  No dysphagia  RESPIRATORY: No cough, wheezing, hemoptysis; No shortness of breath  CARDIOVASCULAR: No chest pain or palpitations; No lower extremity edema  GASTROINTESTINAL: No abdominal or epigastric pain. No nausea, vomiting, or hematemesis, +diarrhea  GENITOURINARY: No dysuria, frequency or hematuria  NEUROLOGICAL: No numbness or weakness  SKIN: No itching, burning, rashes, or lesions   HEME: No bleeding or bruising  MSK: No joint pains or muscle pains  All other review of systems is negative unless indicated above.      PHYSICAL EXAM:  T(C): 36.8 (06-17-19 @ 09:23), Max: 37 (06-17-19 @ 04:20)  HR: 86 (06-17-19 @ 09:23) (86 - 113)  BP: 82/50 (06-17-19 @ 09:22) (82/50 - 104/61)  RR: 18 (06-17-19 @ 09:23) (17 - 18)  SpO2: 98% (06-17-19 @ 09:23) (95% - 98%)      Appearance: Normal, flushed skin  HEENT:   Normal oral mucosa  Cardiovascular: Normal S1 S2, No JVD, No murmurs, No edema  Respiratory: Lungs clear to auscultation	  Psychiatry: A & O x 3, Mood & affect appropriate  Gastrointestinal:  Soft, Non-tender, + BS	  Skin: No rashes, No ecchymoses, No cyanosis	  Neurologic: Non-focal  Extremities: Normal range of motion, No clubbing, cyanosis      I&O's Summary    16 Jun 2019 07:01  -  17 Jun 2019 07:00  --------------------------------------------------------  IN: 1550 mL / OUT: 456 mL / NET: 1094 mL    17 Jun 2019 07:01  -  17 Jun 2019 12:52  --------------------------------------------------------  IN: 0 mL / OUT: 2 mL / NET: -2 mL      LABS:	 	Labs Personally Reviewed 6/27/2019      CBC Full  -  ( 17 Jun 2019 09:17 )  WBC Count : 8.16 K/uL  Hemoglobin : 8.6 g/dL  Hematocrit : 27.6 %  Platelet Count - Automated : 177 K/uL      06-17  134<L>  |  110<H>  |  61<H>  ----------------------------<  128<H>  3.5   |  13<L>  |  2.85<H>    06-16  133<L>  |  110<H>  |  59<H>  ----------------------------<  126<H>  3.3<L>   |  12<L>  |  2.85<H>    Ca    9.3      17 Jun 2019 07:26  Ca    9.4      16 Jun 2019 06:42    Phos  2.6     06-17  Mg     2.3     06-17      ASSESSMENT/PLAN: 	  86yoM with PMH of carcinoid syndrome s/p left lower lobe resection, bladder disease requiring intermittent straight cath.  Presented with persistent diarrhea and weight loss and generalized weakness, undergoing treatment for carcinoid.  Now with medium sized pericardial effusion. Patient continues to have diarrhea.    REC:  - If hypotensive or tachycardic, would repeat TTE   - Otherwise, repeat echo as inpatient or outpatient in 1st week of july    Dhruv Paz

## 2019-06-27 NOTE — PROGRESS NOTE ADULT - PROBLEM SELECTOR PLAN 1
cont sandostatin 4x/day - increase dose to 150 mcg - d/w wife - to titrate based on diarrhea-repeat chromogranin level mildly decreased  Biopsy (+) for Carcinoid  Nutrition support  PRBC transfusion today  D/w pharmacy, wife,  - likely will schedule appt with our office for LAR administration on day of d/c cont sandostatin 4x/day - increase dose to 150 mcg - d/w wife - to titrate based on diarrhea-repeat chromogranin level mildly decreased  Biopsy (+) for Carcinoid  Nutrition support  s/p PRBC transfusion  NH to appt with our office for LAR administration on day of d/c

## 2019-06-27 NOTE — PROGRESS NOTE ADULT - ASSESSMENT
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    Carcinoid syndrome.  - continue octreotide 150mg subQ QID to treat flushing and diarrhea.    - oncology follow. For long acting octreotide as per Oncology.    Liver masses  - s/p Liver bx with metastatic neuroendocrine tumor.      Kidney mass  - pt has cyst as well as a 1.5 cm solid lesion in kidney --- he has coils in that area --- as per outside records, the lesion was felt to be benign.  - Oncology evaluation noted    Hypercalcemia improving   - Patient noted to have mild hypercalcemia, may be related to suspected carcinoid tumor.    Hypokalemia  - supplement    CONSTANZA (acute kidney injury).   - Patient was noted here to have elevated SCr of 2.6, which is high compared to outpatient SCr of 1.91.  - CONSTANZA is likely pre-renal related to hypovolemia from excessive GI losses.   - nephrology follow    Hyponatremia  - follow    Urethral stricture  - urology evaluation noted. S/P cysto and stricture of urethra dilatation.  - Rae. TOV in am . Self cath requested by patient and wife. d/w urology and nephrology.     UTI  - off antibiotics. ID follow    Pericardial effusion.  - Currently, the patient is hemodynamically WNL. He denies palpitations and denies lightheadedness while lying supine.  - TTE repeat unchanged.  - Cardiology follow noted. No further Echo unless symptomatic.  - follow with cardiology    Metabolic acidosis, normal anion gap (NAG).   - Patient found to have metabolic acidosis with normal anion gap, hyperchloremia likely due to GI losses.   - Follow up BMP.  - nephrology follow.    Prophylactic measure.  - VTE ppx with start heparin subQ    Conjunctival erythema  - artificial tears,   - Opthalmology evaluation     Anemia  - s/p Tx 1 PRBC    Activity: OOB with assistance, fall precaution  Diet: regular.  PT   DCP in progress to rehab.  d/w patient , wife at length  Phong Dash MD pager [phone]

## 2019-06-27 NOTE — PROGRESS NOTE ADULT - PROBLEM SELECTOR PLAN 2
trial of  prn imodium  prn K supplementation  check cbc, cmp weekly - labs to dr. Morse and Dr. Houston trial of  prn imodium - encouraged patient to ask for imodium  prn K supplementation  check cbc, cmp weekly - labs to dr. Morse and Dr. Rosemarie Salazar decreased - hold up on rehab d/c  ct fluid retriction

## 2019-06-27 NOTE — PROGRESS NOTE ADULT - SUBJECTIVE AND OBJECTIVE BOX
Patient is a 86y old  Male who presents with a chief complaint of diarrhea (20 Jun 2019 23:28)       Pt is seen and examined  pt is awake and sitting in chair  diarrhea persists  No cp, worsening sob  anorexia better  on fluid restriction  no headache  No eye discomfort      REVIEW OF SYSTEMS:    CONSTITUTIONAL: No fevers or chills.  weakness +  EYES/ENT: No visual changes;  No vertigo or throat pain   NECK: No pain or stiffness  RESPIRATORY: No cough, wheezing, hemoptysis; No shortness of breath  CARDIOVASCULAR: No chest pain or palpitations  GASTROINTESTINAL: No abdominal or epigastric pain. No nausea, vomiting, or hematemesis; Less diarrhea, no constipation. No melena or hematochezia.  GENITOURINARY: No dysuria, frequency or hematuria  NEUROLOGICAL: No numbness or weakness  SKIN: No itching, burning, rashes, or lesions . flushing +    PHYSICAL EXAM  General: adult in NAD  HEENT: clear oropharynx, anicteric sclera, b/l eye swelling + L > R  Neck: supple  CV: normal S1/S2 with (+) murmur, no rubs or gallops  Lungs: positive air movement b/l ant lungs,clear to auscultation, no wheezes, no rales  Abdomen: soft non-tender non-distended, no hepatosplenomegaly  Ext: no clubbing cyanosis, 1+ edema  Skin: no rashes and no petechiae  Neuro: alert and oriented X 4, no focal deficits        MEDICATIONS  (STANDING):  artificial tears (preservative free) Ophthalmic Solution 1 Drop(s) Both EYES every 2 hours  BACItracin   Ointment 1 Application(s) Topical two times a day  chlorhexidine 2% Cloths 1 Application(s) Topical daily  ergocalciferol 83317 Unit(s) Oral every week  heparin  Injectable 5000 Unit(s) SubCutaneous every 8 hours  multivitamin 1 Tablet(s) Oral daily  octreotide  Injectable 150 MICROGram(s) SubCutaneous four times a day  sodium bicarbonate  Infusion 0.084 mEq/kG/Hr (70 mL/Hr) IV Continuous <Continuous>    MEDICATIONS  (PRN):  acetaminophen   Tablet .. 650 milliGRAM(s) Oral every 6 hours PRN Temp greater or equal to 38C (100.4F), Moderate Pain (4 - 6)  loperamide 2 milliGRAM(s) Oral four times a day PRN Diarrhea      Allergies    penicillin (Swelling)    Intolerances        Vital Signs Last 24 Hrs  T(C): 36.7 (27 Jun 2019 08:06), Max: 37 (26 Jun 2019 16:30)  T(F): 98.1 (27 Jun 2019 08:06), Max: 98.6 (26 Jun 2019 16:30)  HR: 91 (27 Jun 2019 08:06) (86 - 98)  BP: 108/66 (27 Jun 2019 08:06) (84/51 - 108/66)  BP(mean): --  RR: 18 (27 Jun 2019 08:06) (18 - 20)  SpO2: 98% (27 Jun 2019 08:06) (95% - 98%)      LABS:                          8.8    5.8   )-----------( 226      ( 27 Jun 2019 07:13 )             25.4         Mean Cell Volume : 98.2 fl  Mean Cell Hemoglobin : 33.9 pg  Mean Cell Hemoglobin Concentration : 34.6 gm/dL    06-27    123<L>  |  91<L>  |  72<H>  ----------------------------<  118<H>  3.9   |  20<L>  |  2.57<H>    Ca    8.8      27 Jun 2019 07:13    TPro  5.8<L>  /  Alb  3.0<L>  /  TBili  0.8  /  DBili  x   /  AST  37  /  ALT  15  /  AlkPhos  80  06-27                      Chromogranin A (06.23.19 @ 11:57)    Chromogranin A: [id number]      Chromogranin A: 25250    Cytopathology - Non Gyn Report (06.13.19 @ 18:00)    Cytopathology - Non Gyn Report:   ACCESSION No:  [id number]    MIKE HARDY                      2        Cytopathology Addendum Report          Addendum  Immunostains for TTF1 and CX2: negative    In summary:  NO CHANGE OF THE DIAGNOSIS.    Verified by: Kedar Jimenez M.D.  (Electronic Signature)  Reported on: 06/19/19 13:35 EDT, [address] [place] [address]  [id number]  _________________________________________________________________          Addendum  Immunostains results are as follows:  Synaptophysin, chromogranin, CD56: all are positive  Ki67: approximately 5%  Mitotic count:: <  than 2 in 10 HPF.    Based on the Ki67 labaling index the neoplasm is classified as :  Well differentiated neuroendocrine tumor, grade II.    Note:  This case was reviewed for  with GI pathology  staff  who concur(s) with the diagnosis on  06/18/19.    Verified by: Kedar Jimenez M.D.  (Electronic Signature)  Reported on: 06/18/19 10:53 EDT, [address], [place]  [address]  _________________________________________________________________      Cytopathology Report      Final Diagnosis  LIVER, CORE BIOPSY  POSITIVE FOR NEOPLASM.        MIKE HARDY                      2    Cytopathology Report    Touch Prep slides and core biopsy sections show a monotonously  uniform plasmacytoid cells in discohesive sheets and trabecular  pattern, with eccentric nuclei, coarsely stippled (salt and  pepper) chromatin, and finely granular cytoplasm.  Immunostains pending.    < from: CT Abdomen No Cont (06.11.19 @ 21:03) >  IMPRESSION:     Evaluation of the solid organs and vessels is limited without use of IV   contrast.    Innumerable hypodense lesions throughout the liver compatible with   metastatic disease, with progression of disease since 5/16/2019.    Previously noted solid right renal mass is not well evaluated without   intravenous contrast.    Moderate pericardial effusion.    < from: Transthoracic Echocardiogram (06.20.19 @ 08:39) >  Conclusions:  Normal left ventricular systolic function. No segmental  wall motion abnormalities.  A moderate circumferential pericardial effusion remains  present.  Right atrial collapse > 1/3 of the cardiac cycle is  present.  No right ventricular diastolic collapse is seen.  No significant respiratory variation in the mitral inflow  is demonstrated in this study, although this was not  extensively evaluated.  Right atrial pressure is low (3-5 mmHg).    < end of copied text > Patient is a 86y old  Male who presents with a chief complaint of diarrhea (20 Jun 2019 23:28)       Pt is seen and examined  pt is awake and sitting in chair  diarrhea persists  - per nurse , only 1 BM in last 12 hours  No cp, worsening sob  anorexia better  on fluid restriction  no headache  No eye discomfort      REVIEW OF SYSTEMS:    CONSTITUTIONAL: No fevers or chills.  weakness +  EYES/ENT: No visual changes;  No vertigo or throat pain   NECK: No pain or stiffness  RESPIRATORY: No cough, wheezing, hemoptysis; No shortness of breath  CARDIOVASCULAR: No chest pain or palpitations  GASTROINTESTINAL: No abdominal or epigastric pain. No nausea, vomiting, or hematemesis; Less diarrhea, no constipation. No melena or hematochezia.  GENITOURINARY: No dysuria, frequency or hematuria  NEUROLOGICAL: No numbness or weakness  SKIN: No itching, burning, rashes, or lesions . flushing +    PHYSICAL EXAM  General: adult in NAD, face flushed  HEENT: clear oropharynx, anicteric sclera, b/l eye swelling + L > R  Neck: supple  CV: normal S1/S2 with (+) murmur, no rubs or gallops  Lungs: positive air movement b/l ant lungs,clear to auscultation, no wheezes, no rales  Abdomen: soft non-tender non-distended, no hepatosplenomegaly  Ext: no clubbing cyanosis, 1+ edema  Skin: no rashes and no petechiae  Neuro: alert and oriented X 4, no focal deficits        MEDICATIONS  (STANDING):  artificial tears (preservative free) Ophthalmic Solution 1 Drop(s) Both EYES every 2 hours  BACItracin   Ointment 1 Application(s) Topical two times a day  chlorhexidine 2% Cloths 1 Application(s) Topical daily  ergocalciferol 94259 Unit(s) Oral every week  heparin  Injectable 5000 Unit(s) SubCutaneous every 8 hours  multivitamin 1 Tablet(s) Oral daily  octreotide  Injectable 150 MICROGram(s) SubCutaneous four times a day  sodium bicarbonate  Infusion 0.084 mEq/kG/Hr (70 mL/Hr) IV Continuous <Continuous>    MEDICATIONS  (PRN):  acetaminophen   Tablet .. 650 milliGRAM(s) Oral every 6 hours PRN Temp greater or equal to 38C (100.4F), Moderate Pain (4 - 6)  loperamide 2 milliGRAM(s) Oral four times a day PRN Diarrhea      Allergies    penicillin (Swelling)    Intolerances        Vital Signs Last 24 Hrs  T(C): 36.7 (27 Jun 2019 08:06), Max: 37 (26 Jun 2019 16:30)  T(F): 98.1 (27 Jun 2019 08:06), Max: 98.6 (26 Jun 2019 16:30)  HR: 91 (27 Jun 2019 08:06) (86 - 98)  BP: 108/66 (27 Jun 2019 08:06) (84/51 - 108/66)  BP(mean): --  RR: 18 (27 Jun 2019 08:06) (18 - 20)  SpO2: 98% (27 Jun 2019 08:06) (95% - 98%)      LABS:                          8.8    5.8   )-----------( 226      ( 27 Jun 2019 07:13 )             25.4         Mean Cell Volume : 98.2 fl  Mean Cell Hemoglobin : 33.9 pg  Mean Cell Hemoglobin Concentration : 34.6 gm/dL    06-27    123<L>  |  91<L>  |  72<H>  ----------------------------<  118<H>  3.9   |  20<L>  |  2.57<H>    Ca    8.8      27 Jun 2019 07:13    TPro  5.8<L>  /  Alb  3.0<L>  /  TBili  0.8  /  DBili  x   /  AST  37  /  ALT  15  /  AlkPhos  80  06-27                      Chromogranin A (06.23.19 @ 11:57)    Chromogranin A: 56213      Chromogranin A: 75596    Cytopathology - Non Gyn Report (06.13.19 @ 18:00)    Cytopathology - Non Gyn Report:   ACCESSION No:  [id number]    MIKE HARDY                      2        Cytopathology Addendum Report          Addendum  Immunostains for TTF1 and CX2: negative    In summary:  NO CHANGE OF THE DIAGNOSIS.    Verified by: Kedar Jimenez M.D.  (Electronic Signature)  Reported on: 06/19/19 13:35 EDT, [address] [place] [address], [place]  [address]  _________________________________________________________________          Addendum  Immunostains results are as follows:  Synaptophysin, chromogranin, CD56: all are positive  Ki67: approximately 5%  Mitotic count:: <  than 2 in 10 HPF.    Based on the Ki67 labaling index the neoplasm is classified as :  Well differentiated neuroendocrine tumor, grade II.    Note:  This case was reviewed for  with GI pathology  staff  who concur(s) with the diagnosis on  06/18/19.    Verified by: Kedar Jimenez M.D.  (Electronic Signature)  Reported on: 06/18/19 10:53 EDT, [address] [facility], [place] [address]  _________________________________________________________________      Cytopathology Report      Final Diagnosis  LIVER, CORE BIOPSY  POSITIVE FOR NEOPLASM.        MIKE HARDY                      2    Cytopathology Report    Touch Prep slides and core biopsy sections show a monotonously  uniform plasmacytoid cells in discohesive sheets and trabecular  pattern, with eccentric nuclei, coarsely stippled (salt and  pepper) chromatin, and finely granular cytoplasm.  Immunostains pending.    < from: CT Abdomen No Cont (06.11.19 @ 21:03) >  IMPRESSION:     Evaluation of the solid organs and vessels is limited without use of IV   contrast.    Innumerable hypodense lesions throughout the liver compatible with   metastatic disease, with progression of disease since 5/16/2019.    Previously noted solid right renal mass is not well evaluated without   intravenous contrast.    Moderate pericardial effusion.    < from: Transthoracic Echocardiogram (06.20.19 @ 08:39) >  Conclusions:  Normal left ventricular systolic function. No segmental  wall motion abnormalities.  A moderate circumferential pericardial effusion remains  present.  Right atrial collapse > 1/3 of the cardiac cycle is  present.  No right ventricular diastolic collapse is seen.  No significant respiratory variation in the mitral inflow  is demonstrated in this study, although this was not  extensively evaluated.  Right atrial pressure is low (3-5 mmHg).    < end of copied text >

## 2019-06-27 NOTE — PROGRESS NOTE ADULT - SUBJECTIVE AND OBJECTIVE BOX
[facility]   [phone]  RENAL FOLLOW UP NOTE  --------------------------------------------------------------------------------  Chief Complaint:    24 hour events/subjective:    c/o 3 bm   no dizziness     PAST HISTORY  --------------------------------------------------------------------------------  No significant changes to PMH, PSH, FHx, SHx, unless otherwise noted    ALLERGIES & MEDICATIONS  --------------------------------------------------------------------------------  Allergies    penicillin (Swelling)    Intolerances      Standing Inpatient Medications  artificial tears (preservative free) Ophthalmic Solution 1 Drop(s) Both EYES every 2 hours  BACItracin   Ointment 1 Application(s) Topical two times a day  chlorhexidine 2% Cloths 1 Application(s) Topical daily  ergocalciferol 03911 Unit(s) Oral every week  heparin  Injectable 5000 Unit(s) SubCutaneous every 8 hours  multivitamin 1 Tablet(s) Oral daily  octreotide  Injectable 150 MICROGram(s) SubCutaneous four times a day  sodium bicarbonate  Infusion 0.084 mEq/kG/Hr IV Continuous <Continuous>    PRN Inpatient Medications  acetaminophen   Tablet .. 650 milliGRAM(s) Oral every 6 hours PRN  loperamide 2 milliGRAM(s) Oral four times a day PRN      REVIEW OF SYSTEMS  --------------------------------------------------------------------------------    Gen: denies  fevers/chills,  CVS: denies chest pain/palpitations  Resp: denies SOB/Cough  GI: Denies N/V/Abd pain + 3-4 bm/d  : Denies dysuria/oliguria/hematuria    All other systems were reviewed and are negative, except as noted.    VITALS/PHYSICAL EXAM  --------------------------------------------------------------------------------  T(C): 36.8 (06-27-19 @ 15:55), Max: 36.8 (06-27-19 @ 04:00)  HR: 92 (06-27-19 @ 15:55) (86 - 92)  BP: 101/61 (06-27-19 @ 15:55) (101/61 - 108/66)  RR: 18 (06-27-19 @ 15:55) (18 - 18)  SpO2: 97% (06-27-19 @ 15:55) (96% - 98%)  Wt(kg): --        06-26-19 @ 07:01  -  06-27-19 @ 07:00  --------------------------------------------------------  IN: 780 mL / OUT: 1 mL / NET: 779 mL    06-27-19 @ 07:01  -  06-27-19 @ 21:19  --------------------------------------------------------  IN: 540 mL / OUT: 290 mL / NET: 250 mL      Physical Exam:  	  Gen: Non toxic comfortable appearing  thin muscle wasting   	no jvd ,  	Pulm: decrease bs  no rales or ronchi or wheezing  	CV: RRR, S1S2; no rub  	Abd: +BS, soft, nontender/nondistended  	: No suprapubic tenderness  	UE: Warm, no cyanosis  no clubbing,  no edema  	LE: Warm, no cyanosis  no clubbing, 3+  edema  	Neuro: alert and oriented.  [place] speech slow but coherent 	    LABS/STUDIES  --------------------------------------------------------------------------------              8.8    5.8   >-----------<  226      [06-27-19 @ 07:13]              25.4     123  |  91  |  72  ----------------------------<  118      [06-27-19 @ 07:13]  3.9   |  20  |  2.57        Ca     8.8     [06-27-19 @ 07:13]    TPro  5.8  /  Alb  3.0  /  TBili  0.8  /  DBili  x   /  AST  37  /  ALT  15  /  AlkPhos  80  [06-27-19 @ 07:13]          Creatinine Trend:  SCr 2.57 [06-27 @ 07:13]  SCr 2.63 [06-26 @ 07:37]  SCr 2.66 [06-25 @ 06:32]  SCr 2.62 [06-24 @ 07:15]  SCr 2.57 [06-23 @ 07:50]              Urinalysis - [06-12-19 @ 09:34]      Color Yellow / Appearance Slightly Turbid / SG 1.019 / pH 6.0      Gluc Negative / Ketone Negative  / Bili Negative / Urobili <2 mg/dL       Blood Trace / Protein 100 mg/dL / Leuk Est Large / Nitrite Positive      RBC 6 /  / Hyaline 0 / Gran 5 / Sq Epi  / Non Sq Epi 5 / Bacteria TNTC      PTH -- (Ca 10.4)      [06-11-19 @ 09:51]   15  Vitamin D (25OH) 9.7      [06-11-19 @ 10:02]

## 2019-06-27 NOTE — PROGRESS NOTE ADULT - ASSESSMENT
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   concern of worsening edema  borderline low bp and pericardial effusion preventing diuretic use and pt also with decreasing sodium level .  had lengthy d/w cards no pericardial effusion drainage at present as per cards. repeat echo  give ivf ns and start 2 g nacl qd   urinary retention  intermittent cath  one liter po  fluid restrict   prbc for anemia

## 2019-06-27 NOTE — PROGRESS NOTE ADULT - SUBJECTIVE AND OBJECTIVE BOX
Patient is a 86y old  Male who presents with a chief complaint of diarrhea (26 Jun 2019 20:09)      SUBJECTIVE / OVERNIGHT EVENTS: Comfortable without new complaints. Wife at bedside.  Review of Systems  chest pain no  palpitations no  sob no  nausea no  headache no    MEDICATIONS  (STANDING):  artificial tears (preservative free) Ophthalmic Solution 1 Drop(s) Both EYES every 2 hours  BACItracin   Ointment 1 Application(s) Topical two times a day  chlorhexidine 2% Cloths 1 Application(s) Topical daily  ergocalciferol 76943 Unit(s) Oral every week  heparin  Injectable 5000 Unit(s) SubCutaneous every 8 hours  multivitamin 1 Tablet(s) Oral daily  octreotide  Injectable 150 MICROGram(s) SubCutaneous four times a day  sodium bicarbonate  Infusion 0.084 mEq/kG/Hr (70 mL/Hr) IV Continuous <Continuous>    MEDICATIONS  (PRN):  acetaminophen   Tablet .. 650 milliGRAM(s) Oral every 6 hours PRN Temp greater or equal to 38C (100.4F), Moderate Pain (4 - 6)  loperamide 2 milliGRAM(s) Oral four times a day PRN Diarrhea      Vital Signs Last 24 Hrs  T(C): 36.7 (27 Jun 2019 08:06), Max: 37 (26 Jun 2019 16:30)  T(F): 98.1 (27 Jun 2019 08:06), Max: 98.6 (26 Jun 2019 16:30)  HR: 91 (27 Jun 2019 08:06) (86 - 98)  BP: 108/66 (27 Jun 2019 08:06) (84/51 - 108/66)  BP(mean): --  RR: 18 (27 Jun 2019 08:06) (18 - 20)  SpO2: 98% (27 Jun 2019 08:06) (95% - 98%)    PHYSICAL EXAM:  GENERAL: NAD  HEAD:  Atraumatic, Normocephalic flushed  EYES: EOMI, PERRLA, conjunctiva less erythema   NECK: Supple, No JVD  CHEST/LUNG: Clear to auscultation bilaterally; No wheeze  HEART: Regular rate and rhythm; No murmurs, rubs, or gallops  ABDOMEN: Soft, Nontender, Nondistended; Bowel sounds present  EXTREMITIES:  2+ Peripheral Pulses, No clubbing, cyanosis, or edema  PSYCH: AAOx3  NEUROLOGY: non-focal  SKIN: No rashes or lesions    LABS:                        8.8    5.8   )-----------( 226      ( 27 Jun 2019 07:13 )             25.4     06-27    123<L>  |  91<L>  |  72<H>  ----------------------------<  118<H>  3.9   |  20<L>  |  2.57<H>    Ca    8.8      27 Jun 2019 07:13    TPro  5.8<L>  /  Alb  3.0<L>  /  TBili  0.8  /  DBili  x   /  AST  37  /  ALT  15  /  AlkPhos  80  06-27                RADIOLOGY & ADDITIONAL TESTS:    Imaging Personally Reviewed:    Consultant(s) Notes Reviewed:      Care Discussed with Consultants/Other Providers:

## 2019-06-28 ENCOUNTER — TRANSCRIPTION ENCOUNTER (OUTPATIENT)
Age: 84
End: 2019-06-28

## 2019-06-28 VITALS
TEMPERATURE: 98 F | HEART RATE: 99 BPM | OXYGEN SATURATION: 96 % | SYSTOLIC BLOOD PRESSURE: 94 MMHG | RESPIRATION RATE: 18 BRPM | DIASTOLIC BLOOD PRESSURE: 59 MMHG

## 2019-06-28 DIAGNOSIS — D69.2 OTHER NONTHROMBOCYTOPENIC PURPURA: ICD-10-CM

## 2019-06-28 LAB
ALBUMIN SERPL ELPH-MCNC: 3.1 G/DL — LOW (ref 3.3–5)
ALP SERPL-CCNC: 79 U/L — SIGNIFICANT CHANGE UP (ref 40–120)
ALT FLD-CCNC: 16 U/L — SIGNIFICANT CHANGE UP (ref 10–45)
ANION GAP SERPL CALC-SCNC: 13 MMOL/L — SIGNIFICANT CHANGE UP (ref 5–17)
AST SERPL-CCNC: 38 U/L — SIGNIFICANT CHANGE UP (ref 10–40)
BILIRUB SERPL-MCNC: 0.4 MG/DL — SIGNIFICANT CHANGE UP (ref 0.2–1.2)
BUN SERPL-MCNC: 70 MG/DL — HIGH (ref 7–23)
CALCIUM SERPL-MCNC: 9 MG/DL — SIGNIFICANT CHANGE UP (ref 8.4–10.5)
CHLORIDE SERPL-SCNC: 94 MMOL/L — LOW (ref 96–108)
CO2 SERPL-SCNC: 20 MMOL/L — LOW (ref 22–31)
CREAT SERPL-MCNC: 2.57 MG/DL — HIGH (ref 0.5–1.3)
GLUCOSE SERPL-MCNC: 122 MG/DL — HIGH (ref 70–99)
LACTATE SERPL-SCNC: 1.6 MMOL/L — SIGNIFICANT CHANGE UP (ref 0.7–2)
POTASSIUM SERPL-MCNC: 4.1 MMOL/L — SIGNIFICANT CHANGE UP (ref 3.5–5.3)
POTASSIUM SERPL-SCNC: 4.1 MMOL/L — SIGNIFICANT CHANGE UP (ref 3.5–5.3)
PROT SERPL-MCNC: 6 G/DL — SIGNIFICANT CHANGE UP (ref 6–8.3)
SODIUM SERPL-SCNC: 127 MMOL/L — LOW (ref 135–145)

## 2019-06-28 PROCEDURE — 82947 ASSAY GLUCOSE BLOOD QUANT: CPT

## 2019-06-28 PROCEDURE — 93005 ELECTROCARDIOGRAM TRACING: CPT

## 2019-06-28 PROCEDURE — 83970 ASSAY OF PARATHORMONE: CPT

## 2019-06-28 PROCEDURE — 88341 IMHCHEM/IMCYTCHM EA ADD ANTB: CPT

## 2019-06-28 PROCEDURE — 87086 URINE CULTURE/COLONY COUNT: CPT

## 2019-06-28 PROCEDURE — 84300 ASSAY OF URINE SODIUM: CPT

## 2019-06-28 PROCEDURE — 82435 ASSAY OF BLOOD CHLORIDE: CPT

## 2019-06-28 PROCEDURE — 85014 HEMATOCRIT: CPT

## 2019-06-28 PROCEDURE — 88307 TISSUE EXAM BY PATHOLOGIST: CPT

## 2019-06-28 PROCEDURE — 83605 ASSAY OF LACTIC ACID: CPT

## 2019-06-28 PROCEDURE — 84295 ASSAY OF SERUM SODIUM: CPT

## 2019-06-28 PROCEDURE — 80048 BASIC METABOLIC PNL TOTAL CA: CPT

## 2019-06-28 PROCEDURE — 86900 BLOOD TYPING SEROLOGIC ABO: CPT

## 2019-06-28 PROCEDURE — 84132 ASSAY OF SERUM POTASSIUM: CPT

## 2019-06-28 PROCEDURE — 74150 CT ABDOMEN W/O CONTRAST: CPT

## 2019-06-28 PROCEDURE — 88360 TUMOR IMMUNOHISTOCHEM/MANUAL: CPT

## 2019-06-28 PROCEDURE — 85730 THROMBOPLASTIN TIME PARTIAL: CPT

## 2019-06-28 PROCEDURE — 88172 CYTP DX EVAL FNA 1ST EA SITE: CPT

## 2019-06-28 PROCEDURE — 82330 ASSAY OF CALCIUM: CPT

## 2019-06-28 PROCEDURE — 99285 EMERGENCY DEPT VISIT HI MDM: CPT

## 2019-06-28 PROCEDURE — 71045 X-RAY EXAM CHEST 1 VIEW: CPT

## 2019-06-28 PROCEDURE — 99232 SBSQ HOSP IP/OBS MODERATE 35: CPT

## 2019-06-28 PROCEDURE — 93321 DOPPLER ECHO F-UP/LMTD STD: CPT

## 2019-06-28 PROCEDURE — 93308 TTE F-UP OR LMTD: CPT

## 2019-06-28 PROCEDURE — 84100 ASSAY OF PHOSPHORUS: CPT

## 2019-06-28 PROCEDURE — 47000 NEEDLE BIOPSY OF LIVER PERQ: CPT

## 2019-06-28 PROCEDURE — 93306 TTE W/DOPPLER COMPLETE: CPT

## 2019-06-28 PROCEDURE — 97116 GAIT TRAINING THERAPY: CPT

## 2019-06-28 PROCEDURE — 86901 BLOOD TYPING SEROLOGIC RH(D): CPT

## 2019-06-28 PROCEDURE — 82310 ASSAY OF CALCIUM: CPT

## 2019-06-28 PROCEDURE — 86923 COMPATIBILITY TEST ELECTRIC: CPT

## 2019-06-28 PROCEDURE — 86316 IMMUNOASSAY TUMOR OTHER: CPT

## 2019-06-28 PROCEDURE — 82570 ASSAY OF URINE CREATININE: CPT

## 2019-06-28 PROCEDURE — 83735 ASSAY OF MAGNESIUM: CPT

## 2019-06-28 PROCEDURE — 85027 COMPLETE CBC AUTOMATED: CPT

## 2019-06-28 PROCEDURE — 81001 URINALYSIS AUTO W/SCOPE: CPT

## 2019-06-28 PROCEDURE — 84133 ASSAY OF URINE POTASSIUM: CPT

## 2019-06-28 PROCEDURE — 83935 ASSAY OF URINE OSMOLALITY: CPT

## 2019-06-28 PROCEDURE — 82040 ASSAY OF SERUM ALBUMIN: CPT

## 2019-06-28 PROCEDURE — 82306 VITAMIN D 25 HYDROXY: CPT

## 2019-06-28 PROCEDURE — 76942 ECHO GUIDE FOR BIOPSY: CPT

## 2019-06-28 PROCEDURE — C1876: CPT

## 2019-06-28 PROCEDURE — P9016: CPT

## 2019-06-28 PROCEDURE — 88305 TISSUE EXAM BY PATHOLOGIST: CPT

## 2019-06-28 PROCEDURE — 80053 COMPREHEN METABOLIC PANEL: CPT

## 2019-06-28 PROCEDURE — 82803 BLOOD GASES ANY COMBINATION: CPT

## 2019-06-28 PROCEDURE — 86850 RBC ANTIBODY SCREEN: CPT

## 2019-06-28 PROCEDURE — 76770 US EXAM ABDO BACK WALL COMP: CPT

## 2019-06-28 PROCEDURE — 97530 THERAPEUTIC ACTIVITIES: CPT

## 2019-06-28 PROCEDURE — 88342 IMHCHEM/IMCYTCHM 1ST ANTB: CPT

## 2019-06-28 PROCEDURE — 87186 SC STD MICRODIL/AGAR DIL: CPT

## 2019-06-28 PROCEDURE — 84156 ASSAY OF PROTEIN URINE: CPT

## 2019-06-28 PROCEDURE — 85610 PROTHROMBIN TIME: CPT

## 2019-06-28 PROCEDURE — 36430 TRANSFUSION BLD/BLD COMPNT: CPT

## 2019-06-28 PROCEDURE — 97161 PT EVAL LOW COMPLEX 20 MIN: CPT

## 2019-06-28 RX ORDER — SODIUM CHLORIDE 9 MG/ML
2 INJECTION INTRAMUSCULAR; INTRAVENOUS; SUBCUTANEOUS
Qty: 0 | Refills: 0 | DISCHARGE
Start: 2019-06-28

## 2019-06-28 RX ADMIN — HEPARIN SODIUM 5000 UNIT(S): 5000 INJECTION INTRAVENOUS; SUBCUTANEOUS at 13:07

## 2019-06-28 RX ADMIN — Medication 1 DROP(S): at 12:10

## 2019-06-28 RX ADMIN — Medication 1 DROP(S): at 14:27

## 2019-06-28 RX ADMIN — Medication 2 MILLIGRAM(S): at 00:51

## 2019-06-28 RX ADMIN — Medication 1 DROP(S): at 10:32

## 2019-06-28 RX ADMIN — OCTREOTIDE ACETATE 150 MICROGRAM(S): 200 INJECTION, SOLUTION INTRAVENOUS; SUBCUTANEOUS at 13:06

## 2019-06-28 RX ADMIN — SODIUM CHLORIDE 2 GRAM(S): 9 INJECTION INTRAMUSCULAR; INTRAVENOUS; SUBCUTANEOUS at 00:01

## 2019-06-28 RX ADMIN — Medication 1 DROP(S): at 06:57

## 2019-06-28 RX ADMIN — Medication 1 TABLET(S): at 12:10

## 2019-06-28 RX ADMIN — Medication 1 DROP(S): at 08:06

## 2019-06-28 RX ADMIN — OCTREOTIDE ACETATE 150 MICROGRAM(S): 200 INJECTION, SOLUTION INTRAVENOUS; SUBCUTANEOUS at 01:43

## 2019-06-28 RX ADMIN — Medication 1 DROP(S): at 00:01

## 2019-06-28 RX ADMIN — CHLORHEXIDINE GLUCONATE 1 APPLICATION(S): 213 SOLUTION TOPICAL at 12:10

## 2019-06-28 RX ADMIN — SODIUM CHLORIDE 2 GRAM(S): 9 INJECTION INTRAMUSCULAR; INTRAVENOUS; SUBCUTANEOUS at 12:10

## 2019-06-28 RX ADMIN — Medication 1 DROP(S): at 16:18

## 2019-06-28 RX ADMIN — Medication 1 APPLICATION(S): at 06:57

## 2019-06-28 RX ADMIN — Medication 1 DROP(S): at 01:44

## 2019-06-28 RX ADMIN — Medication 2 MILLIGRAM(S): at 06:55

## 2019-06-28 RX ADMIN — OCTREOTIDE ACETATE 150 MICROGRAM(S): 200 INJECTION, SOLUTION INTRAVENOUS; SUBCUTANEOUS at 06:55

## 2019-06-28 RX ADMIN — Medication 2 MILLIGRAM(S): at 12:12

## 2019-06-28 RX ADMIN — HEPARIN SODIUM 5000 UNIT(S): 5000 INJECTION INTRAVENOUS; SUBCUTANEOUS at 06:57

## 2019-06-28 NOTE — PROGRESS NOTE ADULT - ASSESSMENT
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   concern of worsening edema  borderline low bp and pericardial effusion preventing diuretic use and pt also with decreasing sodium level .  nacl 2 g qd   had lengthy d/w cards no pericardial effusion drainage at present as per cards. repeat echo   start 2 g nacl qd   na improving   urinary retention  intermittent cath  one liter po  fluid restrict

## 2019-06-28 NOTE — PROGRESS NOTE ADULT - PROBLEM SELECTOR PROBLEM 3
Kidney lesion
Pericardial effusion
Kidney lesion
Pericardial effusion
Kidney lesion
Pericardial effusion

## 2019-06-28 NOTE — CONSULT NOTE ADULT - SUBJECTIVE AND OBJECTIVE BOX
HPI:  Patient was seen and examined at 11:50PM on 6/10/2019.     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 The patient has had about 20 lbs of unintentional weight loss since April 2019, with decline in energy, chills, early satiety and poor appetite and generalized weakness. He has had 7 episodes of watery non-bloody diarrhea daily, and endorses nausea but denies fever, chest pain, palpitations, abdominal pain or vomiting. He has had difficulty ambulating due to his progressive weakness. He notes that he had visited Dr. Houston and Dr. Guerrero for his persistent diarrhea. He had stool testing which was reportedly negative for bacteria and parasites. He developed jen complexion, facial puffiness and excessive eye lacrimation. He Has not had SOB or cough. The patient had outpatient CT and PEG, chromogramin levels checked. He was noted to be hypotensive to 91/49 in the office, and patient had an unsuccessful attempts at venipuncture for labs and hydration, thus recommended to visit ED.     The patient had left lower lobe resection of carcinoid tumor in 2007 at [facility].     In the ED, T 97.5F, HR 70, /66, RR 18, SpO2 99%RA   Pt was given 1 L LR. (11 Jun 2019 00:16)      PAST MEDICAL & SURGICAL HISTORY:  Neck mass: was resected, reportedly bening  Kidney lesion: partial resction- reportedly &quot;not active lesion&quot;  History of carcinoid syndrome  H/O carcinoid syndrome  S/P TURP      REVIEW OF SYSTEMS      General: no fevers/chills, no lethargy	    Skin/Breast: see HPI  	  Ophthalmologic: no eye pain or change in vision  	  ENMT: no dysphagia or change in hearing    Respiratory and Thorax: no SOB or cough  	  Cardiovascular: no palpitations or chest pain    Gastrointestinal: no abdominal pain or blood in stool     Genitourinary: no dysuria or frequency    Musculoskeletal: no joint pains or weakness	    Neurological:no weakness, numbness , or tingling    MEDICATIONS  (STANDING):  artificial tears (preservative free) Ophthalmic Solution 1 Drop(s) Both EYES every 2 hours  BACItracin   Ointment 1 Application(s) Topical two times a day  chlorhexidine 2% Cloths 1 Application(s) Topical daily  ergocalciferol 54890 Unit(s) Oral every week  heparin  Injectable 5000 Unit(s) SubCutaneous every 8 hours  multivitamin 1 Tablet(s) Oral daily  octreotide  Injectable 150 MICROGram(s) SubCutaneous four times a day  sodium bicarbonate  Infusion 0.084 mEq/kG/Hr (70 mL/Hr) IV Continuous <Continuous>  sodium chloride 2 Gram(s) Oral daily    MEDICATIONS  (PRN):  acetaminophen   Tablet .. 650 milliGRAM(s) Oral every 6 hours PRN Temp greater or equal to 38C (100.4F), Moderate Pain (4 - 6)  loperamide 2 milliGRAM(s) Oral four times a day PRN Diarrhea      Allergies    penicillin (Swelling)    Intolerances        SOCIAL HISTORY:    FAMILY HISTORY:  FH: lung cancer: sister  Family history of nasopharyngeal cancer: father      Vital Signs Last 24 Hrs  T(C): 36.9 (28 Jun 2019 13:59), Max: 36.9 (28 Jun 2019 13:59)  T(F): 98.5 (28 Jun 2019 13:59), Max: 98.5 (28 Jun 2019 13:59)  HR: 99 (28 Jun 2019 13:59) (88 - 99)  BP: 94/59 (28 Jun 2019 13:59) (94/59 - 101/61)  BP(mean): --  RR: 18 (28 Jun 2019 13:59) (17 - 18)  SpO2: 96% (28 Jun 2019 13:59) (95% - 97%)    PHYSICAL EXAM:     The patient was alert and oriented X 3, well nourished, and in no  apparent distress.  OP showed no ulcerations  There was no visible lymphadenopathy.  Conjunctiva were non injected  There was no clubbing or edema of extremities.  The scalp, hair, face, eyebrows, lips, OP, neck, chest, back,   extremities X 4, nails were examined.  There was no hyperhidrosis or bromhidrosis.    Of note on skin exam:       LABS:                        8.8    5.8   )-----------( 226      ( 27 Jun 2019 07:13 )             25.4     06-28    127<L>  |  94<L>  |  70<H>  ----------------------------<  122<H>  4.1   |  20<L>  |  2.57<H>    Ca    9.0      28 Jun 2019 08:50    TPro  6.0  /  Alb  3.1<L>  /  TBili  0.4  /  DBili  x   /  AST  38  /  ALT  16  /  AlkPhos  79  06-28          RADIOLOGY & ADDITIONAL STUDIES: HPI:  Patient was seen and examined at 11:50PM on 6/10/2019.     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 Diarrhea improved with octreotide. Patient noted nonpruritic, nonpainful rash on abdomen x 1-2 days. Patient currently receiving multiple SQ injection of octreotide and heparin in this area.     PAST MEDICAL & SURGICAL HISTORY:  Neck mass: was resected, reportedly bening  Kidney lesion: partial resction- reportedly &quot;not active lesion&quot;  History of carcinoid syndrome  H/O carcinoid syndrome  S/P TURP      REVIEW OF SYSTEMS      General: no fevers/chills, no lethargy	    Skin/Breast: see HPI  	  Ophthalmologic: no eye pain or change in vision  	  ENMT: no dysphagia or change in hearing    Respiratory and Thorax: no SOB or cough  	  Cardiovascular: no palpitations or chest pain    Gastrointestinal: no abdominal pain or blood in stool     Genitourinary: no dysuria or frequency    Musculoskeletal: no joint pains or weakness	    Neurological:no weakness, numbness , or tingling    MEDICATIONS  (STANDING):  artificial tears (preservative free) Ophthalmic Solution 1 Drop(s) Both EYES every 2 hours  BACItracin   Ointment 1 Application(s) Topical two times a day  chlorhexidine 2% Cloths 1 Application(s) Topical daily  ergocalciferol 68728 Unit(s) Oral every week  heparin  Injectable 5000 Unit(s) SubCutaneous every 8 hours  multivitamin 1 Tablet(s) Oral daily  octreotide  Injectable 150 MICROGram(s) SubCutaneous four times a day  sodium bicarbonate  Infusion 0.084 mEq/kG/Hr (70 mL/Hr) IV Continuous <Continuous>  sodium chloride 2 Gram(s) Oral daily    MEDICATIONS  (PRN):  acetaminophen   Tablet .. 650 milliGRAM(s) Oral every 6 hours PRN Temp greater or equal to 38C (100.4F), Moderate Pain (4 - 6)  loperamide 2 milliGRAM(s) Oral four times a day PRN Diarrhea      Allergies    penicillin (Swelling)    Intolerances        SOCIAL HISTORY:    FAMILY HISTORY:  FH: lung cancer: sister  Family history of nasopharyngeal cancer: father      Vital Signs Last 24 Hrs  T(C): 36.9 (28 Jun 2019 13:59), Max: 36.9 (28 Jun 2019 13:59)  T(F): 98.5 (28 Jun 2019 13:59), Max: 98.5 (28 Jun 2019 13:59)  HR: 99 (28 Jun 2019 13:59) (88 - 99)  BP: 94/59 (28 Jun 2019 13:59) (94/59 - 101/61)  BP(mean): --  RR: 18 (28 Jun 2019 13:59) (17 - 18)  SpO2: 96% (28 Jun 2019 13:59) (95% - 97%)    PHYSICAL EXAM:     The patient was alert and oriented X 3, well nourished, and in no  apparent distress.  OP showed no ulcerations  There was no visible lymphadenopathy.  Conjunctiva were non injected  There was no clubbing or edema of extremities.  The scalp, hair, face, eyebrows, lips, OP, neck, chest, back,   extremities X 4, nails were examined.  There was no hyperhidrosis or bromhidrosis.    Of note on skin exam:     scattered violaceous nonpalpable purpura on abdomen, extremities is diffuse xerosis    LABS:                        8.8    5.8   )-----------( 226      ( 27 Jun 2019 07:13 )             25.4     06-28    127<L>  |  94<L>  |  70<H>  ----------------------------<  122<H>  4.1   |  20<L>  |  2.57<H>    Ca    9.0      28 Jun 2019 08:50    TPro  6.0  /  Alb  3.1<L>  /  TBili  0.4  /  DBili  x   /  AST  38  /  ALT  16  /  AlkPhos  79  06-28          RADIOLOGY & ADDITIONAL STUDIES:

## 2019-06-28 NOTE — PROGRESS NOTE ADULT - SUBJECTIVE AND OBJECTIVE BOX
Patient is a 86y old  Male who presents with a chief complaint of diarrhea (20 Jun 2019 23:28)       Pt is seen and examined  pt is awake and sitting in chair  diarrhea persists  - per nurse , only 1 BM in last 12 hours  No cp, worsening sob  anorexia better  on fluid restriction  no headache  No eye discomfort      REVIEW OF SYSTEMS:    CONSTITUTIONAL: No fevers or chills.  weakness +  EYES/ENT: No visual changes;  No vertigo or throat pain   NECK: No pain or stiffness  RESPIRATORY: No cough, wheezing, hemoptysis; No shortness of breath  CARDIOVASCULAR: No chest pain or palpitations  GASTROINTESTINAL: No abdominal or epigastric pain. No nausea, vomiting, or hematemesis; Less diarrhea, no constipation. No melena or hematochezia.  GENITOURINARY: No dysuria, frequency or hematuria  NEUROLOGICAL: No numbness or weakness  SKIN: No itching, burning, rashes, or lesions . flushing +    PHYSICAL EXAM  General: adult in NAD, face flushed  HEENT: clear oropharynx, anicteric sclera, b/l eye swelling + L > R  Neck: supple  CV: normal S1/S2 with (+) murmur, no rubs or gallops  Lungs: positive air movement b/l ant lungs,clear to auscultation, no wheezes, no rales  Abdomen: soft non-tender non-distended, no hepatosplenomegaly  Ext: no clubbing cyanosis, 1+ edema  Skin: no rashes and no petechiae  Neuro: alert and oriented X 4, no focal deficits        MEDICATIONS  (STANDING):  artificial tears (preservative free) Ophthalmic Solution 1 Drop(s) Both EYES every 2 hours  BACItracin   Ointment 1 Application(s) Topical two times a day  chlorhexidine 2% Cloths 1 Application(s) Topical daily  ergocalciferol 23900 Unit(s) Oral every week  heparin  Injectable 5000 Unit(s) SubCutaneous every 8 hours  multivitamin 1 Tablet(s) Oral daily  octreotide  Injectable 150 MICROGram(s) SubCutaneous four times a day  sodium bicarbonate  Infusion 0.084 mEq/kG/Hr (70 mL/Hr) IV Continuous <Continuous>  sodium chloride 2 Gram(s) Oral daily    MEDICATIONS  (PRN):  acetaminophen   Tablet .. 650 milliGRAM(s) Oral every 6 hours PRN Temp greater or equal to 38C (100.4F), Moderate Pain (4 - 6)  loperamide 2 milliGRAM(s) Oral four times a day PRN Diarrhea      Allergies    penicillin (Swelling)    Intolerances        Vital Signs Last 24 Hrs  T(C): 36.7 (28 Jun 2019 08:07), Max: 36.8 (27 Jun 2019 15:55)  T(F): 98.1 (28 Jun 2019 08:07), Max: 98.3 (27 Jun 2019 15:55)  HR: 89 (28 Jun 2019 08:07) (88 - 92)  BP: 95/55 (28 Jun 2019 08:07) (94/60 - 101/61)  BP(mean): --  RR: 18 (28 Jun 2019 08:07) (17 - 18)  SpO2: 96% (28 Jun 2019 08:07) (95% - 97%)      LABS:                          8.8    5.8   )-----------( 226      ( 27 Jun 2019 07:13 )             25.4         Mean Cell Volume : 98.2 fl  Mean Cell Hemoglobin : 33.9 pg  Mean Cell Hemoglobin Concentration : 34.6 gm/dL    06-28    127<L>  |  94<L>  |  70<H>  ----------------------------<  122<H>  4.1   |  20<L>  |  2.57<H>    Ca    9.0      28 Jun 2019 08:50    TPro  6.0  /  Alb  3.1<L>  /  TBili  0.4  /  DBili  x   /  AST  38  /  ALT  16  /  AlkPhos  79  06-28    < from: Limited Transthoracic Echo (06.27.19 @ 19:23) >  Conclusions:  1. Inferior vena cava is normal in size (>=1.5cm, <=2.5cm)  without normal respiratory variability consistent with  right atrial pressure 11-15mm Hg.  2. Moderate circumferential pericardial effusion with the  greatest depth near the apex measuring 1.9 cm. The IVC is  not dilated but does not have appropriate respiratory  variability. There is no clinically significant respiratory  flow variation across the TV and MV. There is mild degree  of RA and RV diastolic invagination. There is  echocardiographic evidence of increased pericardial  pressure but not meeting criteria for tamponade.  *** Compared with echocardiogram of 6/20/2019, no  significant changes noted.    < end of copied text >    Chromogranin A (06.23.19 @ 11:57)    Chromogranin A: [id number]      Chromogranin A: [id number]    Cytopathology - Non Gyn Report (06.13.19 @ 18:00)    Cytopathology - Non Gyn Report:   ACCESSION No:  [id number]    MIKE HARDY                      2        Cytopathology Addendum Report          Addendum  Immunostains for TTF1 and CX2: negative    In summary:  NO CHANGE OF THE DIAGNOSIS.    Verified by: Kedar Jimenez M.D.  (Electronic Signature)  Reported on: 06/19/19 13:35 EDT, [address] [place]  [address]  _________________________________________________________________          Addendum  Immunostains results are as follows:  Synaptophysin, chromogranin, CD56: all are positive  Ki67: approximately 5%  Mitotic count:: <  than 2 in 10 HPF.    Based on the Ki67 labaling index the neoplasm is classified as :  Well differentiated neuroendocrine tumor, grade II.    Note:  This case was reviewed for  with GI pathology  staff  who concur(s) with the diagnosis on  06/18/19.    Verified by: Kedar Jimenez M.D.  (Electronic Signature)  Reported on: 06/18/19 10:53 EDT, [address] [place]  [address]  _________________________________________________________________      Cytopathology Report      Final Diagnosis  LIVER, CORE BIOPSY  POSITIVE FOR NEOPLASM.        MIKE HARDY                      2    Cytopathology Report    Touch Prep slides and core biopsy sections show a monotonously  uniform plasmacytoid cells in discohesive sheets and trabecular  pattern, with eccentric nuclei, coarsely stippled (salt and  pepper) chromatin, and finely granular cytoplasm.  Immunostains pending.    < from: CT Abdomen No Cont (06.11.19 @ 21:03) >  IMPRESSION:     Evaluation of the solid organs and vessels is limited without use of IV   contrast.    Innumerable hypodense lesions throughout the liver compatible with   metastatic disease, with progression of disease since 5/16/2019.    Previously noted solid right renal mass is not well evaluated without   intravenous contrast.    Moderate pericardial effusion.    < from: Transthoracic Echocardiogram (06.20.19 @ 08:39) >  Conclusions:  Normal left ventricular systolic function. No segmental  wall motion abnormalities.  A moderate circumferential pericardial effusion remains  present.  Right atrial collapse > 1/3 of the cardiac cycle is  present.  No right ventricular diastolic collapse is seen.  No significant respiratory variation in the mitral inflow  is demonstrated in this study, although this was not  extensively evaluated.  Right atrial pressure is low (3-5 mmHg).    < end of copied text > Patient is a 86y old  Male who presents with a chief complaint of diarrhea (20 Jun 2019 23:28)       Pt is seen and examined  pt is awake and sitting in chair  diarrhea per less frequennt  c/o rash on anterior abd wall for last few days - non pruritic  No cp, worsening sob  anorexia better  on fluid restriction  no headache  No eye discomfort      REVIEW OF SYSTEMS:    CONSTITUTIONAL: No fevers or chills.  weakness +  EYES/ENT: No visual changes;  No vertigo or throat pain   NECK: No pain or stiffness  RESPIRATORY: No cough, wheezing, hemoptysis; No shortness of breath  CARDIOVASCULAR: No chest pain or palpitations  GASTROINTESTINAL: No abdominal or epigastric pain. No nausea, vomiting, or hematemesis; Less diarrhea, no constipation. No melena or hematochezia.  GENITOURINARY: No dysuria, frequency or hematuria  NEUROLOGICAL: No numbness or weakness  SKIN: No itching, burning, or lesions . rash, flushing +    PHYSICAL EXAM  General: adult in NAD, face flushed  HEENT: clear oropharynx, anicteric sclera, b/l eye swelling + L > R  Neck: supple  CV: normal S1/S2 with (+) murmur, no rubs or gallops  Lungs: positive air movement b/l ant lungs,clear to auscultation, no wheezes, no rales  Abdomen: soft non-tender non-distended, no hepatosplenomegaly  Ext: no clubbing cyanosis, 1+ edema  Skin: macular blotchy rash anterior abd wall  Neuro: alert and oriented X 4, no focal deficits        MEDICATIONS  (STANDING):  artificial tears (preservative free) Ophthalmic Solution 1 Drop(s) Both EYES every 2 hours  BACItracin   Ointment 1 Application(s) Topical two times a day  chlorhexidine 2% Cloths 1 Application(s) Topical daily  ergocalciferol 91181 Unit(s) Oral every week  heparin  Injectable 5000 Unit(s) SubCutaneous every 8 hours  multivitamin 1 Tablet(s) Oral daily  octreotide  Injectable 150 MICROGram(s) SubCutaneous four times a day  sodium bicarbonate  Infusion 0.084 mEq/kG/Hr (70 mL/Hr) IV Continuous <Continuous>  sodium chloride 2 Gram(s) Oral daily    MEDICATIONS  (PRN):  acetaminophen   Tablet .. 650 milliGRAM(s) Oral every 6 hours PRN Temp greater or equal to 38C (100.4F), Moderate Pain (4 - 6)  loperamide 2 milliGRAM(s) Oral four times a day PRN Diarrhea      Allergies    penicillin (Swelling)    Intolerances        Vital Signs Last 24 Hrs  T(C): 36.7 (28 Jun 2019 08:07), Max: 36.8 (27 Jun 2019 15:55)  T(F): 98.1 (28 Jun 2019 08:07), Max: 98.3 (27 Jun 2019 15:55)  HR: 89 (28 Jun 2019 08:07) (88 - 92)  BP: 95/55 (28 Jun 2019 08:07) (94/60 - 101/61)  BP(mean): --  RR: 18 (28 Jun 2019 08:07) (17 - 18)  SpO2: 96% (28 Jun 2019 08:07) (95% - 97%)      LABS:                          8.8    5.8   )-----------( 226      ( 27 Jun 2019 07:13 )             25.4         Mean Cell Volume : 98.2 fl  Mean Cell Hemoglobin : 33.9 pg  Mean Cell Hemoglobin Concentration : 34.6 gm/dL    06-28    127<L>  |  94<L>  |  70<H>  ----------------------------<  122<H>  4.1   |  20<L>  |  2.57<H>    Ca    9.0      28 Jun 2019 08:50    TPro  6.0  /  Alb  3.1<L>  /  TBili  0.4  /  DBili  x   /  AST  38  /  ALT  16  /  AlkPhos  79  06-28    < from: Limited Transthoracic Echo (06.27.19 @ 19:23) >  Conclusions:  1. Inferior vena cava is normal in size (>=1.5cm, <=2.5cm)  without normal respiratory variability consistent with  right atrial pressure 11-15mm Hg.  2. Moderate circumferential pericardial effusion with the  greatest depth near the apex measuring 1.9 cm. The IVC is  not dilated but does not have appropriate respiratory  variability. There is no clinically significant respiratory  flow variation across the TV and MV. There is mild degree  of RA and RV diastolic invagination. There is  echocardiographic evidence of increased pericardial  pressure but not meeting criteria for tamponade.  *** Compared with echocardiogram of 6/20/2019, no  significant changes noted.    < end of copied text >    Chromogranin A (06.23.19 @ 11:57)    Chromogranin A: [id number]      Chromogranin A: [id number]    Cytopathology - Non Gyn Report (06.13.19 @ 18:00)    Cytopathology - Non Gyn Report:   ACCESSION No:  [id number]    MIKE HARDY                      2        Cytopathology Addendum Report          Addendum  Immunostains for TTF1 and CX2: negative    In summary:  NO CHANGE OF THE DIAGNOSIS.    Verified by: Kedar Jimenez M.D.  (Electronic Signature)  Reported on: 06/19/19 13:35 EDT, [address] [place]  [address]  _________________________________________________________________          Addendum  Immunostains results are as follows:  Synaptophysin, chromogranin, CD56: all are positive  Ki67: approximately 5%  Mitotic count:: <  than 2 in 10 HPF.    Based on the Ki67 labaling index the neoplasm is classified as :  Well differentiated neuroendocrine tumor, grade II.    Note:  This case was reviewed for  with GI pathology  staff  who concur(s) with the diagnosis on  06/18/19.    Verified by: Kedar Jimenez M.D.  (Electronic Signature)  Reported on: 06/18/19 10:53 EDT, [address] [place]  [address]  _________________________________________________________________      Cytopathology Report      Final Diagnosis  LIVER, CORE BIOPSY  POSITIVE FOR NEOPLASM.        MIKE HARDY                      2    Cytopathology Report    Touch Prep slides and core biopsy sections show a monotonously  uniform plasmacytoid cells in discohesive sheets and trabecular  pattern, with eccentric nuclei, coarsely stippled (salt and  pepper) chromatin, and finely granular cytoplasm.  Immunostains pending.    < from: CT Abdomen No Cont (06.11.19 @ 21:03) >  IMPRESSION:     Evaluation of the solid organs and vessels is limited without use of IV   contrast.    Innumerable hypodense lesions throughout the liver compatible with   metastatic disease, with progression of disease since 5/16/2019.    Previously noted solid right renal mass is not well evaluated without   intravenous contrast.    Moderate pericardial effusion.    < from: Transthoracic Echocardiogram (06.20.19 @ 08:39) >  Conclusions:  Normal left ventricular systolic function. No segmental  wall motion abnormalities.  A moderate circumferential pericardial effusion remains  present.  Right atrial collapse > 1/3 of the cardiac cycle is  present.  No right ventricular diastolic collapse is seen.  No significant respiratory variation in the mitral inflow  is demonstrated in this study, although this was not  extensively evaluated.  Right atrial pressure is low (3-5 mmHg).    < end of copied text >

## 2019-06-28 NOTE — PROGRESS NOTE ADULT - SUBJECTIVE AND OBJECTIVE BOX
Patient is a 86y old  Male who presents with a chief complaint of diarrhea (28 Jun 2019 09:41)      SUBJECTIVE / OVERNIGHT EVENTS: No new complaints.   Review of Systems  chest pain no  palpitations no  sob no  nausea no  headache no    MEDICATIONS  (STANDING):  artificial tears (preservative free) Ophthalmic Solution 1 Drop(s) Both EYES every 2 hours  BACItracin   Ointment 1 Application(s) Topical two times a day  chlorhexidine 2% Cloths 1 Application(s) Topical daily  ergocalciferol 96911 Unit(s) Oral every week  heparin  Injectable 5000 Unit(s) SubCutaneous every 8 hours  multivitamin 1 Tablet(s) Oral daily  octreotide  Injectable 150 MICROGram(s) SubCutaneous four times a day  sodium bicarbonate  Infusion 0.084 mEq/kG/Hr (70 mL/Hr) IV Continuous <Continuous>  sodium chloride 2 Gram(s) Oral daily    MEDICATIONS  (PRN):  acetaminophen   Tablet .. 650 milliGRAM(s) Oral every 6 hours PRN Temp greater or equal to 38C (100.4F), Moderate Pain (4 - 6)  loperamide 2 milliGRAM(s) Oral four times a day PRN Diarrhea      Vital Signs Last 24 Hrs  T(C): 36.7 (28 Jun 2019 08:07), Max: 36.8 (27 Jun 2019 15:55)  T(F): 98.1 (28 Jun 2019 08:07), Max: 98.3 (27 Jun 2019 15:55)  HR: 89 (28 Jun 2019 08:07) (88 - 92)  BP: 95/55 (28 Jun 2019 08:07) (94/60 - 101/61)  BP(mean): --  RR: 18 (28 Jun 2019 08:07) (17 - 18)  SpO2: 96% (28 Jun 2019 08:07) (95% - 97%)    PHYSICAL EXAM:  GENERAL: NAD, well-developed  HEAD:  Atraumatic, Normocephalic Flushed  EYES: EOMI, PERRLA, conjunctiva and sclera clear  NECK: Supple, No JVD  CHEST/LUNG: Clear to auscultation bilaterally; No wheeze  HEART: Regular rate and rhythm; No murmurs, rubs, or gallops  ABDOMEN: Soft, Nontender, Nondistended; Bowel sounds present Papular rash.  EXTREMITIES:  2+ Peripheral Pulses, No clubbing, cyanosis, or edema  PSYCH: AAOx3  NEUROLOGY: non-focal  SKIN: No rashes or lesions    LABS:                        8.8    5.8   )-----------( 226      ( 27 Jun 2019 07:13 )             25.4     06-28    127<L>  |  94<L>  |  70<H>  ----------------------------<  122<H>  4.1   |  20<L>  |  2.57<H>    Ca    9.0      28 Jun 2019 08:50    TPro  6.0  /  Alb  3.1<L>  /  TBili  0.4  /  DBili  x   /  AST  38  /  ALT  16  /  AlkPhos  79  06-28                RADIOLOGY & ADDITIONAL TESTS:    Imaging Personally Reviewed:    Consultant(s) Notes Reviewed:      Care Discussed with Consultants/Other Providers:

## 2019-06-28 NOTE — PROGRESS NOTE ADULT - PROVIDER SPECIALTY LIST ADULT
Cardiology
Heme/Onc
Infectious Disease
Internal Medicine
Intervent Radiology
Nephrology
Urology
Nephrology
Infectious Disease
Nephrology
Internal Medicine
Internal Medicine
Heme/Onc

## 2019-06-28 NOTE — PROGRESS NOTE ADULT - SUBJECTIVE AND OBJECTIVE BOX
[facility]   [phone]  RENAL FOLLOW UP NOTE  --------------------------------------------------------------------------------  Chief Complaint:    24 hour events/subjective:    had diarrhea last pm to this am   none now     PAST HISTORY  --------------------------------------------------------------------------------  No significant changes to PMH, PSH, FHx, SHx, unless otherwise noted    ALLERGIES & MEDICATIONS  --------------------------------------------------------------------------------  Allergies    penicillin (Swelling)    Intolerances      Standing Inpatient Medications  artificial tears (preservative free) Ophthalmic Solution 1 Drop(s) Both EYES every 2 hours  BACItracin   Ointment 1 Application(s) Topical two times a day  chlorhexidine 2% Cloths 1 Application(s) Topical daily  ergocalciferol 38828 Unit(s) Oral every week  heparin  Injectable 5000 Unit(s) SubCutaneous every 8 hours  multivitamin 1 Tablet(s) Oral daily  octreotide  Injectable 150 MICROGram(s) SubCutaneous four times a day  sodium bicarbonate  Infusion 0.084 mEq/kG/Hr IV Continuous <Continuous>  sodium chloride 2 Gram(s) Oral daily    PRN Inpatient Medications  acetaminophen   Tablet .. 650 milliGRAM(s) Oral every 6 hours PRN  loperamide 2 milliGRAM(s) Oral four times a day PRN      REVIEW OF SYSTEMS  --------------------------------------------------------------------------------    Gen: denies  fevers/chills,  CVS: denies chest pain/palpitations  Resp: denies SOB/Cough  GI: Denies N/V/Abd pain  : Denies dysuria/oliguria/hematuria    All other systems were reviewed and are negative, except as noted.    VITALS/PHYSICAL EXAM  --------------------------------------------------------------------------------  T(C): 36.7 (06-28-19 @ 08:07), Max: 36.8 (06-27-19 @ 15:55)  HR: 89 (06-28-19 @ 08:07) (88 - 92)  BP: 95/55 (06-28-19 @ 08:07) (94/60 - 101/61)  RR: 18 (06-28-19 @ 08:07) (17 - 18)  SpO2: 96% (06-28-19 @ 08:07) (95% - 97%)  Wt(kg): --        06-27-19 @ 07:01  -  06-28-19 @ 07:00  --------------------------------------------------------  IN: 720 mL / OUT: 690 mL / NET: 30 mL    06-28-19 @ 07:01  -  06-28-19 @ 11:33  --------------------------------------------------------  IN: 250 mL / OUT: 350 mL / NET: -100 mL      Physical Exam:  	  Gen: Non toxic comfortable appearing  thin muscle wasting   	no jvd ,  	Pulm: decrease bs  no rales or ronchi or wheezing  	CV: RRR, S1S2; no rub  	Abd: +BS, soft, nontender/nondistended  	: No suprapubic tenderness  	UE: Warm, no cyanosis  no clubbing,  no edema  	LE: Warm, no cyanosis  no clubbing, 3+  edema  	Neuro: alert and oriented.  [place] speech slow but coherent 	      LABS/STUDIES  --------------------------------------------------------------------------------              8.8    5.8   >-----------<  226      [06-27-19 @ 07:13]              25.4     127  |  94  |  70  ----------------------------<  122      [06-28-19 @ 08:50]  4.1   |  20  |  2.57        Ca     9.0     [06-28-19 @ 08:50]    TPro  6.0  /  Alb  3.1  /  TBili  0.4  /  DBili  x   /  AST  38  /  ALT  16  /  AlkPhos  79  [06-28-19 @ 08:50]          Creatinine Trend:  SCr 2.57 [06-28 @ 08:50]  SCr 2.57 [06-27 @ 07:13]  SCr 2.63 [06-26 @ 07:37]  SCr 2.66 [06-25 @ 06:32]  SCr 2.62 [06-24 @ 07:15]    	          Urinalysis - [06-12-19 @ 09:34]      Color Yellow / Appearance Slightly Turbid / SG 1.019 / pH 6.0      Gluc Negative / Ketone Negative  / Bili Negative / Urobili <2 mg/dL       Blood Trace / Protein 100 mg/dL / Leuk Est Large / Nitrite Positive      RBC 6 /  / Hyaline 0 / Gran 5 / Sq Epi  / Non Sq Epi 5 / Bacteria TNTC      PTH -- (Ca 10.4)      [06-11-19 @ 09:51]   15  Vitamin D (25OH) 9.7      [06-11-19 @ 10:02]

## 2019-06-28 NOTE — PROGRESS NOTE ADULT - PROBLEM SELECTOR PROBLEM 2
Hepatic lesion
Diarrhea
Diarrhea
CONSTANZA (acute kidney injury)
Diarrhea
Hepatic lesion
UTI (urinary tract infection)
UTI (urinary tract infection)

## 2019-06-28 NOTE — CONSULT NOTE ADULT - ATTENDING COMMENTS
Agree with plan as outlined above with the following additions.  Closely monitor.  No clinical nor echocardiographic tamponade, albeit there is increased pericardial pressures.  Repeat stat TTE tomorrow to ensure it gets done and ensure there is no progression.   Please call fellow on call with any concerns.
I have seen and evaluated the patient. I agree with findings and plan as discussed.

## 2019-06-28 NOTE — PROGRESS NOTE ADULT - PROBLEM SELECTOR PLAN 1
cont sandostatin 4x/day - increase dose to 150 mcg - d/w wife - to titrate based on diarrhea-repeat chromogranin level mildly decreased  Biopsy (+) for Carcinoid  Nutrition support  s/p PRBC transfusion  NH to appt with our office for LAR administration on day of d/c cont sandostatin 4x/day - increase dose to 150 mcg - d/w wife - to titrate based on diarrhea-repeat chromogranin level mildly decreased  Biopsy (+) for Carcinoid  Nutrition support  s/p PRBC transfusion  NH to appt with our office for LAR administration on day of d/c  Rash ? due to sandostatin ? heparin injection - topical treatment , rotate sites - pharmacy input

## 2019-06-28 NOTE — PROGRESS NOTE ADULT - REASON FOR ADMISSION
diarrhea

## 2019-06-28 NOTE — PROGRESS NOTE ADULT - ASSESSMENT
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    Carcinoid syndrome.  - continue octreotide 150mg subQ QID to treat flushing and diarrhea.    - oncology follow. For long acting octreotide as per Oncology.    Liver masses  - s/p Liver bx with metastatic neuroendocrine tumor.      Kidney mass  - pt has cyst as well as a 1.5 cm solid lesion in kidney --- he has coils in that area --- as per outside records, the lesion was felt to be benign.  - Oncology evaluation noted    Hypercalcemia improving   - Patient noted to have mild hypercalcemia, may be related to suspected carcinoid tumor.    Hypokalemia  - supplement    CONSTANZA (acute kidney injury).   - Patient was noted here to have elevated SCr of 2.6, which is high compared to outpatient SCr of 1.91.  - CONSTANZA is likely pre-renal related to hypovolemia from excessive GI losses.   - nephrology follow    Hyponatremia improving   - follow  - continue NaCl.    Urethral stricture  - urology evaluation noted. S/P cysto and stricture of urethra dilatation.  - continue Self cath requested by patient and wife.      UTI  - off antibiotics. ID follow    Pericardial effusion.  - Currently, the patient is hemodynamically WNL. He denies palpitations and denies lightheadedness while lying supine.  - TTE repeat unchanged.  - Cardiology follow noted. No further Echo unless symptomatic.  - follow with cardiology    Metabolic acidosis, normal anion gap (NAG).   - Patient found to have metabolic acidosis with normal anion gap, hyperchloremia likely due to GI losses.   - Follow up BMP.  - nephrology follow.    Prophylactic measure.  - VTE ppx with start heparin subQ    Conjunctival erythema  - artificial tears,   - Opthalmology evaluation     Anemia  - s/p Tx 1 PRBC    Rash  - Dermatology evaluation    Activity: OOB with assistance, fall precaution  Diet: regular.  PT   DCP in progress to rehab.  d/w patient , wife at length  Phong Dash MD pager [phone]

## 2019-06-28 NOTE — CHART NOTE - NSCHARTNOTEFT_GEN_A_CORE
Patient noted with Nose bleed after picking his nose that is now resolved. Pt is medically cleared for discharge to [facility] by Dr. Dash with follow-up as advised.

## 2019-06-28 NOTE — PROGRESS NOTE ADULT - PROBLEM SELECTOR PROBLEM 4
Pericardial effusion
UTI (urinary tract infection)
UTI (urinary tract infection)
CKD (chronic kidney disease)
Pericardial effusion
UTI (urinary tract infection)

## 2019-06-28 NOTE — PROGRESS NOTE ADULT - PROBLEM SELECTOR PROBLEM 1
Carcinoid syndrome
Urinary retention with incomplete bladder emptying
Carcinoid syndrome

## 2019-06-28 NOTE — PROGRESS NOTE ADULT - PROBLEM SELECTOR PLAN 2
trial of  prn imodium - encouraged patient to ask for imodium  prn K supplementation  check cbc, cmp weekly - labs to dr. Morse and Dr. Rosemarie Salazar decreased - hold up on rehab d/c  ct fluid retriction trial of  prn imodium - encouraged patient to ask for imodium  prn K supplementation  check cbc, cmp weekly - labs to dr. Morse and Dr. Houston  Na decreased - ct fluid retriction, na supplementation

## 2019-06-28 NOTE — PROGRESS NOTE ADULT - PROBLEM SELECTOR PLAN 4
eladio ID input  abx d/c'd  cts to have calzada - urology input re. resumption of self catheterization anish d/c'd  doing self catheterization

## 2019-06-28 NOTE — PROGRESS NOTE ADULT - SUBJECTIVE AND OBJECTIVE BOX
Consulting: [place]  Reason: Pericardial Effusion    Pericardial effusion stable.   But lower extremity edema and low NA  No events overnight. Denies CP, SOB, palp, dizziness.      MEDICATIONS:  heparin  Injectable 5000 Unit(s) SubCutaneous every 8 hours  aztreonam  IVPB 1000 milliGRAM(s) IV Intermittent every 12 hours  aztreonam  IVPB      acetaminophen   Tablet .. 650 milliGRAM(s) Oral every 6 hours PRN  octreotide  Injectable 100 MICROGram(s) SubCutaneous four times a day  ergocalciferol 30931 Unit(s) Oral every week  multivitamin 1 Tablet(s) Oral daily  sodium bicarbonate  Infusion 0.084 mEq/kG/Hr IV Continuous <Continuous>  sodium chloride 0.9% Bolus 250 milliLiter(s) IV Bolus once      REVIEW OF SYSTEMS:  CONSTITUTIONAL: No weakness, fevers or chills  EYES/ENT: No visual changes;  No dysphagia  RESPIRATORY: No cough, wheezing, hemoptysis; No shortness of breath  CARDIOVASCULAR: No chest pain or palpitations; +LE edema  GASTROINTESTINAL: No abdominal or epigastric pain. No nausea, vomiting, or hematemesis, +diarrhea  GENITOURINARY: No dysuria, frequency or hematuria  NEUROLOGICAL: No numbness or weakness  SKIN: +Rash  HEME: No bleeding or bruising  MSK: No joint pains or muscle pains  All other review of systems is negative unless indicated above.      PHYSICAL EXAM:  T(C): 36.8 (06-17-19 @ 09:23), Max: 37 (06-17-19 @ 04:20)  HR: 86 (06-17-19 @ 09:23) (86 - 113)  BP: 82/50 (06-17-19 @ 09:22) (82/50 - 104/61)  RR: 18 (06-17-19 @ 09:23) (17 - 18)  SpO2: 98% (06-17-19 @ 09:23) (95% - 98%)      Appearance: Normal, flushed skin  HEENT:   Normal oral mucosa  Cardiovascular: Normal S1 S2, No JVD, No murmurs, +LE edema  Respiratory: Lungs clear to auscultation	  Psychiatry: A & O x 3, Mood & affect appropriate  Gastrointestinal:  Soft, Non-tender, + BS	  Skin:+ abd rash, No ecchymoses, No cyanosis	  Neurologic: Non-focal  Extremities: Normal range of motion, No clubbing, cyanosis      I&O's Summary    16 Jun 2019 07:01  -  17 Jun 2019 07:00  --------------------------------------------------------  IN: 1550 mL / OUT: 456 mL / NET: 1094 mL    17 Jun 2019 07:01  -  17 Jun 2019 12:52  --------------------------------------------------------  IN: 0 mL / OUT: 2 mL / NET: -2 mL      LABS:	 	Labs Personally Reviewed [id number]      CBC Full  -  ( 17 Jun 2019 09:17 )  WBC Count : 8.16 K/uL  Hemoglobin : 8.6 g/dL  Hematocrit : 27.6 %  Platelet Count - Automated : 177 K/uL      06-17  134<L>  |  110<H>  |  61<H>  ----------------------------<  128<H>  3.5   |  13<L>  |  2.85<H>    06-16  133<L>  |  110<H>  |  59<H>  ----------------------------<  126<H>  3.3<L>   |  12<L>  |  2.85<H>    Ca    9.3      17 Jun 2019 07:26  Ca    9.4      16 Jun 2019 06:42    Phos  2.6     06-17  Mg     2.3     06-17      ASSESSMENT/PLAN: 	  86yoM with PMH of carcinoid syndrome s/p left lower lobe resection, bladder disease requiring intermittent straight cath.  Presented with persistent diarrhea and weight loss and generalized weakness, undergoing treatment for carcinoid.  Now with medium sized pericardial effusion. Patient continues to have diarrhea.    REC:  - If hypotensive or tachycardic, would repeat TTE   - Otherwise, repeat echo as inpatient or outpatient in 1st week of july    Dhruv Paz

## 2019-06-28 NOTE — CONSULT NOTE ADULT - ASSESSMENT
Patient staffed with ____    Please page [phone] with questions.    Iveth Elizabeth MD PGY-2  [facility] Dermatology  Pager: [phone]  Office: [phone] 87 y/o M with carcinoid tumor and new purpuric rash.     Purpura  Normal platelets.  Possibly related to trauma 2/2 multiple subcutaneous injections however would see more ecchymotic lesions  Do not recommend any intervention at this time  Follow up in 2-3 weeks if not resolved    Patient staffed with Dr. Cope    Patient can follow up with dermatology at [address] [place] [address]. Patient should call [phone] to schedule appointment with Dr. Lerma/Clara Tuesday [facility] or Dr. Cope in 2-3 weeks.    Iveth Elizabeth MD PGY-2  [facility] Dermatology  Pager: [phone]  Office: [phone] 87 y/o M with carcinoid tumor and new purpuric rash localized to abdomen.   etiol unclear     normal platelets.    limited eval today since pt being discharged  Recommend Follow up in 2-3 weeks, which will allow us to appreciate more of its course    Patient staffed with Dr. Cope    Patient can follow up with dermatology at [address] [place] [address]. Patient should call [phone] to schedule appointment with Dr. Lerma/Clara Tuesday [facility] or Dr. Cope in 2-3 weeks.    Iveth Elizabeth MD PGY-2  [facility] Dermatology  Pager: [phone]  Office: [phone]

## 2019-06-30 ENCOUNTER — EMERGENCY (EMERGENCY)
Facility: HOSPITAL | Age: 84
LOS: 1 days | Discharge: HOME | End: 2019-06-30
Attending: EMERGENCY MEDICINE | Admitting: EMERGENCY MEDICINE
Payer: MEDICARE

## 2019-06-30 VITALS
HEIGHT: 72 IN | SYSTOLIC BLOOD PRESSURE: 98 MMHG | TEMPERATURE: 98 F | RESPIRATION RATE: 18 BRPM | OXYGEN SATURATION: 100 % | WEIGHT: 134.92 LBS | HEART RATE: 84 BPM | DIASTOLIC BLOOD PRESSURE: 59 MMHG

## 2019-06-30 DIAGNOSIS — Z86.39 PERSONAL HISTORY OF OTHER ENDOCRINE, NUTRITIONAL AND METABOLIC DISEASE: Chronic | ICD-10-CM

## 2019-06-30 DIAGNOSIS — Z90.79 ACQUIRED ABSENCE OF OTHER GENITAL ORGAN(S): Chronic | ICD-10-CM

## 2019-06-30 PROCEDURE — 99284 EMERGENCY DEPT VISIT MOD MDM: CPT

## 2019-07-01 VITALS
OXYGEN SATURATION: 98 % | RESPIRATION RATE: 18 BRPM | TEMPERATURE: 98 F | SYSTOLIC BLOOD PRESSURE: 103 MMHG | HEART RATE: 87 BPM | DIASTOLIC BLOOD PRESSURE: 64 MMHG

## 2019-07-01 LAB
ALBUMIN SERPL ELPH-MCNC: 2.9 G/DL — LOW (ref 3.3–5)
ALP SERPL-CCNC: 70 U/L — SIGNIFICANT CHANGE UP (ref 40–120)
ALT FLD-CCNC: 13 U/L — SIGNIFICANT CHANGE UP (ref 10–45)
ANION GAP SERPL CALC-SCNC: 14 MMOL/L — SIGNIFICANT CHANGE UP (ref 5–17)
APPEARANCE UR: CLEAR — SIGNIFICANT CHANGE UP
AST SERPL-CCNC: 29 U/L — SIGNIFICANT CHANGE UP (ref 10–40)
BACTERIA # UR AUTO: NEGATIVE — SIGNIFICANT CHANGE UP
BASOPHILS # BLD AUTO: 0 K/UL — SIGNIFICANT CHANGE UP (ref 0–0.2)
BASOPHILS NFR BLD AUTO: 0.2 % — SIGNIFICANT CHANGE UP (ref 0–2)
BILIRUB SERPL-MCNC: 0.4 MG/DL — SIGNIFICANT CHANGE UP (ref 0.2–1.2)
BILIRUB UR-MCNC: NEGATIVE — SIGNIFICANT CHANGE UP
BUN SERPL-MCNC: 77 MG/DL — HIGH (ref 7–23)
CALCIUM SERPL-MCNC: 9.6 MG/DL — SIGNIFICANT CHANGE UP (ref 8.4–10.5)
CHLORIDE SERPL-SCNC: 102 MMOL/L — SIGNIFICANT CHANGE UP (ref 96–108)
CO2 SERPL-SCNC: 14 MMOL/L — LOW (ref 22–31)
COLOR SPEC: YELLOW — SIGNIFICANT CHANGE UP
COMMENT - URINE: SIGNIFICANT CHANGE UP
CREAT SERPL-MCNC: 2.84 MG/DL — HIGH (ref 0.5–1.3)
DIFF PNL FLD: ABNORMAL
EOSINOPHIL # BLD AUTO: 0.1 K/UL — SIGNIFICANT CHANGE UP (ref 0–0.5)
EOSINOPHIL NFR BLD AUTO: 0.9 % — SIGNIFICANT CHANGE UP (ref 0–6)
EPI CELLS # UR: 1 — SIGNIFICANT CHANGE UP
GLUCOSE SERPL-MCNC: 124 MG/DL — HIGH (ref 70–99)
GLUCOSE UR QL: NEGATIVE — SIGNIFICANT CHANGE UP
HCT VFR BLD CALC: 27.1 % — LOW (ref 39–50)
HGB BLD-MCNC: 9 G/DL — LOW (ref 13–17)
HYALINE CASTS # UR AUTO: 0 /LPF — SIGNIFICANT CHANGE UP (ref 0–7)
KETONES UR-MCNC: NEGATIVE — SIGNIFICANT CHANGE UP
LEUKOCYTE ESTERASE UR-ACNC: NEGATIVE — SIGNIFICANT CHANGE UP
LYMPHOCYTES # BLD AUTO: 1.5 K/UL — SIGNIFICANT CHANGE UP (ref 1–3.3)
LYMPHOCYTES # BLD AUTO: 20.3 % — SIGNIFICANT CHANGE UP (ref 13–44)
MCHC RBC-ENTMCNC: 33 GM/DL — SIGNIFICANT CHANGE UP (ref 32–36)
MCHC RBC-ENTMCNC: 33.1 PG — SIGNIFICANT CHANGE UP (ref 27–34)
MCV RBC AUTO: 100 FL — SIGNIFICANT CHANGE UP (ref 80–100)
MONOCYTES # BLD AUTO: 0.6 K/UL — SIGNIFICANT CHANGE UP (ref 0–0.9)
MONOCYTES NFR BLD AUTO: 8.5 % — SIGNIFICANT CHANGE UP (ref 2–14)
NEUTROPHILS # BLD AUTO: 5.1 K/UL — SIGNIFICANT CHANGE UP (ref 1.8–7.4)
NEUTROPHILS NFR BLD AUTO: 70 % — SIGNIFICANT CHANGE UP (ref 43–77)
NITRITE UR-MCNC: NEGATIVE — SIGNIFICANT CHANGE UP
PH UR: 6.5 — SIGNIFICANT CHANGE UP (ref 5–8)
PLATELET # BLD AUTO: 219 K/UL — SIGNIFICANT CHANGE UP (ref 150–400)
POTASSIUM SERPL-MCNC: 3.8 MMOL/L — SIGNIFICANT CHANGE UP (ref 3.5–5.3)
POTASSIUM SERPL-SCNC: 3.8 MMOL/L — SIGNIFICANT CHANGE UP (ref 3.5–5.3)
PROT SERPL-MCNC: 5.6 G/DL — LOW (ref 6–8.3)
PROT UR-MCNC: ABNORMAL
RBC # BLD: 2.7 M/UL — LOW (ref 4.2–5.8)
RBC # FLD: 16.6 % — HIGH (ref 10.3–14.5)
RBC CASTS # UR COMP ASSIST: 10 /HPF — HIGH (ref 0–4)
SODIUM SERPL-SCNC: 130 MMOL/L — LOW (ref 135–145)
SP GR SPEC: 1.02 — SIGNIFICANT CHANGE UP (ref 1.01–1.02)
UROBILINOGEN FLD QL: NEGATIVE — SIGNIFICANT CHANGE UP
WBC # BLD: 7.2 K/UL — SIGNIFICANT CHANGE UP (ref 3.8–10.5)
WBC # FLD AUTO: 7.2 K/UL — SIGNIFICANT CHANGE UP (ref 3.8–10.5)
WBC UR QL: 1 /HPF — SIGNIFICANT CHANGE UP (ref 0–5)

## 2019-07-01 PROCEDURE — 87086 URINE CULTURE/COLONY COUNT: CPT

## 2019-07-01 PROCEDURE — 85027 COMPLETE CBC AUTOMATED: CPT

## 2019-07-01 PROCEDURE — 51702 INSERT TEMP BLADDER CATH: CPT

## 2019-07-01 PROCEDURE — 80053 COMPREHEN METABOLIC PANEL: CPT

## 2019-07-01 PROCEDURE — 81001 URINALYSIS AUTO W/SCOPE: CPT

## 2019-07-01 PROCEDURE — 99284 EMERGENCY DEPT VISIT MOD MDM: CPT | Mod: 25

## 2019-07-01 NOTE — ED PROVIDER NOTE - ATTENDING CONTRIBUTION TO CARE
Patient from [facility] for urinary retention.  Requires intermittent catheterization for urine at baseline, since catheterization yesterday has had bleeding from urethra and this evening unable to pass catheter at [facility] - sent to Emergency Department.  Reporting suprapubic dyscomfort.  Per patient prior TURP x2 also with possible history of urethral stricture in past.  Currently undergoing treatment with octreotide for carcinoid syndrome, still having ongoing diarrhea.    A 14 point review of systems is negative except as in HPI or otherwise documented.    Exam:  General: Patient uncomfortable appearing  HEENT: airway patent with moist mucous membranes  Cardiac: RRR S1/S2 with strong peripheral pulses  Respiratory: lungs clear without respiratory distress  GI: abdomen soft, suprapubic fullness consistent with enlarged bladder  Neuro: no gross neurologic deficits  Skin: diffuse rash consistent with carcinoid diagnosis  Psych: normal mood and affect    Bladder scan performed with approx 1L urine retained.  Calzada catheter placement attempted by myself with 14F and 18F coude without success.  Urology consulted with succesful catheter placement, see separate note.  Labs obtained without significant change in baseline creatinine, urine output after calzada placement appropriate (after initial large volume drainage) - will discharge back to [facility] with calzada inplace and outpatient uro follow up.

## 2019-07-01 NOTE — ED ADULT NURSE NOTE - NSIMPLEMENTINTERV_GEN_ALL_ED
Implemented All Fall Risk Interventions:  [place] to call system. Call bell, personal items and telephone within reach. Instruct patient to call for assistance. Room bathroom lighting operational. Non-slip footwear when patient is off stretcher. Physically safe environment: no spills, clutter or unnecessary equipment. Stretcher in lowest position, wheels locked, appropriate side rails in place. Provide visual cue, wrist band, yellow gown, etc. Monitor gait and stability. Monitor for mental status changes and reorient to person, place, and time. Review medications for side effects contributing to fall risk. Reinforce activity limits and safety measures with patient and family.

## 2019-07-01 NOTE — ED PROVIDER NOTE - CLINICAL SUMMARY MEDICAL DECISION MAKING FREE TEXT BOX
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 Will place calzada and reassess

## 2019-07-01 NOTE — ED ADULT NURSE REASSESSMENT NOTE - NS ED NURSE REASSESS COMMENT FT1
Report taken from CARLITOS Osorio. Urology placed calzada catheter after performing cystoscopy. Approximately 750cc urine drained. UA and culture drawn and sent to lab.

## 2019-07-01 NOTE — ED ADULT NURSE REASSESSMENT NOTE - NS ED NURSE REASSESS COMMENT FT1
Pt currently sleeping. Non-emergent transport called, ETA is 6am. Pt's wife at bedside made aware. Safety and comfort provided.

## 2019-07-01 NOTE — PROCEDURE NOTE - NSURITECHNIQUE_GU_A_CORE
Proper hand hygiene was performed/The catheter was appropriately lubricated/The collection bag is below the level of the patient and urinary bladder/Sterile gloves were worn for the duration of the procedure/A sterile drape was used to cover all adjacent areas/The site was cleaned with soap/water and sterile solution (betadine)/The catheter was secured with a securement device (e.g. StatLock)/All applicable medical record documentation is completed

## 2019-07-01 NOTE — ED ADULT NURSE NOTE - OBJECTIVE STATEMENT
Patient is a 86 year old male coming by EMS from [facility] for difficulty placing urinary catheter. Patient has history of carcinoid syndrome s/p left lower lobe resection, & bladder disease requiring intermittent straight cath for urinary retention. Patient is A&O x 4 and appears well. Pt reports straight cath failed 4x from multiple RNs at rehab, started to meet resistance and have blood at meatus. Bladder scan at outside facility read 760cc.  bladder scan reads >999cc, MD Weaver aware. Bladder is soft and distended. Patient denies pain. Denies complaints of chest pain, sob, fevers, chills, n/v/d, headache, syncope, blood in stool. Abd is soft, non tender, non distended. Skin is warm and dry. Color is consistent with ethnicity. VSS/ NAD. Safety and comfort maintained. Family at the bedside. Will continue to monitor.

## 2019-07-01 NOTE — ED PROVIDER NOTE - PMH
History of carcinoid syndrome    Kidney lesion  partial resction- reportedly "not active lesion"  Neck mass  was resected, reportedly bening

## 2019-07-01 NOTE — ED PROVIDER NOTE - OBJECTIVE STATEMENT
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 Currently on octreotide 150mg x 4 to control carcinoid sx.       GI doc [place] managing carcinoid syndrome  Urologist Goldberg

## 2019-07-01 NOTE — ED PROVIDER NOTE - NSFOLLOWUPINSTRUCTIONS_ED_ALL_ED_FT
(1) Follow up with your primary care physician and Urologist as discussed  (2) Immediately seek care at your nearest emergency room if your worsen, persist, or do not resolve   (3) Take Tylenol and/or motrin up to every 6 hours as needed for pain.

## 2019-07-01 NOTE — ED PROVIDER NOTE - PHYSICAL EXAMINATION
General: NAD  HEENT: pupils equal and reactive, normal oropharynx, normal external ears bilaterally   Resp: lungs clear to auscultation bilaterally, symmetric chest wall rise  Abd: soft, nontender, nondistended, normoactive bowel sounds  : no CVA tenderness  Neuro: Moving all extremities

## 2019-07-01 NOTE — PROCEDURE NOTE - ADDITIONAL PROCEDURE DETAILS
Called for pt with urinary retention. 12, 18f coude attempted before 22f coude placed easily and aseptically without event. 1200cc of yellow urine drained.    To be dcd with calzada. Watch for postobstructive diuresis, monitor electrolytes, hydration. F/u with his private urologist within 1 week.

## 2019-07-02 LAB
CULTURE RESULTS: NO GROWTH — SIGNIFICANT CHANGE UP
SPECIMEN SOURCE: SIGNIFICANT CHANGE UP

## 2019-07-11 ENCOUNTER — INPATIENT (INPATIENT)
Facility: HOSPITAL | Age: 84
LOS: 11 days | Discharge: HOSPICE | DRG: 698 | End: 2019-07-23
Attending: INTERNAL MEDICINE | Admitting: INTERNAL MEDICINE
Payer: MEDICARE

## 2019-07-11 VITALS
WEIGHT: 141.1 LBS | HEIGHT: 71 IN | SYSTOLIC BLOOD PRESSURE: 94 MMHG | OXYGEN SATURATION: 98 % | HEART RATE: 85 BPM | DIASTOLIC BLOOD PRESSURE: 40 MMHG | RESPIRATION RATE: 18 BRPM | TEMPERATURE: 98 F

## 2019-07-11 DIAGNOSIS — T83.511A INFECTION AND INFLAMMATORY REACTION DUE TO INDWELLING URETHRAL CATHETER, INITIAL ENCOUNTER: ICD-10-CM

## 2019-07-11 DIAGNOSIS — E34.0 CARCINOID SYNDROME: ICD-10-CM

## 2019-07-11 DIAGNOSIS — Z90.79 ACQUIRED ABSENCE OF OTHER GENITAL ORGAN(S): Chronic | ICD-10-CM

## 2019-07-11 DIAGNOSIS — Z86.39 PERSONAL HISTORY OF OTHER ENDOCRINE, NUTRITIONAL AND METABOLIC DISEASE: Chronic | ICD-10-CM

## 2019-07-11 LAB
ALBUMIN SERPL ELPH-MCNC: 3.2 G/DL — LOW (ref 3.3–5)
ALP SERPL-CCNC: 63 U/L — SIGNIFICANT CHANGE UP (ref 40–120)
ALT FLD-CCNC: 18 U/L — SIGNIFICANT CHANGE UP (ref 10–45)
ANION GAP SERPL CALC-SCNC: 13 MMOL/L — SIGNIFICANT CHANGE UP (ref 5–17)
APPEARANCE UR: ABNORMAL
APTT BLD: 39.4 SEC — HIGH (ref 27.5–36.3)
AST SERPL-CCNC: 37 U/L — SIGNIFICANT CHANGE UP (ref 10–40)
BACTERIA # UR AUTO: ABNORMAL
BASE EXCESS BLDV CALC-SCNC: -11.8 MMOL/L — LOW (ref -2–2)
BASOPHILS # BLD AUTO: 0 K/UL — SIGNIFICANT CHANGE UP (ref 0–0.2)
BASOPHILS NFR BLD AUTO: 0.6 % — SIGNIFICANT CHANGE UP (ref 0–2)
BILIRUB SERPL-MCNC: 0.3 MG/DL — SIGNIFICANT CHANGE UP (ref 0.2–1.2)
BILIRUB UR-MCNC: NEGATIVE — SIGNIFICANT CHANGE UP
BUN SERPL-MCNC: 97 MG/DL — HIGH (ref 7–23)
CA-I SERPL-SCNC: 1.72 MMOL/L — CRITICAL HIGH (ref 1.12–1.3)
CALCIUM SERPL-MCNC: 11.4 MG/DL — HIGH (ref 8.4–10.5)
CHLORIDE BLDV-SCNC: >120 MMOL/L — HIGH (ref 96–108)
CHLORIDE SERPL-SCNC: 117 MMOL/L — HIGH (ref 96–108)
CO2 BLDV-SCNC: 15 MMOL/L — LOW (ref 22–30)
CO2 SERPL-SCNC: 12 MMOL/L — LOW (ref 22–31)
COLOR SPEC: ABNORMAL
COMMENT - URINE: SIGNIFICANT CHANGE UP
CREAT SERPL-MCNC: 3.37 MG/DL — HIGH (ref 0.5–1.3)
DIFF PNL FLD: ABNORMAL
EOSINOPHIL # BLD AUTO: 0.1 K/UL — SIGNIFICANT CHANGE UP (ref 0–0.5)
EOSINOPHIL NFR BLD AUTO: 0.7 % — SIGNIFICANT CHANGE UP (ref 0–6)
EPI CELLS # UR: 2 /HPF — SIGNIFICANT CHANGE UP
GAS PNL BLDV: 141 MMOL/L — SIGNIFICANT CHANGE UP (ref 135–145)
GAS PNL BLDV: SIGNIFICANT CHANGE UP
GAS PNL BLDV: SIGNIFICANT CHANGE UP
GLUCOSE BLDV-MCNC: 114 MG/DL — HIGH (ref 70–99)
GLUCOSE SERPL-MCNC: 123 MG/DL — HIGH (ref 70–99)
GLUCOSE UR QL: NEGATIVE — SIGNIFICANT CHANGE UP
HCO3 BLDV-SCNC: 14 MMOL/L — LOW (ref 21–29)
HCT VFR BLD CALC: 24.5 % — LOW (ref 39–50)
HCT VFR BLDA CALC: 26 % — LOW (ref 39–50)
HGB BLD CALC-MCNC: 8.5 G/DL — LOW (ref 13–17)
HGB BLD-MCNC: 8.7 G/DL — LOW (ref 13–17)
HYALINE CASTS # UR AUTO: 3 /LPF — HIGH (ref 0–2)
INR BLD: 1.03 RATIO — SIGNIFICANT CHANGE UP (ref 0.88–1.16)
KETONES UR-MCNC: NEGATIVE — SIGNIFICANT CHANGE UP
LACTATE BLDV-MCNC: 1.5 MMOL/L — SIGNIFICANT CHANGE UP (ref 0.7–2)
LEUKOCYTE ESTERASE UR-ACNC: ABNORMAL
LYMPHOCYTES # BLD AUTO: 1.9 K/UL — SIGNIFICANT CHANGE UP (ref 1–3.3)
LYMPHOCYTES # BLD AUTO: 23.9 % — SIGNIFICANT CHANGE UP (ref 13–44)
MCHC RBC-ENTMCNC: 35.3 PG — HIGH (ref 27–34)
MCHC RBC-ENTMCNC: 35.4 GM/DL — SIGNIFICANT CHANGE UP (ref 32–36)
MCV RBC AUTO: 99.8 FL — SIGNIFICANT CHANGE UP (ref 80–100)
MONOCYTES # BLD AUTO: 0.7 K/UL — SIGNIFICANT CHANGE UP (ref 0–0.9)
MONOCYTES NFR BLD AUTO: 8.9 % — SIGNIFICANT CHANGE UP (ref 2–14)
NEUTROPHILS # BLD AUTO: 5.2 K/UL — SIGNIFICANT CHANGE UP (ref 1.8–7.4)
NEUTROPHILS NFR BLD AUTO: 66 % — SIGNIFICANT CHANGE UP (ref 43–77)
NITRITE UR-MCNC: NEGATIVE — SIGNIFICANT CHANGE UP
PCO2 BLDV: 32 MMHG — LOW (ref 35–50)
PH BLDV: 7.26 — LOW (ref 7.35–7.45)
PH UR: 6 — SIGNIFICANT CHANGE UP (ref 5–8)
PLATELET # BLD AUTO: 177 K/UL — SIGNIFICANT CHANGE UP (ref 150–400)
PO2 BLDV: 60 MMHG — HIGH (ref 25–45)
POTASSIUM BLDV-SCNC: 3 MMOL/L — LOW (ref 3.5–5.3)
POTASSIUM SERPL-MCNC: 3.2 MMOL/L — LOW (ref 3.5–5.3)
POTASSIUM SERPL-SCNC: 3.2 MMOL/L — LOW (ref 3.5–5.3)
PROT SERPL-MCNC: 6 G/DL — SIGNIFICANT CHANGE UP (ref 6–8.3)
PROT UR-MCNC: ABNORMAL
PROTHROM AB SERPL-ACNC: 11.8 SEC — SIGNIFICANT CHANGE UP (ref 10–12.9)
RBC # BLD: 2.45 M/UL — LOW (ref 4.2–5.8)
RBC # FLD: 17 % — HIGH (ref 10.3–14.5)
RBC CASTS # UR COMP ASSIST: 113 /HPF — HIGH (ref 0–4)
SAO2 % BLDV: 91 % — HIGH (ref 67–88)
SODIUM SERPL-SCNC: 142 MMOL/L — SIGNIFICANT CHANGE UP (ref 135–145)
SP GR SPEC: 1.02 — SIGNIFICANT CHANGE UP (ref 1.01–1.02)
UROBILINOGEN FLD QL: NEGATIVE — SIGNIFICANT CHANGE UP
WBC # BLD: 7.8 K/UL — SIGNIFICANT CHANGE UP (ref 3.8–10.5)
WBC # FLD AUTO: 7.8 K/UL — SIGNIFICANT CHANGE UP (ref 3.8–10.5)
WBC UR QL: 893 /HPF — HIGH (ref 0–5)

## 2019-07-11 PROCEDURE — 99223 1ST HOSP IP/OBS HIGH 75: CPT

## 2019-07-11 PROCEDURE — 93010 ELECTROCARDIOGRAM REPORT: CPT

## 2019-07-11 PROCEDURE — 71045 X-RAY EXAM CHEST 1 VIEW: CPT | Mod: 26

## 2019-07-11 PROCEDURE — 70450 CT HEAD/BRAIN W/O DYE: CPT | Mod: 26

## 2019-07-11 PROCEDURE — 99285 EMERGENCY DEPT VISIT HI MDM: CPT | Mod: 25

## 2019-07-11 RX ORDER — SODIUM CHLORIDE 9 MG/ML
1000 INJECTION, SOLUTION INTRAVENOUS
Refills: 0 | Status: DISCONTINUED | OUTPATIENT
Start: 2019-07-11 | End: 2019-07-11

## 2019-07-11 RX ORDER — POTASSIUM CHLORIDE 20 MEQ
40 PACKET (EA) ORAL EVERY 4 HOURS
Refills: 0 | Status: COMPLETED | OUTPATIENT
Start: 2019-07-11 | End: 2019-07-11

## 2019-07-11 RX ORDER — SODIUM BICARBONATE 1 MEQ/ML
0.18 SYRINGE (ML) INTRAVENOUS
Qty: 150 | Refills: 0 | Status: DISCONTINUED | OUTPATIENT
Start: 2019-07-11 | End: 2019-07-16

## 2019-07-11 RX ORDER — HEPARIN SODIUM 5000 [USP'U]/ML
5000 INJECTION INTRAVENOUS; SUBCUTANEOUS EVERY 12 HOURS
Refills: 0 | Status: DISCONTINUED | OUTPATIENT
Start: 2019-07-11 | End: 2019-07-23

## 2019-07-11 RX ORDER — ACETAMINOPHEN 500 MG
650 TABLET ORAL EVERY 6 HOURS
Refills: 0 | Status: DISCONTINUED | OUTPATIENT
Start: 2019-07-11 | End: 2019-07-23

## 2019-07-11 RX ORDER — OCTREOTIDE ACETATE 200 UG/ML
150 INJECTION, SOLUTION INTRAVENOUS; SUBCUTANEOUS
Refills: 0 | Status: DISCONTINUED | OUTPATIENT
Start: 2019-07-11 | End: 2019-07-11

## 2019-07-11 RX ORDER — CEFEPIME 1 G/1
500 INJECTION, POWDER, FOR SOLUTION INTRAMUSCULAR; INTRAVENOUS EVERY 12 HOURS
Refills: 0 | Status: DISCONTINUED | OUTPATIENT
Start: 2019-07-11 | End: 2019-07-14

## 2019-07-11 RX ORDER — CEFEPIME 1 G/1
1000 INJECTION, POWDER, FOR SOLUTION INTRAMUSCULAR; INTRAVENOUS ONCE
Refills: 0 | Status: COMPLETED | OUTPATIENT
Start: 2019-07-11 | End: 2019-07-11

## 2019-07-11 RX ORDER — SODIUM CHLORIDE 9 MG/ML
1 INJECTION INTRAMUSCULAR; INTRAVENOUS; SUBCUTANEOUS DAILY
Refills: 0 | Status: DISCONTINUED | OUTPATIENT
Start: 2019-07-11 | End: 2019-07-11

## 2019-07-11 RX ORDER — OCTREOTIDE ACETATE 200 UG/ML
200 INJECTION, SOLUTION INTRAVENOUS; SUBCUTANEOUS EVERY 6 HOURS
Refills: 0 | Status: DISCONTINUED | OUTPATIENT
Start: 2019-07-11 | End: 2019-07-22

## 2019-07-11 RX ORDER — LOPERAMIDE HCL 2 MG
2 TABLET ORAL
Refills: 0 | Status: DISCONTINUED | OUTPATIENT
Start: 2019-07-11 | End: 2019-07-22

## 2019-07-11 RX ADMIN — OCTREOTIDE ACETATE 200 MICROGRAM(S): 200 INJECTION, SOLUTION INTRAVENOUS; SUBCUTANEOUS at 18:36

## 2019-07-11 RX ADMIN — Medication 1 DROP(S): at 18:34

## 2019-07-11 RX ADMIN — Medication 1 DROP(S): at 14:07

## 2019-07-11 RX ADMIN — Medication 1 DROP(S): at 23:16

## 2019-07-11 RX ADMIN — SODIUM CHLORIDE 50 MILLILITER(S): 9 INJECTION, SOLUTION INTRAVENOUS at 14:09

## 2019-07-11 RX ADMIN — Medication 40 MILLIEQUIVALENT(S): at 14:07

## 2019-07-11 RX ADMIN — Medication 40 MILLIEQUIVALENT(S): at 18:36

## 2019-07-11 RX ADMIN — CEFEPIME 100 MILLIGRAM(S): 1 INJECTION, POWDER, FOR SOLUTION INTRAMUSCULAR; INTRAVENOUS at 03:32

## 2019-07-11 RX ADMIN — Medication 1 TABLET(S): at 14:08

## 2019-07-11 RX ADMIN — Medication 70 MEQ/KG/HR: at 22:03

## 2019-07-11 RX ADMIN — CEFEPIME 100 MILLIGRAM(S): 1 INJECTION, POWDER, FOR SOLUTION INTRAMUSCULAR; INTRAVENOUS at 18:35

## 2019-07-11 RX ADMIN — OCTREOTIDE ACETATE 200 MICROGRAM(S): 200 INJECTION, SOLUTION INTRAVENOUS; SUBCUTANEOUS at 23:17

## 2019-07-11 RX ADMIN — HEPARIN SODIUM 5000 UNIT(S): 5000 INJECTION INTRAVENOUS; SUBCUTANEOUS at 18:35

## 2019-07-11 RX ADMIN — Medication 1 DROP(S): at 20:47

## 2019-07-11 NOTE — ED ADULT NURSE NOTE - NSIMPLEMENTINTERV_GEN_ALL_ED
Implemented All Fall with Harm Risk Interventions:  [place] to call system. Call bell, personal items and telephone within reach. Instruct patient to call for assistance. Room bathroom lighting operational. Non-slip footwear when patient is off stretcher. Physically safe environment: no spills, clutter or unnecessary equipment. Stretcher in lowest position, wheels locked, appropriate side rails in place. Provide visual cue, wrist band, yellow gown, etc. Monitor gait and stability. Monitor for mental status changes and reorient to person, place, and time. Review medications for side effects contributing to fall risk. Reinforce activity limits and safety measures with patient and family. Provide visual clues: red socks.

## 2019-07-11 NOTE — ED PROVIDER NOTE - CLINICAL SUMMARY MEDICAL DECISION MAKING FREE TEXT BOX
86yoM with PMH of carcinoid syndrome s/p left lower lobe resection, bladder disease with new calzada placed 7/1 presenting with AMS and hypotension. Septic. Likely 2/2 UTI. UA, CXR, BCx, abx. Admit.

## 2019-07-11 NOTE — CONSULT NOTE ADULT - ASSESSMENT
86yoM with PMH of carcinoid syndrome s/p left lower lobe resection, bladder outlet obstruction  with pericardial effusion sent from [facility].x with UTI, acidosis and constanza     1- CONSTANZA   2- acidosis   3- carcinoid  4- pericardial effusion   5- hypotension on admission   6- uti         renal function continue to worsen in setting of infection, as well as pericardial effusion and pre renal azotemia due to diarrhea  d5w + 150 meq nahco3/L @ 50 cc/hr   cont with cefepime   repeat echo   octreotide for carcinoid

## 2019-07-11 NOTE — H&P ADULT - HISTORY OF PRESENT ILLNESS
86yoM with PMH of carcinoid syndrome s/p left lower lobe resection, bladder disease with new calzada placed on July 1st in the ED sent from [facility] in [facility]. Patient altered. Per EMS, patient with malodorous urine. At baseline, able to maintain conversations. No fevers or chills noted.

## 2019-07-11 NOTE — ED ADULT NURSE REASSESSMENT NOTE - NS ED NURSE REASSESS COMMENT FT1
Patient received bed assignment on [facility]. Patient aware of assignment. Report given to receiving CARLITOS MCCLAIN. Patient stable for transport. Chart given to charge desk. Safety and comfort maintained while in ED.

## 2019-07-11 NOTE — H&P ADULT - NSHPLABSRESULTS_GEN_ALL_CORE
8.7    7.8   )-----------( 177      ( 2019 03:24 )             24.5       07-    142  |  117<H>  |  97<H>  ----------------------------<  123<H>  3.2<L>   |  12<L>  |  3.37<H>    Ca    11.4<H>      2019 03:24    TPro  6.0  /  Alb  3.2<L>  /  TBili  0.3  /  DBili  x   /  AST  37  /  ALT  18  /  AlkPhos  63  -11              Urinalysis Basic - ( 2019 03:24 )    Color: Light Orange / Appearance: Turbid / S.016 / pH: x  Gluc: x / Ketone: Negative  / Bili: Negative / Urobili: Negative   Blood: x / Protein: 100 mg/dL / Nitrite: Negative   Leuk Esterase: Large / RBC: 113 /hpf /  /HPF   Sq Epi: x / Non Sq Epi: 2 /hpf / Bacteria: Many        PT/INR - ( 2019 03:24 )   PT: 11.8 sec;   INR: 1.03 ratio         PTT - ( 2019 03:24 )  PTT:39.4 sec    Lactate Trend            CAPILLARY BLOOD GLUCOSE    < from: Xray Chest 1 View AP/PA (19 @ 03:36) >    INTERPRETATION:  no emergent findings    < end of copied text >

## 2019-07-11 NOTE — H&P ADULT - ASSESSMENT
86 m with    Sepsis  - panculture  - antibiotics  - ID evaluation called    Carcinoid  - continue Octreotide  - Oncology evaluation called    CONSTANZA on CKD  - Rae  - follow Cr, lytes  - Nephrology evaluation Dr Sisi Morse    Hypercalcemia  - follow  - Nephrology follow    Hyponatremia  - supplement NaCl    Pericardial effusion  - Echo    Anemia  - follow, Tx support  - Cardiology evaluation Dr. Foster    Mental status change  - CT head    Urinary retention  - Rae  Further action as per clinical course  Phong Dash MD pager [phone]

## 2019-07-11 NOTE — ED PROVIDER NOTE - PHYSICAL EXAMINATION
General appearance: AOx1 (self)   Eyes: anicteric sclerae, moist conjunctivae; no lid-lag; Pupils equal, round and reactive to light; Extraocular muscles intact   HENT: Atraumatic; oropharynx clear with dry mucous membrane   Neck: Trachea midline; Full range of motion, supple; no thyromegaly or lymphadenopathy   Pulm: Lungs clear to auscultation bilaterally, with normal respiratory effort and no intercostal retractions; normal work of breathing   CV: Regular Rhythm and Rate; Normal S1, S2;    Abdomen: Soft, non-tender, non-distended; no masses or hepatosplenomegaly.   Extremities: No peripheral edema or extremity lymphadenopathy. 5/5 strength in all four extremities.   Skin: Normal temperature, turgor and texture; no rash, ulcers or subcutaneous nodules

## 2019-07-11 NOTE — CONSULT NOTE ADULT - SUBJECTIVE AND OBJECTIVE BOX
**********              CARDIOLOGY CONSULT NOTE              ************  ============================================================  CHIEF COMPLAINT/REASON FOR CONSULT:  Patient is a 86y old  Male who presents with a chief complaint of mental status jamil (11 Jul 2019 21:08)      HISTORY OF PRESENT ILLNESS:  86yMale with a history of left lower lobe resection, carcinoid syndrome c/b frequent episodes of persistent diarrhea, persistent moderate pericardial effusion, worsening renal function.   Patient reportedly found with hypotension and altered mental status. Urine was maladarous and appears to be a UTI on admission labs.    Rest of review of symptoms are unable to obtain due to hearing difficulties/lethargy.   No reported worsening CP/Palps/SOB            acetaminophen   Tablet .. 650 milliGRAM(s) Oral every 6 hours PRN  artificial tears (preservative free) Ophthalmic Solution 1 Drop(s) Both EYES every 2 hours  cefepime   IVPB 500 milliGRAM(s) IV Intermittent every 12 hours  heparin  Injectable 5000 Unit(s) SubCutaneous every 12 hours  loperamide 2 milliGRAM(s) Oral four times a day PRN  multivitamin 1 Tablet(s) Oral daily  octreotide  Injectable 200 MICROGram(s) SubCutaneous every 6 hours  sodium bicarbonate  Infusion 0.179 mEq/kG/Hr IV Continuous <Continuous>    ============================================================  Interval Events   -   -     ============================================================      Allergies    penicillin (Swelling)    Intolerances    	    MEDICATIONS:  heparin  Injectable 5000 Unit(s) SubCutaneous every 12 hours    cefepime   IVPB 500 milliGRAM(s) IV Intermittent every 12 hours      acetaminophen   Tablet .. 650 milliGRAM(s) Oral every 6 hours PRN    loperamide 2 milliGRAM(s) Oral four times a day PRN    octreotide  Injectable 200 MICROGram(s) SubCutaneous every 6 hours    artificial tears (preservative free) Ophthalmic Solution 1 Drop(s) Both EYES every 2 hours  multivitamin 1 Tablet(s) Oral daily  sodium bicarbonate  Infusion 0.179 mEq/kG/Hr IV Continuous <Continuous>      PAST MEDICAL & SURGICAL HISTORY:  Neck mass: was resected, reportedly bening  Kidney lesion: partial resction- reportedly &quot;not active lesion&quot;  History of carcinoid syndrome  H/O carcinoid syndrome  S/P TURP      FAMILY HISTORY:  FH: lung cancer (Sibling): sister  Family history of nasopharyngeal cancer: father    Mother - HTN      SOCIAL HISTORY:    [ x] Non-smoker  [x] No Illicit Drug Use  [ x] No Excess EtOH Use      REVIEW OF SYSTEMS: (Unless + Before Symptom, it is negative)  Constitutional: [] Fever, [x]Fatigue, []Weight Changes  Eyes:  []Recent Vision Changes, []Eye Pain  ENT: []Congestion, []Sore Throat  Endocrine: []Excess Sweating, []Temperature Intolerance  Cardiovascular:  []Chest Pain, []Palpitations, []Shortness of Breath, []Pre-syncope, []Syncope,[] LE Swelling  Respiratory:[] Cough, []Congestion,[] Wheezing  Gastrointestinal: [] Abdominal Pain,[] Nausea, []Vomiting  Genitourinary: []Dysuria,[] hematuria  Musculoskeletal: []Joint Pain, []Hip/Knee Injury  Neurologic: []Headaches,[] Imbalance, []Weakness  Skin: []Rashes, []hematoma, []purprura    ================================    PHYSICAL EXAM:  T(C): 36.4 (07-11-19 @ 07:53), Max: 36.8 (07-11-19 @ 02:58)  HR: 96 (07-11-19 @ 14:01) (84 - 98)  BP: 95/54 (07-11-19 @ 14:01) (94/40 - 100/54)  RR: 17 (07-11-19 @ 07:53) (17 - 18)  SpO2: 96% (07-11-19 @ 14:01) (96% - 100%)  Wt(kg): --  I&O's Summary    Appearance: Normal; +Lethargic +[place]  Psychiatry: Orientation and mood difficult to assess due to lethargy +[place]  HEENT:   Normal oral mucosa, EOMI	  Lymphatic: No lymphadenopathy  Cardiovascular: Normal S1 S2, No JVD, No murmurs, No edema  Respiratory: Lungs clear to auscultation, no use of accessory muscles	  Gastrointestinal:  Soft, Non-tender	  Skin: No rashes, No ecchymoses, No cyanosis	  Neurologic: Non-focal, No Focal Deficits  Extremities: Normal range of motion, No clubbing, cyanosis  Vascular: Peripheral pulses palpable 2+ bilaterally, no prominent varicosities    ============================    LABS:	   Labs Reviewed07-11-19 @ 21:32	    CBC Full  -  ( 11 Jul 2019 03:24 )  WBC Count : 7.8 K/uL  Hemoglobin : 8.7 g/dL  Hematocrit : 24.5 %  Platelet Count - Automated : 177 K/uL  Mean Cell Volume : 99.8 fl  Mean Cell Hemoglobin : 35.3 pg  Mean Cell Hemoglobin Concentration : 35.4 gm/dL  Auto Neutrophil # : 5.2 K/uL  Auto Lymphocyte # : 1.9 K/uL  Auto Monocyte # : 0.7 K/uL  Auto Eosinophil # : 0.1 K/uL  Auto Basophil # : 0.0 K/uL  Auto Neutrophil % : 66.0 %  Auto Lymphocyte % : 23.9 %  Auto Monocyte % : 8.9 %  Auto Eosinophil % : 0.7 %  Auto Basophil % : 0.6 %    07-11    142  |  117<H>  |  97<H>  ----------------------------<  123<H>  3.2<L>   |  12<L>  |  3.37<H>    Ca    11.4<H>      11 Jul 2019 03:24    TPro  6.0  /  Alb  3.2<L>  /  TBili  0.3  /  DBili  x   /  AST  37  /  ALT  18  /  AlkPhos  63  07-11        =========================================================================  ASSESSMENT:  86yMale with a history of left lower lobe resection, carcinoid syndrome c/b frequent episodes of persistent diarrhea, persistent moderate pericardial effusion, worsening renal function, and likely uti    Recommendations  - acidosis and uti per renal/ID  - re-check TTE and will work on hemodynamics.volume repletion with renal pending results  - Will follow        Please call with questions.     Dhruv Paz MD, [facility]  [phone]                                                                                                        Total time spent in face-to-face encounter was 80 minutes. > 50 minutes spent in counseling and coordination of care addressing all medical issues listed above. All labs, imaging, consultant reports, and any relevant outside medical records personally reviewed in order to evaluate and manage the patient medically as well as provide coordination of care amongst providers.

## 2019-07-11 NOTE — PHYSICAL THERAPY INITIAL EVALUATION ADULT - ADDITIONAL COMMENTS
7/11/19 CT HEAD: No CT evidence of acute intracranial hemorrhage, mass effect, or midline shift. Chronic maxillary sinusitis.

## 2019-07-11 NOTE — PHYSICAL THERAPY INITIAL EVALUATION ADULT - CRITERIA FOR SKILLED THERAPEUTIC INTERVENTIONS
risk reduction/prevention/therapy frequency/rehab potential/anticipated discharge recommendation/impairments found/functional limitations in following categories

## 2019-07-11 NOTE — PHYSICAL THERAPY INITIAL EVALUATION ADULT - ACTIVE RANGE OF MOTION EXAMINATION, REHAB EVAL
BUE/BLE AAROM WFL/bilateral upper extremity Active ROM was WFL (within functional limits)/bilateral  lower extremity Active ROM was WFL (within functional limits)

## 2019-07-11 NOTE — ED PROVIDER NOTE - ATTENDING CONTRIBUTION TO CARE
86M w/ bladder disease with new Rae placed on July 1st in the ED sent from [facility] for SBPs in 70s. Patient altered. Per EMS, patient with malodorous urine. At baseline, able to maintain conversations. Patient unable to provide much history has he is confused.    ROS: Unable to obtain due to delirium/AMS.    PMHx & PSHx3: Carcnoid syndrome, kidney lesion s/p partial resection (not active reportedly), neck mass resection reportedly benign, s/p TURP  Allergies: See EMR    VITALS REVIEWED.  VS slightly hypotensive, otherwise normal    GENERALIZED APPEARANCE:  Elderly male, lying in bed  SKIN:  Warm, dry, no cyanosis  HEAD:  No obvious scalp lesions  EYES:  Conjunctiva pink, no icterus  ENMT:  Mucus membranes moist, no stridor  NECK:  Supple, non-tender  CHEST AND RESPIRATORY:  Clear to auscultation B/L, good air entry B/L, equal chest expansion  HEART AND CARDIOVASCULAR:  Regular rate, no obvious murmur  ABDOMEN AND GI:  Soft, non-tender, non-distended.  No rebound, no guarding,  EXTREMITIES:  No deformity, edema, or calf tenderness  NEURO: AAOx1 (name), gross motor and sensory intact    Impression:  R/o UTI, r/o PNA, r/o sepsis, r/o electrolyte abnormality  Labs, imaging, EKG, Abx, admit

## 2019-07-11 NOTE — H&P ADULT - NSHPPHYSICALEXAM_GEN_ALL_CORE
PHYSICAL EXAMINATION:  Vital Signs Last 24 Hrs  T(C): 36.4 (11 Jul 2019 07:53), Max: 36.8 (11 Jul 2019 02:58)  T(F): 97.5 (11 Jul 2019 07:53), Max: 98.2 (11 Jul 2019 02:58)  HR: 95 (11 Jul 2019 07:53) (84 - 98)  BP: 97/60 (11 Jul 2019 07:53) (94/40 - 100/54)  BP(mean): --  RR: 17 (11 Jul 2019 07:53) (17 - 18)  SpO2: 100% (11 Jul 2019 07:53) (98% - 100%)  CAPILLARY BLOOD GLUCOSE          GENERAL: NAD, flushed  HEAD:  atraumatic, normocephalic  EYES: sclera anicteric  ENMT: mucous membranes moist  NECK: supple, No JVD  CHEST/LUNG: clear to auscultation bilaterally; no rales, rhonchi, or wheezing b/l  HEART: normal S1, S2  ABDOMEN: BS+, soft, ND, NT Rae  EXTREMITIES:  pulses palpable; no clubbing, cyanosis, or edema b/l LEs  NEURO: awake, alert, interactive; moves all extremities  SKIN: no rashes or lesions

## 2019-07-11 NOTE — ED PROVIDER NOTE - FAMILY HISTORY
Father  Still living? Unknown  Family history of nasopharyngeal cancer, Age at diagnosis: Age Unknown     Sibling  Still living? Unknown  FH: lung cancer, Age at diagnosis: Age Unknown

## 2019-07-11 NOTE — PHYSICAL THERAPY INITIAL EVALUATION ADULT - GAIT TRAINING, PT EVAL
GOAL: Pt will ambulate 75 feet w/ contact guard assist, w/use of appropriate assistive device in 2 weeks

## 2019-07-11 NOTE — PROGRESS NOTE ADULT - PROBLEM SELECTOR PLAN 2
Continue Octreotide 200 mcg subq q 6 h  Add disodium as needed  check chromogranins, serotonin, Vasoactive intestinal peptide.

## 2019-07-11 NOTE — ED ADULT NURSE REASSESSMENT NOTE - NS ED NURSE REASSESS COMMENT FT1
As per logistics, [facility] on [facility]. Report given to receiving CARLITOS Bautista. VSS. Patient stable for transport. Chart given to charge desk. Safety and comfort maintained while in ED.

## 2019-07-11 NOTE — PROGRESS NOTE ADULT - SUBJECTIVE AND OBJECTIVE BOX
Patient is a 86y old  Male who presents with a chief complaint of mental status jamil (11 Jul 2019 08:11)      HPI:  86yoM with PMH of carcinoid syndrome s/p left lower lobe resection, bladder disease with new calzada placed on July 1st in the ED sent from [facility] for SBPs in 70s. Patient altered. Per EMS, patient with malodorous urine. At baseline, able to maintain conversations. No fevers or chills noted. (11 Jul 2019 08:11)  On Octreotide, still having frequent bowel movements.        ROS:  Negative except for:    PAST MEDICAL & SURGICAL HISTORY:  Neck mass: was resected, reportedly bening  Kidney lesion: partial resction- reportedly &quot;not active lesion&quot;  History of carcinoid syndrome  H/O carcinoid syndrome  S/P TURP      SOCIAL HISTORY:    FAMILY HISTORY:  FH: lung cancer (Sibling): sister  Family history of nasopharyngeal cancer: father      MEDICATIONS  (STANDING):  artificial  tears Solution 1 Drop(s) Both EYES every 2 hours  cefepime   IVPB 500 milliGRAM(s) IV Intermittent every 12 hours  dextrose 5% 1000 milliLiter(s) (50 mL/Hr) IV Continuous <Continuous>  heparin  Injectable 5000 Unit(s) SubCutaneous every 12 hours  multivitamin 1 Tablet(s) Oral daily  octreotide  Injectable 150 MICROGram(s) SubCutaneous four times a day  potassium chloride    Tablet ER 40 milliEquivalent(s) Oral every 4 hours    MEDICATIONS  (PRN):  acetaminophen   Tablet .. 650 milliGRAM(s) Oral every 6 hours PRN Temp greater or equal to 38.5C (101.3F), Mild Pain (1 - 3)  loperamide 2 milliGRAM(s) Oral four times a day PRN Diarrhea      Allergies    penicillin (Swelling)    Intolerances        Vital Signs Last 24 Hrs  T(C): 36.4 (11 Jul 2019 07:53), Max: 36.8 (11 Jul 2019 02:58)  T(F): 97.5 (11 Jul 2019 07:53), Max: 98.2 (11 Jul 2019 02:58)  HR: 95 (11 Jul 2019 07:53) (84 - 98)  BP: 97/60 (11 Jul 2019 07:53) (94/40 - 100/54)  BP(mean): --  RR: 17 (11 Jul 2019 07:53) (17 - 18)  SpO2: 100% (11 Jul 2019 07:53) (98% - 100%)    PHYSICAL EXAM  General: weak, lethargic, arrousible, hard of hearing, responds appropriately, adult in NAD  HEENT: clear oropharynx, anicteric sclera, pink conjunctiva  Neck: supple  CV: normal S1/S2 with no murmur rubs or gallops  Lungs: positive air movement b/l ant lungs,clear to auscultation, no wheezes, no rales  Abdomen: soft non-tender non-distended, no hepatosplenomegaly  Ext: no clubbing cyanosis or edema  Skin: no rashes and no petechiae  Neuro: alert and oriented X 4, no focal deficits      LABS:                          8.7    7.8   )-----------( 177      ( 11 Jul 2019 03:24 )             24.5         Mean Cell Volume : 99.8 fl  Mean Cell Hemoglobin : 35.3 pg  Mean Cell Hemoglobin Concentration : 35.4 gm/dL  Auto Neutrophil # : 5.2 K/uL  Auto Lymphocyte # : 1.9 K/uL  Auto Monocyte # : 0.7 K/uL  Auto Eosinophil # : 0.1 K/uL  Auto Basophil # : 0.0 K/uL  Auto Neutrophil % : 66.0 %  Auto Lymphocyte % : 23.9 %  Auto Monocyte % : 8.9 %  Auto Eosinophil % : 0.7 %  Auto Basophil % : 0.6 %      07-11    142  |  117<H>  |  97<H>  ----------------------------<  123<H>  3.2<L>   |  12<L>  |  3.37<H>    Ca    11.4<H>      11 Jul 2019 03:24    TPro  6.0  /  Alb  3.2<L>  /  TBili  0.3  /  DBili  x   /  AST  37  /  ALT  18  /  AlkPhos  63  07-11      PT/INR - ( 11 Jul 2019 03:24 )   PT: 11.8 sec;   INR: 1.03 ratio         PTT - ( 11 Jul 2019 03:24 )  PTT:39.4 sec    Urine Microscopic-Add On (NC) (07.11.19 @ 03:24)    Bacteria: Many    Comment - Urine: few yeast like cells    Epithelial Cells: 2 /hpf    White Blood Cell - Urine: 893 /HPF    Hyaline Casts: 3 /lpf    Red Blood Cell - Urine: 113 /hpf    Urinalysis + Microscopic Examination (07.01.19 @ 03:11)    pH Urine: 6.5    Leukocyte Esterase Concentration: Negative    Nitrite: Negative    Ketone - Urine: Negative    Protein, Urine: 100 mg/dL    Urobilinogen: Negative    Specific Gravity: 1.017    Urine Appearance: Clear    Bilirubin: Negative    Color: Yellow    Glucose Qualitative, Urine: Negative    Blood, Urine: Moderate    Red Blood Cell - Urine: 10 /hpf    White Blood Cell - Urine: 1 /HPF    Epithelial Cells: 1    Hyaline Casts: 0 /LPF    Bacteria: Negative    Comment - Urine: Few amorphous crystals present.        BLOOD SMEAR INTERPRETATION:       RADIOLOGY & ADDITIONAL STUDIES:    < from: CT Head No Cont (07.11.19 @ 10:17) >  EXAM:  CT BRAIN                            PROCEDURE DATE:  07/11/2019            INTERPRETATION:  CLINICAL INFORMATION: Confusion and sepsis. Lethargy.    TECHNIQUE: Noncontrast axial CT images were acquired through the head.   Two-dimensional sagittal and coronal reformats were generated.    COMPARISON STUDY: CT head 3/4/2009    FINDINGS:     No acute intracranial hemorrhage, mass effect, or midline shift. No   abnormal extra-axial collections. The basal cisterns are patent without   evidenceof central herniation.     Mild cerebral volume loss with proportional prominence of the sulci and   ventricles. Moderate patchy periventricular white matter hypoattenuation,   likely the sequela of chronic microvascular ischemic disease.    The right maxillary sinus is extensively opacified with thickened walls   consistent with chronic sinusitis, partially visualized. Mild mucosal   thickening and bony wall thickening is partially visualized in the left   maxillary sinus, also consistent with chronic sinusitis. The maxillary   sinuses are not covered on the prior exam.    IMPRESSION:     No CT evidence of acute intracranial hemorrhage, mass effect, or midline   shift.     Chronic maxillary sinusitis.      < from: Xray Chest 1 View AP/PA (07.11.19 @ 03:36) >  EXAM:  XR CHEST AP OR PA 1V                            PROCEDURE DATE:  07/11/2019            INTERPRETATION:  CLINICAL INFORMATION: Sepsis.    COMPARISON:  Chest x-ray 6/10/2019    TECHNIQUE:   Frontal radiograph of the chest.     FINDINGS:    Thelungs are clear. There is no pleural effusion or pneumothorax. The   cardiac silhouette is unremarkable. No acute osseous abnormality.    IMPRESSION: Clear lungs.              < end of copied text >

## 2019-07-11 NOTE — ED ADULT NURSE NOTE - OBJECTIVE STATEMENT
85y/o male BIBEMS from [facility] with AMS. As per EMS, patient usually alert and can have conversation with staff but this evening was nonverbal. Patient also found to hypotensive to 70's systolic with dark foul smelling urine draining in Rae drainage bag. Patient represents to ED awake, able to state name but will not answer any other questions. Lungs clear b/l. Abd soft, nontender, nondistended. Rae catheter in place, draining dark cloudy urine to gravity. Wound noted to sacrum, dressing in place. Patient hypotensive to 90's systolic. EKG done, patient on CM. MD Moore at bedside for eval.

## 2019-07-11 NOTE — CONSULT NOTE ADULT - SUBJECTIVE AND OBJECTIVE BOX
[facility]  [phone]  NEPHROLOGY      INITIAL CONSULT NOTE  --------------------------------------------------------------------------------  HPI:      86yoM with PMH of carcinoid syndrome s/p left lower lobe resection, bladder outlet obstruciton with new calzada placed on July 1st in the ED. also with pericardial effusion sent from [facility] for SBPs in 70s. Patient altered. Per EMS, patient with malodorous urine. dx with UTI as well. noticed with worsening renal function acidosis as well as acidosis renal consult called.       PAST HISTORY  --------------------------------------------------------------------------------  PAST MEDICAL & SURGICAL HISTORY:  Neck mass: was resected, reportedly bening  Kidney lesion: partial resction- reportedly &quot;not active lesion&quot;  History of carcinoid syndrome  H/O carcinoid syndrome  S/P TURP    FAMILY HISTORY:  FH: lung cancer (Sibling): sister  Family history of nasopharyngeal cancer: father    PAST SOCIAL HISTORY: no tobacco use     ALLERGIES & MEDICATIONS  --------------------------------------------------------------------------------  Allergies    penicillin (Swelling)    Intolerances      Standing Inpatient Medications  artificial tears (preservative free) Ophthalmic Solution 1 Drop(s) Both EYES every 2 hours  cefepime   IVPB 500 milliGRAM(s) IV Intermittent every 12 hours  dextrose 5% 1000 milliLiter(s) IV Continuous <Continuous>  heparin  Injectable 5000 Unit(s) SubCutaneous every 12 hours  multivitamin 1 Tablet(s) Oral daily  octreotide  Injectable 200 MICROGram(s) SubCutaneous every 6 hours    PRN Inpatient Medications  acetaminophen   Tablet .. 650 milliGRAM(s) Oral every 6 hours PRN  loperamide 2 milliGRAM(s) Oral four times a day PRN      REVIEW OF SYSTEMS  --------------------------------------------------------------------------------  Gen: No  fevers/chills   Skin: No rashes  Head/Eyes/Ears/Mouth: No headache; decrease hearing;  No sinus pain/discomfort, sore throat  Respiratory: +  dyspnea,  - cough,  - wheezing, hemoptysis -   CV: No chest pain, or palp   GI: No abdominal pain, diarrhea, nausea, vomiting, + diarrhea   : No dysuria,  hematuria, has calzada   MSK: No joint pain/swelling; no back pain  Neuro: No dizziness/lightheadedness,   also with no edema     All other systems were reviewed and are negative, except as noted.    VITALS/PHYSICAL EXAM  --------------------------------------------------------------------------------  T(C): 36.4 (07-11-19 @ 07:53), Max: 36.8 (07-11-19 @ 02:58)  HR: 96 (07-11-19 @ 14:01) (84 - 98)  BP: 95/54 (07-11-19 @ 14:01) (94/40 - 100/54)  RR: 17 (07-11-19 @ 07:53) (17 - 18)  SpO2: 96% (07-11-19 @ 14:01) (96% - 100%)  Wt(kg): --  Height (cm): 180.34 (07-11-19 @ 02:58)  Weight (kg): 58.8 (07-11-19 @ 07:53)  BMI (kg/m2): 18.1 (07-11-19 @ 07:53)  BSA (m2): 1.75 (07-11-19 @ 07:53)      Physical Exam:  	Gen: chronically ill appearing frail m  Very [place]    	no jvd   	Pulm: decrease bs  no rales or ronchi or wheezing  	CV: RRR, S1S2; no rub  	Back: No CVA tenderness  	Abd: +BS, soft, nontender/nondistended  	: No suprapubic tenderness  	UE: Warm, no cyanosis  no clubbing,  no edema  	LE: Warm, no cyanosis  no clubbing, trace  edema  	Neuro: alert and oriented. speech coherent   	  LABS/STUDIES  --------------------------------------------------------------------------------              8.7    7.8   >-----------<  177      [07-11-19 @ 03:24]              24.5     142  |  117  |  97  ----------------------------<  123      [07-11-19 @ 03:24]  3.2   |  12  |  3.37        Ca     11.4     [07-11-19 @ 03:24]    TPro  6.0  /  Alb  3.2  /  TBili  0.3  /  DBili  x   /  AST  37  /  ALT  18  /  AlkPhos  63  [07-11-19 @ 03:24]    PT/INR: PT 11.8 , INR 1.03       [07-11-19 @ 03:24]  PTT: 39.4       [07-11-19 @ 03:24]      Creatinine Trend:  SCr 3.37 [07-11 @ 03:24]  SCr 3.34 [07-09 @ 07:06]  SCr 3.22 [07-08 @ 07:15]  SCr 3.10 [07-07 @ 08:22]  SCr 3.13 [07-05 @ 06:48]    Urinalysis - [07-11-19 @ 03:24]      Color Light Orange / Appearance Turbid / SG 1.016 / pH 6.0      Gluc Negative / Ketone Negative  / Bili Negative / Urobili Negative       Blood Moderate / Protein 100 mg/dL / Leuk Est Large / Nitrite Negative       /  / Hyaline 3 / Gran  / Sq Epi  / Non Sq Epi 2 / Bacteria Many      PTH -- (Ca 10.4)      [06-11-19 @ 09:51]   15  Vitamin D (25OH) 9.7      [06-11-19 @ 10:02]

## 2019-07-11 NOTE — ED PROVIDER NOTE - OBJECTIVE STATEMENT
86yoM with PMH of carcinoid syndrome s/p left lower lobe resection, bladder disease with new calzada placed on July 1st in the ED sent from [facility] for SBPs in 70s. Patient altered. Per EMS, patient with malodorous urine. At baseline, able to maintain conversations. No fevers or chills noted.
Normal rate, regular rhythm, normal S1, S2 heart sounds heard.

## 2019-07-11 NOTE — H&P ADULT - NSICDXFAMILYHX_GEN_ALL_CORE_FT
FAMILY HISTORY:  Family history of nasopharyngeal cancer, father    Sibling  Still living? Unknown  FH: lung cancer, Age at diagnosis: Age Unknown

## 2019-07-11 NOTE — CONSULT NOTE ADULT - SUBJECTIVE AND OBJECTIVE BOX
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 He got course of aztreonam. He went to rehab and calzada removed and then had it replaced about a week ago. Needs loperamide for diarrhea and that was decreased so now worse diarrhea. He comes in with severe lethargy, hypotension , concern raised for sepsis. patient denies pain anywhere. He still has a calzada.     REVIEW OF SYSTEMS:  All other review of systems negative (Comprehensive ROS)    PAST MEDICAL & SURGICAL HISTORY:  Neck mass: was resected, reportedly bening  Kidney lesion: partial resction- reportedly &quot;not active lesion&quot;  History of carcinoid syndrome  H/O carcinoid syndrome  S/P TURP      Allergies    penicillin (Swelling)    Intolerances        Antimicrobials Day #  :2  cefepime   IVPB 500 milliGRAM(s) IV Intermittent every 12 hours    Other Medications:  acetaminophen   Tablet .. 650 milliGRAM(s) Oral every 6 hours PRN  artificial tears (preservative free) Ophthalmic Solution 1 Drop(s) Both EYES every 2 hours  dextrose 5% 1000 milliLiter(s) IV Continuous <Continuous>  heparin  Injectable 5000 Unit(s) SubCutaneous every 12 hours  loperamide 2 milliGRAM(s) Oral four times a day PRN  multivitamin 1 Tablet(s) Oral daily  octreotide  Injectable 200 MICROGram(s) SubCutaneous every 6 hours  potassium chloride    Tablet ER 40 milliEquivalent(s) Oral every 4 hours      FAMILY HISTORY:  FH: lung cancer (Sibling): sister  Family history of nasopharyngeal cancer: father      SOCIAL HISTORY:  Smoking: [ ]Yes x[ ]No  ETOH: [ ]Yes [ x]No  Drug Use: [ ]Yes x[ ]No   [ ] Single[ ]    T(F): 97.5 (19 @ 07:53), Max: 98.2 (19 @ 02:58)  HR: 96 (19 @ 14:01)  BP: 95/54 (19 @ 14:01)  RR: 17 (19 @ 07:53)  SpO2: 96% (19 @ 14:01)  Wt(kg): --    PHYSICAL EXAM:  General: lethargic in chair,  no acute distress  Eyes:  anicteric, no conjunctival injection, no discharge  Oropharynx: no lesions or injection 	  Neck: supple, without adenopathy  Lungs: basilar rales  to auscultation  Heart: regular rate and rhythm; no murmur, rubs or gallops  Abdomen: soft, nondistended, nontender, without mass or organomegaly  Skin: no lesions  Extremities: no clubbing, cyanosis, or edema  Neurologic: awake but sleepy, moves all extremities    LAB RESULTS:                        8.7    7.8   )-----------( 177      ( 2019 03:24 )             24.5         142  |  117<H>  |  97<H>  ----------------------------<  123<H>  3.2<L>   |  12<L>  |  3.37<H>    Ca    11.4<H>      2019 03:24    TPro  6.0  /  Alb  3.2<L>  /  TBili  0.3  /  DBili  x   /  AST  37  /  ALT  18  /  AlkPhos  63      LIVER FUNCTIONS - ( 2019 03:24 )  Alb: 3.2 g/dL / Pro: 6.0 g/dL / ALK PHOS: 63 U/L / ALT: 18 U/L / AST: 37 U/L / GGT: x           Urinalysis Basic - ( 2019 03:24 )    Color: Light Orange / Appearance: Turbid / S.016 / pH: x  Gluc: x / Ketone: Negative  / Bili: Negative / Urobili: Negative   Blood: x / Protein: 100 mg/dL / Nitrite: Negative   Leuk Esterase: Large / RBC: 113 /hpf /  /HPF   Sq Epi: x / Non Sq Epi: 2 /hpf / Bacteria: Many        MICROBIOLOGY:  RECENT CULTURES:        RADIOLOGY REVIEWED:  < from: CT Head No Cont (19 @ 10:17) >  IMPRESSION:     No CT evidence of acute intracranial hemorrhage, mass effect, or midline   shift.     Chronic maxillary sinusitis.        < end of copied text >      < from: Xray Chest 1 View AP/PA (19 @ 03:36) >    EXAM:  XR CHEST AP OR PA 1V                            PROCEDURE DATE:  2019            INTERPRETATION:  CLINICAL INFORMATION: Sepsis.    COMPARISON:  Chest x-ray 6/10/2019    TECHNIQUE:   Frontal radiograph of the chest.     FINDINGS:    Thelungs are clear. There is no pleural effusion or pneumothorax. The   cardiac silhouette is unremarkable. No acute osseous abnormality.    IMPRESSION: Clear lungs.      < end of copied text >      Impression:  patient with carcinoid, calzada for retention due to stricture, s/p recent removal and replacement, increased diarrhea when immodium decreased comes in with hypotension, severe lethargy, mia. It is possible he just got intravascular depletion causing current lethargy and low bp but cannot exclude calzada associated uti or even cdif    Recommendations:  for now continue cefepime  check cdif  follow up cultures  am cortisol level  supportive care HPI:   Patient is a 86y male with metastatic carcinoid, recent stay for carcinoid syndrome and uti, required urethral dilation and calzada for stricture. He got course of aztreonam. He went to rehab and calzada removed and then had it replaced about a week ago. Needs loperamide for diarrhea and that was decreased so now worse diarrhea. He comes in with severe lethargy, hypotension , concern raised for sepsis. patient denies pain anywhere. He still has a calzada.     REVIEW OF SYSTEMS:  All other review of systems negative (Comprehensive ROS)    PAST MEDICAL & SURGICAL HISTORY:  Neck mass: was resected, reportedly bening  Kidney lesion: partial resction- reportedly &quot;not active lesion&quot;  History of carcinoid syndrome  H/O carcinoid syndrome  S/P TURP      Allergies    penicillin (Swelling)    Intolerances        Antimicrobials Day #  :2  cefepime   IVPB 500 milliGRAM(s) IV Intermittent every 12 hours    Other Medications:  acetaminophen   Tablet .. 650 milliGRAM(s) Oral every 6 hours PRN  artificial tears (preservative free) Ophthalmic Solution 1 Drop(s) Both EYES every 2 hours  dextrose 5% 1000 milliLiter(s) IV Continuous <Continuous>  heparin  Injectable 5000 Unit(s) SubCutaneous every 12 hours  loperamide 2 milliGRAM(s) Oral four times a day PRN  multivitamin 1 Tablet(s) Oral daily  octreotide  Injectable 200 MICROGram(s) SubCutaneous every 6 hours  potassium chloride    Tablet ER 40 milliEquivalent(s) Oral every 4 hours      FAMILY HISTORY:  FH: lung cancer (Sibling): sister  Family history of nasopharyngeal cancer: father      SOCIAL HISTORY:  Smoking: [ ]Yes x[ ]No  ETOH: [ ]Yes [ x]No  Drug Use: [ ]Yes x[ ]No   [ ] Single[ ]    T(F): 97.5 (19 @ 07:53), Max: 98.2 (19 @ 02:58)  HR: 96 (19 @ 14:01)  BP: 95/54 (19 @ 14:01)  RR: 17 (19 @ 07:53)  SpO2: 96% (19 @ 14:01)  Wt(kg): --    PHYSICAL EXAM:  General: lethargic in chair,  no acute distress  Eyes:  anicteric, no conjunctival injection, no discharge  Oropharynx: no lesions or injection 	  Neck: supple, without adenopathy  Lungs: basilar rales  to auscultation  Heart: regular rate and rhythm; no murmur, rubs or gallops  Abdomen: soft, nondistended, nontender, without mass or organomegaly  Skin: no lesions  Extremities: no clubbing, cyanosis, or edema  Neurologic: awake but sleepy, moves all extremities    LAB RESULTS:                        8.7    7.8   )-----------( 177      ( 2019 03:24 )             24.5         142  |  117<H>  |  97<H>  ----------------------------<  123<H>  3.2<L>   |  12<L>  |  3.37<H>    Ca    11.4<H>      2019 03:24    TPro  6.0  /  Alb  3.2<L>  /  TBili  0.3  /  DBili  x   /  AST  37  /  ALT  18  /  AlkPhos  63      LIVER FUNCTIONS - ( 2019 03:24 )  Alb: 3.2 g/dL / Pro: 6.0 g/dL / ALK PHOS: 63 U/L / ALT: 18 U/L / AST: 37 U/L / GGT: x           Urinalysis Basic - ( 2019 03:24 )    Color: Light Orange / Appearance: Turbid / S.016 / pH: x  Gluc: x / Ketone: Negative  / Bili: Negative / Urobili: Negative   Blood: x / Protein: 100 mg/dL / Nitrite: Negative   Leuk Esterase: Large / RBC: 113 /hpf /  /HPF   Sq Epi: x / Non Sq Epi: 2 /hpf / Bacteria: Many        MICROBIOLOGY:  RECENT CULTURES:        RADIOLOGY REVIEWED:  < from: CT Head No Cont (19 @ 10:17) >  IMPRESSION:     No CT evidence of acute intracranial hemorrhage, mass effect, or midline   shift.     Chronic maxillary sinusitis.        < end of copied text >      < from: Xray Chest 1 View AP/PA (19 @ 03:36) >    EXAM:  XR CHEST AP OR PA 1V                            PROCEDURE DATE:  2019            INTERPRETATION:  CLINICAL INFORMATION: Sepsis.    COMPARISON:  Chest x-ray 6/10/2019    TECHNIQUE:   Frontal radiograph of the chest.     FINDINGS:    Thelungs are clear. There is no pleural effusion or pneumothorax. The   cardiac silhouette is unremarkable. No acute osseous abnormality.    IMPRESSION: Clear lungs.      < end of copied text >      Impression:  patient with carcinoid, calzada for retention due to stricture, s/p recent removal and replacement, increased diarrhea when immodium decreased comes in with hypotension, severe lethargy, mia. It is possible he just got intravascular depletion causing current lethargy and low bp but cannot exclude calzada associated uti or even cdif    Recommendations:  for now continue cefepime  check cdif  follow up cultures  am cortisol level, rvp, gi pcr  supportive care

## 2019-07-11 NOTE — PHYSICAL THERAPY INITIAL EVALUATION ADULT - PERTINENT HX OF CURRENT PROBLEM, REHAB EVAL
85yo M with PMH of carcinoid syndrome s/p left lower lobe resection, bladder disease with new calzada placed 7/1 presenting from [facility] for AMS and hypotension. Per EMS, pt with malodorous urine.  Pt a/w Sepsis likely 2/2 UTI. Pending UA, CXR, BCx, abx.

## 2019-07-12 DIAGNOSIS — R41.82 ALTERED MENTAL STATUS, UNSPECIFIED: ICD-10-CM

## 2019-07-12 LAB
ANION GAP SERPL CALC-SCNC: 12 MMOL/L — SIGNIFICANT CHANGE UP (ref 5–17)
BUN SERPL-MCNC: 94 MG/DL — HIGH (ref 7–23)
CALCIUM SERPL-MCNC: 10.6 MG/DL — HIGH (ref 8.4–10.5)
CHLORIDE SERPL-SCNC: 115 MMOL/L — HIGH (ref 96–108)
CO2 SERPL-SCNC: 16 MMOL/L — LOW (ref 22–31)
CREAT SERPL-MCNC: 3.14 MG/DL — HIGH (ref 0.5–1.3)
GLUCOSE SERPL-MCNC: 137 MG/DL — HIGH (ref 70–99)
POTASSIUM SERPL-MCNC: 4.1 MMOL/L — SIGNIFICANT CHANGE UP (ref 3.5–5.3)
POTASSIUM SERPL-SCNC: 4.1 MMOL/L — SIGNIFICANT CHANGE UP (ref 3.5–5.3)
RAPID RVP RESULT: SIGNIFICANT CHANGE UP
SODIUM SERPL-SCNC: 143 MMOL/L — SIGNIFICANT CHANGE UP (ref 135–145)

## 2019-07-12 PROCEDURE — 99233 SBSQ HOSP IP/OBS HIGH 50: CPT

## 2019-07-12 PROCEDURE — 93306 TTE W/DOPPLER COMPLETE: CPT | Mod: 26

## 2019-07-12 RX ORDER — SODIUM CHLORIDE 9 MG/ML
1000 INJECTION INTRAMUSCULAR; INTRAVENOUS; SUBCUTANEOUS
Refills: 0 | Status: DISCONTINUED | OUTPATIENT
Start: 2019-07-12 | End: 2019-07-23

## 2019-07-12 RX ADMIN — Medication 1 DROP(S): at 05:51

## 2019-07-12 RX ADMIN — Medication 1 DROP(S): at 17:06

## 2019-07-12 RX ADMIN — Medication 1 DROP(S): at 09:54

## 2019-07-12 RX ADMIN — Medication 1 DROP(S): at 21:17

## 2019-07-12 RX ADMIN — HEPARIN SODIUM 5000 UNIT(S): 5000 INJECTION INTRAVENOUS; SUBCUTANEOUS at 05:51

## 2019-07-12 RX ADMIN — SODIUM CHLORIDE 75 MILLILITER(S): 9 INJECTION INTRAMUSCULAR; INTRAVENOUS; SUBCUTANEOUS at 14:00

## 2019-07-12 RX ADMIN — OCTREOTIDE ACETATE 200 MICROGRAM(S): 200 INJECTION, SOLUTION INTRAVENOUS; SUBCUTANEOUS at 13:59

## 2019-07-12 RX ADMIN — HEPARIN SODIUM 5000 UNIT(S): 5000 INJECTION INTRAVENOUS; SUBCUTANEOUS at 17:05

## 2019-07-12 RX ADMIN — OCTREOTIDE ACETATE 200 MICROGRAM(S): 200 INJECTION, SOLUTION INTRAVENOUS; SUBCUTANEOUS at 05:52

## 2019-07-12 RX ADMIN — Medication 1 TABLET(S): at 13:59

## 2019-07-12 RX ADMIN — OCTREOTIDE ACETATE 200 MICROGRAM(S): 200 INJECTION, SOLUTION INTRAVENOUS; SUBCUTANEOUS at 17:06

## 2019-07-12 RX ADMIN — CEFEPIME 100 MILLIGRAM(S): 1 INJECTION, POWDER, FOR SOLUTION INTRAMUSCULAR; INTRAVENOUS at 05:52

## 2019-07-12 RX ADMIN — Medication 1 DROP(S): at 14:00

## 2019-07-12 RX ADMIN — CEFEPIME 100 MILLIGRAM(S): 1 INJECTION, POWDER, FOR SOLUTION INTRAMUSCULAR; INTRAVENOUS at 17:06

## 2019-07-12 NOTE — PROGRESS NOTE ADULT - SUBJECTIVE AND OBJECTIVE BOX
Patient is a 86y old  Male who presents with a chief complaint of mental status jamil (2019 18:46)      SUBJECTIVE / OVERNIGHT EVENTS: weak.   Review of Systems  unobtainable     MEDICATIONS  (STANDING):  artificial tears (preservative free) Ophthalmic Solution 1 Drop(s) Both EYES every 2 hours  cefepime   IVPB 500 milliGRAM(s) IV Intermittent every 12 hours  heparin  Injectable 5000 Unit(s) SubCutaneous every 12 hours  multivitamin 1 Tablet(s) Oral daily  octreotide  Injectable 200 MICROGram(s) SubCutaneous every 6 hours  sodium bicarbonate  Infusion 0.179 mEq/kG/Hr (70 mL/Hr) IV Continuous <Continuous>  sodium chloride 0.9%. 1000 milliLiter(s) (75 mL/Hr) IV Continuous <Continuous>    MEDICATIONS  (PRN):  acetaminophen   Tablet .. 650 milliGRAM(s) Oral every 6 hours PRN Temp greater or equal to 38.5C (101.3F), Mild Pain (1 - 3)  loperamide 2 milliGRAM(s) Oral four times a day PRN Diarrhea      Vital Signs Last 24 Hrs  T(C): 36.4 (2019 08:16), Max: 36.4 (2019 08:16)  T(F): 97.6 (2019 08:16), Max: 97.6 (2019 08:16)  HR: 87 (2019 17:02) (85 - 101)  BP: 104/55 (2019 17:02) (90/52 - 104/55)  BP(mean): --  RR: 18 (2019 08:16) (18 - 18)  SpO2: 97% (2019 08:16) (97% - 98%)    PHYSICAL EXAM:  GENERAL: sick  HEAD:  Atraumatic, Normocephalic flushed  EYES: EOMI, PERRLA, conjunctiva and sclera clear  NECK: Supple, No JVD  CHEST/LUNG: Clear to auscultation bilaterally; No wheeze  HEART: Regular rate and rhythm; No murmurs, rubs, or gallops  ABDOMEN: Soft, Nontender, Nondistended; Bowel sounds present Rae  EXTREMITIES:  2+ Peripheral Pulses, No clubbing, cyanosis, or edema  NEUROLOGY: non-focal  SKIN: No rashes or lesions    LABS:                        8.7    7.8   )-----------( 177      ( 2019 03:24 )             24.5     07-12    143  |  115<H>  |  94<H>  ----------------------------<  137<H>  4.1   |  16<L>  |  3.14<H>    Ca    10.6<H>      2019 07:19    TPro  6.0  /  Alb  3.2<L>  /  TBili  0.3  /  DBili  x   /  AST  37  /  ALT  18  /  AlkPhos  63  07-11    PT/INR - ( 2019 03:24 )   PT: 11.8 sec;   INR: 1.03 ratio         PTT - ( 2019 03:24 )  PTT:39.4 sec      Urinalysis Basic - ( 2019 03:24 )    Color: Light Orange / Appearance: Turbid / S.016 / pH: x  Gluc: x / Ketone: Negative  / Bili: Negative / Urobili: Negative   Blood: x / Protein: 100 mg/dL / Nitrite: Negative   Leuk Esterase: Large / RBC: 113 /hpf /  /HPF   Sq Epi: x / Non Sq Epi: 2 /hpf / Bacteria: Many        Culture - Blood (collected 2019 05:11)  Source: .Blood  Preliminary Report (2019 06:01):    No growth to date.    Culture - Blood (collected 2019 05:11)  Source: .Blood  Preliminary Report (2019 06:01):    No growth to date.    Culture - Urine (collected 2019 05:01)  Source: .Urine  Preliminary Report (2019 09:38):    >100,000 CFU/ml Escherichia coli        RADIOLOGY & ADDITIONAL TESTS:    Imaging Personally Reviewed:    Consultant(s) Notes Reviewed:      Care Discussed with Consultants/Other Providers:

## 2019-07-12 NOTE — PROGRESS NOTE ADULT - SUBJECTIVE AND OBJECTIVE BOX
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   Patient reportedly found with hypotension and altered mental status. Urine was maladarous and appears to be a UTI on admission labs.    Rest of review of symptoms are unable to obtain due to hearing difficulties/lethargy.   No reported worsening CP/Palps/SOB    ===========================  Interval Events  - Echo personally reviewed - no change - IVC collapses with respiration   - No reported worsening CP/Palps/SOB\  ===========================            acetaminophen   Tablet .. 650 milliGRAM(s) Oral every 6 hours PRN  artificial tears (preservative free) Ophthalmic Solution 1 Drop(s) Both EYES every 2 hours  cefepime   IVPB 500 milliGRAM(s) IV Intermittent every 12 hours  heparin  Injectable 5000 Unit(s) SubCutaneous every 12 hours  loperamide 2 milliGRAM(s) Oral four times a day PRN  multivitamin 1 Tablet(s) Oral daily  octreotide  Injectable 200 MICROGram(s) SubCutaneous every 6 hours  sodium bicarbonate  Infusion 0.179 mEq/kG/Hr IV Continuous <Continuous>    ============================================================  Interval Events   -   -     ============================================================      Allergies    penicillin (Swelling)    Intolerances    	    MEDICATIONS:  heparin  Injectable 5000 Unit(s) SubCutaneous every 12 hours    cefepime   IVPB 500 milliGRAM(s) IV Intermittent every 12 hours      acetaminophen   Tablet .. 650 milliGRAM(s) Oral every 6 hours PRN    loperamide 2 milliGRAM(s) Oral four times a day PRN    octreotide  Injectable 200 MICROGram(s) SubCutaneous every 6 hours    artificial tears (preservative free) Ophthalmic Solution 1 Drop(s) Both EYES every 2 hours  multivitamin 1 Tablet(s) Oral daily  sodium bicarbonate  Infusion 0.179 mEq/kG/Hr IV Continuous <Continuous>      PAST MEDICAL & SURGICAL HISTORY:  Neck mass: was resected, reportedly bening  Kidney lesion: partial resction- reportedly &quot;not active lesion&quot;  History of carcinoid syndrome  H/O carcinoid syndrome  S/P TURP      FAMILY HISTORY:  FH: lung cancer (Sibling): sister  Family history of nasopharyngeal cancer: father    Mother - HTN      SOCIAL HISTORY:    [ x] Non-smoker  [x] No Illicit Drug Use  [ x] No Excess EtOH Use      REVIEW OF SYSTEMS: (Unless + Before Symptom, it is negative)  Constitutional: [] Fever, [x]Fatigue, []Weight Changes  Eyes:  []Recent Vision Changes, []Eye Pain  ENT: []Congestion, []Sore Throat  Endocrine: []Excess Sweating, []Temperature Intolerance  Cardiovascular:  []Chest Pain, []Palpitations, []Shortness of Breath, []Pre-syncope, []Syncope,[] LE Swelling  Respiratory:[] Cough, []Congestion,[] Wheezing  Gastrointestinal: [] Abdominal Pain,[] Nausea, []Vomiting  Genitourinary: []Dysuria,[] hematuria  Musculoskeletal: []Joint Pain, []Hip/Knee Injury  Neurologic: []Headaches,[] Imbalance, []Weakness  Skin: []Rashes, []hematoma, []purprura    ================================    PHYSICAL EXAM:  T(C): 36.4 (07-11-19 @ 07:53), Max: 36.8 (07-11-19 @ 02:58)  HR: 96 (07-11-19 @ 14:01) (84 - 98)  BP: 95/54 (07-11-19 @ 14:01) (94/40 - 100/54)  RR: 17 (07-11-19 @ 07:53) (17 - 18)  SpO2: 96% (07-11-19 @ 14:01) (96% - 100%)  Wt(kg): --  I&O's Summary    Appearance: Normal; +Lethargic +[place]  Psychiatry: Orientation and mood difficult to assess due to lethargy +[place]  HEENT:   Normal oral mucosa, EOMI	  Lymphatic: No lymphadenopathy  Cardiovascular: Normal S1 S2, No JVD, No murmurs, No edema  Respiratory: Lungs clear to auscultation, no use of accessory muscles	  Gastrointestinal:  Soft, Non-tender	  Skin: No rashes, No ecchymoses, No cyanosis	  Neurologic: Non-focal, No Focal Deficits  Extremities: Normal range of motion, No clubbing, cyanosis  Vascular: Peripheral pulses palpable 2+ bilaterally, no prominent varicosities    ============================    LABS:	   Labs [id number]     CBC Full  -  ( 11 Jul 2019 03:24 )  WBC Count : 7.8 K/uL  Hemoglobin : 8.7 g/dL  Hematocrit : 24.5 %  Platelet Count - Automated : 177 K/uL  Mean Cell Volume : 99.8 fl  Mean Cell Hemoglobin : 35.3 pg  Mean Cell Hemoglobin Concentration : 35.4 gm/dL  Auto Neutrophil # : 5.2 K/uL  Auto Lymphocyte # : 1.9 K/uL  Auto Monocyte # : 0.7 K/uL  Auto Eosinophil # : 0.1 K/uL  Auto Basophil # : 0.0 K/uL  Auto Neutrophil % : 66.0 %  Auto Lymphocyte % : 23.9 %  Auto Monocyte % : 8.9 %  Auto Eosinophil % : 0.7 %  Auto Basophil % : 0.6 %    07-11    142  |  117<H>  |  97<H>  ----------------------------<  123<H>  3.2<L>   |  12<L>  |  3.37<H>    Ca    11.4<H>      11 Jul 2019 03:24    TPro  6.0  /  Alb  3.2<L>  /  TBili  0.3  /  DBili  x   /  AST  37  /  ALT  18  /  AlkPhos  63  07-11    =============================  < from: Transthoracic Echocardiogram (07.12.19 @ 15:11) >  Derived variables:  LVMI: 84 g/m2  RWT: 0.33  EF (Visual Estimate): 70 %  ------------------------------------------------------------------------  Observations:  Mitral Valve: Normal mitral valve. Mild mitral  regurgitation.  Aortic Valve/Aorta: Calcified aortic valve without  stenosis.  Mild to moderate aortic regurgitation.  Normal aortic root size. (Ao: 3.4 cm at the sinuses of  Valsalva).  Left Atrium: Normal left atrium.  LA volume index = 26  cc/m2.  Left Ventricle: Normal left ventricular systolic function.  No segmental wall motion abnormalities. Normal left  ventricular internal dimensions and wall thicknesses.  Right Heart: Normal right atrium. Normal right ventricular  size and function. Moderate tricuspid regurgitation. Normal  pulmonic valve. Minimal pulmonic regurgitation.  Pericardium/Pleura: Normal pericardium with moderate  pericardial effusion.  Mild right atrial inversion.  Approximately 25% variation in mitral inflow velocity with  respiration.  No evidence of right ventricular diastolic collapse.  Hemodynamic: Estimated right atrial pressure is normal.  Mild pulmonary hypertension. Estimated PASP 37 mmHg.  ------------------------------------------------------------------------  Conclusions:  Normal left ventricular systolic function. No segmental  wall motion abnormalities.  Moderate tricuspid regurgitation.  Mild pulmonary hypertension.  Moderate circumferential pericardial effusion. Mild right  atrial inversion.  Approximately 25% variation in mitral inflow velocity with  respiration.  No evidence of right ventricular diastolic collapse.  IVC collapses with respiration.  *** Compared with echocardiogram of 6/27/2019, no  significant changes noted.  ------------------------------------------------------------------------  Confirmed on  7/12/2019 - 17:30:07 by Zheng Denise M.D.  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     Dhruv Paz MD, [facility]  [phone]

## 2019-07-12 NOTE — PROGRESS NOTE ADULT - SUBJECTIVE AND OBJECTIVE BOX
CC: f/u for  uti  Patient reports  nothing that makes sense  REVIEW OF SYSTEMS:  All other review of systems negative (Comprehensive ROS)    Antimicrobials Day #  :2  cefepime   IVPB 500 milliGRAM(s) IV Intermittent every 12 hours    Other Medications Reviewed    T(F): 97.6 (19 @ 08:16), Max: 97.6 (19 @ 08:16)  HR: 87 (19 @ 17:02)  BP: 104/55 (19 @ 17:02)  RR: 18 (19 @ 08:16)  SpO2: 97% (19 @ 08:16)  Wt(kg): --    PHYSICAL EXAM:  General: more alert, no acute distress  Eyes:  anicteric, no conjunctival injection, no discharge  Oropharynx: no lesions or injection 	  Neck: supple, without adenopathy  Lungs: course to auscultation  Heart: regular rate and rhythm; no murmur, rubs or gallops  Abdomen: soft, nondistended, nontender, without mass or organomegaly  Skin: no lesions  Extremities: no clubbing, cyanosis, or edema  Neurologic: more  oriented, moves all extremities    LAB RESULTS:                        8.7    7.8   )-----------( 177      ( 2019 03:24 )             24.5     07-12    143  |  115<H>  |  94<H>  ----------------------------<  137<H>  4.1   |  16<L>  |  3.14<H>    Ca    10.6<H>      2019 07:19    TPro  6.0  /  Alb  3.2<L>  /  TBili  0.3  /  DBili  x   /  AST  37  /  ALT  18  /  AlkPhos  63  07-11    LIVER FUNCTIONS - ( 2019 03:24 )  Alb: 3.2 g/dL / Pro: 6.0 g/dL / ALK PHOS: 63 U/L / ALT: 18 U/L / AST: 37 U/L / GGT: x           Urinalysis Basic - ( 2019 03:24 )    Color: Light Orange / Appearance: Turbid / S.016 / pH: x  Gluc: x / Ketone: Negative  / Bili: Negative / Urobili: Negative   Blood: x / Protein: 100 mg/dL / Nitrite: Negative   Leuk Esterase: Large / RBC: 113 /hpf /  /HPF   Sq Epi: x / Non Sq Epi: 2 /hpf / Bacteria: Many      MICROBIOLOGY:  RECENT CULTURES:   @ 05:11 .Blood     No growth to date.       @ 05:01 .Urine     >100,000 CFU/ml Escherichia coli          RADIOLOGY REVIEWED:      < from: CT Head No Cont (19 @ 10:17) >  IMPRESSION:     No CT evidence of acute intracranial hemorrhage, mass effect, or midline   shift.     Chronic maxillary sinusitis.      < end of copied text >    Assessment:  elderly man with recent stay for carcinoid syndrome s/p urethral dilation, had calzada pulled at rehab and replaced about a week ago for ongoing retention returns with severe lethargy, hypotension, maybe calzada associated uti  Plan:  continue cefepime  await final culture data

## 2019-07-12 NOTE — PROGRESS NOTE ADULT - SUBJECTIVE AND OBJECTIVE BOX
Patient is a 86y old  Male who presents with a chief complaint of mental status change (11 Jul 2019 21:31)       Pt is seen and examined  pt is awake and lying in bed  pt seems comfortable and denies any complaints at this time; He is confused, oriented to person only; as per nursing staff, pt has been resting comfortably; no documented stools;      REVIEW OF SYSTEMS:    CONSTITUTIONAL: (+) weakness, No fevers or chills  EYES/ENT: No visual changes;  No vertigo or throat pain   NECK: No pain or stiffness  RESPIRATORY: No cough, wheezing, hemoptysis; No shortness of breath  CARDIOVASCULAR: No chest pain or palpitations  GASTROINTESTINAL: No abdominal or epigastric pain. No nausea, vomiting, or hematemesis; No diarrhea or constipation. No melena or hematochezia.  GENITOURINARY: No dysuria, frequency or hematuria  NEUROLOGICAL: No numbness: (+) weakness, AMS  SKIN: No itching, burning, rashes, or lesions     MEDICATIONS  (STANDING):  artificial tears (preservative free) Ophthalmic Solution 1 Drop(s) Both EYES every 2 hours  cefepime   IVPB 500 milliGRAM(s) IV Intermittent every 12 hours  heparin  Injectable 5000 Unit(s) SubCutaneous every 12 hours  multivitamin 1 Tablet(s) Oral daily  octreotide  Injectable 200 MICROGram(s) SubCutaneous every 6 hours  sodium bicarbonate  Infusion 0.179 mEq/kG/Hr (70 mL/Hr) IV Continuous <Continuous>    MEDICATIONS  (PRN):  acetaminophen   Tablet .. 650 milliGRAM(s) Oral every 6 hours PRN Temp greater or equal to 38.5C (101.3F), Mild Pain (1 - 3)  loperamide 2 milliGRAM(s) Oral four times a day PRN Diarrhea      Allergies    penicillin (Swelling)    Intolerances        Vital Signs Last 24 Hrs  T(C): 35.8 (12 Jul 2019 00:49), Max: 35.8 (12 Jul 2019 00:49)  T(F): 96.4 (12 Jul 2019 00:49), Max: 96.4 (12 Jul 2019 00:49)  HR: 101 (12 Jul 2019 00:49) (96 - 101)  BP: 101/61 (12 Jul 2019 00:49) (95/54 - 101/61)  BP(mean): --  RR: 18 (12 Jul 2019 00:49) (18 - 18)  SpO2: 98% (12 Jul 2019 00:49) (96% - 98%)    PHYSICAL EXAM  General: adult in NAD  HEENT: clear oropharynx, anicteric sclera, pink conjunctiva  Neck: supple  CV: normal S1/S2 with no murmur rubs or gallops  Lungs: positive air movement b/l ant lungs,clear to auscultation, no wheezes, no rales  Abdomen: soft non-tender non-distended, no hepatosplenomegaly  Ext: no clubbing cyanosis or edema  Skin: no rashes and no petechiae  Neuro: alert and oriented X 1, no focal deficits    LABS:                          8.7    7.8   )-----------( 177      ( 11 Jul 2019 03:24 )             24.5         Mean Cell Volume : 99.8 fl  Mean Cell Hemoglobin : 35.3 pg  Mean Cell Hemoglobin Concentration : 35.4 gm/dL  Auto Neutrophil # : 5.2 K/uL  Auto Lymphocyte # : 1.9 K/uL  Auto Monocyte # : 0.7 K/uL  Auto Eosinophil # : 0.1 K/uL  Auto Basophil # : 0.0 K/uL  Auto Neutrophil % : 66.0 %  Auto Lymphocyte % : 23.9 %  Auto Monocyte % : 8.9 %  Auto Eosinophil % : 0.7 %  Auto Basophil % : 0.6 %    Serial CBC's  07-11 @ 03:24  Hct-24.5 / Hgb-8.7 / Plat-177 / RBC-2.45 / WBC-7.8      Serial CBC's  07-09 @ 08:46  Hct-25.4 / Hgb-8.2 / Plat-172 / RBC-2.53 / WBC-6.31        07-12    143  |  115<H>  |  94<H>  ----------------------------<  137<H>  4.1   |  16<L>  |  3.14<H>    Ca    10.6<H>      12 Jul 2019 07:19    TPro  6.0  /  Alb  3.2<L>  /  TBili  0.3  /  DBili  x   /  AST  37  /  ALT  18  /  AlkPhos  63  07-11      PT/INR - ( 11 Jul 2019 03:24 )   PT: 11.8 sec;   INR: 1.03 ratio         PTT - ( 11 Jul 2019 03:24 )  PTT:39.4 sec    Culture - Blood (07.11.19 @ 05:11)    Specimen Source: .Blood    Culture Results:   No growth to date.    Culture - Urine (07.11.19 @ 05:01)    Specimen Source: .Urine    Culture Results:   >100,000 CFU/ml Gram Negative Rods    Rapid Respiratory Viral Panel (07.11.19 @ 22:22)    Rapid RVP Result: NotDetec: This Respiratory Panel uses polymerase chain reaction (PCR) to detect for  adenovirus; coronavirus (HKU1, NL63, 229E, OC43); human metapneumovirus  (hMPV); human enterovirus/rhinovirus (Entero/RV); influenza A; influenza  A/H1; influenza A/H3; influenza A/H1-2009; influenza B; parainfluenza  viruses 1, 2, 3, 4; respiratory syncytial virus; Mycoplasma pneumoniae;  and Chlamydophila pneumoniae.        RADIOLOGY & ADDITIONAL STUDIES:    EXAM:  XR CHEST AP OR PA 1V                            PROCEDURE DATE:  07/11/2019        INTERPRETATION:  CLINICAL INFORMATION: Sepsis.    COMPARISON:  Chest x-ray 6/10/2019    TECHNIQUE:   Frontal radiograph of the chest.     FINDINGS:    Thelungs are clear. There is no pleural effusion or pneumothorax. The   cardiac silhouette is unremarkable. No acute osseous abnormality.    IMPRESSION: Clear lungs.        RUFUS MALAGON M.D., RADIOLOGY RESIDENT  This document has been electronically signed.  ODILON VIZCAINO M.D., ATTENDING RADIOLOGIST  This document has been electronically signed. Jul 11 2019  9:32AM    Assessment

## 2019-07-12 NOTE — PROGRESS NOTE ADULT - PROBLEM SELECTOR PLAN 1
Pt has been on sq octreotide; unclear about amount of stools; currently no bowel movements documented over last 12 hours; would not change Octreotide dosing at present time; continue monitoring BMs and then decision will be made re: changes in dosing

## 2019-07-12 NOTE — PROGRESS NOTE ADULT - SUBJECTIVE AND OBJECTIVE BOX
[facility]   [phone]  RENAL FOLLOW UP NOTE  --------------------------------------------------------------------------------  Chief Complaint:    24 hour events/subjective:    seen earlier.   pt confused but communicative     PAST HISTORY  --------------------------------------------------------------------------------  No significant changes to PMH, PSH, FHx, SHx, unless otherwise noted    ALLERGIES & MEDICATIONS  --------------------------------------------------------------------------------  Allergies    penicillin (Swelling)    Intolerances      Standing Inpatient Medications  artificial tears (preservative free) Ophthalmic Solution 1 Drop(s) Both EYES every 2 hours  cefepime   IVPB 500 milliGRAM(s) IV Intermittent every 12 hours  heparin  Injectable 5000 Unit(s) SubCutaneous every 12 hours  multivitamin 1 Tablet(s) Oral daily  octreotide  Injectable 200 MICROGram(s) SubCutaneous every 6 hours  sodium bicarbonate  Infusion 0.179 mEq/kG/Hr IV Continuous <Continuous>  sodium chloride 0.9%. 1000 milliLiter(s) IV Continuous <Continuous>    PRN Inpatient Medications  acetaminophen   Tablet .. 650 milliGRAM(s) Oral every 6 hours PRN  loperamide 2 milliGRAM(s) Oral four times a day PRN      REVIEW OF SYSTEMS  --------------------------------------------------------------------------------  not giving ROS     VITALS/PHYSICAL EXAM  --------------------------------------------------------------------------------  T(C): 36.4 (07-12-19 @ 08:16), Max: 36.4 (07-12-19 @ 08:16)  HR: 87 (07-12-19 @ 17:02) (85 - 101)  BP: 104/55 (07-12-19 @ 17:02) (90/52 - 104/55)  RR: 18 (07-12-19 @ 08:16) (18 - 18)  SpO2: 97% (07-12-19 @ 08:16) (97% - 98%)  Wt(kg): --  Height (cm): 180.34 (07-11-19 @ 02:58)  Weight (kg): 58.8 (07-11-19 @ 07:53)  BMI (kg/m2): 18.1 (07-11-19 @ 07:53)  BSA (m2): 1.75 (07-11-19 @ 07:53)      07-11-19 @ 07:01  -  07-12-19 @ 07:00  --------------------------------------------------------  IN: 1110 mL / OUT: 900 mL / NET: 210 mL    07-12-19 @ 07:01  -  07-12-19 @ 22:37  --------------------------------------------------------  IN: 0 mL / OUT: 500 mL / NET: -500 mL      Physical Exam:  	hronically ill appearing frail m  Very [place]  confused   	no jvd   	Pulm: decrease bs  no rales or ronchi or wheezing  	CV: RRR, S1S2; no rub  	Back: No CVA tenderness  	Abd: +BS, soft, nontender/nondistended  	: No suprapubic tenderness  	UE: Warm, no cyanosis  no clubbing,  no edema  	LE: Warm, no cyanosis  no clubbing, trace  edema        LABS/STUDIES  --------------------------------------------------------------------------------              8.7    7.8   >-----------<  177      [07-11-19 @ 03:24]              24.5     143  |  115  |  94  ----------------------------<  137      [07-12-19 @ 07:19]  4.1   |  16  |  3.14        Ca     10.6     [07-12-19 @ 07:19]    TPro  6.0  /  Alb  3.2  /  TBili  0.3  /  DBili  x   /  AST  37  /  ALT  18  /  AlkPhos  63  [07-11-19 @ 03:24]    PT/INR: PT 11.8 , INR 1.03       [07-11-19 @ 03:24]  PTT: 39.4       [07-11-19 @ 03:24]      Creatinine Trend:  SCr 3.14 [07-12 @ 07:19]  SCr 3.37 [07-11 @ 03:24]  SCr 3.34 [07-09 @ 07:06]  SCr 3.22 [07-08 @ 07:15]  SCr 3.10 [07-07 @ 08:22]              Urinalysis - [07-11-19 @ 03:24]      Color Light Orange / Appearance Turbid / SG 1.016 / pH 6.0      Gluc Negative / Ketone Negative  / Bili Negative / Urobili Negative       Blood Moderate / Protein 100 mg/dL / Leuk Est Large / Nitrite Negative       /  / Hyaline 3 / Gran  / Sq Epi  / Non Sq Epi 2 / Bacteria Many      PTH -- (Ca 10.4)      [06-11-19 @ 09:51]   15  Vitamin D (25OH) 9.7      [06-11-19 @ 10:02]

## 2019-07-13 DIAGNOSIS — F05 DELIRIUM DUE TO KNOWN PHYSIOLOGICAL CONDITION: ICD-10-CM

## 2019-07-13 DIAGNOSIS — N18.9 CHRONIC KIDNEY DISEASE, UNSPECIFIED: ICD-10-CM

## 2019-07-13 LAB
-  AMIKACIN: SIGNIFICANT CHANGE UP
-  AMOXICILLIN/CLAVULANIC ACID: SIGNIFICANT CHANGE UP
-  AMPICILLIN/SULBACTAM: SIGNIFICANT CHANGE UP
-  AMPICILLIN: SIGNIFICANT CHANGE UP
-  AZTREONAM: SIGNIFICANT CHANGE UP
-  CEFAZOLIN: SIGNIFICANT CHANGE UP
-  CEFEPIME: SIGNIFICANT CHANGE UP
-  CEFOXITIN: SIGNIFICANT CHANGE UP
-  CEFTRIAXONE: SIGNIFICANT CHANGE UP
-  CIPROFLOXACIN: SIGNIFICANT CHANGE UP
-  ERTAPENEM: SIGNIFICANT CHANGE UP
-  GENTAMICIN: SIGNIFICANT CHANGE UP
-  IMIPENEM: SIGNIFICANT CHANGE UP
-  LEVOFLOXACIN: SIGNIFICANT CHANGE UP
-  MEROPENEM: SIGNIFICANT CHANGE UP
-  NITROFURANTOIN: SIGNIFICANT CHANGE UP
-  PIPERACILLIN/TAZOBACTAM: SIGNIFICANT CHANGE UP
-  TIGECYCLINE: SIGNIFICANT CHANGE UP
-  TOBRAMYCIN: SIGNIFICANT CHANGE UP
-  TRIMETHOPRIM/SULFAMETHOXAZOLE: SIGNIFICANT CHANGE UP
ANION GAP SERPL CALC-SCNC: 13 MMOL/L — SIGNIFICANT CHANGE UP (ref 5–17)
BUN SERPL-MCNC: 89 MG/DL — HIGH (ref 7–23)
CALCIUM SERPL-MCNC: 10.8 MG/DL — HIGH (ref 8.4–10.5)
CHLORIDE SERPL-SCNC: 113 MMOL/L — HIGH (ref 96–108)
CO2 SERPL-SCNC: 17 MMOL/L — LOW (ref 22–31)
CREAT SERPL-MCNC: 2.94 MG/DL — HIGH (ref 0.5–1.3)
CULTURE RESULTS: SIGNIFICANT CHANGE UP
GLUCOSE SERPL-MCNC: 113 MG/DL — HIGH (ref 70–99)
HCT VFR BLD CALC: 25 % — LOW (ref 39–50)
HGB BLD-MCNC: 8.1 G/DL — LOW (ref 13–17)
MCHC RBC-ENTMCNC: 32.4 GM/DL — SIGNIFICANT CHANGE UP (ref 32–36)
MCHC RBC-ENTMCNC: 32.4 PG — SIGNIFICANT CHANGE UP (ref 27–34)
MCV RBC AUTO: 100 FL — SIGNIFICANT CHANGE UP (ref 80–100)
METHOD TYPE: SIGNIFICANT CHANGE UP
NRBC # BLD: 0 /100 WBCS — SIGNIFICANT CHANGE UP (ref 0–0)
ORGANISM # SPEC MICROSCOPIC CNT: SIGNIFICANT CHANGE UP
ORGANISM # SPEC MICROSCOPIC CNT: SIGNIFICANT CHANGE UP
PLATELET # BLD AUTO: 156 K/UL — SIGNIFICANT CHANGE UP (ref 150–400)
POTASSIUM SERPL-MCNC: 3.7 MMOL/L — SIGNIFICANT CHANGE UP (ref 3.5–5.3)
POTASSIUM SERPL-SCNC: 3.7 MMOL/L — SIGNIFICANT CHANGE UP (ref 3.5–5.3)
RBC # BLD: 2.5 M/UL — LOW (ref 4.2–5.8)
RBC # FLD: 19.9 % — HIGH (ref 10.3–14.5)
SODIUM SERPL-SCNC: 143 MMOL/L — SIGNIFICANT CHANGE UP (ref 135–145)
SPECIMEN SOURCE: SIGNIFICANT CHANGE UP
WBC # BLD: 7.23 K/UL — SIGNIFICANT CHANGE UP (ref 3.8–10.5)
WBC # FLD AUTO: 7.23 K/UL — SIGNIFICANT CHANGE UP (ref 3.8–10.5)

## 2019-07-13 PROCEDURE — 95816 EEG AWAKE AND DROWSY: CPT | Mod: 26

## 2019-07-13 PROCEDURE — 99223 1ST HOSP IP/OBS HIGH 75: CPT

## 2019-07-13 RX ORDER — HALOPERIDOL DECANOATE 100 MG/ML
0.5 INJECTION INTRAMUSCULAR ONCE
Refills: 0 | Status: COMPLETED | OUTPATIENT
Start: 2019-07-13 | End: 2019-07-13

## 2019-07-13 RX ORDER — LANOLIN ALCOHOL/MO/W.PET/CERES
3 CREAM (GRAM) TOPICAL AT BEDTIME
Refills: 0 | Status: DISCONTINUED | OUTPATIENT
Start: 2019-07-13 | End: 2019-07-23

## 2019-07-13 RX ORDER — HALOPERIDOL DECANOATE 100 MG/ML
1 INJECTION INTRAMUSCULAR ONCE
Refills: 0 | Status: COMPLETED | OUTPATIENT
Start: 2019-07-13 | End: 2019-07-13

## 2019-07-13 RX ORDER — OLANZAPINE 15 MG/1
1.25 TABLET, FILM COATED ORAL EVERY 6 HOURS
Refills: 0 | Status: DISCONTINUED | OUTPATIENT
Start: 2019-07-13 | End: 2019-07-14

## 2019-07-13 RX ORDER — OLANZAPINE 15 MG/1
1.25 TABLET, FILM COATED ORAL
Refills: 0 | Status: DISCONTINUED | OUTPATIENT
Start: 2019-07-13 | End: 2019-07-14

## 2019-07-13 RX ADMIN — OCTREOTIDE ACETATE 200 MICROGRAM(S): 200 INJECTION, SOLUTION INTRAVENOUS; SUBCUTANEOUS at 05:03

## 2019-07-13 RX ADMIN — Medication 1 DROP(S): at 05:03

## 2019-07-13 RX ADMIN — HALOPERIDOL DECANOATE 1 MILLIGRAM(S): 100 INJECTION INTRAMUSCULAR at 04:12

## 2019-07-13 RX ADMIN — Medication 1 DROP(S): at 11:07

## 2019-07-13 RX ADMIN — OLANZAPINE 1.25 MILLIGRAM(S): 15 TABLET, FILM COATED ORAL at 17:13

## 2019-07-13 RX ADMIN — OCTREOTIDE ACETATE 200 MICROGRAM(S): 200 INJECTION, SOLUTION INTRAVENOUS; SUBCUTANEOUS at 17:16

## 2019-07-13 RX ADMIN — Medication 1 DROP(S): at 07:51

## 2019-07-13 RX ADMIN — Medication 1 DROP(S): at 17:17

## 2019-07-13 RX ADMIN — HALOPERIDOL DECANOATE 0.5 MILLIGRAM(S): 100 INJECTION INTRAMUSCULAR at 11:06

## 2019-07-13 RX ADMIN — HEPARIN SODIUM 5000 UNIT(S): 5000 INJECTION INTRAVENOUS; SUBCUTANEOUS at 17:17

## 2019-07-13 RX ADMIN — Medication 1 DROP(S): at 22:40

## 2019-07-13 RX ADMIN — SODIUM CHLORIDE 75 MILLILITER(S): 9 INJECTION INTRAMUSCULAR; INTRAVENOUS; SUBCUTANEOUS at 22:40

## 2019-07-13 RX ADMIN — HEPARIN SODIUM 5000 UNIT(S): 5000 INJECTION INTRAVENOUS; SUBCUTANEOUS at 05:02

## 2019-07-13 RX ADMIN — Medication 1 DROP(S): at 04:21

## 2019-07-13 RX ADMIN — Medication 1 DROP(S): at 16:16

## 2019-07-13 RX ADMIN — CEFEPIME 100 MILLIGRAM(S): 1 INJECTION, POWDER, FOR SOLUTION INTRAMUSCULAR; INTRAVENOUS at 05:03

## 2019-07-13 RX ADMIN — CEFEPIME 100 MILLIGRAM(S): 1 INJECTION, POWDER, FOR SOLUTION INTRAMUSCULAR; INTRAVENOUS at 22:39

## 2019-07-13 NOTE — PROGRESS NOTE ADULT - SUBJECTIVE AND OBJECTIVE BOX
Patient is a 86y old  Male who presents with a chief complaint of mental status chanhe (13 Jul 2019 16:40)      SUBJECTIVE / OVERNIGHT EVENTS: more awake. Little verbal  Review of Systems  unobtainable     MEDICATIONS  (STANDING):  artificial tears (preservative free) Ophthalmic Solution 1 Drop(s) Both EYES every 2 hours  cefepime   IVPB 500 milliGRAM(s) IV Intermittent every 12 hours  heparin  Injectable 5000 Unit(s) SubCutaneous every 12 hours  melatonin 3 milliGRAM(s) Oral at bedtime  multivitamin 1 Tablet(s) Oral daily  octreotide  Injectable 200 MICROGram(s) SubCutaneous every 6 hours  OLANZapine 1.25 milliGRAM(s) Oral two times a day  sodium bicarbonate  Infusion 0.179 mEq/kG/Hr (70 mL/Hr) IV Continuous <Continuous>  sodium chloride 0.9%. 1000 milliLiter(s) (75 mL/Hr) IV Continuous <Continuous>    MEDICATIONS  (PRN):  acetaminophen   Tablet .. 650 milliGRAM(s) Oral every 6 hours PRN Temp greater or equal to 38.5C (101.3F), Mild Pain (1 - 3)  loperamide 2 milliGRAM(s) Oral four times a day PRN Diarrhea  OLANZapine Injectable 1.25 milliGRAM(s) IntraMuscular every 6 hours PRN Severe Agitation      Vital Signs Last 24 Hrs  T(C): 36.6 (13 Jul 2019 15:49), Max: 36.8 (13 Jul 2019 00:42)  T(F): 97.8 (13 Jul 2019 15:49), Max: 98.2 (13 Jul 2019 00:42)  HR: 99 (13 Jul 2019 15:49) (89 - 104)  BP: 96/58 (13 Jul 2019 15:49) (90/55 - 96/59)  BP(mean): --  RR: 18 (13 Jul 2019 15:49) (18 - 18)  SpO2: 97% (13 Jul 2019 15:49) (95% - 97%)    PHYSICAL EXAM:  GENERAL: NAD, sick  HEAD:  Atraumatic, Normocephalic Flushed  EYES: EOMI, PERRLA, conjunctiva and sclera clear  NECK: Supple, No JVD  CHEST/LUNG: Clear to auscultation bilaterally; No wheeze  HEART: Regular rate and rhythm; No murmurs, rubs, or gallops  ABDOMEN: Soft, Nontender, Nondistended; Bowel sounds present  EXTREMITIES:  2+ Peripheral Pulses, No clubbing, cyanosis, or edema  NEUROLOGY: non-focal  SKIN: No rashes or lesions    LABS:                        8.1    7.23  )-----------( 156      ( 12 Jul 2019 09:21 )             25.0     07-13    143  |  113<H>  |  89<H>  ----------------------------<  113<H>  3.7   |  17<L>  |  2.94<H>    Ca    10.8<H>      13 Jul 2019 09:21                Culture - Blood (collected 11 Jul 2019 05:11)  Source: .Blood  Preliminary Report (12 Jul 2019 06:01):    No growth to date.    Culture - Blood (collected 11 Jul 2019 05:11)  Source: .Blood  Preliminary Report (12 Jul 2019 06:01):    No growth to date.    Culture - Urine (collected 11 Jul 2019 05:01)  Source: .Urine  Final Report (13 Jul 2019 10:56):    >100,000 CFU/ml Escherichia coli  Organism: Escherichia coli (13 Jul 2019 10:56)  Organism: Escherichia coli (13 Jul 2019 10:56)        RADIOLOGY & ADDITIONAL TESTS:    Imaging Personally Reviewed:    Consultant(s) Notes Reviewed:      Care Discussed with Consultants/Other Providers:

## 2019-07-13 NOTE — BEHAVIORAL HEALTH ASSESSMENT NOTE - NSBHCHARTREVIEWIMAGING_PSY_A_CORE FT
< from: CT Head No Cont (07.11.19 @ 10:17) >    FINDINGS:     No acute intracranial hemorrhage, mass effect, or midline shift. No   abnormal extra-axial collections. The basal cisterns are patent without   evidenceof central herniation.     Mild cerebral volume loss with proportional prominence of the sulci and   ventricles. Moderate patchy periventricular white matter hypoattenuation,   likely the sequela of chronic microvascular ischemic disease.    The right maxillary sinus is extensively opacified with thickened walls   consistent with chronic sinusitis, partially visualized. Mild mucosal   thickening and bony wall thickening is partially visualized in the left   maxillary sinus, also consistent with chronic sinusitis. The maxillary   sinuses are not covered on the prior exam.    IMPRESSION:     No CT evidence of acute intracranial hemorrhage, mass effect, or midline   shift.     Chronic maxillary sinusitis.    < end of copied text >

## 2019-07-13 NOTE — BEHAVIORAL HEALTH ASSESSMENT NOTE - NSBHCONSULTMEDS_PSY_A_CORE FT
1. Start Zyprexa 1.25mg PO bid, FDA BB warning d/w pt's daughter who was in agreement for use    2. PRN: Zyprexa 1.25mg PO/IM q6h PRN agitation.  Monitor for QTc<500.  Melatonin 3mg PO qHS PRN insomnia.    3. Check: TSH, B12, RPR    4. Minimize use of benzos, opioids, anticholinergics, or other deliriogenic agents when possible.  Maintain sleep wake cycle.  Provide frequent reorientation and redirection.  Family member at bedside if possible. Assess for need for glasses and hearing aid (if applicable).    5. Pt does not meet criteria for psychiatric hospitalization.  Recommend outpt psych f/u at [facility] after d/c- [phone].   [facility] Psych will follow.

## 2019-07-13 NOTE — EEG REPORT - NS EEG TEXT BOX
[facility]  Report of Routine EEG Study with Video      [facility]: [address], [address] [place] [address], Phone: [phone]  [facility]: [address] [place] [address], Phone: [phone]  Office: [address] [facility], [place] [address], [place] [address], Phone: [phone]    Patient Name: Nitesh Sexton    Age: 86 year  : 1933  Patient ID: -, MRN #: MRN# [id number], Location: [address] [place] [address]  Referring Physician: - inpatient  EEG #: [id number]    Study Date: 2019		    Technical Information:					  On Instrument: [id number]  Placement and Labeling of Electrodes:  The EEG was performed utilizing 20 channels referential EEG connections (coronal over temporal over parasagittal montage) using all standard 10-20 electrode placements with EKG.  Recording was at a sampling rate of 256 samples per second per channel.  Time synchronized digital video recording was done simultaneously with EEG recording.  A low light infrared camera was used for low light recording.  Liam and seizure detection algorithms were utilized.    History:  [id number] study performed at bedside  COR: Awake, confused, agitated  No HV, No photic  87 Y/O Male  P/W: AMS  H/O:  Carcinoid syndrome, kidney lesion      Medication	  Haldol, no AEDs	    [Abbreviation Key:  PDR=alpha rhythm/posterior dominant rhythm. A-P=anterior posterior gradient.  Amplitude: ‘very low’:<20; ‘low’:20-50; ‘medium’:; ‘high’:>200uV.  Persistence for periodic/rhythmic patterns (% of epoch) ‘rare’:<1%; ‘occasional’:1-10%; ‘frequent’:10-50%; ‘abundant’:50-90%; ‘continuous’:>90%.  Persistence for sporadic discharges: ‘rare’:<1/hr; ‘occasional’:1/min-1/hr; ‘frequent’:>1/min; ‘abundant’:>1/10 sec.  GRDA=generalized rhythmic delta activity, LRDA=lateralized rhythmic delta activity, TIRDA=temporal intermittent rhythmic delta activity, FIRDA=frontal intermittent rhythmic activity. LPD=PLED=lateralized periodic discharges, GPD=generalized periodic discharges, BiPDs=BiPLEDs=bilateral independent periodic epileptiform discharges, SIRPID=stimulus induced rhythmic, periodic, or ictal appearing discharges.  Modifiers: +F=with fast component, +S=with spike component, +R=with rhythmic component.  S-B=burst suppression pattern. PFA: paroxysmal fast activity. Max=maximal. N1-drowsy, N2-stage II sleep, N3-slow wave sleep.  HV=hyperventilation, PS=photic stimulation]    Study Interpretation:    FINDINGS:      Background:   Limited study with artifact in frontal leads.     -The background was continuous, spontaneously variable and reactive. During wakefulness, the posterior dominant rhythm was not well define , with amplitude to 25 uV. Low amplitude frontal beta was noted in wakefulness.      Background Slowing:  -No generalized background slowing was present.    Focal Slowing:   -None present.    Sleep Background:  -Drowsiness and stage II sleep were not recorded.      Other Non-Epileptiform Findings:  -None were present.    Interictal Epileptiform Activity:   -None were present.    Events:  -Clinical events: None recorded.  -Seizures: None recorded.    Activation Procedures:   -Hyperventilation was not performed.    -Photic stimulation was not performed.    Artifacts:  -Intermittent myogenic and movement artifacts were noted.    ECG:  -The heart rate was not recorded.    EEG Summary/Classification:  -Normal EEG in the awake state, although limited with muscle artifact.       EEG Impression/Clinical Correlate:  -No focal or epileptiform abnormalities recorded.    ________________________________________    Erick Phelps DO  Epilepsy Fellow, [facility]    Pedro Jon MD  Attending Physician, [facility]   COMPREHENSIVE EPILEPSY CENTER   REPORT OF ROUTINE VIDEO EEG     [facility]: [address] [place] [address] [address], [place] [address], Ph#: [phone]  [facility]: [address], [place] [address], Ph#: [phone]  Office: [address] [facility], [address], [place] [address] Ph#: [phone]    Patient Name: MIKE HARDY  Age and : 86y ([birth year])  MRN #: [id number]  Location: [facility] 8MON 803   Referring Physician: Phong Dash    Study Date: 19    _____________________________________________________________  TECHNICAL INFORMATION    Placement and Labeling of Electrodes:  The EEG was performed utilizing 20 channels referential EEG connections (coronal over temporal over parasagittal montage) using all standard 10-20 electrode placements with EKG.  Recording was at a sampling rate of 256 samples per second per channel.  Time synchronized digital video recording was done simultaneously with EEG recording.  A low light infrared camera was used for low light recording.  Liam and seizure detection algorithms were utilized.    _____________________________________________________________  HISTORY    Patient is a 86y old  Male who presents with a chief complaint of mental status change (2019 17:43)      PERTINENT MEDICATION:  none    _____________________________________________________________  STUDY INTERPRETATION    Findings: The background was continuous, spontaneously variable and reactive. No posterior dominant rhythm seen.    Background Slowing:  No generalized background slowing was present.    Focal Slowing:   None were present.    Sleep Background:  Drowsiness and stage II sleep transients were not recorded.    Other Non-Epileptiform Findings:  None were present.    Interictal Epileptiform Activity:   None were present.    Events:  Clinical events: None recorded.  Seizures: None recorded.    Activation Procedures:   Hyperventilation was not performed.    Photic stimulation was not performed.     Artifacts:  Intermittent myogenic and movement artifacts were noted.    _____________________________________________________________  EEG SUMMARY/CLASSIFICATION    Normal EEG in the awake state.    _____________________________________________________________  EEG IMPRESSION/CLINICAL CORRELATE    Normal EEG study.  No epileptic pattern or seizure seen.    _____________________________________________________________    Erick Phelps DO  Epilepsy Fellow, [facility] [place]    Pedro Jon MD  Attending Physician, [facility] [place]

## 2019-07-13 NOTE — BEHAVIORAL HEALTH ASSESSMENT NOTE - NSBHCHARTREVIEWINVESTIGATE_PSY_A_CORE FT
< from: 12 Lead ECG (07.11.19 @ 03:11) >      Ventricular Rate 85 BPM    Atrial Rate 85 BPM    P-R Interval 252 ms    QRS Duration 136 ms    Q-T Interval 394 ms    QTC Calculation(Bezet) 468 ms    P Axis 66 degrees    R Axis -65 degrees    T Axis 94 degrees    Diagnosis Line SINUS RHYTHM WITH 1ST DEGREE A-V BLOCK  LEFT AXIS DEVIATION  NON-SPECIFIC INTRA-VENTRICULAR CONDUCTION BLOCK  CANNOT RULE OUT ANTEROSEPTAL INFARCT , AGE UNDETERMINED  ABNORMAL ECG    Confirmed by ATTENDING, ED ([id number]),  DARYL MCCLAIN ([id number]) on 7/11/2019 5:07:31 AM      < end of copied text >

## 2019-07-13 NOTE — PROGRESS NOTE ADULT - SUBJECTIVE AND OBJECTIVE BOX
Patient is a 86y old  Male who presents with a chief complaint of mental status change (11 Jul 2019 21:31)       Pt is seen and examined  pt is awake and lying in bed  pt seems comfortable and denies any complaints at this time; He is confused, oriented to person only; as per nursing staff, pt has been resting comfortably; no documented stools;      REVIEW OF SYSTEMS:    CONSTITUTIONAL: (+) weakness, No fevers or chills  EYES/ENT: No visual changes;  No vertigo or throat pain   NECK: No pain or stiffness  RESPIRATORY: No cough, wheezing, hemoptysis; No shortness of breath  CARDIOVASCULAR: No chest pain or palpitations  GASTROINTESTINAL: No abdominal or epigastric pain. No nausea, vomiting, or hematemesis; No diarrhea or constipation. No melena or hematochezia.  GENITOURINARY: No dysuria, frequency or hematuria  NEUROLOGICAL: No numbness: (+) weakness, AMS  SKIN: No itching, burning, rashes, or lesions     PHYSICAL EXAM  General: adult in NAD  HEENT: clear oropharynx, anicteric sclera, pink conjunctiva  Neck: supple  CV: normal S1/S2 with no murmur rubs or gallops  Lungs: positive air movement b/l ant lungs,clear to auscultation, no wheezes, no rales  Abdomen: soft non-tender non-distended, no hepatosplenomegaly  Ext: no clubbing cyanosis or edema  Skin: no rashes and no petechiae  Neuro: alert and oriented X 1, no focal deficits        MEDICATIONS  (STANDING):  artificial tears (preservative free) Ophthalmic Solution 1 Drop(s) Both EYES every 2 hours  cefepime   IVPB 500 milliGRAM(s) IV Intermittent every 12 hours  heparin  Injectable 5000 Unit(s) SubCutaneous every 12 hours  melatonin 3 milliGRAM(s) Oral at bedtime  multivitamin 1 Tablet(s) Oral daily  octreotide  Injectable 200 MICROGram(s) SubCutaneous every 6 hours  OLANZapine 1.25 milliGRAM(s) Oral two times a day  sodium bicarbonate  Infusion 0.179 mEq/kG/Hr (70 mL/Hr) IV Continuous <Continuous>  sodium chloride 0.9%. 1000 milliLiter(s) (75 mL/Hr) IV Continuous <Continuous>    MEDICATIONS  (PRN):  acetaminophen   Tablet .. 650 milliGRAM(s) Oral every 6 hours PRN Temp greater or equal to 38.5C (101.3F), Mild Pain (1 - 3)  loperamide 2 milliGRAM(s) Oral four times a day PRN Diarrhea  OLANZapine Injectable 1.25 milliGRAM(s) IntraMuscular every 6 hours PRN Severe Agitation      Allergies    penicillin (Swelling)    Intolerances        Vital Signs Last 24 Hrs  T(C): 36.3 (13 Jul 2019 08:38), Max: 36.8 (13 Jul 2019 00:42)  T(F): 97.4 (13 Jul 2019 08:38), Max: 98.2 (13 Jul 2019 00:42)  HR: 100 (13 Jul 2019 08:38) (87 - 104)  BP: 90/55 (13 Jul 2019 08:38) (90/55 - 104/55)  BP(mean): --  RR: 18 (13 Jul 2019 08:38) (18 - 18)  SpO2: 95% (13 Jul 2019 08:38) (95% - 95%)      LABS:                          8.1    7.23  )-----------( 156      ( 12 Jul 2019 09:21 )             25.0         Mean Cell Volume : 100.0 fl  Mean Cell Hemoglobin : 32.4 pg  Mean Cell Hemoglobin Concentration : 32.4 gm/dL    07-13    143  |  113<H>  |  89<H>  ----------------------------<  113<H>  3.7   |  17<L>  |  2.94<H>    Ca    10.8<H>      13 Jul 2019 09:21    Culture - Blood (07.11.19 @ 05:11)    Specimen Source: .Blood    Culture Results:   No growth to date.    Culture - Urine (07.11.19 @ 05:01)    -  Cefazolin: S <=2 (MIC_CL_COM_ENTERIC_CEFAZU) For uncomplicated UTI with K. pneumoniae, E. coli, or P. mirablis: MAYRA <=16 is sensitive and MAYRA >=32 is resistant. This also predicts results for oral agents cefaclor, cefdinir, cefpodoxime, cefprozil, cefuroxime axetil, cephalexin and locarbef for uncomplicated UTI. Note that some isolates may be susceptible to these agents while testing resistant to cefazolin.    -  Aztreonam: S <=4    -  Ampicillin: S <=2 These ampicillin results predict results for amoxicillin    -  Ampicillin/Sulbactam: S <=4/2 Enterobacter, Citrobacter, and Serratia may develop resistance during prolonged therapy (3-4 days)    -  Amikacin: S <=8    -  Amoxicillin/Clavulanic Acid: S <=8/4    -  Trimethoprim/Sulfamethoxazole: S <=0.5/9.5    -  Tobramycin: S <=2    -  Piperacillin/Tazobactam: S <=8    -  Tigecycline: S <=1    -  Nitrofurantoin: S <=32 Should not be used to treat pyelonephritis    -  Meropenem: S <=1    -  Ertapenem: S <=0.5    -  Gentamicin: S <=1    -  Imipenem: S <=1    -  Levofloxacin: S <=1    -  Ciprofloxacin: S <=0.5    -  Ceftriaxone: S <=1 Enterobacter, Citrobacter, and Serratia may develop resistance during prolonged therapy    -  Cefoxitin: S <=4    -  Cefepime: S <=2    Specimen Source: .Urine    Culture Results:   >100,000 CFU/ml Escherichia coli    Organism Identification: Escherichia coli    Organism: Escherichia coli    Method Type: [facility]        Rapid Respiratory Viral Panel (07.11.19 @ 22:22)    Rapid RVP Result: NotDetec: This Respiratory Panel uses polymerase chain reaction (PCR) to detect for  adenovirus; coronavirus (HKU1, NL63, 229E, OC43); human metapneumovirus  (hMPV); human enterovirus/rhinovirus (Entero/RV); influenza A; influenza  A/H1; influenza A/H3; influenza A/H1-2009; influenza B; parainfluenza  viruses 1, 2, 3, 4; respiratory syncytial virus; Mycoplasma pneumoniae;  and Chlamydophila pneumoniae.        RADIOLOGY & ADDITIONAL STUDIES:    EXAM:  XR CHEST AP OR PA 1V                            PROCEDURE DATE:  07/11/2019        INTERPRETATION:  CLINICAL INFORMATION: Sepsis.    COMPARISON:  Chest x-ray 6/10/2019    TECHNIQUE:   Frontal radiograph of the chest.     FINDINGS:    Thelungs are clear. There is no pleural effusion or pneumothorax. The   cardiac silhouette is unremarkable. No acute osseous abnormality.    IMPRESSION: Clear lungs.    < from: CT Head No Cont (07.11.19 @ 10:17) >  IMPRESSION:     No CT evidence of acute intracranial hemorrhage, mass effect, or midline   shift.     Chronic maxillary sinusitis.      < end of copied text > Patient is a 86y old  Male who presents with a chief complaint of mental status change (11 Jul 2019 21:31)       Pt is seen and examined  pt is sleeping and lying in bed  per daughter at bedside - less confused, no major diarrhea      REVIEW OF SYSTEMS:    limited  PHYSICAL EXAM  General: adult  chri=oniccally ill, flushed face better  HEENT: clear oropharynx, anicteric sclera, pale conjunctiva  Neck: supple  CV: normal S1/S2 with no murmur rubs or gallops  Lungs: positive air movement b/l ant lungs,clear to auscultation, no wheezes, no rales  Abdomen: soft non-tender non-distended  Ext: no clubbing cyanosis or edema  Skin: no rashes and no petechiae  Neuro: asleeping      MEDICATIONS  (STANDING):  artificial tears (preservative free) Ophthalmic Solution 1 Drop(s) Both EYES every 2 hours  cefepime   IVPB 500 milliGRAM(s) IV Intermittent every 12 hours  heparin  Injectable 5000 Unit(s) SubCutaneous every 12 hours  melatonin 3 milliGRAM(s) Oral at bedtime  multivitamin 1 Tablet(s) Oral daily  octreotide  Injectable 200 MICROGram(s) SubCutaneous every 6 hours  OLANZapine 1.25 milliGRAM(s) Oral two times a day  sodium bicarbonate  Infusion 0.179 mEq/kG/Hr (70 mL/Hr) IV Continuous <Continuous>  sodium chloride 0.9%. 1000 milliLiter(s) (75 mL/Hr) IV Continuous <Continuous>    MEDICATIONS  (PRN):  acetaminophen   Tablet .. 650 milliGRAM(s) Oral every 6 hours PRN Temp greater or equal to 38.5C (101.3F), Mild Pain (1 - 3)  loperamide 2 milliGRAM(s) Oral four times a day PRN Diarrhea  OLANZapine Injectable 1.25 milliGRAM(s) IntraMuscular every 6 hours PRN Severe Agitation      Allergies    penicillin (Swelling)    Intolerances        Vital Signs Last 24 Hrs  T(C): 36.3 (13 Jul 2019 08:38), Max: 36.8 (13 Jul 2019 00:42)  T(F): 97.4 (13 Jul 2019 08:38), Max: 98.2 (13 Jul 2019 00:42)  HR: 100 (13 Jul 2019 08:38) (87 - 104)  BP: 90/55 (13 Jul 2019 08:38) (90/55 - 104/55)  BP(mean): --  RR: 18 (13 Jul 2019 08:38) (18 - 18)  SpO2: 95% (13 Jul 2019 08:38) (95% - 95%)      LABS:                          8.1    7.23  )-----------( 156      ( 12 Jul 2019 09:21 )             25.0         Mean Cell Volume : 100.0 fl  Mean Cell Hemoglobin : 32.4 pg  Mean Cell Hemoglobin Concentration : 32.4 gm/dL    07-13    143  |  113<H>  |  89<H>  ----------------------------<  113<H>  3.7   |  17<L>  |  2.94<H>    Ca    10.8<H>      13 Jul 2019 09:21    Culture - Blood (07.11.19 @ 05:11)    Specimen Source: .Blood    Culture Results:   No growth to date.    Culture - Urine (07.11.19 @ 05:01)    -  Cefazolin: S <=2 (MIC_CL_COM_ENTERIC_CEFAZU) For uncomplicated UTI with K. pneumoniae, E. coli, or P. mirablis: MAYRA <=16 is sensitive and MAYRA >=32 is resistant. This also predicts results for oral agents cefaclor, cefdinir, cefpodoxime, cefprozil, cefuroxime axetil, cephalexin and locarbef for uncomplicated UTI. Note that some isolates may be susceptible to these agents while testing resistant to cefazolin.    -  Aztreonam: S <=4    -  Ampicillin: S <=2 These ampicillin results predict results for amoxicillin    -  Ampicillin/Sulbactam: S <=4/2 Enterobacter, Citrobacter, and Serratia may develop resistance during prolonged therapy (3-4 days)    -  Amikacin: S <=8    -  Amoxicillin/Clavulanic Acid: S <=8/4    -  Trimethoprim/Sulfamethoxazole: S <=0.5/9.5    -  Tobramycin: S <=2    -  Piperacillin/Tazobactam: S <=8    -  Tigecycline: S <=1    -  Nitrofurantoin: S <=32 Should not be used to treat pyelonephritis    -  Meropenem: S <=1    -  Ertapenem: S <=0.5    -  Gentamicin: S <=1    -  Imipenem: S <=1    -  Levofloxacin: S <=1    -  Ciprofloxacin: S <=0.5    -  Ceftriaxone: S <=1 Enterobacter, Citrobacter, and Serratia may develop resistance during prolonged therapy    -  Cefoxitin: S <=4    -  Cefepime: S <=2    Specimen Source: .Urine    Culture Results:   >100,000 CFU/ml Escherichia coli    Organism Identification: Escherichia coli    Organism: Escherichia coli    Method Type: [facility]        Rapid Respiratory Viral Panel (07.11.19 @ 22:22)    Rapid RVP Result: NotDetec: This Respiratory Panel uses polymerase chain reaction (PCR) to detect for  adenovirus; coronavirus (HKU1, NL63, 229E, OC43); human metapneumovirus  (hMPV); human enterovirus/rhinovirus (Entero/RV); influenza A; influenza  A/H1; influenza A/H3; influenza A/H1-2009; influenza B; parainfluenza  viruses 1, 2, 3, 4; respiratory syncytial virus; Mycoplasma pneumoniae;  and Chlamydophila pneumoniae.        RADIOLOGY & ADDITIONAL STUDIES:    EXAM:  XR CHEST AP OR PA 1V                            PROCEDURE DATE:  07/11/2019        INTERPRETATION:  CLINICAL INFORMATION: Sepsis.    COMPARISON:  Chest x-ray 6/10/2019    TECHNIQUE:   Frontal radiograph of the chest.     FINDINGS:    Thelungs are clear. There is no pleural effusion or pneumothorax. The   cardiac silhouette is unremarkable. No acute osseous abnormality.    IMPRESSION: Clear lungs.    < from: CT Head No Cont (07.11.19 @ 10:17) >  IMPRESSION:     No CT evidence of acute intracranial hemorrhage, mass effect, or midline   shift.     Chronic maxillary sinusitis.      < end of copied text >

## 2019-07-13 NOTE — PROGRESS NOTE ADULT - PROBLEM SELECTOR PLAN 1
Pt has been on sq octreotide; unclear about amount of stools; currently no bowel movements documented over last 12 hours; would not change Octreotide dosing at present time; continue monitoring BMs and then decision will be made re: changes in dosing ct octreotide at current dose  If diarrhea recurs, will need to r/o c.diff, abxa assoc diarrhea - prn probiotics  F/up chromogranin level  Prn reimaging  on zyprexa  explained extent of disease - daughter was not aware of bony disease , liver biopsy showed low grade carcinoid  clinically though patient is behaving aggressively ? due to large burden of disease, exacerbating pre existing chronic co morbidities  reviewed options of DNR, comfort care - patient not a candidate for aggressive therapy beyond octreotide at this point  daughter is leaning towards DNR - will d/w rest of family  Daughter wanted to know if appetite stimulants possible - reviewed risk of DVT with megace and MS issues with medical marijuana or marinol - hold off currently\Plan d/w dr. arnold as well

## 2019-07-13 NOTE — BEHAVIORAL HEALTH ASSESSMENT NOTE - NSBHCHARTREVIEWLAB_PSY_A_CORE FT
8.1    7.23  )-----------( 156      ( 12 Jul 2019 09:21 )             25.0     07-13    143  |  113<H>  |  89<H>  ----------------------------<  113<H>  3.7   |  17<L>  |  2.94<H>    Ca    10.8<H>      13 Jul 2019 09:21

## 2019-07-13 NOTE — BEHAVIORAL HEALTH ASSESSMENT NOTE - NSBHSOCIALHXDETAILSFT_PSY_A_CORE
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

## 2019-07-13 NOTE — PROGRESS NOTE ADULT - SUBJECTIVE AND OBJECTIVE BOX
[facility]   [phone]  RENAL FOLLOW UP NOTE  --------------------------------------------------------------------------------  Chief Complaint:    24 hour events/subjective:    seen earlier. confused when seen.     PAST HISTORY  --------------------------------------------------------------------------------  No significant changes to PMH, PSH, FHx, SHx, unless otherwise noted    ALLERGIES & MEDICATIONS  --------------------------------------------------------------------------------  Allergies    penicillin (Swelling)    Intolerances      Standing Inpatient Medications  artificial tears (preservative free) Ophthalmic Solution 1 Drop(s) Both EYES every 2 hours  cefepime   IVPB 500 milliGRAM(s) IV Intermittent every 12 hours  heparin  Injectable 5000 Unit(s) SubCutaneous every 12 hours  melatonin 3 milliGRAM(s) Oral at bedtime  multivitamin 1 Tablet(s) Oral daily  octreotide  Injectable 200 MICROGram(s) SubCutaneous every 6 hours  OLANZapine 1.25 milliGRAM(s) Oral two times a day  sodium bicarbonate  Infusion 0.179 mEq/kG/Hr IV Continuous <Continuous>  sodium chloride 0.9%. 1000 milliLiter(s) IV Continuous <Continuous>    PRN Inpatient Medications  acetaminophen   Tablet .. 650 milliGRAM(s) Oral every 6 hours PRN  loperamide 2 milliGRAM(s) Oral four times a day PRN  OLANZapine Injectable 1.25 milliGRAM(s) IntraMuscular every 6 hours PRN      REVIEW OF SYSTEMS  --------------------------------------------------------------------------------  reliability poor   Gen: denies  fevers/chills,  CVS: denies chest pain/palpitations  Resp: denies SOB/Cough  GI: Denies N/V/Abd pain  : Denies dysuria        VITALS/PHYSICAL EXAM  --------------------------------------------------------------------------------  T(C): 36.6 (07-13-19 @ 15:49), Max: 36.8 (07-13-19 @ 00:42)  HR: 99 (07-13-19 @ 15:49) (87 - 104)  BP: 96/58 (07-13-19 @ 15:49) (90/55 - 104/55)  RR: 18 (07-13-19 @ 15:49) (18 - 18)  SpO2: 97% (07-13-19 @ 15:49) (95% - 97%)  Wt(kg): --        07-12-19 @ 07:01  -  07-13-19 @ 07:00  --------------------------------------------------------  IN: 1790 mL / OUT: 500 mL / NET: 1290 mL    07-13-19 @ 07:01  -  07-13-19 @ 16:40  --------------------------------------------------------  IN: 0 mL / OUT: 400 mL / NET: -400 mL      Physical Exam:  	  	Chronically ill appearing frail m  Very [place]  confused   	no jvd   	Pulm: decrease bs  no rales or ronchi or wheezing  	CV: RRR, S1S2; no rub  	Back: No CVA tenderness  	Abd: +BS, soft, nontender/nondistended  	: No suprapubic tenderness  	UE: Warm, no cyanosis  no clubbing,  no edema  	LE: Warm, no cyanosis  no clubbing, trace  edema    	    LABS/STUDIES  --------------------------------------------------------------------------------              8.1    7.23  >-----------<  156      [07-12-19 @ 09:21]              25.0     143  |  113  |  89  ----------------------------<  113      [07-13-19 @ 09:21]  3.7   |  17  |  2.94        Ca     10.8     [07-13-19 @ 09:21]            Creatinine Trend:  SCr 2.94 [07-13 @ 09:21]  SCr 3.14 [07-12 @ 07:19]  SCr 3.37 [07-11 @ 03:24]  SCr 3.34 [07-09 @ 07:06]  SCr 3.22 [07-08 @ 07:15]              Urinalysis - [07-11-19 @ 03:24]      Color Light Orange / Appearance Turbid / SG 1.016 / pH 6.0      Gluc Negative / Ketone Negative  / Bili Negative / Urobili Negative       Blood Moderate / Protein 100 mg/dL / Leuk Est Large / Nitrite Negative       /  / Hyaline 3 / Gran  / Sq Epi  / Non Sq Epi 2 / Bacteria Many      PTH -- (Ca 10.4)      [06-11-19 @ 09:51]   15  Vitamin D (25OH) 9.7      [06-11-19 @ 10:02]

## 2019-07-13 NOTE — BEHAVIORAL HEALTH ASSESSMENT NOTE - HPI (INCLUDE ILLNESS QUALITY, SEVERITY, DURATION, TIMING, CONTEXT, MODIFYING FACTORS, ASSOCIATED SIGNS AND SYMPTOMS)
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    On interview, pt is lethargic s/p Haldol 0.5mg PRN x1 dose.  Oriented to self only, thinks we are in his home.  Unable to describe reasons for admission, or name the current president.  Cannot give season or year.  Denies any current complaints, but unable to provide reliable history.    Collateral obtained from pt's daughter Sonia at bedside.  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  She states he developed an acute change in mental status on Wednesday, and since that time has been disoriented, agitated, restless, calling family members by the wrong name, etc.  She denies pt having previous psychiatric or substance history.

## 2019-07-13 NOTE — BEHAVIORAL HEALTH ASSESSMENT NOTE - RISK ASSESSMENT
elevated risk 2/2 delirium, no e/o suicidality or homicidality at this time, does not meet criteria for psychiatric admission

## 2019-07-13 NOTE — CHART NOTE - NSCHARTNOTEFT_GEN_A_CORE
Approached by RN, reports during the day patient pulled out 4+ IV and currently has no access. Patient currently pulling at [facility] for Rae catheter. Patient seen and examined resting comfortably although as per RN when walking out of room patient begins to take clothes off and get out of bed. Will place B/L wrist restraints so RN can get IVs access as patient is due for important medications: cefepime and soidum bicarb. Psych consult appreciated c/w zyprexa as ordered. Low threshold for 1:1, patient currently on enhanced supervision.     [place] [facility]  [id number]

## 2019-07-13 NOTE — BEHAVIORAL HEALTH ASSESSMENT NOTE - SUMMARY
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  On interview, pt is lethargic s/p Haldol 0.5mg PRN x1 dose.  Oriented to self only, thinks we are in his home.  Unable to describe reasons for admission, or name the current president.  Cannot give season or year.  Denies any current complaints, but unable to provide reliable history.  Daughter denies any baseline cognitive impairments, states pt was functioning independently and actively in the community.  Agreeable for antipsychotic use for tx of delirium.

## 2019-07-13 NOTE — PROVIDER CONTACT NOTE (OTHER) - ASSESSMENT
pt agitated, climbing out of bed, pulling at calzada and IV lines. speech illogical, a&ox 0-1 when normally redirectable and aggreable

## 2019-07-13 NOTE — PROGRESS NOTE ADULT - SUBJECTIVE AND OBJECTIVE BOX
CC: f/u for  uti  Patient reports he is ok    REVIEW OF SYSTEMS:  All other review of systems negative (Comprehensive ROS)    Antimicrobials Day #  :3  cefepime   IVPB 500 milliGRAM(s) IV Intermittent every 12 hours    Other Medications Reviewed    T(F): 97.4 (07-13-19 @ 08:38), Max: 98.2 (07-13-19 @ 00:42)  HR: 100 (07-13-19 @ 08:38)  BP: 90/55 (07-13-19 @ 08:38)  RR: 18 (07-13-19 @ 08:38)  SpO2: 95% (07-13-19 @ 08:38)  Wt(kg): --    PHYSICAL EXAM:  General: more alert, no acute distress  Eyes:  anicteric, no conjunctival injection, no discharge  Oropharynx: no lesions or injection 	  Neck: supple, without adenopathy  Lungs: clear to auscultation  Heart: regular rate and rhythm; no murmur, rubs or gallops  Abdomen: soft, nondistended, nontender, without mass or organomegaly  Skin: no lesions  Extremities: no clubbing, cyanosis, or edema  Neurologic: alert,  moves all extremities    LAB RESULTS:                        8.1    7.23  )-----------( 156      ( 12 Jul 2019 09:21 )             25.0     07-13    143  |  113<H>  |  89<H>  ----------------------------<  113<H>  3.7   |  17<L>  |  2.94<H>    Ca    10.8<H>      13 Jul 2019 09:21          MICROBIOLOGY:  RECENT CULTURES:  07-11 @ 05:11 .Blood     No growth to date.      07-11 @ 05:01 .Urine Escherichia coli    >100,000 CFU/ml Escherichia coli          RADIOLOGY REVIEWED:    < from: CT Head No Cont (07.11.19 @ 10:17) >  IMPRESSION:     No CT evidence of acute intracranial hemorrhage, mass effect, or midline   shift.     Chronic maxillary sinusitis.    < end of copied text >      Assessment:  patient with carcinoid syndrome admitted for lethargy, hypotension, recent calzada pull then replaced now bp stable , ms better suspect uti  Plan: continue cefepime  await organism sensitivity

## 2019-07-13 NOTE — PROVIDER CONTACT NOTE (OTHER) - SITUATION
patient attempted to climb out of bed. yelling he wants to go. removing clothing and pulling at calzada

## 2019-07-13 NOTE — EEG REPORT - NS EEG TEXT BOX
[facility]   COMPREHENSIVE EPILEPSY CENTER   REPORT OF ROUTINE VIDEO EEG     [facility]: [address] [place] [address] [address], [place] [address], Ph#: [phone]  [facility]: [address], [place] [address], Ph#: [phone]  Office: [address] [facility], [address], [place] [address] Ph#: [phone]    Patient Name: MIKE HARDY  Age and : 86y ([birth year])  MRN #: [id number]  Location: [facility] 8MON 803   Referring Physician: Phong Dash    Study Date: 19    _____________________________________________________________  TECHNICAL INFORMATION    Placement and Labeling of Electrodes:  The EEG was performed utilizing 20 channels referential EEG connections (coronal over temporal over parasagittal montage) using all standard 10-20 electrode placements with EKG.  Recording was at a sampling rate of 256 samples per second per channel.  Time synchronized digital video recording was done simultaneously with EEG recording.  A low light infrared camera was used for low light recording.  Liam and seizure detection algorithms were utilized.    _____________________________________________________________  HISTORY    Patient is a 86y old  Male who presents with a chief complaint of mental status change (2019 17:43)      PERTINENT MEDICATION:  none    _____________________________________________________________  STUDY INTERPRETATION    Findings: The background was continuous, spontaneously variable and reactive. No posterior dominant rhythm seen.    Background Slowing:  No generalized background slowing was present.    Focal Slowing:   None were present.    Sleep Background:  Drowsiness and stage II sleep transients were not recorded.    Other Non-Epileptiform Findings:  None were present.    Interictal Epileptiform Activity:   None were present.    Events:  Clinical events: None recorded.  Seizures: None recorded.    Activation Procedures:   Hyperventilation was not performed.    Photic stimulation was not performed.     Artifacts:  Intermittent myogenic and movement artifacts were noted.    _____________________________________________________________  EEG SUMMARY/CLASSIFICATION    Normal EEG in the awake state.    _____________________________________________________________  EEG IMPRESSION/CLINICAL CORRELATE    Normal EEG study.  No epileptic pattern or seizure seen.    _____________________________________________________________    Pedro Jon MD  Attending Physician, [facility] Epilepsy [place] ERROR

## 2019-07-13 NOTE — CHART NOTE - NSCHARTNOTEFT_GEN_A_CORE
CC: Agitation    Event Summary: Asked by nurse to evaluate patient for agitation. As per nurse, patient is trying to pull out IV access and Calzada. Patient seen and examined at the bedside. Patient is A&Ox0 and agitated, trying to get out of bed. As per PCA, patient was also A&Ox0 and agitated the previous night. Unable to obtain ROS due to A&Ox0.    Vital Signs Last 24 Hrs  T(C): 36.3 (13 Jul 2019 03:56), Max: 36.8 (13 Jul 2019 00:42)  T(F): 97.3 (13 Jul 2019 03:56), Max: 98.2 (13 Jul 2019 00:42)  HR: 104 (13 Jul 2019 03:56) (85 - 104)  BP: 96/59 (13 Jul 2019 03:56) (90/52 - 104/55)  RR: 18 (13 Jul 2019 03:56) (18 - 18)  SpO2: 95% (13 Jul 2019 03:56) (95% - 97%)    PHYSICAL EXAM:  General: NAD, A&Ox0  Respiratory: Lungs clear to auscultation bilaterally. No wheezes, rales, rhonchi. Normal respiratory effort.   Cardiovascular: S1, S2 present. Regular rate and rhythm. No murmurs, wheezes, or gallops  Gastrointestinal: BS x4 normoactive. Soft, non-tender, non-distended.   Extremities: 2+ peripheral pulses. No edema, cyanosis.    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 Patient altered. Per EMS, patient with malodorous urine. At baseline, able to maintain conversations.   Now, patient is agitated and A&Ox0, trying to pull out IV access and Calzada and getting out of bed    #Agitation  -Repeat vitals were stable   -Given haldol 1 mg IV push once  -Enhanced supervision  -Fall precautions  -Bed alarms  -Will endorse to day team      Marta Calvillo PA-C  #[id number]

## 2019-07-13 NOTE — BEHAVIORAL HEALTH ASSESSMENT NOTE - NSBHCHARTREVIEWVS_PSY_A_CORE FT
T(C): 36.3 (07-13-19 @ 08:38), Max: 36.8 (07-13-19 @ 00:42)  HR: 100 (07-13-19 @ 08:38) (87 - 104)  BP: 90/55 (07-13-19 @ 08:38) (90/55 - 104/55)  RR: 18 (07-13-19 @ 08:38) (18 - 18)  SpO2: 95% (07-13-19 @ 08:38) (95% - 95%)  Wt(kg): --

## 2019-07-14 LAB
ANION GAP SERPL CALC-SCNC: 12 MMOL/L — SIGNIFICANT CHANGE UP (ref 5–17)
APPEARANCE UR: ABNORMAL
BACTERIA # UR AUTO: ABNORMAL
BILIRUB UR-MCNC: NEGATIVE — SIGNIFICANT CHANGE UP
BUN SERPL-MCNC: 87 MG/DL — HIGH (ref 7–23)
CALCIUM SERPL-MCNC: 10.2 MG/DL — SIGNIFICANT CHANGE UP (ref 8.4–10.5)
CHLORIDE SERPL-SCNC: 114 MMOL/L — HIGH (ref 96–108)
CO2 SERPL-SCNC: 19 MMOL/L — LOW (ref 22–31)
COLOR SPEC: SIGNIFICANT CHANGE UP
CREAT SERPL-MCNC: 2.93 MG/DL — HIGH (ref 0.5–1.3)
DIFF PNL FLD: ABNORMAL
EPI CELLS # UR: 3 — SIGNIFICANT CHANGE UP
GLUCOSE BLDC GLUCOMTR-MCNC: 112 MG/DL — HIGH (ref 70–99)
GLUCOSE BLDC GLUCOMTR-MCNC: 130 MG/DL — HIGH (ref 70–99)
GLUCOSE SERPL-MCNC: 126 MG/DL — HIGH (ref 70–99)
GLUCOSE UR QL: NEGATIVE — SIGNIFICANT CHANGE UP
GRAN CASTS # UR COMP ASSIST: 4 /LPF — SIGNIFICANT CHANGE UP
HCT VFR BLD CALC: 26.8 % — LOW (ref 39–50)
HGB BLD-MCNC: 8.3 G/DL — LOW (ref 13–17)
HYALINE CASTS # UR AUTO: 10 /LPF — HIGH (ref 0–2)
KETONES UR-MCNC: NEGATIVE — SIGNIFICANT CHANGE UP
LEUKOCYTE ESTERASE UR-ACNC: ABNORMAL
MCHC RBC-ENTMCNC: 31 GM/DL — LOW (ref 32–36)
MCHC RBC-ENTMCNC: 31.2 PG — SIGNIFICANT CHANGE UP (ref 27–34)
MCV RBC AUTO: 100.8 FL — HIGH (ref 80–100)
NITRITE UR-MCNC: POSITIVE
PH UR: 6 — SIGNIFICANT CHANGE UP (ref 5–8)
PLATELET # BLD AUTO: 140 K/UL — LOW (ref 150–400)
POTASSIUM SERPL-MCNC: 3.6 MMOL/L — SIGNIFICANT CHANGE UP (ref 3.5–5.3)
POTASSIUM SERPL-SCNC: 3.6 MMOL/L — SIGNIFICANT CHANGE UP (ref 3.5–5.3)
PROT UR-MCNC: 100 — SIGNIFICANT CHANGE UP
RBC # BLD: 2.66 M/UL — LOW (ref 4.2–5.8)
RBC # FLD: 20.4 % — HIGH (ref 10.3–14.5)
RBC CASTS # UR COMP ASSIST: 19 /HPF — HIGH (ref 0–4)
SODIUM SERPL-SCNC: 145 MMOL/L — SIGNIFICANT CHANGE UP (ref 135–145)
SP GR SPEC: 1.02 — SIGNIFICANT CHANGE UP (ref 1.01–1.02)
TSH SERPL-MCNC: 1.15 UIU/ML — SIGNIFICANT CHANGE UP (ref 0.27–4.2)
UROBILINOGEN FLD QL: NEGATIVE — SIGNIFICANT CHANGE UP
VIT B12 SERPL-MCNC: 998 PG/ML — SIGNIFICANT CHANGE UP (ref 232–1245)
WBC # BLD: 7.78 K/UL — SIGNIFICANT CHANGE UP (ref 3.8–10.5)
WBC # FLD AUTO: 7.78 K/UL — SIGNIFICANT CHANGE UP (ref 3.8–10.5)
WBC UR QL: 104 /HPF — HIGH (ref 0–5)

## 2019-07-14 PROCEDURE — 70553 MRI BRAIN STEM W/O & W/DYE: CPT | Mod: 26

## 2019-07-14 PROCEDURE — 71250 CT THORAX DX C-: CPT | Mod: 26

## 2019-07-14 PROCEDURE — 71045 X-RAY EXAM CHEST 1 VIEW: CPT | Mod: 26

## 2019-07-14 RX ORDER — HALOPERIDOL DECANOATE 100 MG/ML
0.25 INJECTION INTRAMUSCULAR EVERY 6 HOURS
Refills: 0 | Status: DISCONTINUED | OUTPATIENT
Start: 2019-07-14 | End: 2019-07-23

## 2019-07-14 RX ORDER — CEFTRIAXONE 500 MG/1
1000 INJECTION, POWDER, FOR SOLUTION INTRAMUSCULAR; INTRAVENOUS EVERY 24 HOURS
Refills: 0 | Status: COMPLETED | OUTPATIENT
Start: 2019-07-14 | End: 2019-07-15

## 2019-07-14 RX ADMIN — Medication 1 DROP(S): at 03:15

## 2019-07-14 RX ADMIN — OCTREOTIDE ACETATE 200 MICROGRAM(S): 200 INJECTION, SOLUTION INTRAVENOUS; SUBCUTANEOUS at 14:11

## 2019-07-14 RX ADMIN — OCTREOTIDE ACETATE 200 MICROGRAM(S): 200 INJECTION, SOLUTION INTRAVENOUS; SUBCUTANEOUS at 21:17

## 2019-07-14 RX ADMIN — Medication 1 DROP(S): at 05:52

## 2019-07-14 RX ADMIN — OLANZAPINE 1.25 MILLIGRAM(S): 15 TABLET, FILM COATED ORAL at 00:32

## 2019-07-14 RX ADMIN — Medication 1 DROP(S): at 05:24

## 2019-07-14 RX ADMIN — Medication 1 DROP(S): at 19:55

## 2019-07-14 RX ADMIN — OLANZAPINE 1.25 MILLIGRAM(S): 15 TABLET, FILM COATED ORAL at 15:01

## 2019-07-14 RX ADMIN — Medication 1 DROP(S): at 03:16

## 2019-07-14 RX ADMIN — HEPARIN SODIUM 5000 UNIT(S): 5000 INJECTION INTRAVENOUS; SUBCUTANEOUS at 18:44

## 2019-07-14 RX ADMIN — Medication 1 DROP(S): at 14:12

## 2019-07-14 RX ADMIN — OCTREOTIDE ACETATE 200 MICROGRAM(S): 200 INJECTION, SOLUTION INTRAVENOUS; SUBCUTANEOUS at 05:25

## 2019-07-14 RX ADMIN — Medication 1 DROP(S): at 12:26

## 2019-07-14 RX ADMIN — Medication 1 DROP(S): at 21:17

## 2019-07-14 RX ADMIN — OLANZAPINE 1.25 MILLIGRAM(S): 15 TABLET, FILM COATED ORAL at 05:51

## 2019-07-14 RX ADMIN — HEPARIN SODIUM 5000 UNIT(S): 5000 INJECTION INTRAVENOUS; SUBCUTANEOUS at 05:26

## 2019-07-14 RX ADMIN — Medication 3 MILLIGRAM(S): at 00:31

## 2019-07-14 RX ADMIN — Medication 1 DROP(S): at 08:35

## 2019-07-14 RX ADMIN — CEFTRIAXONE 100 MILLIGRAM(S): 500 INJECTION, POWDER, FOR SOLUTION INTRAMUSCULAR; INTRAVENOUS at 18:44

## 2019-07-14 RX ADMIN — CEFEPIME 100 MILLIGRAM(S): 1 INJECTION, POWDER, FOR SOLUTION INTRAMUSCULAR; INTRAVENOUS at 05:24

## 2019-07-14 RX ADMIN — Medication 1 DROP(S): at 18:44

## 2019-07-14 NOTE — CONSULT NOTE ADULT - SUBJECTIVE AND OBJECTIVE BOX
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 Noted to have malodorous urine and treated for UTI. On this admission patient has been extremely agitated. Nursing reports he has pulled out numerous IVs, pulled on calzada, frequently agitated even in the presence of enhanced supervision. On previous visits was reportedly aaxo3. Nursing reports no sleep for many nights. Patient otherwise unable to provide any further history,.     Past Medical History:  Neck mass (R22.1): was resected, reportedly bening  Kidney lesion (N28.9): partial resction- reportedly &quot;not active lesion&quot;  History of carcinoid syndrome (Z86.39)      Past Surgical History:  H/O carcinoid syndrome (Z86.39)  S/P TURP (Z90.79)      Social History:  No toxic habits    Family History:  FAMILY HISTORY:  FH: lung cancer (Sibling): sister  Family history of nasopharyngeal cancer: father      Allergies:  penicillin (Swelling)      ROS:  Patient unable to provide.     Advanced care planning reviewed and noted in the chart.    Medications:  acetaminophen   Tablet .. 650 milliGRAM(s) Oral every 6 hours PRN  artificial tears (preservative free) Ophthalmic Solution 1 Drop(s) Both EYES every 2 hours  cefepime   IVPB 500 milliGRAM(s) IV Intermittent every 12 hours  heparin  Injectable 5000 Unit(s) SubCutaneous every 12 hours  loperamide 2 milliGRAM(s) Oral four times a day PRN  melatonin 3 milliGRAM(s) Oral at bedtime  multivitamin 1 Tablet(s) Oral daily  octreotide  Injectable 200 MICROGram(s) SubCutaneous every 6 hours  OLANZapine 1.25 milliGRAM(s) Oral two times a day  OLANZapine Injectable 1.25 milliGRAM(s) IntraMuscular every 6 hours PRN  sodium bicarbonate  Infusion 0.179 mEq/kG/Hr IV Continuous <Continuous>  sodium chloride 0.9%. 1000 milliLiter(s) IV Continuous <Continuous>      Labs:        145  |  114<H>  |  87<H>  ----------------------------<  126<H>  3.6   |  19<L>  |  2.93<H>    Ca    10.2      2019 07:19      CAPILLARY BLOOD GLUCOSE      POCT Blood Glucose.: 130 mg/dL (2019 08:04)  POCT Blood Glucose.: 112 mg/dL (2019 01:05)        Urinalysis Basic - ( 2019 09:42 )    Color: Light Yellow / Appearance: Slightly Turbid / S.016 / pH: x  Gluc: x / Ketone: Negative  / Bili: Negative / Urobili: Negative   Blood: x / Protein: 100 / Nitrite: Positive   Leuk Esterase: Large / RBC: 19 /hpf /  /HPF   Sq Epi: x / Non Sq Epi: 3 / Bacteria: Moderate      Vitals:  Vital Signs Last 24 Hrs  T(C): 36.8 (2019 08:25), Max: 36.9 (2019 04:28)  T(F): 98.2 (2019 08:25), Max: 98.4 (2019 04:28)  HR: 96 (2019 08:25) (96 - 111)  BP: 100/60 (2019 08:25) (79/42 - 100/60)  BP(mean): --  RR: 18 (2019 08:25) (18 - 18)  SpO2: 94% (2019 08:25) (94% - 97%)    NEUROLOGICAL EXAM:    Mental status: Lethargic but eyes open, attends with reinforcement, paucity of spontaneous speech, oriented to self and 2019. Does not report name of hospital.     Cranial Nerves: Pupils were equal, round, reactive to light. Extraocular movements with slight left exophoria. Visual field were full. Fundoscopic exam was deferred. Facial sensation was intact to light touch. There was no facial asymmetry. The palate was upgoing symmetrically and tongue was midline.     Motor exam: Bulk and tone were normal. Strength was 5/5 in all four extremities to resistance. Limited effort proximally in LE bilaterally.     Reflexes: Absent in the bilateral upper extremities. Absent in the bilateral lower extremities. Toes were mute.     Sensation: Intact to light touch x 4    Coordination: Unable to assess    Gait: Unable to assess

## 2019-07-14 NOTE — PROGRESS NOTE ADULT - PROBLEM SELECTOR PLAN 1
ct octreotide at current dose  If diarrhea recurs, will need to r/o c.diff, abxa assoc diarrhea - prn probiotics  F/up chromogranin level  Prn reimaging  on zyprexa  explained extent of disease - daughter was not aware of bony disease , liver biopsy showed low grade carcinoid  clinically though patient is behaving aggressively ? due to large burden of disease, exacerbating pre existing chronic co morbidities  reviewed options of DNR, comfort care - patient not a candidate for aggressive therapy beyond octreotide at this point  daughter is leaning towards DNR - will d/w rest of family  Daughter wanted to know if appetite stimulants possible - reviewed risk of DVT with megace and MS issues with medical marijuana or marinol - hold off currently\Plan d/w dr. arnold as well ct octreotide at current dose  If diarrhea worsens, will need to r/o c.diff, abxa assoc diarrhea - prn probiotics  F/up chromogranin level  Prn reimaging if chromogranin increased or decision re. overall GOC need to be made to assess aggressiveness of disease  on zyprexa  , liver biopsy showed low grade carcinoid  clinically though patient is behaving aggressively ? due to large burden of disease, exacerbating pre existing chronic co morbidities  Patient has outpatient DNR  Plan d/w dr. arnold as well

## 2019-07-14 NOTE — CHART NOTE - NSCHARTNOTEFT_GEN_A_CORE
RRT called this AM for HypoTN and Tachycardia. Pt's VS back to normal without any intervention.  Oxygenating normal on RA.  UA + for UTI, Pt receiving Cefepime and changed to CTX by ID this afternoon. CXR with questionable PNA, CT chest negative for PNA.  Pt with BP 97/59 which has been his baseline since presentation.    Dr. Dash and ID aware.    Pt on Zyprexa standing BID and PRN q6hrs for agitation. 1 dose given at 5PM yesterday, 12 midnight and 6AM this AM.  No extra doses has been given.    Will continue to monitor. RRT called this AM for HypoTN and Tachycardia. Pt's VS back to normal without any intervention.  Oxygenating normal on RA.  UA + for UTI, Pt receiving Cefepime and changed to CTX by ID this afternoon. CXR with questionable PNA, CT chest negative for PNA.  Pt with BP 97/59 which has been his baseline since presentation.    Dr. Dash and ID aware.    Pt on Zyprexa standing BID and PRN q6hrs for agitation. 1 dose given at 5PM yesterday, 12 midnight and 6AM this AM.  No extra doses has been given.    Spoke to Psych (Dr. Dockery): DC Zyprexa and order Haldol 0.25mg IV q6hrs PRN for now.  Psych will see Pt in AM.   Will continue to monitor.

## 2019-07-14 NOTE — CONSULT NOTE ADULT - ASSESSMENT
86y year old Male with the below past medical history significant for carcinoid syndrome s/p left lower lobe resection, bladder disease with calzada placed on July 1st in the ED sent from [facility] for SBPs in 70s. Noted to have malodorous urine and treated for UTI. On this admission patient has been extremely agitated, pulling out multiple IVs and on calzada despite enhanced supervision. Now in restraints.     1. Suspect insomnia as etiology for AMS. As per psychiatry. Now on olanzapine. ? Addition of seroquel qhs.     2. MRI planned for today to rule out secondary causes for delirium as exam is limited.     3. Would prefer enhanced supervision over restraints which may exacerbate delirium.     4. Abx for UTI as per medicine/ID. Note cefepime can contribute to delirium.     5. Frequent re-orientation    6. Avoid delirium provoking agents    7. Likely component of hyperuremia as well. Baseline BUNs in 60's, now ~90. As per renal    8. Will follow. Discussed with nursing

## 2019-07-14 NOTE — RAPID RESPONSE TEAM SUMMARY - NSSITUATIONBACKGROUNDRRT_GEN_ALL_CORE
RRT called for hypotension with reported BP in the 80s/50s and heart rate in 100s, with baseline 90s/50s.     Briefly, this is an 86 year old male with hx of carcinoid syndrome, s/p left lower lobe resection, bladder disease who presented from [facility]'s rehab with systolic blood pressures in the 70s with malodorous urine, concering for UTI. On arrival to the rapid which was already taking place, patient is on NRB with cycled blood pressures in 90/65 and subsequently 100/65, Afebrile. Physical exam: patient arousable and communicating CVS: RRR, normal s1 and s2, no murmur Lungs: decreased breath sounds on the left lower lobe anteriorly and laterally. Abdomen: soft, nondistended abdomen. Bowel sounds present.     Blood pressure and HR improved without intervention. Discussed with primary team at bedside: would obtain CXR, repeat UA. The thought of medication induced change in mental status was brought up which is a very fair point. Primary team will reach out to psychiatry re: medication recommendations. Labs: CBC, CMP, UA, CXR

## 2019-07-14 NOTE — PROGRESS NOTE ADULT - PROBLEM SELECTOR PLAN 3
- likely due to Urosepsis, metabolic changes  - CT head neg  - cont to monitor - likely due to Urosepsis, metabolic changes  - CT head neg  MRI pending  eeg - no e/o seizure activity

## 2019-07-14 NOTE — PROGRESS NOTE ADULT - SUBJECTIVE AND OBJECTIVE BOX
Patient is a 86y old  Male who presents with a chief complaint of mental status change (11 Jul 2019 21:31)       Pt is seen and examined  pt is sleeping and lying in bed  per daughter at bedside - less confused, no major diarrhea      REVIEW OF SYSTEMS:    limited  PHYSICAL EXAM  General: adult  chri=oniccally ill, flushed face better  HEENT: clear oropharynx, anicteric sclera, pale conjunctiva  Neck: supple  CV: normal S1/S2 with no murmur rubs or gallops  Lungs: positive air movement b/l ant lungs,clear to auscultation, no wheezes, no rales  Abdomen: soft non-tender non-distended  Ext: no clubbing cyanosis or edema  Skin: no rashes and no petechiae  Neuro: sleeping        MEDICATIONS  (STANDING):  artificial tears (preservative free) Ophthalmic Solution 1 Drop(s) Both EYES every 2 hours  cefepime   IVPB 500 milliGRAM(s) IV Intermittent every 12 hours  heparin  Injectable 5000 Unit(s) SubCutaneous every 12 hours  melatonin 3 milliGRAM(s) Oral at bedtime  multivitamin 1 Tablet(s) Oral daily  octreotide  Injectable 200 MICROGram(s) SubCutaneous every 6 hours  OLANZapine 1.25 milliGRAM(s) Oral two times a day  sodium bicarbonate  Infusion 0.179 mEq/kG/Hr (70 mL/Hr) IV Continuous <Continuous>  sodium chloride 0.9%. 1000 milliLiter(s) (75 mL/Hr) IV Continuous <Continuous>    MEDICATIONS  (PRN):  acetaminophen   Tablet .. 650 milliGRAM(s) Oral every 6 hours PRN Temp greater or equal to 38.5C (101.3F), Mild Pain (1 - 3)  loperamide 2 milliGRAM(s) Oral four times a day PRN Diarrhea  OLANZapine Injectable 1.25 milliGRAM(s) IntraMuscular every 6 hours PRN Severe Agitation      Allergies    penicillin (Swelling)    Intolerances        Vital Signs Last 24 Hrs  T(C): 36.8 (14 Jul 2019 08:25), Max: 36.9 (14 Jul 2019 04:28)  T(F): 98.2 (14 Jul 2019 08:25), Max: 98.4 (14 Jul 2019 04:28)  HR: 96 (14 Jul 2019 08:25) (96 - 111)  BP: 100/60 (14 Jul 2019 08:25) (79/42 - 100/60)  BP(mean): --  RR: 18 (14 Jul 2019 08:25) (18 - 18)  SpO2: 94% (14 Jul 2019 08:25) (94% - 97%)      LABS:                          8.3    x     )-----------( 140      ( 14 Jul 2019 10:01 )             26.8         Mean Cell Volume : 100.8 fl  Mean Cell Hemoglobin : 31.2 pg  Mean Cell Hemoglobin Concentration : 31.0 gm/dL        07-14    145  |  114<H>  |  87<H>  ----------------------------<  126<H>  3.6   |  19<L>  |  2.93<H>    Ca    10.2      14 Jul 2019 07:19    Culture - Blood (07.11.19 @ 05:11)    Specimen Source: .Blood    Culture Results:   No growth to date.    Culture - Urine (07.11.19 @ 05:01)    -  Cefazolin: S <=2 (MIC_CL_COM_ENTERIC_CEFAZU) For uncomplicated UTI with K. pneumoniae, E. coli, or P. mirablis: MAYRA <=16 is sensitive and MAYRA >=32 is resistant. This also predicts results for oral agents cefaclor, cefdinir, cefpodoxime, cefprozil, cefuroxime axetil, cephalexin and locarbef for uncomplicated UTI. Note that some isolates may be susceptible to these agents while testing resistant to cefazolin.    -  Aztreonam: S <=4    -  Ampicillin: S <=2 These ampicillin results predict results for amoxicillin    -  Ampicillin/Sulbactam: S <=4/2 Enterobacter, Citrobacter, and Serratia may develop resistance during prolonged therapy (3-4 days)    -  Amikacin: S <=8    -  Amoxicillin/Clavulanic Acid: S <=8/4    -  Trimethoprim/Sulfamethoxazole: S <=0.5/9.5    -  Tobramycin: S <=2    -  Piperacillin/Tazobactam: S <=8    -  Tigecycline: S <=1    -  Nitrofurantoin: S <=32 Should not be used to treat pyelonephritis    -  Meropenem: S <=1    -  Ertapenem: S <=0.5    -  Gentamicin: S <=1    -  Imipenem: S <=1    -  Levofloxacin: S <=1    -  Ciprofloxacin: S <=0.5    -  Ceftriaxone: S <=1 Enterobacter, Citrobacter, and Serratia may develop resistance during prolonged therapy    -  Cefoxitin: S <=4    -  Cefepime: S <=2    Specimen Source: .Urine    Culture Results:   >100,000 CFU/ml Escherichia coli    Organism Identification: Escherichia coli    Organism: Escherichia coli    Method Type: [facility]        Rapid Respiratory Viral Panel (07.11.19 @ 22:22)    Rapid RVP Result: NotDetec: This Respiratory Panel uses polymerase chain reaction (PCR) to detect for  adenovirus; coronavirus (HKU1, NL63, 229E, OC43); human metapneumovirus  (hMPV); human enterovirus/rhinovirus (Entero/RV); influenza A; influenza  A/H1; influenza A/H3; influenza A/H1-2009; influenza B; parainfluenza  viruses 1, 2, 3, 4; respiratory syncytial virus; Mycoplasma pneumoniae;  and Chlamydophila pneumoniae.        RADIOLOGY & ADDITIONAL STUDIES:    EXAM:  XR CHEST AP OR PA 1V                            PROCEDURE DATE:  07/11/2019        INTERPRETATION:  CLINICAL INFORMATION: Sepsis.    COMPARISON:  Chest x-ray 6/10/2019    TECHNIQUE:   Frontal radiograph of the chest.     FINDINGS:    Thelungs are clear. There is no pleural effusion or pneumothorax. The   cardiac silhouette is unremarkable. No acute osseous abnormality.    IMPRESSION: Clear lungs.    < from: CT Head No Cont (07.11.19 @ 10:17) >  IMPRESSION:     No CT evidence of acute intracranial hemorrhage, mass effect, or midline   shift.     Chronic maxillary sinusitis.      < end of copied text > Patient is a 86y old  Male who presents with a chief complaint of mental status change (11 Jul 2019 21:31)       Pt is seen and examined  pt is sle lying in bed  per daughter , wife at bedside, 4 episodes of diarrhea yday and 2 today  Patient c/o restraints  remains confused    REVIEW OF SYSTEMS:    limited  PHYSICAL EXAM  General: adult  chronically ill, flushed face   HEENT: clear oropharynx, anicteric sclera, pale conjunctiva  Neck: supple  CV: normal S1/S2 with no murmur rubs or gallops  Lungs: positive air movement b/l ant lungs,clear to auscultation, no wheezes, no rales  Abdomen: soft non-tender non-distended  Ext: no clubbing cyanosis or edema  Skin: no rashes and no petechiae  Neuro: sleeping        MEDICATIONS  (STANDING):  artificial tears (preservative free) Ophthalmic Solution 1 Drop(s) Both EYES every 2 hours  cefepime   IVPB 500 milliGRAM(s) IV Intermittent every 12 hours  heparin  Injectable 5000 Unit(s) SubCutaneous every 12 hours  melatonin 3 milliGRAM(s) Oral at bedtime  multivitamin 1 Tablet(s) Oral daily  octreotide  Injectable 200 MICROGram(s) SubCutaneous every 6 hours  OLANZapine 1.25 milliGRAM(s) Oral two times a day  sodium bicarbonate  Infusion 0.179 mEq/kG/Hr (70 mL/Hr) IV Continuous <Continuous>  sodium chloride 0.9%. 1000 milliLiter(s) (75 mL/Hr) IV Continuous <Continuous>    MEDICATIONS  (PRN):  acetaminophen   Tablet .. 650 milliGRAM(s) Oral every 6 hours PRN Temp greater or equal to 38.5C (101.3F), Mild Pain (1 - 3)  loperamide 2 milliGRAM(s) Oral four times a day PRN Diarrhea  OLANZapine Injectable 1.25 milliGRAM(s) IntraMuscular every 6 hours PRN Severe Agitation      Allergies    penicillin (Swelling)    Intolerances        Vital Signs Last 24 Hrs  T(C): 36.8 (14 Jul 2019 08:25), Max: 36.9 (14 Jul 2019 04:28)  T(F): 98.2 (14 Jul 2019 08:25), Max: 98.4 (14 Jul 2019 04:28)  HR: 96 (14 Jul 2019 08:25) (96 - 111)  BP: 100/60 (14 Jul 2019 08:25) (79/42 - 100/60)  BP(mean): --  RR: 18 (14 Jul 2019 08:25) (18 - 18)  SpO2: 94% (14 Jul 2019 08:25) (94% - 97%)      LABS:                          8.3    x     )-----------( 140      ( 14 Jul 2019 10:01 )             26.8         Mean Cell Volume : 100.8 fl  Mean Cell Hemoglobin : 31.2 pg  Mean Cell Hemoglobin Concentration : 31.0 gm/dL        07-14    145  |  114<H>  |  87<H>  ----------------------------<  126<H>  3.6   |  19<L>  |  2.93<H>    Ca    10.2      14 Jul 2019 07:19    Culture - Blood (07.11.19 @ 05:11)    Specimen Source: .Blood    Culture Results:   No growth to date.    Culture - Urine (07.11.19 @ 05:01)    -  Cefazolin: S <=2 (MIC_CL_COM_ENTERIC_CEFAZU) For uncomplicated UTI with K. pneumoniae, E. coli, or P. mirablis: MAYRA <=16 is sensitive and MAYRA >=32 is resistant. This also predicts results for oral agents cefaclor, cefdinir, cefpodoxime, cefprozil, cefuroxime axetil, cephalexin and locarbef for uncomplicated UTI. Note that some isolates may be susceptible to these agents while testing resistant to cefazolin.    -  Aztreonam: S <=4    -  Ampicillin: S <=2 These ampicillin results predict results for amoxicillin    -  Ampicillin/Sulbactam: S <=4/2 Enterobacter, Citrobacter, and Serratia may develop resistance during prolonged therapy (3-4 days)    -  Amikacin: S <=8    -  Amoxicillin/Clavulanic Acid: S <=8/4    -  Trimethoprim/Sulfamethoxazole: S <=0.5/9.5    -  Tobramycin: S <=2    -  Piperacillin/Tazobactam: S <=8    -  Tigecycline: S <=1    -  Nitrofurantoin: S <=32 Should not be used to treat pyelonephritis    -  Meropenem: S <=1    -  Ertapenem: S <=0.5    -  Gentamicin: S <=1    -  Imipenem: S <=1    -  Levofloxacin: S <=1    -  Ciprofloxacin: S <=0.5    -  Ceftriaxone: S <=1 Enterobacter, Citrobacter, and Serratia may develop resistance during prolonged therapy    -  Cefoxitin: S <=4    -  Cefepime: S <=2    Specimen Source: .Urine    Culture Results:   >100,000 CFU/ml Escherichia coli    Organism Identification: Escherichia coli    Organism: Escherichia coli    Method Type: [facility]        Rapid Respiratory Viral Panel (07.11.19 @ 22:22)    Rapid RVP Result: NotDetec: This Respiratory Panel uses polymerase chain reaction (PCR) to detect for  adenovirus; coronavirus (HKU1, NL63, 229E, OC43); human metapneumovirus  (hMPV); human enterovirus/rhinovirus (Entero/RV); influenza A; influenza  A/H1; influenza A/H3; influenza A/H1-2009; influenza B; parainfluenza  viruses 1, 2, 3, 4; respiratory syncytial virus; Mycoplasma pneumoniae;  and Chlamydophila pneumoniae.        RADIOLOGY & ADDITIONAL STUDIES:    EXAM:  XR CHEST AP OR PA 1V                            PROCEDURE DATE:  07/11/2019        INTERPRETATION:  CLINICAL INFORMATION: Sepsis.    COMPARISON:  Chest x-ray 6/10/2019    TECHNIQUE:   Frontal radiograph of the chest.     FINDINGS:    Thelungs are clear. There is no pleural effusion or pneumothorax. The   cardiac silhouette is unremarkable. No acute osseous abnormality.    IMPRESSION: Clear lungs.    < from: CT Head No Cont (07.11.19 @ 10:17) >  IMPRESSION:     No CT evidence of acute intracranial hemorrhage, mass effect, or midline   shift.     Chronic maxillary sinusitis.      < end of copied text >      Chromogranin A (06.23.19 @ 11:57)    Chromogranin A: [id number]

## 2019-07-14 NOTE — PROGRESS NOTE ADULT - SUBJECTIVE AND OBJECTIVE BOX
Patient is a 86y old  Male who presents with a chief complaint of mental status jamil (2019 12:40)      SUBJECTIVE / OVERNIGHT EVENTS: events noted. Confused.   Review of Systems  chest pain no  palpitations no  sob no  nausea no  headache no    MEDICATIONS  (STANDING):  artificial tears (preservative free) Ophthalmic Solution 1 Drop(s) Both EYES every 2 hours  cefTRIAXone   IVPB 1000 milliGRAM(s) IV Intermittent every 24 hours  heparin  Injectable 5000 Unit(s) SubCutaneous every 12 hours  melatonin 3 milliGRAM(s) Oral at bedtime  multivitamin 1 Tablet(s) Oral daily  octreotide  Injectable 200 MICROGram(s) SubCutaneous every 6 hours  sodium bicarbonate  Infusion 0.179 mEq/kG/Hr (70 mL/Hr) IV Continuous <Continuous>  sodium chloride 0.9%. 1000 milliLiter(s) (75 mL/Hr) IV Continuous <Continuous>    MEDICATIONS  (PRN):  acetaminophen   Tablet .. 650 milliGRAM(s) Oral every 6 hours PRN Temp greater or equal to 38.5C (101.3F), Mild Pain (1 - 3)  haloperidol    Injectable 0.25 milliGRAM(s) IV Push every 6 hours PRN Agitation  loperamide 2 milliGRAM(s) Oral four times a day PRN Diarrhea      Vital Signs Last 24 Hrs  T(C): 36.6 (2019 16:32), Max: 37.1 (2019 14:52)  T(F): 97.8 (2019 16:32), Max: 98.8 (2019 14:52)  HR: 102 (2019 16:32) (96 - 111)  BP: 97/59 (2019 16:32) (79/42 - 148/95)  BP(mean): --  RR: 18 (2019 16:32) (18 - 18)  SpO2: 94% (2019 16:32) (94% - 95%)    PHYSICAL EXAM:  GENERAL: agitated.  HEAD:  Atraumatic, Normocephalic Flushed.  EYES: EOMI, PERRLA, conjunctiva and sclera clear  NECK: Supple, No JVD  CHEST/LUNG: Clear to auscultation bilaterally; No wheeze  HEART: Regular rate and rhythm; No murmurs, rubs, or gallops  ABDOMEN: Soft, Nontender, Nondistended; Bowel sounds present  EXTREMITIES:  2+ Peripheral Pulses, No clubbing, cyanosis, or edema  PSYCH: confused  NEUROLOGY: non-focal  SKIN: No rashes or lesions    LABS:                        8.3    7.78  )-----------( 140      ( 2019 10:01 )             26.8     07-14    145  |  114<H>  |  87<H>  ----------------------------<  126<H>  3.6   |  19<L>  |  2.93<H>    Ca    10.2      2019 07:19            Urinalysis Basic - ( 2019 09:42 )    Color: Light Yellow / Appearance: Slightly Turbid / S.016 / pH: x  Gluc: x / Ketone: Negative  / Bili: Negative / Urobili: Negative   Blood: x / Protein: 100 / Nitrite: Positive   Leuk Esterase: Large / RBC: 19 /hpf /  /HPF   Sq Epi: x / Non Sq Epi: 3 / Bacteria: Moderate          RADIOLOGY & ADDITIONAL TESTS:    Imaging Personally Reviewed:    Consultant(s) Notes Reviewed:      Care Discussed with Consultants/Other Providers:

## 2019-07-15 LAB
ANION GAP SERPL CALC-SCNC: 11 MMOL/L — SIGNIFICANT CHANGE UP (ref 5–17)
BUN SERPL-MCNC: 80 MG/DL — HIGH (ref 7–23)
CALCIUM SERPL-MCNC: 10 MG/DL — SIGNIFICANT CHANGE UP (ref 8.4–10.5)
CHLORIDE SERPL-SCNC: 115 MMOL/L — HIGH (ref 96–108)
CO2 SERPL-SCNC: 20 MMOL/L — LOW (ref 22–31)
CREAT SERPL-MCNC: 2.83 MG/DL — HIGH (ref 0.5–1.3)
GLUCOSE SERPL-MCNC: 148 MG/DL — HIGH (ref 70–99)
POTASSIUM SERPL-MCNC: 3.5 MMOL/L — SIGNIFICANT CHANGE UP (ref 3.5–5.3)
POTASSIUM SERPL-SCNC: 3.5 MMOL/L — SIGNIFICANT CHANGE UP (ref 3.5–5.3)
SODIUM SERPL-SCNC: 146 MMOL/L — HIGH (ref 135–145)
T PALLIDUM AB TITR SER: NEGATIVE — SIGNIFICANT CHANGE UP

## 2019-07-15 PROCEDURE — 99232 SBSQ HOSP IP/OBS MODERATE 35: CPT

## 2019-07-15 RX ORDER — CEPHALEXIN 500 MG
500 CAPSULE ORAL EVERY 12 HOURS
Refills: 0 | Status: DISCONTINUED | OUTPATIENT
Start: 2019-07-16 | End: 2019-07-23

## 2019-07-15 RX ORDER — CHLORHEXIDINE GLUCONATE 213 G/1000ML
1 SOLUTION TOPICAL DAILY
Refills: 0 | Status: DISCONTINUED | OUTPATIENT
Start: 2019-07-15 | End: 2019-07-23

## 2019-07-15 RX ORDER — SODIUM CHLORIDE 9 MG/ML
500 INJECTION INTRAMUSCULAR; INTRAVENOUS; SUBCUTANEOUS ONCE
Refills: 0 | Status: COMPLETED | OUTPATIENT
Start: 2019-07-15 | End: 2019-07-15

## 2019-07-15 RX ADMIN — OCTREOTIDE ACETATE 200 MICROGRAM(S): 200 INJECTION, SOLUTION INTRAVENOUS; SUBCUTANEOUS at 01:29

## 2019-07-15 RX ADMIN — HEPARIN SODIUM 5000 UNIT(S): 5000 INJECTION INTRAVENOUS; SUBCUTANEOUS at 18:22

## 2019-07-15 RX ADMIN — CHLORHEXIDINE GLUCONATE 1 APPLICATION(S): 213 SOLUTION TOPICAL at 10:27

## 2019-07-15 RX ADMIN — Medication 1 TABLET(S): at 12:31

## 2019-07-15 RX ADMIN — Medication 3 MILLIGRAM(S): at 22:29

## 2019-07-15 RX ADMIN — OCTREOTIDE ACETATE 200 MICROGRAM(S): 200 INJECTION, SOLUTION INTRAVENOUS; SUBCUTANEOUS at 09:11

## 2019-07-15 RX ADMIN — Medication 1 DROP(S): at 18:21

## 2019-07-15 RX ADMIN — Medication 1 DROP(S): at 07:50

## 2019-07-15 RX ADMIN — CEFTRIAXONE 100 MILLIGRAM(S): 500 INJECTION, POWDER, FOR SOLUTION INTRAMUSCULAR; INTRAVENOUS at 18:17

## 2019-07-15 RX ADMIN — OCTREOTIDE ACETATE 200 MICROGRAM(S): 200 INJECTION, SOLUTION INTRAVENOUS; SUBCUTANEOUS at 15:27

## 2019-07-15 RX ADMIN — SODIUM CHLORIDE 2000 MILLILITER(S): 9 INJECTION INTRAMUSCULAR; INTRAVENOUS; SUBCUTANEOUS at 00:45

## 2019-07-15 RX ADMIN — Medication 1 DROP(S): at 15:28

## 2019-07-15 RX ADMIN — HEPARIN SODIUM 5000 UNIT(S): 5000 INJECTION INTRAVENOUS; SUBCUTANEOUS at 05:21

## 2019-07-15 RX ADMIN — Medication 1 DROP(S): at 12:31

## 2019-07-15 RX ADMIN — OCTREOTIDE ACETATE 200 MICROGRAM(S): 200 INJECTION, SOLUTION INTRAVENOUS; SUBCUTANEOUS at 22:28

## 2019-07-15 RX ADMIN — Medication 1 DROP(S): at 06:54

## 2019-07-15 RX ADMIN — Medication 1 DROP(S): at 09:10

## 2019-07-15 NOTE — PROGRESS NOTE ADULT - SUBJECTIVE AND OBJECTIVE BOX
Patient is a 86y old  Male who presents with a chief complaint of mental status chanhe (15 Jul 2019 19:23)      SUBJECTIVE / OVERNIGHT EVENTS: more awake. Family at bedside.   Review of Systems  unobtainable     MEDICATIONS  (STANDING):  artificial tears (preservative free) Ophthalmic Solution 1 Drop(s) Both EYES every 2 hours  chlorhexidine 2% Cloths 1 Application(s) Topical daily  heparin  Injectable 5000 Unit(s) SubCutaneous every 12 hours  melatonin 3 milliGRAM(s) Oral at bedtime  multivitamin 1 Tablet(s) Oral daily  octreotide  Injectable 200 MICROGram(s) SubCutaneous every 6 hours  sodium bicarbonate  Infusion 0.179 mEq/kG/Hr (70 mL/Hr) IV Continuous <Continuous>  sodium chloride 0.9%. 1000 milliLiter(s) (75 mL/Hr) IV Continuous <Continuous>    MEDICATIONS  (PRN):  acetaminophen   Tablet .. 650 milliGRAM(s) Oral every 6 hours PRN Temp greater or equal to 38.5C (101.3F), Mild Pain (1 - 3)  haloperidol    Injectable 0.25 milliGRAM(s) IV Push every 6 hours PRN Agitation  loperamide 2 milliGRAM(s) Oral four times a day PRN Diarrhea      Vital Signs Last 24 Hrs  T(C): 36.7 (15 Jul 2019 17:48), Max: 36.8 (15 Jul 2019 08:28)  T(F): 98 (15 Jul 2019 17:48), Max: 98.2 (15 Jul 2019 08:28)  HR: 94 (15 Jul 2019 17:48) (71 - 96)  BP: 91/59 (15 Jul 2019 17:48) (81/46 - 96/56)  BP(mean): --  RR: 18 (15 Jul 2019 17:48) (18 - 18)  SpO2: 96% (15 Jul 2019 17:48) (94% - 96%)    PHYSICAL EXAM:  GENERAL: sick  HEAD:  Atraumatic, Normocephalic Flushed  EYES: EOMI, PERRLA, conjunctiva and sclera clear  NECK: Supple, No JVD  CHEST/LUNG: Clear to auscultation bilaterally; No wheeze  HEART: Regular rate and rhythm; No murmurs, rubs, or gallops  ABDOMEN: Soft, Nontender, Nondistended; Bowel sounds present  EXTREMITIES:  2+ Peripheral Pulses, No clubbing, cyanosis, or edema  NEUROLOGY: non-focal  SKIN: No rashes or lesions flushed    LABS:                        8.3    7.78  )-----------( 140      ( 2019 10:01 )             26.8     07-15    146<H>  |  115<H>  |  80<H>  ----------------------------<  148<H>  3.5   |  20<L>  |  2.83<H>    Ca    10.0      15 Jul 2019 10:05            Urinalysis Basic - ( 2019 09:42 )    Color: Light Yellow / Appearance: Slightly Turbid / S.016 / pH: x  Gluc: x / Ketone: Negative  / Bili: Negative / Urobili: Negative   Blood: x / Protein: 100 / Nitrite: Positive   Leuk Esterase: Large / RBC: 19 /hpf /  /HPF   Sq Epi: x / Non Sq Epi: 3 / Bacteria: Moderate          RADIOLOGY & ADDITIONAL TESTS:    Imaging Personally Reviewed:  < from: MR Head w/wo IV Cont (19 @ 18:20) >  IMPRESSION:     Motion limited study, enhancing foci of decreased T1 and hyperintense DWI   signal in the clivusconcerning for osseous metastasis, postcontrast   images are limited by motion, 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    Volume loss and microvascular disease without obvious restricted   diffusion to suggest new territorial infarction, hemorrhage or midline   shift.    Opacified retracted right maxillary sinus correlate with right silent   sinus syndrome, and left maxillary sinus air-fluid level. Opacified   mastoids and middle ears.      < end of copied text >    Consultant(s) Notes Reviewed:      Care Discussed with Consultants/Other Providers:

## 2019-07-15 NOTE — PROGRESS NOTE ADULT - SUBJECTIVE AND OBJECTIVE BOX
CC: f/u for uti    S: Patient reports he is ok    REVIEW OF SYSTEMS:  All other review of systems negative (Comprehensive ROS)    Antimicrobials Day #  :4  cefepime   IVPB 500 milliGRAM(s) IV Intermittent every 12 hours    Other Medications Reviewed  Vital Signs Last 24 Hrs  T(C): 36.6 (15 Jul 2019 13:54), Max: 36.8 (15 Jul 2019 08:28)  T(F): 97.8 (15 Jul 2019 13:54), Max: 98.2 (15 Jul 2019 08:28)  HR: 90 (15 Jul 2019 13:54) (71 - 102)  BP: 95/59 (15 Jul 2019 13:54) (81/46 - 97/59)  BP(mean): --  RR: 18 (15 Jul 2019 13:54) (18 - 18)  SpO2: 95% (15 Jul 2019 13:54) (94% - 95%)    PHYSICAL EXAM:  General: more alert, no acute distress  Eyes:  anicteric, no conjunctival injection, no discharge  Oropharynx: no lesions or injection 	  Neck: supple, without adenopathy  Lungs: clear to auscultation  Heart: regular rate and rhythm; no murmur, rubs or gallops  Abdomen: soft, nondistended, nontender, without mass or organomegaly  Skin: no lesions  Extremities: no clubbing, cyanosis, or edema  Neurologic: alert,  moves all extremities    LAB RESULTS:                        8.3    7.78  )-----------( 140      ( 14 Jul 2019 10:01 )             26.8                           8.1    7.23  )-----------( 156      ( 12 Jul 2019 09:21 )             25.0     07-15    146<H>  |  115<H>  |  80<H>  ----------------------------<  148<H>  3.5   |  20<L>  |  2.83<H>    Ca    10.0      15 Jul 2019 10:05              MICROBIOLOGY:  RECENT CULTURES:  07-11 @ 05:11 .Blood     No growth to date.      07-11 @ 05:01 .Urine Escherichia coli    >100,000 CFU/ml Escherichia coli          RADIOLOGY REVIEWED:    < from: CT Head No Cont (07.11.19 @ 10:17) >  IMPRESSION:     No CT evidence of acute intracranial hemorrhage, mass effect, or midline   shift.     Chronic maxillary sinusitis.    < end of copied text >

## 2019-07-15 NOTE — PROGRESS NOTE ADULT - SUBJECTIVE AND OBJECTIVE BOX
[facility]   [phone]  RENAL FOLLOW UP NOTE  --------------------------------------------------------------------------------  Chief Complaint:    24 hour events/subjective:    seen earlier. confused     PAST HISTORY  --------------------------------------------------------------------------------  No significant changes to PMH, PSH, FHx, SHx, unless otherwise noted    ALLERGIES & MEDICATIONS  --------------------------------------------------------------------------------  Allergies    penicillin (Swelling)    Intolerances      Standing Inpatient Medications  artificial tears (preservative free) Ophthalmic Solution 1 Drop(s) Both EYES every 2 hours  chlorhexidine 2% Cloths 1 Application(s) Topical daily  heparin  Injectable 5000 Unit(s) SubCutaneous every 12 hours  melatonin 3 milliGRAM(s) Oral at bedtime  multivitamin 1 Tablet(s) Oral daily  octreotide  Injectable 200 MICROGram(s) SubCutaneous every 6 hours  sodium bicarbonate  Infusion 0.179 mEq/kG/Hr IV Continuous <Continuous>  sodium chloride 0.9%. 1000 milliLiter(s) IV Continuous <Continuous>    PRN Inpatient Medications  acetaminophen   Tablet .. 650 milliGRAM(s) Oral every 6 hours PRN  haloperidol    Injectable 0.25 milliGRAM(s) IV Push every 6 hours PRN  loperamide 2 milliGRAM(s) Oral four times a day PRN      REVIEW OF SYSTEMS  --------------------------------------------------------------------------------    confused when seen     VITALS/PHYSICAL EXAM  --------------------------------------------------------------------------------  T(C): 36.8 (07-15-19 @ 21:02), Max: 36.8 (07-15-19 @ 08:28)  HR: 99 (07-15-19 @ 21:02) (71 - 99)  BP: 93/56 (07-15-19 @ 21:02) (81/46 - 96/56)  RR: 18 (07-15-19 @ 21:02) (18 - 18)  SpO2: 99% (07-15-19 @ 21:02) (94% - 99%)  Wt(kg): --        07-14-19 @ 07:01  -  07-15-19 @ 07:00  --------------------------------------------------------  IN: 2390 mL / OUT: 825 mL / NET: 1565 mL    07-15-19 @ 07:01  -  07-15-19 @ 22:48  --------------------------------------------------------  IN: 650 mL / OUT: 675 mL / NET: -25 mL      Physical Exam:  	  	Chronically ill appearing frail m  Very [place]  confused   	no jvd   	Pulm: decrease bs  no rales or ronchi or wheezing  	CV: RRR, S1S2; no rub  	Back: No CVA tenderness  	Abd: +BS, soft, nontender/nondistended  	: No suprapubic tenderness  	UE: Warm, no cyanosis  no clubbing,  no edema  	LE: Warm, no cyanosis  no clubbing, trace  edema    	    LABS/STUDIES  --------------------------------------------------------------------------------              8.3    7.78  >-----------<  140      [07-14-19 @ 10:01]              26.8     146  |  115  |  80  ----------------------------<  148      [07-15-19 @ 10:05]  3.5   |  20  |  2.83        Ca     10.0     [07-15-19 @ 10:05]            Creatinine Trend:  SCr 2.83 [07-15 @ 10:05]  SCr 2.93 [07-14 @ 07:19]  SCr 2.94 [07-13 @ 09:21]  SCr 3.14 [07-12 @ 07:19]  SCr 3.37 [07-11 @ 03:24]              Urinalysis - [07-14-19 @ 09:42]      Color Light Yellow / Appearance Slightly Turbid / SG 1.016 / pH 6.0      Gluc Negative / Ketone Negative  / Bili Negative / Urobili Negative       Blood Moderate / Protein 100 / Leuk Est Large / Nitrite Positive      RBC 19 /  / Hyaline 10 / Gran 4 / Sq Epi  / Non Sq Epi 3 / Bacteria Moderate      PTH -- (Ca 10.4)      [06-11-19 @ 09:51]   15  Vitamin D (25OH) 9.7      [06-11-19 @ 10:02]  TSH 1.15      [07-14-19 @ 10:04]    Syphilis Screen (Treponema Pallidum Ab) Negative      [07-15-19 @ 04:20]

## 2019-07-15 NOTE — CHART NOTE - NSCHARTNOTEFT_GEN_A_CORE
CC: Hypotension    Event Summary: Asked by nurse to evaluate patient for hypotension of 86/48 manual. Patient seen and examined at the bedside. Patient was sleeping, and resting comfortably. When awoken, patient denies fevers/chills, headaches, dizziness, syncope, weakness, diaphoresis, chest pain, palpitations, SOB, abdominal pain, N/V/D.     Vital Signs Last 24 Hrs  T(C): 36.6 (14 Jul 2019 23:35), Max: 37.1 (14 Jul 2019 14:52)  T(F): 97.9 (14 Jul 2019 23:35), Max: 98.8 (14 Jul 2019 14:52)  HR: 96 (14 Jul 2019 23:35) (96 - 111)  BP: 81/46 (14 Jul 2019 23:35) (79/42 - 148/95)  RR: 18 (14 Jul 2019 23:35) (18 - 18)  SpO2: 94% (14 Jul 2019 23:35) (94% - 95%)    PHYSICAL EXAM:  General: NAD, A&Ox3  Respiratory: Lungs clear to auscultation bilaterally. No wheezes, rales, rhonchi. Normal respiratory effort.   Cardiovascular: S1, S2 present. Regular rate and rhythm. No murmurs, wheezes, or gallops  Gastrointestinal: BS x4 normoactive. Soft, non-tender, non-distended.   Extremities: 2+ peripheral pulses. No edema, cyanosis.    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 Patient altered. Per EMS, patient with malodorous urine. At baseline, able to maintain conversations. No fevers or chills noted.   Now, patient with manual BP of 86/48. Patient is asymptomatic.     #Hypotension  -Ordered NS bolus of 500 cc STAT; re-check BP after bolus is complete  -Continue maintenance NS fluids at 75 mL/hr  -Continue to monitor  -Will endorse to day team      Marta Calvillo PA-C  #[id number] CC: Hypotension    Event Summary: Asked by nurse to evaluate patient for hypotension of 86/48 (manual reading). Pt is s/p RRT (July 14) for hypotension, but as per RRT note, hypotension resolved on its own without any interventions. Patient seen and examined at the bedside. Patient was sleeping, and resting comfortably. When awoken, patient is baseline confused, but denies fevers/chills, headaches, dizziness, syncope, weakness, diaphoresis, chest pain, palpitations, SOB, abdominal pain, N/V/D.     Vital Signs Last 24 Hrs  T(C): 36.6 (14 Jul 2019 23:35), Max: 37.1 (14 Jul 2019 14:52)  T(F): 97.9 (14 Jul 2019 23:35), Max: 98.8 (14 Jul 2019 14:52)  HR: 96 (14 Jul 2019 23:35) (96 - 111)  BP: 81/46 (14 Jul 2019 23:35) (79/42 - 148/95)  RR: 18 (14 Jul 2019 23:35) (18 - 18)  SpO2: 94% (14 Jul 2019 23:35) (94% - 95%)    PHYSICAL EXAM:  General: NAD, A&Ox1  Respiratory: Lungs clear to auscultation bilaterally. No wheezes, rales, rhonchi. Normal respiratory effort.   Cardiovascular: S1, S2 present. Regular rate and rhythm. No murmurs, wheezes, or gallops  Gastrointestinal: BS x4 normoactive. Soft, non-tender, non-distended.   Extremities: 2+ peripheral pulses. No edema, cyanosis.    A/P  86yoM with PMH of carcinoid syndrome s/p left lower lobe resection, bladder disease with new calzada placed on July 1st in the ED sent from [facility] for SBPs in 70s. Patient altered. Per EMS, patient with malodorous urine. At baseline, able to maintain conversations. No fevers or chills noted.   Now, patient with manual BP of 86/48. Patient is asymptomatic. Pt is s/p RRT (July 14) for hypotension, which resolved on its own without any interventions    #Hypotension  -Ordered NS bolus of 500 cc STAT; re-check BP after bolus is complete  -Continue maintenance NS fluids at 75 mL/hr  -Continue to monitor  -Will endorse to day team      Marta Calvillo PA-C  #[id number]        **ADDENDUM @ 01:36**  -BP after 500 NS bolus is 91/52, which is baseline for patient   -Continue maintenance NS fluids at 75 mL/hr  -Continue to monitor  -Will endorse to day team      Marta Calvillo PA-C  #[id number]

## 2019-07-15 NOTE — PROGRESS NOTE ADULT - SUBJECTIVE AND OBJECTIVE BOX
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   Patient reportedly found with hypotension and altered mental status. Urine was maladarous and appears to be a UTI on admission labs.    Rest of review of symptoms are unable to obtain due to hearing difficulties/lethargy.   No reported worsening CP/Palps/SOB    ===========================  Interval Events  - Echo personally reviewed - no change - IVC collapses with respiration   - No reported worsening CP/Palps/SOB  - Agitation and AMS  ===========================            acetaminophen   Tablet .. 650 milliGRAM(s) Oral every 6 hours PRN  artificial tears (preservative free) Ophthalmic Solution 1 Drop(s) Both EYES every 2 hours  cefepime   IVPB 500 milliGRAM(s) IV Intermittent every 12 hours  heparin  Injectable 5000 Unit(s) SubCutaneous every 12 hours  loperamide 2 milliGRAM(s) Oral four times a day PRN  multivitamin 1 Tablet(s) Oral daily  octreotide  Injectable 200 MICROGram(s) SubCutaneous every 6 hours  sodium bicarbonate  Infusion 0.179 mEq/kG/Hr IV Continuous <Continuous>    ============================================================  Interval Events   -   -     ============================================================      Allergies    penicillin (Swelling)    Intolerances    	    MEDICATIONS:  heparin  Injectable 5000 Unit(s) SubCutaneous every 12 hours    cefepime   IVPB 500 milliGRAM(s) IV Intermittent every 12 hours      acetaminophen   Tablet .. 650 milliGRAM(s) Oral every 6 hours PRN    loperamide 2 milliGRAM(s) Oral four times a day PRN    octreotide  Injectable 200 MICROGram(s) SubCutaneous every 6 hours    artificial tears (preservative free) Ophthalmic Solution 1 Drop(s) Both EYES every 2 hours  multivitamin 1 Tablet(s) Oral daily  sodium bicarbonate  Infusion 0.179 mEq/kG/Hr IV Continuous <Continuous>      PAST MEDICAL & SURGICAL HISTORY:  Neck mass: was resected, reportedly bening  Kidney lesion: partial resction- reportedly &quot;not active lesion&quot;  History of carcinoid syndrome  H/O carcinoid syndrome  S/P TURP      FAMILY HISTORY:  FH: lung cancer (Sibling): sister  Family history of nasopharyngeal cancer: father    Mother - HTN      SOCIAL HISTORY:    [ x] Non-smoker  [x] No Illicit Drug Use  [ x] No Excess EtOH Use      REVIEW OF SYSTEMS: (Unless + Before Symptom, it is negative)  Constitutional: [] Fever, [x]Fatigue, []Weight Changes  Eyes:  []Recent Vision Changes, []Eye Pain  ENT: []Congestion, []Sore Throat  Endocrine: []Excess Sweating, []Temperature Intolerance  Cardiovascular:  []Chest Pain, []Palpitations, []Shortness of Breath, []Pre-syncope, []Syncope,[] LE Swelling  Respiratory:[] Cough, []Congestion,[] Wheezing  Gastrointestinal: [] Abdominal Pain,[] Nausea, []Vomiting  Genitourinary: []Dysuria,[] hematuria  Musculoskeletal: []Joint Pain, []Hip/Knee Injury  Neurologic: []Headaches,[] Imbalance, []Weakness  Skin: []Rashes, []hematoma, []purprura    ================================    PHYSICAL EXAM:  T(C): 36.4 (07-11-19 @ 07:53), Max: 36.8 (07-11-19 @ 02:58)  HR: 96 (07-11-19 @ 14:01) (84 - 98)  BP: 95/54 (07-11-19 @ 14:01) (94/40 - 100/54)  RR: 17 (07-11-19 @ 07:53) (17 - 18)  SpO2: 96% (07-11-19 @ 14:01) (96% - 100%)  Wt(kg): --  I&O's Summary    Appearance: Normal; +Lethargic +[place]  Psychiatry: Orientation and mood difficult to assess due to lethargy +[place]  HEENT:   Normal oral mucosa, EOMI	  Lymphatic: No lymphadenopathy  Cardiovascular: Normal S1 S2, No JVD, No murmurs, No edema  Respiratory: Lungs clear to auscultation, no use of accessory muscles	  Gastrointestinal:  Soft, Non-tender	  Skin: No rashes, No ecchymoses, No cyanosis	  Neurologic: Non-focal, No Focal Deficits  Extremities: Normal range of motion, No clubbing, cyanosis  Vascular: Peripheral pulses palpable 2+ bilaterally, no prominent varicosities    ============================    LABS:	   Labs Reviewed07-15-19     CBC Full  -  ( 11 Jul 2019 03:24 )  WBC Count : 7.8 K/uL  Hemoglobin : 8.7 g/dL  Hematocrit : 24.5 %  Platelet Count - Automated : 177 K/uL  Mean Cell Volume : 99.8 fl  Mean Cell Hemoglobin : 35.3 pg  Mean Cell Hemoglobin Concentration : 35.4 gm/dL  Auto Neutrophil # : 5.2 K/uL  Auto Lymphocyte # : 1.9 K/uL  Auto Monocyte # : 0.7 K/uL  Auto Eosinophil # : 0.1 K/uL  Auto Basophil # : 0.0 K/uL  Auto Neutrophil % : 66.0 %  Auto Lymphocyte % : 23.9 %  Auto Monocyte % : 8.9 %  Auto Eosinophil % : 0.7 %  Auto Basophil % : 0.6 %    07-11    142  |  117<H>  |  97<H>  ----------------------------<  123<H>  3.2<L>   |  12<L>  |  3.37<H>    Ca    11.4<H>      11 Jul 2019 03:24    TPro  6.0  /  Alb  3.2<L>  /  TBili  0.3  /  DBili  x   /  AST  37  /  ALT  18  /  AlkPhos  63  07-11    =============================  < from: Transthoracic Echocardiogram (07.12.19 @ 15:11) >  Derived variables:  LVMI: 84 g/m2  RWT: 0.33  EF (Visual Estimate): 70 %  ------------------------------------------------------------------------  Observations:  Mitral Valve: Normal mitral valve. Mild mitral  regurgitation.  Aortic Valve/Aorta: Calcified aortic valve without  stenosis.  Mild to moderate aortic regurgitation.  Normal aortic root size. (Ao: 3.4 cm at the sinuses of  Valsalva).  Left Atrium: Normal left atrium.  LA volume index = 26  cc/m2.  Left Ventricle: Normal left ventricular systolic function.  No segmental wall motion abnormalities. Normal left  ventricular internal dimensions and wall thicknesses.  Right Heart: Normal right atrium. Normal right ventricular  size and function. Moderate tricuspid regurgitation. Normal  pulmonic valve. Minimal pulmonic regurgitation.  Pericardium/Pleura: Normal pericardium with moderate  pericardial effusion.  Mild right atrial inversion.  Approximately 25% variation in mitral inflow velocity with  respiration.  No evidence of right ventricular diastolic collapse.  Hemodynamic: Estimated right atrial pressure is normal.  Mild pulmonary hypertension. Estimated PASP 37 mmHg.  ------------------------------------------------------------------------  Conclusions:  Normal left ventricular systolic function. No segmental  wall motion abnormalities.  Moderate tricuspid regurgitation.  Mild pulmonary hypertension.  Moderate circumferential pericardial effusion. Mild right  atrial inversion.  Approximately 25% variation in mitral inflow velocity with  respiration.  No evidence of right ventricular diastolic collapse.  IVC collapses with respiration.  *** Compared with echocardiogram of 6/27/2019, no  significant changes noted.  ------------------------------------------------------------------------  Confirmed on  7/12/2019 - 17:30:07 by Zheng Denise M.D.  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     Dhruv Paz MD, [facility]  [phone]

## 2019-07-15 NOTE — PROGRESS NOTE ADULT - PROBLEM SELECTOR PLAN 1
ct octreotide at current dose  If diarrhea worsens, will need to r/o c.diff, abxa assoc diarrhea - prn probiotics  F/up chromogranin level  Prn reimaging if chromogranin increased or decision re. overall GOC need to be made to assess aggressiveness of disease  on zyprexa  , liver biopsy showed low grade carcinoid  clinically though patient is behaving aggressively ? due to large burden of disease, exacerbating pre existing chronic co morbidities  Patient has outpatient DNR  Plan d/w dr. arnold as well ct octreotide at current dose  If diarrhea worsens, will need to r/o c.diff, abxa assoc diarrhea - prn probiotics  F/up chromogranin level - pending  Prn reimaging if chromogranin increased or decision re. overall GOC need to be made to assess aggressiveness of disease  on zyprexa  , liver biopsy showed low grade carcinoid  clinically though patient is behaving aggressively ? due to large burden of disease, exacerbating pre existing chronic co morbidities  Patient has outpatient DNR  Plan d/w dr. arnold as well

## 2019-07-15 NOTE — PROGRESS NOTE ADULT - SUBJECTIVE AND OBJECTIVE BOX
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 Noted to have malodorous urine and treated for UTI. On this admission patient has been extremely agitated. Nursing reports he has pulled out numerous IVs, pulled on calzada, frequently agitated even in the presence of enhanced supervision. On previous visits was reportedly aaxo3. Nursing reports no sleep for many nights. Patient otherwise unable to provide any further history,.     RRT called on  for low bp and inc HR    Past Medical History:  Neck mass (R22.1): was resected, reportedly bening  Kidney lesion (N28.9): partial resction- reportedly &quot;not active lesion&quot;  History of carcinoid syndrome (Z86.39)      Past Surgical History:  H/O carcinoid syndrome (Z86.39)  S/P TURP (Z90.79)      Social History:  No toxic habits    Family History:  FAMILY HISTORY:  FH: lung cancer (Sibling): sister  Family history of nasopharyngeal cancer: father      Allergies:  penicillin (Swelling)      ROS:  Patient unable to provide.     Advanced care planning reviewed and noted in the chart.    Medications:  acetaminophen   Tablet .. 650 milliGRAM(s) Oral every 6 hours PRN  artificial tears (preservative free) Ophthalmic Solution 1 Drop(s) Both EYES every 2 hours  cefTRIAXone   IVPB 1000 milliGRAM(s) IV Intermittent every 24 hours  haloperidol    Injectable 0.25 milliGRAM(s) IV Push every 6 hours PRN  heparin  Injectable 5000 Unit(s) SubCutaneous every 12 hours  loperamide 2 milliGRAM(s) Oral four times a day PRN  melatonin 3 milliGRAM(s) Oral at bedtime  multivitamin 1 Tablet(s) Oral daily  octreotide  Injectable 200 MICROGram(s) SubCutaneous every 6 hours  sodium bicarbonate  Infusion 0.179 mEq/kG/Hr IV Continuous <Continuous>  sodium chloride 0.9%. 1000 milliLiter(s) IV Continuous <Continuous>      Labs:  CBC Full  -  ( 2019 10:01 )  WBC Count : 7.78 K/uL  RBC Count : 2.66 M/uL  Hemoglobin : 8.3 g/dL  Hematocrit : 26.8 %  Platelet Count - Automated : 140 K/uL  Mean Cell Volume : 100.8 fl  Mean Cell Hemoglobin : 31.2 pg  Mean Cell Hemoglobin Concentration : 31.0 gm/dL  Auto Neutrophil # : x  Auto Lymphocyte # : x  Auto Monocyte # : x  Auto Eosinophil # : x  Auto Basophil # : x  Auto Neutrophil % : x  Auto Lymphocyte % : x  Auto Monocyte % : x  Auto Eosinophil % : x  Auto Basophil % : x        145  |  114<H>  |  87<H>  ----------------------------<  126<H>  3.6   |  19<L>  |  2.93<H>    Ca    10.2      2019 07:19      CAPILLARY BLOOD GLUCOSE            Urinalysis Basic - ( 2019 09:42 )    Color: Light Yellow / Appearance: Slightly Turbid / S.016 / pH: x  Gluc: x / Ketone: Negative  / Bili: Negative / Urobili: Negative   Blood: x / Protein: 100 / Nitrite: Positive   Leuk Esterase: Large / RBC: 19 /hpf /  /HPF   Sq Epi: x / Non Sq Epi: 3 / Bacteria: Moderate      Vitamin B12: 998 pg/mL [313 - 1245] 19 @ 10:04  Folate: -- 19 @ 10:04  Thyroid Function: -- 19 @ 10:04  Ammonia: -- 19 @ 10:04  Dilantin: -- 19 @ 10:04      Vitals:  Vital Signs Last 24 Hrs  T(C): 36.6 (2019 23:35), Max: 37.1 (2019 14:52)  T(F): 97.9 (2019 23:35), Max: 98.8 (2019 14:52)  HR: 71 (15 Jul 2019 01:34) (71 - 102)  BP: 91/52 (15 Jul 2019 01:34) (81/46 - 148/95)  BP(mean): --  RR: 18 (2019 23:35) (18 - 18)  SpO2: 94% (2019 23:35) (94% - 95%)    NEUROLOGICAL EXAM:    Mental status: Lethargic but eyes open, attends with reinforcement, paucity of spontaneous speech, says in hospital, but does not follow commands    Cranial Nerves: Pupils were equal, round, reactive to light. Extraocular movements with slight left exophoria. Visual field were full. Fundoscopic exam was deferred. Facial sensation was intact to light touch. There was no facial asymmetry. The palate was upgoing symmetrically and tongue was midline.     Motor exam: Bulk and tone were normal. not cooperative with formal exam    Reflexes: Absent in the bilateral upper extremities. Absent in the bilateral lower extremities. Toes were mute.     Sensation: Intact to light touch x 4    Coordination: Unable to assess    Gait: Unable to assess    < from: CT Head No Cont (19 @ 10:17) >    EXAM:  CT BRAIN                            PROCEDURE DATE:  2019            INTERPRETATION:  CLINICAL INFORMATION: Confusion and sepsis. Lethargy.    TECHNIQUE: Noncontrast axial CT images were acquired through the head.   Two-dimensional sagittal and coronal reformats were generated.    COMPARISON STUDY: CT head 3/4/2009    FINDINGS:     No acute intracranial hemorrhage, mass effect, or midline shift. No   abnormal extra-axial collections. The basal cisterns are patent without   evidenceof central herniation.     Mild cerebral volume loss with proportional prominence of the sulci and   ventricles. Moderate patchy periventricular white matter hypoattenuation,   likely the sequela of chronic microvascular ischemic disease.    The right maxillary sinus is extensively opacified with thickened walls   consistent with chronic sinusitis, partially visualized. Mild mucosal   thickening and bony wall thickening is partially visualized in the left   maxillary sinus, also consistent with chronic sinusitis. The maxillary   sinuses are not covered on the prior exam.    IMPRESSION:     No CT evidence of acute intracranial hemorrhage, mass effect, or midline   shift.     Chronic maxillary sinusitis.                FEROZ PURVIS M.D.,RADIOLOGY RESIDENT  This document has been electronically signed.  JARRETT BERGER M.D., ATTENDING RADIOLOGIST  This document has been electronically signed. 2019 10:30AM                < end of copied text >

## 2019-07-15 NOTE — PROGRESS NOTE ADULT - PROBLEM SELECTOR PLAN 3
- likely due to Urosepsis, metabolic changes  - CT head neg  MRI pending  eeg - no e/o seizure activity - likely due to Urosepsis, metabolic changes  - CT head neg  MRI pending  clinically better today  eeg - no e/o seizure activity

## 2019-07-15 NOTE — PROGRESS NOTE ADULT - SUBJECTIVE AND OBJECTIVE BOX
Patient is a 86y old  Male who presents with a chief complaint of mental status change (11 Jul 2019 21:31)       Pt is seen and examined  pt is sle lying in bed  per daughter , wife at bedside, 4 episodes of diarrhea yday and 2 today  Patient c/o restraints  remains confused    REVIEW OF SYSTEMS:    limited  PHYSICAL EXAM  General: adult  chronically ill, flushed face   HEENT: clear oropharynx, anicteric sclera, pale conjunctiva  Neck: supple  CV: normal S1/S2 with no murmur rubs or gallops  Lungs: positive air movement b/l ant lungs,clear to auscultation, no wheezes, no rales  Abdomen: soft non-tender non-distended  Ext: no clubbing cyanosis or edema  Skin: no rashes and no petechiae  Neuro: sleeping        MEDICATIONS  (STANDING):  artificial tears (preservative free) Ophthalmic Solution 1 Drop(s) Both EYES every 2 hours  cefTRIAXone   IVPB 1000 milliGRAM(s) IV Intermittent every 24 hours  chlorhexidine 2% Cloths 1 Application(s) Topical daily  heparin  Injectable 5000 Unit(s) SubCutaneous every 12 hours  melatonin 3 milliGRAM(s) Oral at bedtime  multivitamin 1 Tablet(s) Oral daily  octreotide  Injectable 200 MICROGram(s) SubCutaneous every 6 hours  sodium bicarbonate  Infusion 0.179 mEq/kG/Hr (70 mL/Hr) IV Continuous <Continuous>  sodium chloride 0.9%. 1000 milliLiter(s) (75 mL/Hr) IV Continuous <Continuous>    MEDICATIONS  (PRN):  acetaminophen   Tablet .. 650 milliGRAM(s) Oral every 6 hours PRN Temp greater or equal to 38.5C (101.3F), Mild Pain (1 - 3)  haloperidol    Injectable 0.25 milliGRAM(s) IV Push every 6 hours PRN Agitation  loperamide 2 milliGRAM(s) Oral four times a day PRN Diarrhea      Allergies    penicillin (Swelling)    Intolerances        Vital Signs Last 24 Hrs  T(C): 36.8 (15 Jul 2019 08:28), Max: 37.1 (14 Jul 2019 14:52)  T(F): 98.2 (15 Jul 2019 08:28), Max: 98.8 (14 Jul 2019 14:52)  HR: 94 (15 Jul 2019 08:28) (71 - 102)  BP: 96/56 (15 Jul 2019 08:28) (81/46 - 148/95)  BP(mean): --  RR: 18 (15 Jul 2019 08:28) (18 - 18)  SpO2: 94% (15 Jul 2019 08:28) (94% - 95%)      LABS:                          8.3    7.78  )-----------( 140      ( 14 Jul 2019 10:01 )             26.8         Mean Cell Volume : 100.8 fl  Mean Cell Hemoglobin : 31.2 pg  Mean Cell Hemoglobin Concentration : 31.0 gm/dL    07-14    145  |  114<H>  |  87<H>  ----------------------------<  126<H>  3.6   |  19<L>  |  2.93<H>    Ca    10.2      14 Jul 2019 07:19    Culture - Blood (07.11.19 @ 05:11)    Specimen Source: .Blood    Culture Results:   No growth to date.    Culture - Urine (07.11.19 @ 05:01)    -  Cefazolin: S <=2 (MIC_CL_[id number]_CEFAZU) For uncomplicated UTI with K. pneumoniae, E. coli, or P. mirablis: MAYRA <=16 is sensitive and MAYRA >=32 is resistant. This also predicts results for oral agents cefaclor, cefdinir, cefpodoxime, cefprozil, cefuroxime axetil, cephalexin and locarbef for uncomplicated UTI. Note that some isolates may be susceptible to these agents while testing resistant to cefazolin.    -  Aztreonam: S <=4    -  Ampicillin: S <=2 These ampicillin results predict results for amoxicillin    -  Ampicillin/Sulbactam: S <=4/2 Enterobacter, Citrobacter, and Serratia may develop resistance during prolonged therapy (3-4 days)    -  Amikacin: S <=8    -  Amoxicillin/Clavulanic Acid: S <=8/4    -  Trimethoprim/Sulfamethoxazole: S <=0.5/9.5    -  Tobramycin: S <=2    -  Piperacillin/Tazobactam: S <=8    -  Tigecycline: S <=1    -  Nitrofurantoin: S <=32 Should not be used to treat pyelonephritis    -  Meropenem: S <=1    -  Ertapenem: S <=0.5    -  Gentamicin: S <=1    -  Imipenem: S <=1    -  Levofloxacin: S <=1    -  Ciprofloxacin: S <=0.5    -  Ceftriaxone: S <=1 Enterobacter, Citrobacter, and Serratia may develop resistance during prolonged therapy    -  Cefoxitin: S <=4    -  Cefepime: S <=2    Specimen Source: .Urine    Culture Results:   >100,000 CFU/ml Escherichia coli    Organism Identification: Escherichia coli    Organism: Escherichia coli    Method Type: [facility]        Rapid Respiratory Viral Panel (07.11.19 @ 22:22)    Rapid RVP Result: NotDetec: This Respiratory Panel uses polymerase chain reaction (PCR) to detect for  adenovirus; coronavirus (HKU1, NL63, 229E, OC43); human metapneumovirus  (hMPV); human enterovirus/rhinovirus (Entero/RV); influenza A; influenza  A/H1; influenza A/H3; influenza A/H1-2009; influenza B; parainfluenza  viruses 1, 2, 3, 4; respiratory syncytial virus; Mycoplasma pneumoniae;  and Chlamydophila pneumoniae.    < from: CT Chest No Cont (07.14.19 @ 12:01) >  IMPRESSION:     Mild pulmonary edema.    < end of copied text >      RADIOLOGY & ADDITIONAL STUDIES:    EXAM:  XR CHEST AP OR PA 1V                            PROCEDURE DATE:  07/11/2019        INTERPRETATION:  CLINICAL INFORMATION: Sepsis.    COMPARISON:  Chest x-ray 6/10/2019    TECHNIQUE:   Frontal radiograph of the chest.     FINDINGS:    Thelungs are clear. There is no pleural effusion or pneumothorax. The   cardiac silhouette is unremarkable. No acute osseous abnormality.    IMPRESSION: Clear lungs.    < from: CT Head No Cont (07.11.19 @ 10:17) >  IMPRESSION:     No CT evidence of acute intracranial hemorrhage, mass effect, or midline   shift.     Chronic maxillary sinusitis.      < end of copied text >      Chromogranin A (06.23.19 @ 11:57)    Chromogranin A: [id number] Patient is a 86y old  Male who presents with a chief complaint of mental status change (11 Jul 2019 21:31)       Pt is seen and examined  pt is sle lying in bed  liquid stool earlier today  less confused today      REVIEW OF SYSTEMS:    limited  PHYSICAL EXAM  General: adult  chronically ill, flushed face   HEENT: clear oropharynx, anicteric sclera, pale conjunctiva  Neck: supple  CV: normal S1/S2 with no murmur rubs or gallops  Lungs: positive air movement b/l ant lungs,clear to auscultation, no wheezes, no rales  Abdomen: soft non-tender non-distended  Ext: no clubbing cyanosis or edema  Skin: no rashes and no petechiae  Neuro: awake, oriented to person        MEDICATIONS  (STANDING):  artificial tears (preservative free) Ophthalmic Solution 1 Drop(s) Both EYES every 2 hours  cefTRIAXone   IVPB 1000 milliGRAM(s) IV Intermittent every 24 hours  chlorhexidine 2% Cloths 1 Application(s) Topical daily  heparin  Injectable 5000 Unit(s) SubCutaneous every 12 hours  melatonin 3 milliGRAM(s) Oral at bedtime  multivitamin 1 Tablet(s) Oral daily  octreotide  Injectable 200 MICROGram(s) SubCutaneous every 6 hours  sodium bicarbonate  Infusion 0.179 mEq/kG/Hr (70 mL/Hr) IV Continuous <Continuous>  sodium chloride 0.9%. 1000 milliLiter(s) (75 mL/Hr) IV Continuous <Continuous>    MEDICATIONS  (PRN):  acetaminophen   Tablet .. 650 milliGRAM(s) Oral every 6 hours PRN Temp greater or equal to 38.5C (101.3F), Mild Pain (1 - 3)  haloperidol    Injectable 0.25 milliGRAM(s) IV Push every 6 hours PRN Agitation  loperamide 2 milliGRAM(s) Oral four times a day PRN Diarrhea      Allergies    penicillin (Swelling)    Intolerances        Vital Signs Last 24 Hrs  T(C): 36.8 (15 Jul 2019 08:28), Max: 37.1 (14 Jul 2019 14:52)  T(F): 98.2 (15 Jul 2019 08:28), Max: 98.8 (14 Jul 2019 14:52)  HR: 94 (15 Jul 2019 08:28) (71 - 102)  BP: 96/56 (15 Jul 2019 08:28) (81/46 - 148/95)  BP(mean): --  RR: 18 (15 Jul 2019 08:28) (18 - 18)  SpO2: 94% (15 Jul 2019 08:28) (94% - 95%)      LABS:                          8.3    7.78  )-----------( 140      ( 14 Jul 2019 10:01 )             26.8         Mean Cell Volume : 100.8 fl  Mean Cell Hemoglobin : 31.2 pg  Mean Cell Hemoglobin Concentration : 31.0 gm/dL    07-14    145  |  114<H>  |  87<H>  ----------------------------<  126<H>  3.6   |  19<L>  |  2.93<H>    Ca    10.2      14 Jul 2019 07:19    Culture - Blood (07.11.19 @ 05:11)    Specimen Source: .Blood    Culture Results:   No growth to date.    Culture - Urine (07.11.19 @ 05:01)    -  Cefazolin: S <=2 (MIC_CL_COM_ENTERIC_CEFAZU) For uncomplicated UTI with K. pneumoniae, E. coli, or P. mirablis: MAYRA <=16 is sensitive and MAYRA >=32 is resistant. This also predicts results for oral agents cefaclor, cefdinir, cefpodoxime, cefprozil, cefuroxime axetil, cephalexin and locarbef for uncomplicated UTI. Note that some isolates may be susceptible to these agents while testing resistant to cefazolin.    -  Aztreonam: S <=4    -  Ampicillin: S <=2 These ampicillin results predict results for amoxicillin    -  Ampicillin/Sulbactam: S <=4/2 Enterobacter, Citrobacter, and Serratia may develop resistance during prolonged therapy (3-4 days)    -  Amikacin: S <=8    -  Amoxicillin/Clavulanic Acid: S <=8/4    -  Trimethoprim/Sulfamethoxazole: S <=0.5/9.5    -  Tobramycin: S <=2    -  Piperacillin/Tazobactam: S <=8    -  Tigecycline: S <=1    -  Nitrofurantoin: S <=32 Should not be used to treat pyelonephritis    -  Meropenem: S <=1    -  Ertapenem: S <=0.5    -  Gentamicin: S <=1    -  Imipenem: S <=1    -  Levofloxacin: S <=1    -  Ciprofloxacin: S <=0.5    -  Ceftriaxone: S <=1 Enterobacter, Citrobacter, and Serratia may develop resistance during prolonged therapy    -  Cefoxitin: S <=4    -  Cefepime: S <=2    Specimen Source: .Urine    Culture Results:   >100,000 CFU/ml Escherichia coli    Organism Identification: Escherichia coli    Organism: Escherichia coli    Method Type: MAYRA      Chromogranin A (06.23.19 @ 11:57)    Chromogranin A: [id number]    Rapid Respiratory Viral Panel (07.11.19 @ 22:22)    Rapid RVP Result: NotDetec: This Respiratory Panel uses polymerase chain reaction (PCR) to detect for  adenovirus; coronavirus (HKU1, NL63, 229E, OC43); human metapneumovirus  (hMPV); human enterovirus/rhinovirus (Entero/RV); influenza A; influenza  A/H1; influenza A/H3; influenza A/H1-2009; influenza B; parainfluenza  viruses 1, 2, 3, 4; respiratory syncytial virus; Mycoplasma pneumoniae;  and Chlamydophila pneumoniae.    < from: CT Chest No Cont (07.14.19 @ 12:01) >  IMPRESSION:     Mild pulmonary edema.    < end of copied text >      RADIOLOGY & ADDITIONAL STUDIES:    EXAM:  XR CHEST AP OR PA 1V                            PROCEDURE DATE:  07/11/2019        INTERPRETATION:  CLINICAL INFORMATION: Sepsis.    COMPARISON:  Chest x-ray 6/10/2019    TECHNIQUE:   Frontal radiograph of the chest.     FINDINGS:    Thelungs are clear. There is no pleural effusion or pneumothorax. The   cardiac silhouette is unremarkable. No acute osseous abnormality.    IMPRESSION: Clear lungs.    < from: CT Head No Cont (07.11.19 @ 10:17) >  IMPRESSION:     No CT evidence of acute intracranial hemorrhage, mass effect, or midline   shift.     Chronic maxillary sinusitis.      < end of copied text >      Chromogranin A (06.23.19 @ 11:57)    Chromogranin A: [id number]

## 2019-07-16 LAB
ANION GAP SERPL CALC-SCNC: 11 MMOL/L — SIGNIFICANT CHANGE UP (ref 5–17)
BUN SERPL-MCNC: 79 MG/DL — HIGH (ref 7–23)
CALCIUM SERPL-MCNC: 9.5 MG/DL — SIGNIFICANT CHANGE UP (ref 8.4–10.5)
CGA FLD-MCNC: HIGH NG/ML
CHLORIDE SERPL-SCNC: 110 MMOL/L — HIGH (ref 96–108)
CO2 SERPL-SCNC: 22 MMOL/L — SIGNIFICANT CHANGE UP (ref 22–31)
CREAT SERPL-MCNC: 2.8 MG/DL — HIGH (ref 0.5–1.3)
CULTURE RESULTS: SIGNIFICANT CHANGE UP
CULTURE RESULTS: SIGNIFICANT CHANGE UP
GLUCOSE SERPL-MCNC: 118 MG/DL — HIGH (ref 70–99)
HCT VFR BLD CALC: 24 % — LOW (ref 39–50)
HCT VFR BLD CALC: 26.5 % — LOW (ref 39–50)
HGB BLD-MCNC: 7.6 G/DL — LOW (ref 13–17)
HGB BLD-MCNC: 8.1 G/DL — LOW (ref 13–17)
MCHC RBC-ENTMCNC: 30.6 GM/DL — LOW (ref 32–36)
MCHC RBC-ENTMCNC: 31.5 PG — SIGNIFICANT CHANGE UP (ref 27–34)
MCHC RBC-ENTMCNC: 31.7 GM/DL — LOW (ref 32–36)
MCHC RBC-ENTMCNC: 32.2 PG — SIGNIFICANT CHANGE UP (ref 27–34)
MCV RBC AUTO: 101.7 FL — HIGH (ref 80–100)
MCV RBC AUTO: 103.1 FL — HIGH (ref 80–100)
NRBC # BLD: 0 /100 WBCS — SIGNIFICANT CHANGE UP (ref 0–0)
NRBC # BLD: 0 /100 WBCS — SIGNIFICANT CHANGE UP (ref 0–0)
PLATELET # BLD AUTO: 133 K/UL — LOW (ref 150–400)
PLATELET # BLD AUTO: 140 K/UL — LOW (ref 150–400)
POTASSIUM SERPL-MCNC: 3 MMOL/L — LOW (ref 3.5–5.3)
POTASSIUM SERPL-SCNC: 3 MMOL/L — LOW (ref 3.5–5.3)
RBC # BLD: 2.36 M/UL — LOW (ref 4.2–5.8)
RBC # BLD: 2.57 M/UL — LOW (ref 4.2–5.8)
RBC # FLD: 20.2 % — HIGH (ref 10.3–14.5)
RBC # FLD: 20.3 % — HIGH (ref 10.3–14.5)
SEROTONIN SER-MCNC: 39 NG/ML — SIGNIFICANT CHANGE UP
SODIUM SERPL-SCNC: 143 MMOL/L — SIGNIFICANT CHANGE UP (ref 135–145)
SPECIMEN SOURCE: SIGNIFICANT CHANGE UP
SPECIMEN SOURCE: SIGNIFICANT CHANGE UP
WBC # BLD: 7.31 K/UL — SIGNIFICANT CHANGE UP (ref 3.8–10.5)
WBC # BLD: 9.54 K/UL — SIGNIFICANT CHANGE UP (ref 3.8–10.5)
WBC # FLD AUTO: 7.31 K/UL — SIGNIFICANT CHANGE UP (ref 3.8–10.5)
WBC # FLD AUTO: 9.54 K/UL — SIGNIFICANT CHANGE UP (ref 3.8–10.5)

## 2019-07-16 PROCEDURE — 99232 SBSQ HOSP IP/OBS MODERATE 35: CPT

## 2019-07-16 RX ORDER — POTASSIUM CHLORIDE 20 MEQ
10 PACKET (EA) ORAL ONCE
Refills: 0 | Status: COMPLETED | OUTPATIENT
Start: 2019-07-16 | End: 2019-07-16

## 2019-07-16 RX ORDER — SODIUM CHLORIDE 9 MG/ML
500 INJECTION INTRAMUSCULAR; INTRAVENOUS; SUBCUTANEOUS ONCE
Refills: 0 | Status: COMPLETED | OUTPATIENT
Start: 2019-07-16 | End: 2019-07-16

## 2019-07-16 RX ORDER — SODIUM BICARBONATE 1 MEQ/ML
650 SYRINGE (ML) INTRAVENOUS THREE TIMES A DAY
Refills: 0 | Status: DISCONTINUED | OUTPATIENT
Start: 2019-07-16 | End: 2019-07-16

## 2019-07-16 RX ORDER — SODIUM BICARBONATE 1 MEQ/ML
1300 SYRINGE (ML) INTRAVENOUS EVERY 6 HOURS
Refills: 0 | Status: DISCONTINUED | OUTPATIENT
Start: 2019-07-16 | End: 2019-07-23

## 2019-07-16 RX ORDER — POTASSIUM CHLORIDE 20 MEQ
40 PACKET (EA) ORAL EVERY 4 HOURS
Refills: 0 | Status: COMPLETED | OUTPATIENT
Start: 2019-07-16 | End: 2019-07-16

## 2019-07-16 RX ADMIN — OCTREOTIDE ACETATE 200 MICROGRAM(S): 200 INJECTION, SOLUTION INTRAVENOUS; SUBCUTANEOUS at 04:10

## 2019-07-16 RX ADMIN — HEPARIN SODIUM 5000 UNIT(S): 5000 INJECTION INTRAVENOUS; SUBCUTANEOUS at 05:15

## 2019-07-16 RX ADMIN — Medication 500 MILLIGRAM(S): at 18:18

## 2019-07-16 RX ADMIN — Medication 1 DROP(S): at 23:40

## 2019-07-16 RX ADMIN — Medication 1 DROP(S): at 14:44

## 2019-07-16 RX ADMIN — Medication 1300 MILLIGRAM(S): at 23:40

## 2019-07-16 RX ADMIN — CHLORHEXIDINE GLUCONATE 1 APPLICATION(S): 213 SOLUTION TOPICAL at 14:44

## 2019-07-16 RX ADMIN — SODIUM CHLORIDE 75 MILLILITER(S): 9 INJECTION INTRAMUSCULAR; INTRAVENOUS; SUBCUTANEOUS at 18:18

## 2019-07-16 RX ADMIN — OCTREOTIDE ACETATE 200 MICROGRAM(S): 200 INJECTION, SOLUTION INTRAVENOUS; SUBCUTANEOUS at 23:39

## 2019-07-16 RX ADMIN — OCTREOTIDE ACETATE 200 MICROGRAM(S): 200 INJECTION, SOLUTION INTRAVENOUS; SUBCUTANEOUS at 18:18

## 2019-07-16 RX ADMIN — HEPARIN SODIUM 5000 UNIT(S): 5000 INJECTION INTRAVENOUS; SUBCUTANEOUS at 18:18

## 2019-07-16 RX ADMIN — Medication 1 DROP(S): at 09:59

## 2019-07-16 RX ADMIN — Medication 100 MILLIEQUIVALENT(S): at 10:04

## 2019-07-16 RX ADMIN — SODIUM CHLORIDE 1500 MILLILITER(S): 9 INJECTION INTRAMUSCULAR; INTRAVENOUS; SUBCUTANEOUS at 04:44

## 2019-07-16 RX ADMIN — Medication 40 MILLIEQUIVALENT(S): at 10:01

## 2019-07-16 RX ADMIN — Medication 1 DROP(S): at 18:18

## 2019-07-16 RX ADMIN — Medication 500 MILLIGRAM(S): at 05:15

## 2019-07-16 RX ADMIN — OCTREOTIDE ACETATE 200 MICROGRAM(S): 200 INJECTION, SOLUTION INTRAVENOUS; SUBCUTANEOUS at 10:03

## 2019-07-16 RX ADMIN — Medication 1 DROP(S): at 04:11

## 2019-07-16 NOTE — PROGRESS NOTE ADULT - SUBJECTIVE AND OBJECTIVE BOX
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   Patient reportedly found with hypotension and altered mental status. Urine was maladarous and appears to be a UTI on admission labs.    Rest of review of symptoms are unable to obtain due to hearing difficulties/lethargy.   No reported worsening CP/Palps/SOB    ===========================  Interval Events  - No reported worsening CP/Palps/SOB  - Seen earlier - confusion persists  ===========================            acetaminophen   Tablet .. 650 milliGRAM(s) Oral every 6 hours PRN  artificial tears (preservative free) Ophthalmic Solution 1 Drop(s) Both EYES every 2 hours  cefepime   IVPB 500 milliGRAM(s) IV Intermittent every 12 hours  heparin  Injectable 5000 Unit(s) SubCutaneous every 12 hours  loperamide 2 milliGRAM(s) Oral four times a day PRN  multivitamin 1 Tablet(s) Oral daily  octreotide  Injectable 200 MICROGram(s) SubCutaneous every 6 hours  sodium bicarbonate  Infusion 0.179 mEq/kG/Hr IV Continuous <Continuous>        Allergies    penicillin (Swelling)    Intolerances    	    MEDICATIONS:  heparin  Injectable 5000 Unit(s) SubCutaneous every 12 hours    cefepime   IVPB 500 milliGRAM(s) IV Intermittent every 12 hours      acetaminophen   Tablet .. 650 milliGRAM(s) Oral every 6 hours PRN    loperamide 2 milliGRAM(s) Oral four times a day PRN    octreotide  Injectable 200 MICROGram(s) SubCutaneous every 6 hours    artificial tears (preservative free) Ophthalmic Solution 1 Drop(s) Both EYES every 2 hours  multivitamin 1 Tablet(s) Oral daily  sodium bicarbonate  Infusion 0.179 mEq/kG/Hr IV Continuous <Continuous>      PAST MEDICAL & SURGICAL HISTORY:  Neck mass: was resected, reportedly bening  Kidney lesion: partial resction- reportedly &quot;not active lesion&quot;  History of carcinoid syndrome  H/O carcinoid syndrome  S/P TURP      FAMILY HISTORY:  FH: lung cancer (Sibling): sister  Family history of nasopharyngeal cancer: father    Mother - HTN      SOCIAL HISTORY:    [ x] Non-smoker  [x] No Illicit Drug Use  [ x] No Excess EtOH Use      REVIEW OF SYSTEMS: (Unless + Before Symptom, it is negative)  Constitutional: [] Fever, [x]Fatigue, []Weight Changes  Eyes:  []Recent Vision Changes, []Eye Pain  ENT: []Congestion, []Sore Throat  Endocrine: []Excess Sweating, []Temperature Intolerance  Cardiovascular:  []Chest Pain, []Palpitations, []Shortness of Breath, []Pre-syncope, []Syncope,[] LE Swelling  Respiratory:[] Cough, []Congestion,[] Wheezing  Gastrointestinal: [] Abdominal Pain,[] Nausea, []Vomiting  Genitourinary: []Dysuria,[] hematuria  Musculoskeletal: []Joint Pain, []Hip/Knee Injury  Neurologic: []Headaches,[] Imbalance, []Weakness  Skin: []Rashes, []hematoma, []purprura    ================================    PHYSICAL EXAM:  T(C): 36.4 (07-11-19 @ 07:53), Max: 36.8 (07-11-19 @ 02:58)  HR: 96 (07-11-19 @ 14:01) (84 - 98)  BP: 95/54 (07-11-19 @ 14:01) (94/40 - 100/54)  RR: 17 (07-11-19 @ 07:53) (17 - 18)  SpO2: 96% (07-11-19 @ 14:01) (96% - 100%)  Wt(kg): --  I&O's Summary    Appearance: Normal; +Lethargic +[place]  Psychiatry: Orientation and mood difficult to assess due to lethargy +[place]  HEENT:   Normal oral mucosa, EOMI	  Lymphatic: No lymphadenopathy  Cardiovascular: Normal S1 S2, No JVD, No murmurs, No edema  Respiratory: Lungs clear to auscultation, no use of accessory muscles	  Gastrointestinal:  Soft, Non-tender	  Skin: No rashes, No ecchymoses, No cyanosis	  Neurologic: Non-focal, No Focal Deficits  Extremities: Normal range of motion, No clubbing, cyanosis  Vascular: Peripheral pulses palpable 2+ bilaterally, no prominent varicosities    ============================    LABS:	   Labs [id number]     CBC Full  -  ( 11 Jul 2019 03:24 )  WBC Count : 7.8 K/uL  Hemoglobin : 8.7 g/dL  Hematocrit : 24.5 %  Platelet Count - Automated : 177 K/uL  Mean Cell Volume : 99.8 fl  Mean Cell Hemoglobin : 35.3 pg  Mean Cell Hemoglobin Concentration : 35.4 gm/dL  Auto Neutrophil # : 5.2 K/uL  Auto Lymphocyte # : 1.9 K/uL  Auto Monocyte # : 0.7 K/uL  Auto Eosinophil # : 0.1 K/uL  Auto Basophil # : 0.0 K/uL  Auto Neutrophil % : 66.0 %  Auto Lymphocyte % : 23.9 %  Auto Monocyte % : 8.9 %  Auto Eosinophil % : 0.7 %  Auto Basophil % : 0.6 %    07-11    142  |  117<H>  |  97<H>  ----------------------------<  123<H>  3.2<L>   |  12<L>  |  3.37<H>    Ca    11.4<H>      11 Jul 2019 03:24    TPro  6.0  /  Alb  3.2<L>  /  TBili  0.3  /  DBili  x   /  AST  37  /  ALT  18  /  AlkPhos  63  07-11    =============================  < from: Transthoracic Echocardiogram (07.12.19 @ 15:11) >  Derived variables:  LVMI: 84 g/m2  RWT: 0.33  EF (Visual Estimate): 70 %  ------------------------------------------------------------------------  Observations:  Mitral Valve: Normal mitral valve. Mild mitral  regurgitation.  Aortic Valve/Aorta: Calcified aortic valve without  stenosis.  Mild to moderate aortic regurgitation.  Normal aortic root size. (Ao: 3.4 cm at the sinuses of  Valsalva).  Left Atrium: Normal left atrium.  LA volume index = 26  cc/m2.  Left Ventricle: Normal left ventricular systolic function.  No segmental wall motion abnormalities. Normal left  ventricular internal dimensions and wall thicknesses.  Right Heart: Normal right atrium. Normal right ventricular  size and function. Moderate tricuspid regurgitation. Normal  pulmonic valve. Minimal pulmonic regurgitation.  Pericardium/Pleura: Normal pericardium with moderate  pericardial effusion.  Mild right atrial inversion.  Approximately 25% variation in mitral inflow velocity with  respiration.  No evidence of right ventricular diastolic collapse.  Hemodynamic: Estimated right atrial pressure is normal.  Mild pulmonary hypertension. Estimated PASP 37 mmHg.  ------------------------------------------------------------------------  Conclusions:  Normal left ventricular systolic function. No segmental  wall motion abnormalities.  Moderate tricuspid regurgitation.  Mild pulmonary hypertension.  Moderate circumferential pericardial effusion. Mild right  atrial inversion.  Approximately 25% variation in mitral inflow velocity with  respiration.  No evidence of right ventricular diastolic collapse.  IVC collapses with respiration.  *** Compared with echocardiogram of 6/27/2019, no  significant changes noted.  ------------------------------------------------------------------------  Confirmed on  7/12/2019 - 17:30:07 by Zheng Denise M.D.  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     Dhruv Paz MD, [facility]  [phone]

## 2019-07-16 NOTE — PROVIDER CONTACT NOTE (MEDICATION) - ASSESSMENT
family at bedside encouraging pt to take medicine, becoming agitated when prompted.  Restraints still in place, per pt "go away or I'm going to get angry"

## 2019-07-16 NOTE — PROGRESS NOTE ADULT - SUBJECTIVE AND OBJECTIVE BOX
CC: f/u for uti he is currently on treatment     S: Patient is currently resting comfortably in bed     REVIEW OF SYSTEMS:  All other review of systems negative (Comprehensive ROS)    Vital Signs Last 24 Hrs  T(C): 37.2 (16 Jul 2019 13:40), Max: 37.2 (16 Jul 2019 13:40)  T(F): 98.9 (16 Jul 2019 13:40), Max: 98.9 (16 Jul 2019 13:40)  HR: 92 (16 Jul 2019 13:40) (92 - 105)  BP: 89/52 (16 Jul 2019 13:40) (78/40 - 103/60)  BP(mean): --  RR: 17 (16 Jul 2019 13:40) (17 - 18)  SpO2: 94% (16 Jul 2019 13:40) (92% - 99%)    PHYSICAL EXAM:  General: more alert, no acute distress  Eyes:  anicteric, no conjunctival injection, no discharge  Oropharynx: no lesions or injection 	  Neck: supple, without adenopathy  Lungs: clear to auscultation  Heart: regular rate and rhythm; no murmur, rubs or gallops  Abdomen: soft, nondistended, nontender, without mass or organomegaly  Skin: no lesions  Extremities: no clubbing, cyanosis, or edema  Neurologic: alert,  moves all extremities    LAB RESULTS:                        8.1    9.54  )-----------( 140      ( 16 Jul 2019 09:55 )             26.5       07-16    143  |  110<H>  |  79<H>  ----------------------------<  118<H>  3.0<L>   |  22  |  2.80<H>    Ca    9.5      16 Jul 2019 07:00                MICROBIOLOGY:  RECENT CULTURES:  07-11 @ 05:11 .Blood     No growth to date.      07-11 @ 05:01 .Urine Escherichia coli    >100,000 CFU/ml Escherichia coli          RADIOLOGY REVIEWED:    < from: CT Head No Cont (07.11.19 @ 10:17) >  IMPRESSION:     No CT evidence of acute intracranial hemorrhage, mass effect, or midline   shift.     Chronic maxillary sinusitis.    < end of copied text >

## 2019-07-16 NOTE — PROGRESS NOTE ADULT - PROBLEM SELECTOR PLAN 3
- likely due to Urosepsis, metabolic changes  - CT head neg  MRI pending  clinically better today  eeg - no e/o seizure activity

## 2019-07-16 NOTE — PROGRESS NOTE ADULT - SUBJECTIVE AND OBJECTIVE BOX
[facility]   [phone]  RENAL FOLLOW UP NOTE  --------------------------------------------------------------------------------  Chief Complaint:    24 hour events/subjective:    seen confused     PAST HISTORY  --------------------------------------------------------------------------------  No significant changes to PMH, PSH, FHx, SHx, unless otherwise noted    ALLERGIES & MEDICATIONS  --------------------------------------------------------------------------------  Allergies    penicillin (Swelling)    Intolerances      Standing Inpatient Medications  artificial tears (preservative free) Ophthalmic Solution 1 Drop(s) Both EYES every 2 hours  cephalexin 500 milliGRAM(s) Oral every 12 hours  chlorhexidine 2% Cloths 1 Application(s) Topical daily  heparin  Injectable 5000 Unit(s) SubCutaneous every 12 hours  melatonin 3 milliGRAM(s) Oral at bedtime  multivitamin 1 Tablet(s) Oral daily  octreotide  Injectable 200 MICROGram(s) SubCutaneous every 6 hours  sodium bicarbonate 650 milliGRAM(s) Oral three times a day  sodium chloride 0.9%. 1000 milliLiter(s) IV Continuous <Continuous>    PRN Inpatient Medications  acetaminophen   Tablet .. 650 milliGRAM(s) Oral every 6 hours PRN  haloperidol    Injectable 0.25 milliGRAM(s) IV Push every 6 hours PRN  loperamide 2 milliGRAM(s) Oral four times a day PRN      REVIEW OF SYSTEMS  --------------------------------------------------------------------------------    not giving ROS     VITALS/PHYSICAL EXAM  --------------------------------------------------------------------------------  T(C): 36.3 (07-16-19 @ 15:37), Max: 37.2 (07-16-19 @ 13:40)  HR: 101 (07-16-19 @ 15:37) (92 - 105)  BP: 92/57 (07-16-19 @ 15:37) (78/40 - 103/60)  RR: 17 (07-16-19 @ 15:37) (17 - 18)  SpO2: 94% (07-16-19 @ 13:40) (92% - 99%)  Wt(kg): --        07-15-19 @ 07:01  -  07-16-19 @ 07:00  --------------------------------------------------------  IN: 2890 mL / OUT: 825 mL / NET: 2065 mL    07-16-19 @ 07:01  -  07-16-19 @ 19:58  --------------------------------------------------------  IN: 300 mL / OUT: 0 mL / NET: 300 mL      Physical Exam:    	Chronically ill appearing frail m    	no jvd   	Pulm: decrease bs  no rales or ronchi or wheezing  	CV: RRR, S1S2; no rub  	Abd: +BS, soft, nontender/nondistended  	: No suprapubic tenderness  	UE: Warm, no cyanosis  no clubbing,  no edema  	LE: Warm, no cyanosis  no clubbing, 1+   edema    		        LABS/STUDIES  --------------------------------------------------------------------------------              8.1    9.54  >-----------<  140      [07-16-19 @ 09:55]              26.5     143  |  110  |  79  ----------------------------<  118      [07-16-19 @ 07:00]  3.0   |  22  |  2.80        Ca     9.5     [07-16-19 @ 07:00]            Creatinine Trend:  SCr 2.80 [07-16 @ 07:00]  SCr 2.83 [07-15 @ 10:05]  SCr 2.93 [07-14 @ 07:19]  SCr 2.94 [07-13 @ 09:21]  SCr 3.14 [07-12 @ 07:19]              Urinalysis - [07-14-19 @ 09:42]      Color Light Yellow / Appearance Slightly Turbid / SG 1.016 / pH 6.0      Gluc Negative / Ketone Negative  / Bili Negative / Urobili Negative       Blood Moderate / Protein 100 / Leuk Est Large / Nitrite Positive      RBC 19 /  / Hyaline 10 / Gran 4 / Sq Epi  / Non Sq Epi 3 / Bacteria Moderate      PTH -- (Ca 10.4)      [06-11-19 @ 09:51]   15  Vitamin D (25OH) 9.7      [06-11-19 @ 10:02]  TSH 1.15      [07-14-19 @ 10:04]    Syphilis Screen (Treponema Pallidum Ab) Negative      [07-15-19 @ 04:20]

## 2019-07-16 NOTE — CHART NOTE - NSCHARTNOTEFT_GEN_A_CORE
MIKE HARDY  86y Male    Notified by RN BP 78/40. Patient seen and examined at bedside. Resting in bed comfortably. AxOx1-2 as per baseline of patient. Denies CP, SOB, abdominal pain, N/V, HA, dizziness. IVF bolus ordered and running at this time. Repeat BP 88/54.       Vital Signs   T(C): 36.8   T(F): 98.3   HR: 103   BP: 78/40  RR: 18   SpO2: 92%     Physical Exam:   Constitutional: AOx1-2. NAD.  Neuro:  Grossly intact   Cardiovascular: +S1 S2. RRR.  No murmurs.  No LE edema.  Respiratory:  Even, unlabored.  Clear lungs bilaterally. No wheezing, rhonchi, or crackles.  Gastrointestinal: +BS X4 active. Soft. Nontender. Nondistended   Extremities/Vascular: +2 pulses bilaterally.   Skin:  warm, dry    HPI:   86yoM with PMH of carcinoid syndrome s/p left lower lobe resection, bladder disease with new calzada placed on July 1st in the ED sent from [facility] for SBPs in [facility]. Patient altered. Per EMS, patient with malodorous urine. At baseline, able to maintain conversations. No fevers or chills noted. (11 Jul 2019 08:11). Now, UTI on keflex.     Hypotension   - bolus x1 - repeat BP s/p bolus 92/52   -AM labs   -monitor VS   -continue maintenance fluids   -continue abx     will continue to monitor   will endorse to day team       Dolly Whyte PA-C  [id number]

## 2019-07-16 NOTE — PROVIDER CONTACT NOTE (MEDICATION) - ACTION/TREATMENT ORDERED:
okay for now, pt cannot be forced to take medication, will continue to encourage to take pills as prescribed

## 2019-07-16 NOTE — PROVIDER CONTACT NOTE (MEDICATION) - BACKGROUND
admitted from [facility] with UTI, AMS, chronic calzada, on enhanced supervision and restraints for restlessness and agitation

## 2019-07-16 NOTE — PROGRESS NOTE ADULT - PROBLEM SELECTOR PLAN 1
ct octreotide at current dose  If diarrhea worsens, will need to r/o c.diff, abxa assoc diarrhea - prn probiotics  F/up chromogranin level - pending  Prn reimaging if chromogranin increased or decision re. overall GOC need to be made to assess aggressiveness of disease  on zyprexa  , liver biopsy showed low grade carcinoid  clinically though patient is behaving aggressively ? due to large burden of disease, exacerbating pre existing chronic co morbidities  Patient has outpatient DNR  Plan d/w dr. arnold as well ct octreotide at current dose  If diarrhea worsens, will need to r/o c.diff, abxa assoc diarrhea - prn probiotics  F/up chromogranin level - increased - concern for tumor progression - can repeat Ct abdomen to assess liver disease or PET for whole body disease  If POD on octreotide - need to decide with help of palliative team re. GOC - additional treatment options include everolimus, cytotoxic chemo  POOR PS makes benefit frm aggressive therapy less likely  Patient has outpatient DNR  Plan d/w dr. arnold as well

## 2019-07-16 NOTE — PROGRESS NOTE ADULT - SUBJECTIVE AND OBJECTIVE BOX
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 Noted to have malodorous urine and treated for UTI. On this admission patient has been extremely agitated. Nursing reports he has pulled out numerous IVs, pulled on calzada, frequently agitated even in the presence of enhanced supervision. On previous visits was reportedly aaxo3. Nursing reports no sleep for many nights. Patient otherwise unable to provide any further history,.     RRT called on 7/14 for low bp and inc HR  no complaints this morning, states " i don't need a neurologist"    Past Medical History:  Neck mass (R22.1): was resected, reportedly bening  Kidney lesion (N28.9): partial resction- reportedly &quot;not active lesion&quot;  History of carcinoid syndrome (Z86.39)      Past Surgical History:  H/O carcinoid syndrome (Z86.39)  S/P TURP (Z90.79)      Social History:  No toxic habits    Family History:  FAMILY HISTORY:  FH: lung cancer (Sibling): sister  Family history of nasopharyngeal cancer: father      Allergies:  penicillin (Swelling)      ROS:  Patient unable to provide.     Advanced care planning reviewed and noted in the chart.    Medications:  acetaminophen   Tablet .. 650 milliGRAM(s) Oral every 6 hours PRN  artificial tears (preservative free) Ophthalmic Solution 1 Drop(s) Both EYES every 2 hours  cephalexin 500 milliGRAM(s) Oral every 12 hours  chlorhexidine 2% Cloths 1 Application(s) Topical daily  haloperidol    Injectable 0.25 milliGRAM(s) IV Push every 6 hours PRN  heparin  Injectable 5000 Unit(s) SubCutaneous every 12 hours  loperamide 2 milliGRAM(s) Oral four times a day PRN  melatonin 3 milliGRAM(s) Oral at bedtime  multivitamin 1 Tablet(s) Oral daily  octreotide  Injectable 200 MICROGram(s) SubCutaneous every 6 hours  potassium chloride    Tablet ER 40 milliEquivalent(s) Oral every 4 hours  sodium bicarbonate 650 milliGRAM(s) Oral three times a day  sodium chloride 0.9%. 1000 milliLiter(s) IV Continuous <Continuous>      Labs:  CBC Full  -  ( 16 Jul 2019 09:55 )  WBC Count : 9.54 K/uL  RBC Count : 2.57 M/uL  Hemoglobin : 8.1 g/dL  Hematocrit : 26.5 %  Platelet Count - Automated : 140 K/uL  Mean Cell Volume : 103.1 fl  Mean Cell Hemoglobin : 31.5 pg  Mean Cell Hemoglobin Concentration : 30.6 gm/dL  Auto Neutrophil # : x  Auto Lymphocyte # : x  Auto Monocyte # : x  Auto Eosinophil # : x  Auto Basophil # : x  Auto Neutrophil % : x  Auto Lymphocyte % : x  Auto Monocyte % : x  Auto Eosinophil % : x  Auto Basophil % : x    07-16    143  |  110<H>  |  79<H>  ----------------------------<  118<H>  3.0<L>   |  22  |  2.80<H>    Ca    9.5      16 Jul 2019 07:00      CAPILLARY BLOOD GLUCOSE                  Vitals:  Vital Signs Last 24 Hrs  T(C): 37.2 (16 Jul 2019 13:40), Max: 37.2 (16 Jul 2019 13:40)  T(F): 98.9 (16 Jul 2019 13:40), Max: 98.9 (16 Jul 2019 13:40)  HR: 92 (16 Jul 2019 13:40) (90 - 105)  BP: 89/52 (16 Jul 2019 13:40) (78/40 - 103/60)  BP(mean): --  RR: 17 (16 Jul 2019 13:40) (17 - 18)  SpO2: 94% (16 Jul 2019 13:40) (92% - 99%)    NEUROLOGICAL EXAM:    Mental status: Lethargic but eyes open, attends with reinforcement, difficult to maintain alertness, follows only simple commands.     Cranial Nerves: Pupils were equal, round, reactive to light. Extraocular movements with slight left exophoria. Visual field were full.  Facial sensation was intact to light touch. There was no facial asymmetry. The palate was upgoing symmetrically and tongue was midline.     Motor exam: Bulk and tone were normal. not cooperative with formal exam    Reflexes: Absent in the bilateral upper extremities. Absent in the bilateral lower extremities. Toes were mute.     Sensation: Intact to light touch x 4    Coordination: Unable to assess    Gait: Unable to assess    < from: CT Head No Cont (07.11.19 @ 10:17) >    EXAM:  CT BRAIN                            PROCEDURE DATE:  07/11/2019            INTERPRETATION:  CLINICAL INFORMATION: Confusion and sepsis. Lethargy.    TECHNIQUE: Noncontrast axial CT images were acquired through the head.   Two-dimensional sagittal and coronal reformats were generated.    COMPARISON STUDY: CT head 3/4/2009    FINDINGS:     No acute intracranial hemorrhage, mass effect, or midline shift. No   abnormal extra-axial collections. The basal cisterns are patent without   evidenceof central herniation.     Mild cerebral volume loss with proportional prominence of the sulci and   ventricles. Moderate patchy periventricular white matter hypoattenuation,   likely the sequela of chronic microvascular ischemic disease.    The right maxillary sinus is extensively opacified with thickened walls   consistent with chronic sinusitis, partially visualized. Mild mucosal   thickening and bony wall thickening is partially visualized in the left   maxillary sinus, also consistent with chronic sinusitis. The maxillary   sinuses are not covered on the prior exam.    IMPRESSION:     No CT evidence of acute intracranial hemorrhage, mass effect, or midline   shift.     Chronic maxillary sinusitis.                FEROZ PURVIS M.D.,RADIOLOGY RESIDENT  This document has been electronically signed.  JARRETT BERGER M.D., ATTENDING RADIOLOGIST  This document has been electronically signed. Jul 11 2019 10:30AM      < from: MR Head w/wo IV Cont (07.14.19 @ 18:20) >  EXAM:  MR BRAIN WAW IC                            PROCEDURE DATE:  07/14/2019            INTERPRETATION:  CLINICAL INDICATION: ADMDIAG1: T83.511A LETHARGY/.   Carcinoid syndrome, with altered mental status.86y year old Male    carcinoid syndrome, s/p left lower lobe resection, bladder disease with   calzada placed on  July 1st in the ED sent from [facility] for SBPs in 70s with malodorous urine   and treated for UTI.     TECHNIQUE: MRI of the brain was performed without and with contrast using   the following sequences: Axial DWI/ADC map, axial SWI, axial gradient   echo, axial SPGR, sagittal T1 FLAIR, axial T2 FLAIR, axial T2 FSE axial   T1 SPGR postcontrast and axial/coronal/sagittal T1 spin-echo postcontrast.    5 mls of Gadavist was administered intravenously without complication and   2.5 mls were discarded.    COMPARISON: CT head dated 7/11/2019, and 3/4/2009, PET/CT with dotatate   6/4/2019    FINDINGS:    Severely motion limited study on all sequences, suggest repeat imaging   with contrast and patient can hold still.    There is enlargement of ventricles and sulci greater expected for age   consistent with moderate volume loss. There are multiple nonspecific   white matter T2 hyperintensities,, likely microvascular disease with 5 mm   bifrontal subdural hygromas extending toward the vertex unchanged from   prior CT. There is no  restricted diffusion to suggest new territorial   infarction. There is no  acute intra-axial or extra-axial hemorrhage or   midline shift. There are no new extra-axial  collections. Basal cisterns   remain patent.    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 Postcontrast images are severely motion limited with   questionable enhancement along the adjacent leptomeninges, suggest repeat   imaging when patient can hold still.    Flow voids are noted within the intraventricular vasculature indicative   patency with a dominantintradural left and poorly seen intradural right   vertebral artery, and tortuous arterial vessels may reflect hypertension.    Orbital globes remain unchanged and unremarkable there is demonstration   of complete opacification and retraction of the right maxillary sinus   with increased downward sloping of the right orbital floor which may be   seen with a right silent sinus syndrome. There is mucosal thickening with   bubbly secretions and air-fluid level in the left maxillary sinus,   scattered mucosal thickening in the ethmoid and frontal sinuses. There is   opacification of the bilateral mastoid air cells and middle ears.      IMPRESSION:     Motion limited study, enhancing foci of decreased T1 and hyperintense DWI   signal in the clivusconcerning for osseous metastasis, postcontrast   images are limited by motion, 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    Volume loss and microvascular disease without obvious restricted   diffusion to suggest new territorial infarction, hemorrhage or midline   shift.    Opacified retracted right maxillary sinus correlate with right silent   sinus syndrome, and left maxillary sinus air-fluid level. Opacified   mastoids and middle ears.                DIANA STEWART M.D., RADIOLOGY FELLOW  This document has been electronically signed.  PRAVEEN MACKEY M.D., ATTENDING RADIOLOGIST  This document has been electronically signed. Jul 15 2019  3:03PM                < end of copied text >            < end of copied text >

## 2019-07-16 NOTE — PROVIDER CONTACT NOTE (OTHER) - ASSESSMENT
Pt hypotensive 78/40 this AM, alert and oriented x1 to self only. pt denies cp, sob. pt otherwise asymptomatic

## 2019-07-16 NOTE — PROGRESS NOTE ADULT - SUBJECTIVE AND OBJECTIVE BOX
Patient is a 86y old  Male who presents with a chief complaint of mental status change (11 Jul 2019 21:31)       Pt is seen and examined  pt is sle lying in bed  liquid stool earlier today  less confused today      REVIEW OF SYSTEMS:    limited  PHYSICAL EXAM  General: adult  chronically ill, flushed face   HEENT: clear oropharynx, anicteric sclera, pale conjunctiva  Neck: supple  CV: normal S1/S2 with no murmur rubs or gallops  Lungs: positive air movement b/l ant lungs,clear to auscultation, no wheezes, no rales  Abdomen: soft non-tender non-distended  Ext: no clubbing cyanosis or edema  Skin: no rashes and no petechiae  Neuro: awake, oriented to person        MEDICATIONS  (STANDING):  artificial tears (preservative free) Ophthalmic Solution 1 Drop(s) Both EYES every 2 hours  cephalexin 500 milliGRAM(s) Oral every 12 hours  chlorhexidine 2% Cloths 1 Application(s) Topical daily  heparin  Injectable 5000 Unit(s) SubCutaneous every 12 hours  melatonin 3 milliGRAM(s) Oral at bedtime  multivitamin 1 Tablet(s) Oral daily  octreotide  Injectable 200 MICROGram(s) SubCutaneous every 6 hours  potassium chloride    Tablet ER 40 milliEquivalent(s) Oral every 4 hours  sodium bicarbonate 650 milliGRAM(s) Oral three times a day  sodium chloride 0.9%. 1000 milliLiter(s) (75 mL/Hr) IV Continuous <Continuous>    MEDICATIONS  (PRN):  acetaminophen   Tablet .. 650 milliGRAM(s) Oral every 6 hours PRN Temp greater or equal to 38.5C (101.3F), Mild Pain (1 - 3)  haloperidol    Injectable 0.25 milliGRAM(s) IV Push every 6 hours PRN Agitation  loperamide 2 milliGRAM(s) Oral four times a day PRN Diarrhea      Allergies    penicillin (Swelling)    Intolerances        Vital Signs Last 24 Hrs  T(C): 37 (16 Jul 2019 08:07), Max: 37 (16 Jul 2019 08:07)  T(F): 98.6 (16 Jul 2019 08:07), Max: 98.6 (16 Jul 2019 08:07)  HR: 100 (16 Jul 2019 08:07) (90 - 105)  BP: 89/47 (16 Jul 2019 08:07) (78/40 - 103/60)  BP(mean): --  RR: 18 (16 Jul 2019 08:07) (18 - 18)  SpO2: 94% (16 Jul 2019 08:07) (92% - 99%)      LABS:                          7.6    x     )-----------( 133      ( 15 Jul 2019 11:20 )             24.0         Mean Cell Volume : 101.7 fl  Mean Cell Hemoglobin : 32.2 pg  Mean Cell Hemoglobin Concentration : 31.7 gm/dL        07-16    143  |  110<H>  |  79<H>  ----------------------------<  118<H>  3.0<L>   |  22  |  2.80<H>    Ca    9.5      16 Jul 2019 07:00    Culture - Blood (07.11.19 @ 05:11)    Specimen Source: .Blood    Culture Results:   No growth at 5 days.      Culture - Urine (07.11.19 @ 05:01)    -  Cefazolin: S <=2 (MIC_CL_COM_ENTERIC_CEFAZU) For uncomplicated UTI with K. pneumoniae, E. coli, or P. mirablis: MAYRA <=16 is sensitive and MAYRA >=32 is resistant. This also predicts results for oral agents cefaclor, cefdinir, cefpodoxime, cefprozil, cefuroxime axetil, cephalexin and locarbef for uncomplicated UTI. Note that some isolates may be susceptible to these agents while testing resistant to cefazolin.    -  Aztreonam: S <=4    -  Ampicillin: S <=2 These ampicillin results predict results for amoxicillin    -  Ampicillin/Sulbactam: S <=4/2 Enterobacter, Citrobacter, and Serratia may develop resistance during prolonged therapy (3-4 days)    -  Amikacin: S <=8    -  Amoxicillin/Clavulanic Acid: S <=8/4    -  Trimethoprim/Sulfamethoxazole: S <=0.5/9.5    -  Tobramycin: S <=2    -  Piperacillin/Tazobactam: S <=8    -  Tigecycline: S <=1    -  Nitrofurantoin: S <=32 Should not be used to treat pyelonephritis    -  Meropenem: S <=1    -  Ertapenem: S <=0.5    -  Gentamicin: S <=1    -  Imipenem: S <=1    -  Levofloxacin: S <=1    -  Ciprofloxacin: S <=0.5    -  Ceftriaxone: S <=1 Enterobacter, Citrobacter, and Serratia may develop resistance during prolonged therapy    -  Cefoxitin: S <=4    -  Cefepime: S <=2    Specimen Source: .Urine    Culture Results:   >100,000 CFU/ml Escherichia coli    Organism Identification: Escherichia coli    Organism: Escherichia coli    Method Type: MAYRA      Chromogranin A (06.23.19 @ 11:57)    Chromogranin A: [id number]    Rapid Respiratory Viral Panel (07.11.19 @ 22:22)    Rapid RVP Result: NotDetec: This Respiratory Panel uses polymerase chain reaction (PCR) to detect for  adenovirus; coronavirus (HKU1, NL63, 229E, OC43); human metapneumovirus  (hMPV); human enterovirus/rhinovirus (Entero/RV); influenza A; influenza  A/H1; influenza A/H3; influenza A/H1-2009; influenza B; parainfluenza  viruses 1, 2, 3, 4; respiratory syncytial virus; Mycoplasma pneumoniae;  and Chlamydophila pneumoniae.    < from: CT Chest No Cont (07.14.19 @ 12:01) >  IMPRESSION:     Mild pulmonary edema.    < end of copied text >      RADIOLOGY & ADDITIONAL STUDIES:    EXAM:  XR CHEST AP OR PA 1V                            PROCEDURE DATE:  07/11/2019        INTERPRETATION:  CLINICAL INFORMATION: Sepsis.    COMPARISON:  Chest x-ray 6/10/2019    TECHNIQUE:   Frontal radiograph of the chest.     FINDINGS:    Thelungs are clear. There is no pleural effusion or pneumothorax. The   cardiac silhouette is unremarkable. No acute osseous abnormality.    IMPRESSION: Clear lungs.    < from: CT Head No Cont (07.11.19 @ 10:17) >  IMPRESSION:     No CT evidence of acute intracranial hemorrhage, mass effect, or midline   shift.     Chronic maxillary sinusitis.      < end of copied text >      Chromogranin A (06.23.19 @ 11:57)    Chromogranin A: [id number] Patient is a 86y old  Male who presents with a chief complaint of mental status change (11 Jul 2019 21:31)       Pt is seen and examined  pt is sle lying in bed  liquid stool earlier today  remains confused       REVIEW OF SYSTEMS:    limited  PHYSICAL EXAM  General: adult  chronically ill, flushed face   HEENT: clear oropharynx, anicteric sclera, pale conjunctiva  Neck: supple  CV: normal S1/S2 with no murmur rubs or gallops  Lungs: positive air movement b/l ant lungs,clear to auscultation, no wheezes, no rales  Abdomen: soft non-tender non-distended  Ext: no clubbing cyanosis or edema  Skin: no rashes and no petechiae  Neuro: awake        MEDICATIONS  (STANDING):  artificial tears (preservative free) Ophthalmic Solution 1 Drop(s) Both EYES every 2 hours  cephalexin 500 milliGRAM(s) Oral every 12 hours  chlorhexidine 2% Cloths 1 Application(s) Topical daily  heparin  Injectable 5000 Unit(s) SubCutaneous every 12 hours  melatonin 3 milliGRAM(s) Oral at bedtime  multivitamin 1 Tablet(s) Oral daily  octreotide  Injectable 200 MICROGram(s) SubCutaneous every 6 hours  potassium chloride    Tablet ER 40 milliEquivalent(s) Oral every 4 hours  sodium bicarbonate 650 milliGRAM(s) Oral three times a day  sodium chloride 0.9%. 1000 milliLiter(s) (75 mL/Hr) IV Continuous <Continuous>    MEDICATIONS  (PRN):  acetaminophen   Tablet .. 650 milliGRAM(s) Oral every 6 hours PRN Temp greater or equal to 38.5C (101.3F), Mild Pain (1 - 3)  haloperidol    Injectable 0.25 milliGRAM(s) IV Push every 6 hours PRN Agitation  loperamide 2 milliGRAM(s) Oral four times a day PRN Diarrhea      Allergies    penicillin (Swelling)    Intolerances        Vital Signs Last 24 Hrs  T(C): 37 (16 Jul 2019 08:07), Max: 37 (16 Jul 2019 08:07)  T(F): 98.6 (16 Jul 2019 08:07), Max: 98.6 (16 Jul 2019 08:07)  HR: 100 (16 Jul 2019 08:07) (90 - 105)  BP: 89/47 (16 Jul 2019 08:07) (78/40 - 103/60)  BP(mean): --  RR: 18 (16 Jul 2019 08:07) (18 - 18)  SpO2: 94% (16 Jul 2019 08:07) (92% - 99%)      LABS:           Complete Blood Count in AM (07.16.19 @ 09:55)    Nucleated RBC: 0 /100 WBCs    WBC Count: 9.54 K/uL    RBC Count: 2.57 M/uL    Hemoglobin: 8.1 g/dL    Hematocrit: 26.5 %    Mean Cell Volume: 103.1 fl    Mean Cell Hemoglobin: 31.5 pg    Mean Cell Hemoglobin Conc: 30.6 gm/dL    Red Cell Distrib Width: 20.3 %    Platelet Count - Automated: 140 K/uL          07-16    143  |  110<H>  |  79<H>  ----------------------------<  118<H>  3.0<L>   |  22  |  2.80<H>    Ca    9.5      16 Jul 2019 07:00    Culture - Blood (07.11.19 @ 05:11)    Specimen Source: .Blood    Culture Results:   No growth at 5 days.    Chromogranin A (07.12.19 @ 11:00)    Chromogranin A: [id number]  Culture - Urine (07.11.19 @ 05:01)    -  Cefazolin: S <=2 (MIC_CL_COM_ENTERIC_CEFAZU) For uncomplicated UTI with K. pneumoniae, E. coli, or P. mirablis: MAYRA <=16 is sensitive and MAYRA >=32 is resistant. This also predicts results for oral agents cefaclor, cefdinir, cefpodoxime, cefprozil, cefuroxime axetil, cephalexin and locarbef for uncomplicated UTI. Note that some isolates may be susceptible to these agents while testing resistant to cefazolin.    -  Aztreonam: S <=4    -  Ampicillin: S <=2 These ampicillin results predict results for amoxicillin    -  Ampicillin/Sulbactam: S <=4/2 Enterobacter, Citrobacter, and Serratia may develop resistance during prolonged therapy (3-4 days)    -  Amikacin: S <=8    -  Amoxicillin/Clavulanic Acid: S <=8/4    -  Trimethoprim/Sulfamethoxazole: S <=0.5/9.5    -  Tobramycin: S <=2    -  Piperacillin/Tazobactam: S <=8    -  Tigecycline: S <=1    -  Nitrofurantoin: S <=32 Should not be used to treat pyelonephritis    -  Meropenem: S <=1    -  Ertapenem: S <=0.5    -  Gentamicin: S <=1    -  Imipenem: S <=1    -  Levofloxacin: S <=1    -  Ciprofloxacin: S <=0.5    -  Ceftriaxone: S <=1 Enterobacter, Citrobacter, and Serratia may develop resistance during prolonged therapy    -  Cefoxitin: S <=4    -  Cefepime: S <=2    Specimen Source: .Urine    Culture Results:   >100,000 CFU/ml Escherichia coli    Organism Identification: Escherichia coli    Organism: Escherichia coli    Method Type: MAYRA      Chromogranin A (06.23.19 @ 11:57)    Chromogranin A: [id number]    Rapid Respiratory Viral Panel (07.11.19 @ 22:22)    Rapid RVP Result: NotDetec: This Respiratory Panel uses polymerase chain reaction (PCR) to detect for  adenovirus; coronavirus (HKU1, NL63, 229E, OC43); human metapneumovirus  (hMPV); human enterovirus/rhinovirus (Entero/RV); influenza A; influenza  A/H1; influenza A/H3; influenza A/H1-2009; influenza B; parainfluenza  viruses 1, 2, 3, 4; respiratory syncytial virus; Mycoplasma pneumoniae;  and Chlamydophila pneumoniae.    < from: CT Chest No Cont (07.14.19 @ 12:01) >  IMPRESSION:     Mild pulmonary edema.    < end of copied text >      RADIOLOGY & ADDITIONAL STUDIES:    EXAM:  XR CHEST AP OR PA 1V                            PROCEDURE DATE:  07/11/2019        INTERPRETATION:  CLINICAL INFORMATION: Sepsis.    COMPARISON:  Chest x-ray 6/10/2019    TECHNIQUE:   Frontal radiograph of the chest.     FINDINGS:    Thelungs are clear. There is no pleural effusion or pneumothorax. The   cardiac silhouette is unremarkable. No acute osseous abnormality.    IMPRESSION: Clear lungs.    < from: CT Head No Cont (07.11.19 @ 10:17) >  IMPRESSION:     No CT evidence of acute intracranial hemorrhage, mass effect, or midline   shift.     Chronic maxillary sinusitis.      < end of copied text >      Chromogranin A (06.23.19 @ 11:57)    Chromogranin A: [id number]

## 2019-07-16 NOTE — PROGRESS NOTE ADULT - SUBJECTIVE AND OBJECTIVE BOX
Patient is a 86y old  Male who presents with a chief complaint of mental status jamil (16 Jul 2019 20:03)      SUBJECTIVE / OVERNIGHT EVENTS: more awake, confused.  Review of Systems  chest pain no  palpitations no  sob no  nausea no  headache no    MEDICATIONS  (STANDING):  artificial tears (preservative free) Ophthalmic Solution 1 Drop(s) Both EYES every 2 hours  cephalexin 500 milliGRAM(s) Oral every 12 hours  chlorhexidine 2% Cloths 1 Application(s) Topical daily  heparin  Injectable 5000 Unit(s) SubCutaneous every 12 hours  melatonin 3 milliGRAM(s) Oral at bedtime  multivitamin 1 Tablet(s) Oral daily  octreotide  Injectable 200 MICROGram(s) SubCutaneous every 6 hours  sodium bicarbonate 1300 milliGRAM(s) Oral every 6 hours  sodium chloride 0.9%. 1000 milliLiter(s) (75 mL/Hr) IV Continuous <Continuous>    MEDICATIONS  (PRN):  acetaminophen   Tablet .. 650 milliGRAM(s) Oral every 6 hours PRN Temp greater or equal to 38.5C (101.3F), Mild Pain (1 - 3)  haloperidol    Injectable 0.25 milliGRAM(s) IV Push every 6 hours PRN Agitation  loperamide 2 milliGRAM(s) Oral four times a day PRN Diarrhea      Vital Signs Last 24 Hrs  T(C): 36.3 (16 Jul 2019 15:37), Max: 37.2 (16 Jul 2019 13:40)  T(F): 97.4 (16 Jul 2019 15:37), Max: 98.9 (16 Jul 2019 13:40)  HR: 101 (16 Jul 2019 15:37) (92 - 105)  BP: 92/57 (16 Jul 2019 15:37) (78/40 - 103/60)  BP(mean): --  RR: 17 (16 Jul 2019 15:37) (17 - 18)  SpO2: 94% (16 Jul 2019 13:40) (92% - 99%)    PHYSICAL EXAM:  GENERAL: NAD  HEAD:  Atraumatic, Normocephalic  EYES: EOMI, PERRLA, conjunctiva and sclera clear  NECK: Supple, No JVD  CHEST/LUNG: Clear to auscultation bilaterally; No wheeze  HEART: Regular rate and rhythm; No murmurs, rubs, or gallops  ABDOMEN: Soft, Nontender, Nondistended; Bowel sounds present  EXTREMITIES:  2+ Peripheral Pulses, No clubbing, cyanosis, or edema  PSYCH: confused  NEUROLOGY: non-focal  SKIN: No rashes or lesions flushed    LABS:                        8.1    9.54  )-----------( 140      ( 16 Jul 2019 09:55 )             26.5     07-16    143  |  110<H>  |  79<H>  ----------------------------<  118<H>  3.0<L>   |  22  |  2.80<H>    Ca    9.5      16 Jul 2019 07:00                  RADIOLOGY & ADDITIONAL TESTS:    Imaging Personally Reviewed:    Consultant(s) Notes Reviewed:      Care Discussed with Consultants/Other Providers:

## 2019-07-17 DIAGNOSIS — N17.9 ACUTE KIDNEY FAILURE, UNSPECIFIED: ICD-10-CM

## 2019-07-17 DIAGNOSIS — G93.40 ENCEPHALOPATHY, UNSPECIFIED: ICD-10-CM

## 2019-07-17 DIAGNOSIS — R53.81 OTHER MALAISE: ICD-10-CM

## 2019-07-17 DIAGNOSIS — Z51.5 ENCOUNTER FOR PALLIATIVE CARE: ICD-10-CM

## 2019-07-17 DIAGNOSIS — R19.7 DIARRHEA, UNSPECIFIED: ICD-10-CM

## 2019-07-17 LAB
ANION GAP SERPL CALC-SCNC: 15 MMOL/L — SIGNIFICANT CHANGE UP (ref 5–17)
BUN SERPL-MCNC: 79 MG/DL — HIGH (ref 7–23)
CALCIUM SERPL-MCNC: 9.9 MG/DL — SIGNIFICANT CHANGE UP (ref 8.4–10.5)
CHLORIDE SERPL-SCNC: 111 MMOL/L — HIGH (ref 96–108)
CO2 SERPL-SCNC: 17 MMOL/L — LOW (ref 22–31)
CREAT SERPL-MCNC: 2.83 MG/DL — HIGH (ref 0.5–1.3)
GLUCOSE SERPL-MCNC: 98 MG/DL — SIGNIFICANT CHANGE UP (ref 70–99)
HCT VFR BLD CALC: 26.3 % — LOW (ref 39–50)
HGB BLD-MCNC: 7.9 G/DL — LOW (ref 13–17)
MAGNESIUM SERPL-MCNC: 2.4 MG/DL — SIGNIFICANT CHANGE UP (ref 1.6–2.6)
MCHC RBC-ENTMCNC: 30 GM/DL — LOW (ref 32–36)
MCHC RBC-ENTMCNC: 31.2 PG — SIGNIFICANT CHANGE UP (ref 27–34)
MCV RBC AUTO: 104 FL — HIGH (ref 80–100)
NRBC # BLD: 0 /100 WBCS — SIGNIFICANT CHANGE UP (ref 0–0)
PHOSPHATE SERPL-MCNC: 1.9 MG/DL — LOW (ref 2.5–4.5)
PLATELET # BLD AUTO: 146 K/UL — LOW (ref 150–400)
POTASSIUM SERPL-MCNC: 3.5 MMOL/L — SIGNIFICANT CHANGE UP (ref 3.5–5.3)
POTASSIUM SERPL-SCNC: 3.5 MMOL/L — SIGNIFICANT CHANGE UP (ref 3.5–5.3)
RBC # BLD: 2.53 M/UL — LOW (ref 4.2–5.8)
RBC # FLD: 20.3 % — HIGH (ref 10.3–14.5)
SODIUM SERPL-SCNC: 143 MMOL/L — SIGNIFICANT CHANGE UP (ref 135–145)
WBC # BLD: 9.33 K/UL — SIGNIFICANT CHANGE UP (ref 3.8–10.5)
WBC # FLD AUTO: 9.33 K/UL — SIGNIFICANT CHANGE UP (ref 3.8–10.5)

## 2019-07-17 PROCEDURE — 99233 SBSQ HOSP IP/OBS HIGH 50: CPT

## 2019-07-17 PROCEDURE — 93010 ELECTROCARDIOGRAM REPORT: CPT

## 2019-07-17 RX ORDER — SODIUM CHLORIDE 9 MG/ML
250 INJECTION INTRAMUSCULAR; INTRAVENOUS; SUBCUTANEOUS ONCE
Refills: 0 | Status: COMPLETED | OUTPATIENT
Start: 2019-07-17 | End: 2019-07-17

## 2019-07-17 RX ORDER — CHOLESTYRAMINE 4 G/9G
4 POWDER, FOR SUSPENSION ORAL EVERY 12 HOURS
Refills: 0 | Status: DISCONTINUED | OUTPATIENT
Start: 2019-07-17 | End: 2019-07-22

## 2019-07-17 RX ADMIN — Medication 1300 MILLIGRAM(S): at 05:00

## 2019-07-17 RX ADMIN — SODIUM CHLORIDE 75 MILLILITER(S): 9 INJECTION INTRAMUSCULAR; INTRAVENOUS; SUBCUTANEOUS at 22:14

## 2019-07-17 RX ADMIN — SODIUM CHLORIDE 75 MILLILITER(S): 9 INJECTION INTRAMUSCULAR; INTRAVENOUS; SUBCUTANEOUS at 20:10

## 2019-07-17 RX ADMIN — Medication 2 MILLIGRAM(S): at 17:54

## 2019-07-17 RX ADMIN — CHOLESTYRAMINE 4 GRAM(S): 4 POWDER, FOR SUSPENSION ORAL at 20:10

## 2019-07-17 RX ADMIN — Medication 500 MILLIGRAM(S): at 17:53

## 2019-07-17 RX ADMIN — Medication 1 DROP(S): at 22:15

## 2019-07-17 RX ADMIN — Medication 1 DROP(S): at 05:01

## 2019-07-17 RX ADMIN — Medication 1 TABLET(S): at 13:10

## 2019-07-17 RX ADMIN — HEPARIN SODIUM 5000 UNIT(S): 5000 INJECTION INTRAVENOUS; SUBCUTANEOUS at 05:00

## 2019-07-17 RX ADMIN — OCTREOTIDE ACETATE 200 MICROGRAM(S): 200 INJECTION, SOLUTION INTRAVENOUS; SUBCUTANEOUS at 05:00

## 2019-07-17 RX ADMIN — Medication 500 MILLIGRAM(S): at 05:00

## 2019-07-17 RX ADMIN — OCTREOTIDE ACETATE 200 MICROGRAM(S): 200 INJECTION, SOLUTION INTRAVENOUS; SUBCUTANEOUS at 13:10

## 2019-07-17 RX ADMIN — SODIUM CHLORIDE 250 MILLILITER(S): 9 INJECTION INTRAMUSCULAR; INTRAVENOUS; SUBCUTANEOUS at 13:31

## 2019-07-17 RX ADMIN — Medication 2 MILLIGRAM(S): at 05:02

## 2019-07-17 RX ADMIN — OCTREOTIDE ACETATE 200 MICROGRAM(S): 200 INJECTION, SOLUTION INTRAVENOUS; SUBCUTANEOUS at 20:11

## 2019-07-17 RX ADMIN — Medication 1 DROP(S): at 13:32

## 2019-07-17 RX ADMIN — Medication 1 DROP(S): at 16:28

## 2019-07-17 RX ADMIN — Medication 1 DROP(S): at 08:32

## 2019-07-17 RX ADMIN — Medication 1 DROP(S): at 17:47

## 2019-07-17 RX ADMIN — Medication 1 DROP(S): at 12:08

## 2019-07-17 RX ADMIN — CHLORHEXIDINE GLUCONATE 1 APPLICATION(S): 213 SOLUTION TOPICAL at 13:11

## 2019-07-17 RX ADMIN — Medication 1 DROP(S): at 20:11

## 2019-07-17 RX ADMIN — Medication 1300 MILLIGRAM(S): at 17:53

## 2019-07-17 RX ADMIN — HEPARIN SODIUM 5000 UNIT(S): 5000 INJECTION INTRAVENOUS; SUBCUTANEOUS at 17:52

## 2019-07-17 RX ADMIN — Medication 1 DROP(S): at 10:39

## 2019-07-17 RX ADMIN — Medication 3 MILLIGRAM(S): at 22:15

## 2019-07-17 RX ADMIN — Medication 1300 MILLIGRAM(S): at 13:10

## 2019-07-17 NOTE — DIETITIAN INITIAL EVALUATION ADULT. - ADD RECOMMEND
1. Continue multivitamin, consider adding vitamin C to further aid in wound healing, 2. Continue to encourage po intake and obtain/honor food preferences as able, 3. Monitor PO intake, diet tolerance, weight trends, labs, and skin integrity

## 2019-07-17 NOTE — CONSULT NOTE ADULT - PROBLEM SELECTOR RECOMMENDATION 9
Persistent despite octreotide  Likely related to neuroendocrine activity  Octreotide infusion versus telotristat ethyl (may not be on formulary) versus  On loperamide but may need ATC dosing,   perhaps bile acid sequestrants if tolerating po: cholestyramine 4mg bid  QTc 468, will order repeat EKG to determine if zofran trial may be helpful  Would not use opium tincture at this time due to hypotension  Ensure Mg >2 K>4 to help with QTc correction

## 2019-07-17 NOTE — CONSULT NOTE ADULT - PROBLEM SELECTOR RECOMMENDATION 6
Wife Essence  Reports of three children  Christiana [phone]  Sonia ? contact  and an unnamed third after reviewing the chart Wife Essence  Reports of three children  Christiana [phone]  Sonia ? contact  and an unnamed third after reviewing the chart  Will work to arrange a family meeting

## 2019-07-17 NOTE — PROGRESS NOTE ADULT - PROBLEM SELECTOR PLAN 3
- likely due to Urosepsis, metabolic changes  - CT head neg  MRI pending  clinically better today  eeg - no e/o seizure activity - likely due to Urosepsis, metabolic changes  - CT head neg  MRI non contributory  clinically better today  eeg - no e/o seizure activity

## 2019-07-17 NOTE — PROGRESS NOTE ADULT - PROBLEM SELECTOR PLAN 1
ct octreotide at current dose  If diarrhea worsens, will need to r/o c.diff, abxa assoc diarrhea - prn probiotics  F/up chromogranin level - increased - concern for tumor progression - can repeat Ct abdomen to assess liver disease or PET for whole body disease  If POD on octreotide - need to decide with help of palliative team re. GOC - additional treatment options include everolimus, cytotoxic chemo  POOR PS makes benefit frm aggressive therapy less likely  Patient has outpatient DNR  Plan d/w dr. arnold as well ct octreotide at current dose  If diarrhea worsens, will need to r/o c.diff, abxa assoc diarrhea - prn probiotics  F/up chromogranin level - increased - concern for tumor progression - can repeat Ct abdomen to assess liver disease or PET for whole body disease if family desires  If POD on octreotide - need to decide with help of palliative team re. GOC - additional treatment options include everolimus, cytotoxic chemo  POOR PS makes benefit frm aggressive therapy less likely  Patient has outpatient DNR  Plan d/w dr. arnold as well

## 2019-07-17 NOTE — CHART NOTE - NSCHARTNOTEFT_GEN_A_CORE
Upon Nutritional Assessment by the Registered Dietitian your patient was determined to meet criteria / has evidence of the following diagnosis/diagnoses:          [ ]  Mild Protein Calorie Malnutrition        [ ]  Moderate Protein Calorie Malnutrition        [ x] Severe Protein Calorie Malnutrition        [ ] Unspecified Protein Calorie Malnutrition        [ x] Underweight / BMI <19        [ ] Morbid Obesity / BMI > 40      Findings as based on:  [ x] Comprehensive nutrition assessment: 10% wt loss within 3 months, meeting <75% estimated needs PTA  [ ] Nutrition Focused Physical Exam  [ ] Other:       Nutrition Plan/Recommendations:  Add ensure clear 3 daily (240 Kcal, 8g protein per 8 oz serving) and Liam 2 packets daily (90 Kcal, 14g additional amino acid per packet)        PROVIDER Section:     By signing this assessment you are acknowledging and agree with the diagnosis/diagnoses assigned by the Registered Dietitian    Comments:

## 2019-07-17 NOTE — CONSULT NOTE ADULT - REASON FOR ADMISSION
mental status chanhe

## 2019-07-17 NOTE — PROGRESS NOTE ADULT - SUBJECTIVE AND OBJECTIVE BOX
[facility]   [phone]  RENAL FOLLOW UP NOTE  --------------------------------------------------------------------------------  Chief Complaint:    24 hour events/subjective:    seen earlier. confused but calm today     PAST HISTORY  --------------------------------------------------------------------------------  No significant changes to PMH, PSH, FHx, SHx, unless otherwise noted    ALLERGIES & MEDICATIONS  --------------------------------------------------------------------------------  Allergies    penicillin (Swelling)    Intolerances      Standing Inpatient Medications  artificial tears (preservative free) Ophthalmic Solution 1 Drop(s) Both EYES every 2 hours  cephalexin 500 milliGRAM(s) Oral every 12 hours  chlorhexidine 2% Cloths 1 Application(s) Topical daily  cholestyramine Powder (Sugar-Free) 4 Gram(s) Oral every 12 hours  heparin  Injectable 5000 Unit(s) SubCutaneous every 12 hours  melatonin 3 milliGRAM(s) Oral at bedtime  multivitamin 1 Tablet(s) Oral daily  octreotide  Injectable 200 MICROGram(s) SubCutaneous every 6 hours  sodium bicarbonate 1300 milliGRAM(s) Oral every 6 hours  sodium chloride 0.9%. 1000 milliLiter(s) IV Continuous <Continuous>    PRN Inpatient Medications  acetaminophen   Tablet .. 650 milliGRAM(s) Oral every 6 hours PRN  haloperidol    Injectable 0.25 milliGRAM(s) IV Push every 6 hours PRN  loperamide 2 milliGRAM(s) Oral four times a day PRN      REVIEW OF SYSTEMS  --------------------------------------------------------------------------------    Gen: denies  fevers/chills,  CVS: denies chest pain/palpitations  Resp: denies SOB/Cough  GI: Denies N/V/Abd pain  : Denies dysuria/hematuria    All other systems were reviewed and are negative, except as noted.    VITALS/PHYSICAL EXAM  --------------------------------------------------------------------------------  T(C): 36.6 (07-17-19 @ 16:22), Max: 36.9 (07-16-19 @ 23:37)  HR: 108 (07-17-19 @ 20:17) (95 - 108)  BP: 92/57 (07-17-19 @ 20:17) (80/49 - 98/59)  RR: 18 (07-17-19 @ 16:22) (18 - 18)  SpO2: 99% (07-17-19 @ 16:22) (92% - 99%)  Wt(kg): --        07-16-19 @ 07:01  -  07-17-19 @ 07:00  --------------------------------------------------------  IN: 350 mL / OUT: 600 mL / NET: -250 mL    07-17-19 @ 07:01  -  07-17-19 @ 22:53  --------------------------------------------------------  IN: 1075 mL / OUT: 400 mL / NET: 675 mL      Physical Exam:  	  Chronically ill appearing frail m    	no jvd   	Pulm: decrease bs  no rales or ronchi or wheezing  	CV: RRR, S1S2; no rub  	Abd: +BS, soft, nontender/nondistended  	: No suprapubic tenderness  	UE: Warm, no cyanosis  no clubbing,  no edema  	LE: Warm, no cyanosis  no clubbing, trace    edema        LABS/STUDIES  --------------------------------------------------------------------------------              7.9    9.33  >-----------<  146      [07-17-19 @ 09:30]              26.3     143  |  111  |  79  ----------------------------<  98      [07-17-19 @ 07:42]  3.5   |  17  |  2.83        Ca     9.9     [07-17-19 @ 07:42]      Mg     2.4     [07-17-19 @ 07:42]      Phos  1.9     [07-17-19 @ 07:42]            Creatinine Trend:  SCr 2.83 [07-17 @ 07:42]  SCr 2.80 [07-16 @ 07:00]  SCr 2.83 [07-15 @ 10:05]  SCr 2.93 [07-14 @ 07:19]  SCr 2.94 [07-13 @ 09:21]              Urinalysis - [07-14-19 @ 09:42]      Color Light Yellow / Appearance Slightly Turbid / SG 1.016 / pH 6.0      Gluc Negative / Ketone Negative  / Bili Negative / Urobili Negative       Blood Moderate / Protein 100 / Leuk Est Large / Nitrite Positive      RBC 19 /  / Hyaline 10 / Gran 4 / Sq Epi  / Non Sq Epi 3 / Bacteria Moderate      PTH -- (Ca 10.4)      [06-11-19 @ 09:51]   15  Vitamin D (25OH) 9.7      [06-11-19 @ 10:02]  TSH 1.15      [07-14-19 @ 10:04]    Syphilis Screen (Treponema Pallidum Ab) Negative      [07-15-19 @ 04:20]

## 2019-07-17 NOTE — DIETITIAN INITIAL EVALUATION ADULT. - DIET TYPE
Ensure clear 3 daily (240 Kcal, 8g protein per 8 oz serving), rickey 2 packets daily (90 Kcal, 14g additional amino acid per packet)/regular

## 2019-07-17 NOTE — PROGRESS NOTE ADULT - SUBJECTIVE AND OBJECTIVE BOX
Patient is a 86y old  Male who presents with a chief complaint of mental status change (11 Jul 2019 21:31)       Pt is seen and examined  pt is sle lying in bed  liquid stool earlier today  remains confused       REVIEW OF SYSTEMS:    limited  PHYSICAL EXAM  General: adult  chronically ill, flushed face   HEENT: clear oropharynx, anicteric sclera, pale conjunctiva  Neck: supple  CV: normal S1/S2 with no murmur rubs or gallops  Lungs: positive air movement b/l ant lungs,clear to auscultation, no wheezes, no rales  Abdomen: soft non-tender non-distended  Ext: no clubbing cyanosis or edema  Skin: no rashes and no petechiae  Neuro: awake        MEDICATIONS  (STANDING):  artificial tears (preservative free) Ophthalmic Solution 1 Drop(s) Both EYES every 2 hours  cephalexin 500 milliGRAM(s) Oral every 12 hours  chlorhexidine 2% Cloths 1 Application(s) Topical daily  heparin  Injectable 5000 Unit(s) SubCutaneous every 12 hours  melatonin 3 milliGRAM(s) Oral at bedtime  multivitamin 1 Tablet(s) Oral daily  octreotide  Injectable 200 MICROGram(s) SubCutaneous every 6 hours  sodium bicarbonate 1300 milliGRAM(s) Oral every 6 hours  sodium chloride 0.9%. 1000 milliLiter(s) (75 mL/Hr) IV Continuous <Continuous>    MEDICATIONS  (PRN):  acetaminophen   Tablet .. 650 milliGRAM(s) Oral every 6 hours PRN Temp greater or equal to 38.5C (101.3F), Mild Pain (1 - 3)  haloperidol    Injectable 0.25 milliGRAM(s) IV Push every 6 hours PRN Agitation  loperamide 2 milliGRAM(s) Oral four times a day PRN Diarrhea      Allergies    penicillin (Swelling)    Intolerances        Vital Signs Last 24 Hrs  T(C): 36.9 (17 Jul 2019 04:28), Max: 37.2 (16 Jul 2019 13:40)  T(F): 98.4 (17 Jul 2019 04:28), Max: 98.9 (16 Jul 2019 13:40)  HR: 96 (17 Jul 2019 04:28) (92 - 108)  BP: 91/52 (17 Jul 2019 04:42) (80/49 - 98/59)  BP(mean): --  RR: 18 (17 Jul 2019 04:28) (17 - 18)  SpO2: 94% (17 Jul 2019 04:28) (92% - 94%)      LABS:                          8.1    9.54  )-----------( 140      ( 16 Jul 2019 09:55 )             26.5         Mean Cell Volume : 103.1 fl  Mean Cell Hemoglobin : 31.5 pg  Mean Cell Hemoglobin Concentration : 30.6 gm/dL          07-17    143  |  111<H>  |  79<H>  ----------------------------<  98  3.5   |  17<L>  |  2.83<H>    Ca    9.9      17 Jul 2019 07:42  Phos  1.9     07-17  Mg     2.4     07-17          Culture - Blood (07.11.19 @ 05:11)    Specimen Source: .Blood    Culture Results:   No growth at 5 days.    Chromogranin A (07.12.19 @ 11:00)    Chromogranin A: [id number]  Culture - Urine (07.11.19 @ 05:01)    -  Cefazolin: S <=2 (MIC_CL_COM_ENTERIC_CEFAZU) For uncomplicated UTI with K. pneumoniae, E. coli, or P. mirablis: MAYRA <=16 is sensitive and MAYRA >=32 is resistant. This also predicts results for oral agents cefaclor, cefdinir, cefpodoxime, cefprozil, cefuroxime axetil, cephalexin and locarbef for uncomplicated UTI. Note that some isolates may be susceptible to these agents while testing resistant to cefazolin.    -  Aztreonam: S <=4    -  Ampicillin: S <=2 These ampicillin results predict results for amoxicillin    -  Ampicillin/Sulbactam: S <=4/2 Enterobacter, Citrobacter, and Serratia may develop resistance during prolonged therapy (3-4 days)    -  Amikacin: S <=8    -  Amoxicillin/Clavulanic Acid: S <=8/4    -  Trimethoprim/Sulfamethoxazole: S <=0.5/9.5    -  Tobramycin: S <=2    -  Piperacillin/Tazobactam: S <=8    -  Tigecycline: S <=1    -  Nitrofurantoin: S <=32 Should not be used to treat pyelonephritis    -  Meropenem: S <=1    -  Ertapenem: S <=0.5    -  Gentamicin: S <=1    -  Imipenem: S <=1    -  Levofloxacin: S <=1    -  Ciprofloxacin: S <=0.5    -  Ceftriaxone: S <=1 Enterobacter, Citrobacter, and Serratia may develop resistance during prolonged therapy    -  Cefoxitin: S <=4    -  Cefepime: S <=2    Specimen Source: .Urine    Culture Results:   >100,000 CFU/ml Escherichia coli    Organism Identification: Escherichia coli    Organism: Escherichia coli    Method Type: MAYRA      Chromogranin A (06.23.19 @ 11:57)    Chromogranin A: [id number]    Rapid Respiratory Viral Panel (07.11.19 @ 22:22)    Rapid RVP Result: NotDetec: This Respiratory Panel uses polymerase chain reaction (PCR) to detect for  adenovirus; coronavirus (HKU1, NL63, 229E, OC43); human metapneumovirus  (hMPV); human enterovirus/rhinovirus (Entero/RV); influenza A; influenza  A/H1; influenza A/H3; influenza A/H1-2009; influenza B; parainfluenza  viruses 1, 2, 3, 4; respiratory syncytial virus; Mycoplasma pneumoniae;  and Chlamydophila pneumoniae.    < from: CT Chest No Cont (07.14.19 @ 12:01) >  IMPRESSION:     Mild pulmonary edema.    < end of copied text >      RADIOLOGY & ADDITIONAL STUDIES:    EXAM:  XR CHEST AP OR PA 1V                            PROCEDURE DATE:  07/11/2019        INTERPRETATION:  CLINICAL INFORMATION: Sepsis.    COMPARISON:  Chest x-ray 6/10/2019    TECHNIQUE:   Frontal radiograph of the chest.     FINDINGS:    Thelungs are clear. There is no pleural effusion or pneumothorax. The   cardiac silhouette is unremarkable. No acute osseous abnormality.    IMPRESSION: Clear lungs.    < from: CT Head No Cont (07.11.19 @ 10:17) >  IMPRESSION:     No CT evidence of acute intracranial hemorrhage, mass effect, or midline   shift.     Chronic maxillary sinusitis.      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    Volume loss and microvascular disease without obvious restricted   diffusion to suggest new territorial infarction, hemorrhage or midline   shift.    Opacified retracted right maxillary sinus correlate with right silent   sinus syndrome, and left maxillary sinus air-fluid level. Opacified   mastoids and middle ears.    < end of copied text > Patient is a 86y old  Male who presents with a chief complaint of mental status change (11 Jul 2019 21:31)       Pt is seen and examined  pt is  lying in bed  cts to have diarrhea  remains confused       REVIEW OF SYSTEMS:    limited  PHYSICAL EXAM  General: adult  chronically ill, flushed face   HEENT: clear oropharynx, anicteric sclera, pale conjunctiva  Neck: supple  CV: normal S1/S2 with no murmur rubs or gallops  Lungs: positive air movement b/l ant lungs,clear to auscultation, no wheezes, no rales  Abdomen: soft non-tender non-distended  Ext: no clubbing cyanosis or edema  Skin: no rashes and no petechiae  Neuro: awake        MEDICATIONS  (STANDING):  artificial tears (preservative free) Ophthalmic Solution 1 Drop(s) Both EYES every 2 hours  cephalexin 500 milliGRAM(s) Oral every 12 hours  chlorhexidine 2% Cloths 1 Application(s) Topical daily  heparin  Injectable 5000 Unit(s) SubCutaneous every 12 hours  melatonin 3 milliGRAM(s) Oral at bedtime  multivitamin 1 Tablet(s) Oral daily  octreotide  Injectable 200 MICROGram(s) SubCutaneous every 6 hours  sodium bicarbonate 1300 milliGRAM(s) Oral every 6 hours  sodium chloride 0.9%. 1000 milliLiter(s) (75 mL/Hr) IV Continuous <Continuous>    MEDICATIONS  (PRN):  acetaminophen   Tablet .. 650 milliGRAM(s) Oral every 6 hours PRN Temp greater or equal to 38.5C (101.3F), Mild Pain (1 - 3)  haloperidol    Injectable 0.25 milliGRAM(s) IV Push every 6 hours PRN Agitation  loperamide 2 milliGRAM(s) Oral four times a day PRN Diarrhea      Allergies    penicillin (Swelling)    Intolerances        Vital Signs Last 24 Hrs  T(C): 36.9 (17 Jul 2019 04:28), Max: 37.2 (16 Jul 2019 13:40)  T(F): 98.4 (17 Jul 2019 04:28), Max: 98.9 (16 Jul 2019 13:40)  HR: 96 (17 Jul 2019 04:28) (92 - 108)  BP: 91/52 (17 Jul 2019 04:42) (80/49 - 98/59)  BP(mean): --  RR: 18 (17 Jul 2019 04:28) (17 - 18)  SpO2: 94% (17 Jul 2019 04:28) (92% - 94%)      LABS:                          8.1    9.54  )-----------( 140      ( 16 Jul 2019 09:55 )             26.5         Mean Cell Volume : 103.1 fl  Mean Cell Hemoglobin : 31.5 pg  Mean Cell Hemoglobin Concentration : 30.6 gm/dL          07-17    143  |  111<H>  |  79<H>  ----------------------------<  98  3.5   |  17<L>  |  2.83<H>    Ca    9.9      17 Jul 2019 07:42  Phos  1.9     07-17  Mg     2.4     07-17          Culture - Blood (07.11.19 @ 05:11)    Specimen Source: .Blood    Culture Results:   No growth at 5 days.    Chromogranin A (07.12.19 @ 11:00)    Chromogranin A: [id number]  Culture - Urine (07.11.19 @ 05:01)    -  Cefazolin: S <=2 (MIC_CL_COM_ENTERIC_CEFAZU) For uncomplicated UTI with K. pneumoniae, E. coli, or P. mirablis: MAYRA <=16 is sensitive and MAYRA >=32 is resistant. This also predicts results for oral agents cefaclor, cefdinir, cefpodoxime, cefprozil, cefuroxime axetil, cephalexin and locarbef for uncomplicated UTI. Note that some isolates may be susceptible to these agents while testing resistant to cefazolin.    -  Aztreonam: S <=4    -  Ampicillin: S <=2 These ampicillin results predict results for amoxicillin    -  Ampicillin/Sulbactam: S <=4/2 Enterobacter, Citrobacter, and Serratia may develop resistance during prolonged therapy (3-4 days)    -  Amikacin: S <=8    -  Amoxicillin/Clavulanic Acid: S <=8/4    -  Trimethoprim/Sulfamethoxazole: S <=0.5/9.5    -  Tobramycin: S <=2    -  Piperacillin/Tazobactam: S <=8    -  Tigecycline: S <=1    -  Nitrofurantoin: S <=32 Should not be used to treat pyelonephritis    -  Meropenem: S <=1    -  Ertapenem: S <=0.5    -  Gentamicin: S <=1    -  Imipenem: S <=1    -  Levofloxacin: S <=1    -  Ciprofloxacin: S <=0.5    -  Ceftriaxone: S <=1 Enterobacter, Citrobacter, and Serratia may develop resistance during prolonged therapy    -  Cefoxitin: S <=4    -  Cefepime: S <=2    Specimen Source: .Urine    Culture Results:   >100,000 CFU/ml Escherichia coli    Organism Identification: Escherichia coli    Organism: Escherichia coli    Method Type: MAYRA      Chromogranin A (06.23.19 @ 11:57)    Chromogranin A: [id number]    Rapid Respiratory Viral Panel (07.11.19 @ 22:22)    Rapid RVP Result: NotDetec: This Respiratory Panel uses polymerase chain reaction (PCR) to detect for  adenovirus; coronavirus (HKU1, NL63, 229E, OC43); human metapneumovirus  (hMPV); human enterovirus/rhinovirus (Entero/RV); influenza A; influenza  A/H1; influenza A/H3; influenza A/H1-2009; influenza B; parainfluenza  viruses 1, 2, 3, 4; respiratory syncytial virus; Mycoplasma pneumoniae;  and Chlamydophila pneumoniae.    < from: CT Chest No Cont (07.14.19 @ 12:01) >  IMPRESSION:     Mild pulmonary edema.    < end of copied text >      RADIOLOGY & ADDITIONAL STUDIES:    EXAM:  XR CHEST AP OR PA 1V                            PROCEDURE DATE:  07/11/2019        INTERPRETATION:  CLINICAL INFORMATION: Sepsis.    COMPARISON:  Chest x-ray 6/10/2019    TECHNIQUE:   Frontal radiograph of the chest.     FINDINGS:    Thelungs are clear. There is no pleural effusion or pneumothorax. The   cardiac silhouette is unremarkable. No acute osseous abnormality.    IMPRESSION: Clear lungs.    < from: CT Head No Cont (07.11.19 @ 10:17) >  IMPRESSION:     No CT evidence of acute intracranial hemorrhage, mass effect, or midline   shift.     Chronic maxillary sinusitis.      < end of copied text >      Chromogranin A (06.23.19 @ 11:57)    Chromogranin A: [id number]    < from: MR Head w/wo IV Cont (07.14.19 @ 18:20) >  IMPRESSION:     Motion limited study, enhancing foci of decreased T1 and hyperintense DWI   signal in the clivusconcerning for osseous metastasis, postcontrast   images are limited by motion, 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    Volume loss and microvascular disease without obvious restricted   diffusion to suggest new territorial infarction, hemorrhage or midline   shift.    Opacified retracted right maxillary sinus correlate with right silent   sinus syndrome, and left maxillary sinus air-fluid level. Opacified   mastoids and middle ears.    < end of copied text >

## 2019-07-17 NOTE — CONSULT NOTE ADULT - PROBLEM SELECTOR RECOMMENDATION 5
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 everolimus

## 2019-07-17 NOTE — DIETITIAN INITIAL EVALUATION ADULT. - OTHER INFO
Diet PTA: Pt S/P hospital stay 6/10-6/28 and discharged to rehab. Pt known to RD from previous admission and pt with decreased appetite with gradual improvement towards end of admission, pt discharged to rehab and per rehab RD notes pt with poor po intake at this time. Pt ordered for regular diet with ensure enlive 3 daily and liquid protein supplement once daily at rehab per transfer records.    Weight: Per previous RD note pt with reported UBW of 162 lbs (4/2019) with a decrease to previous admission 138 lbs (6/10), weight appears to have decreased further to previous rehab weight 134.6 lbs (7/1) and now 129.6 lbs (7/11), overall pt with 20% wt loss within 3 months, 6% within the past month.     Pt with no noted food allergies, per rehab transfer records pt taking multivitamin. No N+V per RN, pt with chronic diarrhea ordered for octreotide and imodium, pt noted with 8 BMs yesterday, 3 thus far today-liquid stool. No difficulty chewing/swallowing

## 2019-07-17 NOTE — PROGRESS NOTE ADULT - SUBJECTIVE AND OBJECTIVE BOX
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   Patient reportedly found with hypotension and altered mental status. Urine was maladarous and appears to be a UTI on admission labs.    Rest of review of symptoms are unable to obtain due to hearing difficulties/lethargy.   No reported worsening CP/Palps/SOB    ===========================  Interval Events  - unreliable history  - lower bp   ===========================            acetaminophen   Tablet .. 650 milliGRAM(s) Oral every 6 hours PRN  artificial tears (preservative free) Ophthalmic Solution 1 Drop(s) Both EYES every 2 hours  cefepime   IVPB 500 milliGRAM(s) IV Intermittent every 12 hours  heparin  Injectable 5000 Unit(s) SubCutaneous every 12 hours  loperamide 2 milliGRAM(s) Oral four times a day PRN  multivitamin 1 Tablet(s) Oral daily  octreotide  Injectable 200 MICROGram(s) SubCutaneous every 6 hours  sodium bicarbonate  Infusion 0.179 mEq/kG/Hr IV Continuous <Continuous>        Allergies    penicillin (Swelling)    Intolerances    	    MEDICATIONS:  heparin  Injectable 5000 Unit(s) SubCutaneous every 12 hours    cefepime   IVPB 500 milliGRAM(s) IV Intermittent every 12 hours      acetaminophen   Tablet .. 650 milliGRAM(s) Oral every 6 hours PRN    loperamide 2 milliGRAM(s) Oral four times a day PRN    octreotide  Injectable 200 MICROGram(s) SubCutaneous every 6 hours    artificial tears (preservative free) Ophthalmic Solution 1 Drop(s) Both EYES every 2 hours  multivitamin 1 Tablet(s) Oral daily  sodium bicarbonate  Infusion 0.179 mEq/kG/Hr IV Continuous <Continuous>      PAST MEDICAL & SURGICAL HISTORY:  Neck mass: was resected, reportedly bening  Kidney lesion: partial resction- reportedly &quot;not active lesion&quot;  History of carcinoid syndrome  H/O carcinoid syndrome  S/P TURP      FAMILY HISTORY:  FH: lung cancer (Sibling): sister  Family history of nasopharyngeal cancer: father    Mother - HTN      SOCIAL HISTORY:    [ x] Non-smoker  [x] No Illicit Drug Use  [ x] No Excess EtOH Use      REVIEW OF SYSTEMS: (Unless + Before Symptom, it is negative)  Constitutional: [] Fever, [x]Fatigue, []Weight Changes  Eyes:  []Recent Vision Changes, []Eye Pain  ENT: []Congestion, []Sore Throat  Endocrine: []Excess Sweating, []Temperature Intolerance  Cardiovascular:  []Chest Pain, []Palpitations, []Shortness of Breath, []Pre-syncope, []Syncope,[] LE Swelling  Respiratory:[] Cough, []Congestion,[] Wheezing  Gastrointestinal: [] Abdominal Pain,[] Nausea, []Vomiting  Genitourinary: []Dysuria,[] hematuria  Musculoskeletal: []Joint Pain, []Hip/Knee Injury  Neurologic: []Headaches,[] Imbalance, []Weakness  Skin: []Rashes, []hematoma, []purprura    ================================    PHYSICAL EXAM:  T(C): 36.4 (07-11-19 @ 07:53), Max: 36.8 (07-11-19 @ 02:58)  HR: 96 (07-11-19 @ 14:01) (84 - 98)  BP: 95/54 (07-11-19 @ 14:01) (94/40 - 100/54)  RR: 17 (07-11-19 @ 07:53) (17 - 18)  SpO2: 96% (07-11-19 @ 14:01) (96% - 100%)  Wt(kg): --  I&O's Summary    Appearance: Normal; +Lethargic +[place]  Psychiatry: Orientation and mood difficult to assess due to lethargy +[place]  HEENT:   Normal oral mucosa, EOMI	  Lymphatic: No lymphadenopathy  Cardiovascular: Normal S1 S2, No JVD, No murmurs, No edema  Respiratory: Lungs clear to auscultation, no use of accessory muscles	  Gastrointestinal:  Soft, Non-tender	  Skin: No rashes, No ecchymoses, No cyanosis	  Neurologic: Non-focal, No Focal Deficits  Extremities: Normal range of motion, No clubbing, cyanosis  Vascular: Peripheral pulses palpable 2+ bilaterally, no prominent varicosities    ============================    LABS:	   Labs [id number]     CBC Full  -  ( 11 Jul 2019 03:24 )  WBC Count : 7.8 K/uL  Hemoglobin : 8.7 g/dL  Hematocrit : 24.5 %  Platelet Count - Automated : 177 K/uL  Mean Cell Volume : 99.8 fl  Mean Cell Hemoglobin : 35.3 pg  Mean Cell Hemoglobin Concentration : 35.4 gm/dL  Auto Neutrophil # : 5.2 K/uL  Auto Lymphocyte # : 1.9 K/uL  Auto Monocyte # : 0.7 K/uL  Auto Eosinophil # : 0.1 K/uL  Auto Basophil # : 0.0 K/uL  Auto Neutrophil % : 66.0 %  Auto Lymphocyte % : 23.9 %  Auto Monocyte % : 8.9 %  Auto Eosinophil % : 0.7 %  Auto Basophil % : 0.6 %    07-11    142  |  117<H>  |  97<H>  ----------------------------<  123<H>  3.2<L>   |  12<L>  |  3.37<H>    Ca    11.4<H>      11 Jul 2019 03:24    TPro  6.0  /  Alb  3.2<L>  /  TBili  0.3  /  DBili  x   /  AST  37  /  ALT  18  /  AlkPhos  63  07-11    =============================  < from: Transthoracic Echocardiogram (07.12.19 @ 15:11) >  Derived variables:  LVMI: 84 g/m2  RWT: 0.33  EF (Visual Estimate): 70 %  ------------------------------------------------------------------------  Observations:  Mitral Valve: Normal mitral valve. Mild mitral  regurgitation.  Aortic Valve/Aorta: Calcified aortic valve without  stenosis.  Mild to moderate aortic regurgitation.  Normal aortic root size. (Ao: 3.4 cm at the sinuses of  Valsalva).  Left Atrium: Normal left atrium.  LA volume index = 26  cc/m2.  Left Ventricle: Normal left ventricular systolic function.  No segmental wall motion abnormalities. Normal left  ventricular internal dimensions and wall thicknesses.  Right Heart: Normal right atrium. Normal right ventricular  size and function. Moderate tricuspid regurgitation. Normal  pulmonic valve. Minimal pulmonic regurgitation.  Pericardium/Pleura: Normal pericardium with moderate  pericardial effusion.  Mild right atrial inversion.  Approximately 25% variation in mitral inflow velocity with  respiration.  No evidence of right ventricular diastolic collapse.  Hemodynamic: Estimated right atrial pressure is normal.  Mild pulmonary hypertension. Estimated PASP 37 mmHg.  ------------------------------------------------------------------------  Conclusions:  Normal left ventricular systolic function. No segmental  wall motion abnormalities.  Moderate tricuspid regurgitation.  Mild pulmonary hypertension.  Moderate circumferential pericardial effusion. Mild right  atrial inversion.  Approximately 25% variation in mitral inflow velocity with  respiration.  No evidence of right ventricular diastolic collapse.  IVC collapses with respiration.  *** Compared with echocardiogram of 6/27/2019, no  significant changes noted.  ------------------------------------------------------------------------  Confirmed on  7/12/2019 - 17:30:07 by Zheng Denise M.D.  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     Dhruv Paz MD, [facility]  [phone]

## 2019-07-17 NOTE — DIETITIAN INITIAL EVALUATION ADULT. - SIGNS/SYMPTOMS
Pt with 10% wt loss within 3 months, meeting <75% estimated needs PTA Pt noted with stage 2 pressure injury to sacral spine

## 2019-07-17 NOTE — DIETITIAN INITIAL EVALUATION ADULT. - REASON INDICATOR FOR ASSESSMENT
Pt seen for length of stay, BMI <19. Information obtained from RN, previous RD notes. Pt with altered mental status unable to fully participate in interview at this time.

## 2019-07-17 NOTE — CONSULT NOTE ADULT - SUBJECTIVE AND OBJECTIVE BOX
HPI:  86yoM with PMH of carcinoid syndrome s/p left lower lobe resection, bladder disease with new calzada placed on July 1st in the ED sent from [facility] for SBPs in 70s. Patient altered. Per EMS, patient with malodorous urine. At baseline, able to maintain conversations. No fevers or chills noted. (11 Jul 2019 08:11)    PERTINENT PM/SXH:   Neck mass  Kidney lesion  History of carcinoid syndrome    H/O carcinoid syndrome  S/P TURP    FAMILY HISTORY:  FH: lung cancer (Sibling): sister  Family history of nasopharyngeal cancer: father    ITEMS NOT CHECKED ARE NOT PRESENT    SOCIAL HISTORY:   Significant other/partner:  [ ]  Children:  [ ]  Confucianist/Spirituality:  Substance hx:  [ ]   Tobacco hx:  [ ]   Alcohol hx: [ ]   Home Opioid hx:  [ ] I-Stop Reference No:  Living Situation: [ ]Home  [ ]Long term care  [ ]Rehab [ ]Other    ADVANCE DIRECTIVES:    DNR  Yes  MOLST  [ ]  Living Will  [ ]   DECISION MAKER(s):  [ ] Health Care Proxy(s)  [ ] Surrogate(s)  [ ] Guardian           Name(s): Phone Number(s):    BASELINE (I)ADL(s) (prior to admission):  [place]: [ ]Total  [ ] Moderate [ ]Dependent    Allergies    penicillin (Swelling)    Intolerances    MEDICATIONS  (STANDING):  artificial tears (preservative free) Ophthalmic Solution 1 Drop(s) Both EYES every 2 hours  cephalexin 500 milliGRAM(s) Oral every 12 hours  chlorhexidine 2% Cloths 1 Application(s) Topical daily  heparin  Injectable 5000 Unit(s) SubCutaneous every 12 hours  melatonin 3 milliGRAM(s) Oral at bedtime  multivitamin 1 Tablet(s) Oral daily  octreotide  Injectable 200 MICROGram(s) SubCutaneous every 6 hours  sodium bicarbonate 1300 milliGRAM(s) Oral every 6 hours  sodium chloride 0.9%. 1000 milliLiter(s) (75 mL/Hr) IV Continuous <Continuous>    MEDICATIONS  (PRN):  acetaminophen   Tablet .. 650 milliGRAM(s) Oral every 6 hours PRN Temp greater or equal to 38.5C (101.3F), Mild Pain (1 - 3)  haloperidol    Injectable 0.25 milliGRAM(s) IV Push every 6 hours PRN Agitation  loperamide 2 milliGRAM(s) Oral four times a day PRN Diarrhea    PRESENT SYMPTOMS: [ ]Unable to obtain due to poor mentation   Source if other than patient:  [ ]Family   [ ]Team     Pain (Impact on QOL):    Location -         Minimal acceptable level (0-10 scale):                    Aggravating factors -  Quality -  Radiation -  Severity (0-10 scale) -    Timing -    PAIN AD Score:     [email or web address] (press ctrl +  left click to view)    Dyspnea:                           [ ]Mild [ ]Moderate [ ]Severe  Anxiety:                             [ ]Mild [ ]Moderate [ ]Severe  Fatigue:                             [ ]Mild [ ]Moderate [ ]Severe  Nausea:                             [ ]Mild [ ]Moderate [ ]Severe  Loss of appetite:              [ ]Mild [ ]Moderate [ ]Severe  Constipation:                    [ ]Mild [ ]Moderate [ ]Severe    Other Symptoms:  [ ]All other review of systems negative     Karnofsky Performance Score/Palliative Performance Status Version 2:         %    http://palliative.info/resource_material/[email or web address].pdf  PHYSICAL EXAM:  Vital Signs Last 24 Hrs  T(C): 36.9 (17 Jul 2019 04:28), Max: 37.2 (16 Jul 2019 13:40)  T(F): 98.4 (17 Jul 2019 04:28), Max: 98.9 (16 Jul 2019 13:40)  HR: 96 (17 Jul 2019 04:28) (92 - 108)  BP: 91/52 (17 Jul 2019 04:42) (80/49 - 98/59)  BP(mean): --  RR: 18 (17 Jul 2019 04:28) (17 - 18)  SpO2: 94% (17 Jul 2019 04:28) (92% - 94%) I&O's Summary    16 Jul 2019 07:01  -  17 Jul 2019 07:00  --------------------------------------------------------  IN: 350 mL / OUT: 600 mL / NET: -250 mL    GENERAL:  [ ]Alert  [ ]Oriented x   [ ]Lethargic  [ ]Cachexia  [ ]Unarousable  [ ]Verbal  [ ]Non-Verbal  Behavioral:   [ ] Anxiety  [ ] Delirium [ ] Agitation [ ] Other  HEENT:  [ ]Normal   [ ]Dry mouth   [ ]ET Tube/Trach  [ ]Oral lesions  PULMONARY:   [ ]Clear [ ]Tachypnea  [ ]Audible excessive secretions   [ ]Rhonchi        [ ]Right [ ]Left [ ]Bilateral  [ ]Crackles        [ ]Right [ ]Left [ ]Bilateral  [ ]Wheezing     [ ]Right [ ]Left [ ]Bilateral  CARDIOVASCULAR:    [ ]Regular [ ]Irregular [ ]Tachy  [ ]Saravanan [ ]Murmur [ ]Other  GASTROINTESTINAL:  [ ]Soft  [ ]Distended   [ ]+BS  [ ]Non tender [ ]Tender  [ ]PEG [ ]OGT/ NGT  Last BM:   GENITOURINARY:  [ ]Normal [ ] Incontinent   [ ]Oliguria/Anuria   [ ]Calzada  MUSCULOSKELETAL:   [ ]Normal   [ ]Weakness  [ ]Bed/Wheelchair bound [ ]Edema  NEUROLOGIC:   [ ]No focal deficits  [ ] Cognitive impairment  [ ] Dysphagia [ ]Dysarthria [ ] Paresis [ ]Other   SKIN:   [ ]Normal   [ ]Pressure ulcer(s)  [ ]Rash    CRITICAL CARE:  [ ] Shock Present  [ ]Septic [ ]Cardiogenic [ ]Neurologic [ ]Hypovolemic  [ ]  Vasopressors [ ]  Inotropes   [ ] Respiratory failure present  [ ] Acute  [ ] Chronic [ ] Hypoxic  [ ] Hypercarbic [ ] Other  [ ] Other organ failure     LABS:                        8.1    9.54  )-----------( 140      ( 16 Jul 2019 09:55 )             26.5   07-17    143  |  111<H>  |  79<H>  ----------------------------<  98  3.5   |  17<L>  |  2.83<H>    Ca    9.9      17 Jul 2019 07:42  Phos  1.9     07-17  Mg     2.4     07-17          RADIOLOGY & ADDITIONAL STUDIES:    PROTEIN CALORIE MALNUTRITION PRESENT: [ ] Yes [ ] No  [ ] PPSV2 < or = to 30% [ ] significant weight loss  [ ] poor nutritional intake [ ] catabolic state [ ] anasarca     Albumin, Serum: 3.2 g/dL (07-11-19 @ 03:24)  Artificial Nutrition [ ]     REFERRALS:   [ ]Chaplaincy  [ ] Hospice  [ ]Child Life  [ ]Social Work  [ ]Case management [ ]Holistic Therapy   Goals of Care Discussion Document: HPI:  86yoM with PMH of carcinoid syndrome s/p left lower lobe resection, bladder disease with new calzada placed on July 1st in the ED sent from [facility] for SBPs in 70s. Patient altered. Per EMS, patient with malodorous urine. At baseline, able to maintain conversations. No fevers or chills noted. (11 Jul 2019 08:11)    Palliative care team consulted for assistance with decision making in the setting of disease progression despite first line treatment. 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 In the hospital pt has had soft blood pressures, systolic usually high 80s to low 100s. Has haldol 0.25mg IV q6 PRN for agitation, has not required any doses. Pt was seen laying in bed. Appears mildly cachectic, fecally incontinent. Alert, verbal, not oriented. Could not answer questions regarding name of family members, location, etc. Would answer questions with illogical and at times confabulated responses. Was not able to adequately state symptoms. Appeared to be comfortable and not anxious. Family not at bedside at that time.        PERTINENT PM/SXH:   Neck mass  Kidney lesion  History of carcinoid syndrome    H/O carcinoid syndrome  S/P TURP    FAMILY HISTORY:  FH: lung cancer (Sibling): sister  Family history of nasopharyngeal cancer: father    ITEMS NOT CHECKED ARE NOT PRESENT    SOCIAL HISTORY:   Significant other/partner:  [x ]  Children:  [ ]  Gnosticist/Spirituality:  Substance hx:  [ ]   Tobacco hx:  [ ]   Alcohol hx: [ ]   Home Opioid hx:  [ ] I-Stop Reference No:  Living Situation: [ ]Home  [ ]Long term care  [ ]Rehab [ ]Other    ADVANCE DIRECTIVES:    DNR  Yes  MOLST  [x ]  Living Will  [ ]   DECISION MAKER(s):  [x ] Health Care Proxy(s)  [ ] Surrogate(s)  [ ] Guardian           Name(s): Phone Number(s):    BASELINE (I)ADL(s) (prior to admission):  [place]: [ ]Total  [ ] Moderate [ ]Dependent    Allergies    penicillin (Swelling)    Intolerances    MEDICATIONS  (STANDING):  artificial tears (preservative free) Ophthalmic Solution 1 Drop(s) Both EYES every 2 hours  cephalexin 500 milliGRAM(s) Oral every 12 hours  chlorhexidine 2% Cloths 1 Application(s) Topical daily  heparin  Injectable 5000 Unit(s) SubCutaneous every 12 hours  melatonin 3 milliGRAM(s) Oral at bedtime  multivitamin 1 Tablet(s) Oral daily  octreotide  Injectable 200 MICROGram(s) SubCutaneous every 6 hours  sodium bicarbonate 1300 milliGRAM(s) Oral every 6 hours  sodium chloride 0.9%. 1000 milliLiter(s) (75 mL/Hr) IV Continuous <Continuous>    MEDICATIONS  (PRN):  acetaminophen   Tablet .. 650 milliGRAM(s) Oral every 6 hours PRN Temp greater or equal to 38.5C (101.3F), Mild Pain (1 - 3)  haloperidol    Injectable 0.25 milliGRAM(s) IV Push every 6 hours PRN Agitation  loperamide 2 milliGRAM(s) Oral four times a day PRN Diarrhea    PRESENT SYMPTOMS: [ ]Unable to obtain due to poor mentation   Source if other than patient:  [ ]Family   [ ]Team     Pain (Impact on QOL):    Location -         Minimal acceptable level (0-10 scale):                    Aggravating factors -  Quality -  Radiation -  Severity (0-10 scale) -    Timing -    PAIN AD Score:     [email or web address] (press ctrl +  left click to view)    Dyspnea:                           [ ]Mild [ ]Moderate [ ]Severe  Anxiety:                             [ ]Mild [ ]Moderate [ ]Severe  Fatigue:                             [ ]Mild [ ]Moderate [ ]Severe  Nausea:                             [ ]Mild [ ]Moderate [ ]Severe  Loss of appetite:              [ ]Mild [ x]Moderate [ ]Severe  Fecal incontinence              [ ]Mild [ ]Moderate [x ]Severe    Other Symptoms:  [ ]All other review of systems negative     Karnofsky Performance Score/Palliative Performance Status Version 2:         %    http://palliative.info/resource_material/[email or web address].pdf  PHYSICAL EXAM:  Vital Signs Last 24 Hrs  T(C): 36.9 (17 Jul 2019 04:28), Max: 37.2 (16 Jul 2019 13:40)  T(F): 98.4 (17 Jul 2019 04:28), Max: 98.9 (16 Jul 2019 13:40)  HR: 96 (17 Jul 2019 04:28) (92 - 108)  BP: 91/52 (17 Jul 2019 04:42) (80/49 - 98/59)  BP(mean): --  RR: 18 (17 Jul 2019 04:28) (17 - 18)  SpO2: 94% (17 Jul 2019 04:28) (92% - 94%) I&O's Summary    16 Jul 2019 07:01  -  17 Jul 2019 07:00  --------------------------------------------------------  IN: 350 mL / OUT: 600 mL / NET: -250 mL    GENERAL:  [ x]Alert  [ ]Oriented x 0  [ ]Lethargic  [ ]Cachexia  [ ]Unarousable  [x ]Verbal  [ ]Non-Verbal  Behavioral:   [ ] Anxiety  [x ] Delirium 2/2 underlying illness [ ] Agitation [ ] Other  HEENT:  [x ]Normal   [ ]Dry mouth   [ ]ET Tube/Trach  [ ]Oral lesions  PULMONARY:   [x ]Clear [ ]Tachypnea  [ ]Audible excessive secretions   [ ]Rhonchi        [ ]Right [ ]Left [ ]Bilateral  [ ]Crackles        [ ]Right [ ]Left [ ]Bilateral  [ ]Wheezing     [ ]Right [ ]Left [ ]Bilateral  CARDIOVASCULAR:    [x ]Regular [ ]Irregular [ ]Tachy  [ ]Saravanan [ ]Murmur [ ]Other  GASTROINTESTINAL:  [x ]Soft  [ ]Distended   [x ]+BS  [ ]Non tender [ ]Tender  [ ]PEG [ ]OGT/ NGT  Last BM: today (loose)  GENITOURINARY:  [ ]Normal [ ] Incontinent   [ ]Oliguria/Anuria   [ ]Calzada  MUSCULOSKELETAL:   [ ]Normal   [c ]Weakness  [ ]Bed/Wheelchair bound [ ]Edema  NEUROLOGIC:   [ ]No focal deficits  [ x] Cognitive impairment  [ ] Dysphagia [ ]Dysarthria [ ] Paresis [ ]Other   SKIN:   [x ]Normal   [ ]Pressure ulcer(s)  [ ]Rash    LABS:                        8.1    9.54  )-----------( 140      ( 16 Jul 2019 09:55 )             26.5   07-17    143  |  111<H>  |  79<H>  ----------------------------<  98  3.5   |  17<L>  |  2.83<H>    Ca    9.9      17 Jul 2019 07:42  Phos  1.9     07-17  Mg     2.4     07-17          RADIOLOGY & ADDITIONAL STUDIES:    PROTEIN CALORIE MALNUTRITION PRESENT: [x ] Yes [ ] No (Underweight, BMI <19 w/ severe protein deficiency per dietition)  [ ] PPSV2 < or = to 30% [ ] significant weight loss  [x ] poor nutritional intake [x ] catabolic state [ ] anasarca     Albumin, Serum: 3.2 g/dL (07-11-19 @ 03:24)  Artificial Nutrition [ ]     REFERRALS:   [ ]Chaplaincy  [ ] Hospice  [ ]Child Life  [ ]Social Work  [ ]Case management [ ]Holistic Therapy   Goals of Care Discussion Document: HPI:  86yoM with PMH of carcinoid syndrome s/p left lower lobe resection, bladder disease with new calzada placed on July 1st in the ED sent from [facility] for SBPs in 70s. Patient altered. Per EMS, patient with malodorous urine. At baseline, able to maintain conversations. No fevers or chills noted. (11 Jul 2019 08:11)    Palliative care team consulted for assistance with decision making in the setting of disease progression despite first line treatment. 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 In the hospital pt has had soft blood pressures, systolic usually high 80s to low 100s. Has haldol 0.25mg IV q6 PRN for agitation, has not required any doses. His main symptom burden arises from carcinoid syndrome. Experiences flushing and frequent diarrhea. Diarrhea being treated with Imodium. Given his inadequate response to octreotide, there is discussion whether pt should undergo additional treatment options, including Everoliumus, cytotoxic chemotherapy. Pt currently has very poor functional and mental status. Palliative to help with decision-making regarding further disease-modifying treatments.     Pt was seen laying in bed. Appears mildly cachectic, fecally incontinent. Alert, verbal, not oriented. Could not answer questions regarding name of self or family members, location, etc. Would answer questions with illogical and at times confabulated responses. Was not able to adequately state symptoms. Appeared to be comfortable and not anxious. Family not at bedside at that time.        PERTINENT PM/SXH:   Neck mass  Kidney lesion  History of carcinoid syndrome    H/O carcinoid syndrome  S/P TURP    FAMILY HISTORY:  FH: lung cancer (Sibling): sister  Family history of nasopharyngeal cancer: father    ITEMS NOT CHECKED ARE NOT PRESENT    SOCIAL HISTORY:   Significant other/partner:  [x ]  Children:  [ ]  Baptism/Spirituality:  Substance hx:  [ ]   Tobacco hx:  [ ]   Alcohol hx: [ ]   Home Opioid hx:  [ ] I-Stop Reference No:  Living Situation: [ ]Home  [ ]Long term care  [ ]Rehab [ ]Other    ADVANCE DIRECTIVES:    DNR  Yes  MOLST  [x ]  Living Will  [ ]   DECISION MAKER(s):  [x ] Health Care Proxy(s)  [ ] Surrogate(s)  [ ] Guardian           Name(s): Phone Number(s):    BASELINE (I)ADL(s) (prior to admission):  [place]: [ ]Total  [ ] Moderate [ ]Dependent    Allergies    penicillin (Swelling)    Intolerances    MEDICATIONS  (STANDING):  artificial tears (preservative free) Ophthalmic Solution 1 Drop(s) Both EYES every 2 hours  cephalexin 500 milliGRAM(s) Oral every 12 hours  chlorhexidine 2% Cloths 1 Application(s) Topical daily  heparin  Injectable 5000 Unit(s) SubCutaneous every 12 hours  melatonin 3 milliGRAM(s) Oral at bedtime  multivitamin 1 Tablet(s) Oral daily  octreotide  Injectable 200 MICROGram(s) SubCutaneous every 6 hours  sodium bicarbonate 1300 milliGRAM(s) Oral every 6 hours  sodium chloride 0.9%. 1000 milliLiter(s) (75 mL/Hr) IV Continuous <Continuous>    MEDICATIONS  (PRN):  acetaminophen   Tablet .. 650 milliGRAM(s) Oral every 6 hours PRN Temp greater or equal to 38.5C (101.3F), Mild Pain (1 - 3)  haloperidol    Injectable 0.25 milliGRAM(s) IV Push every 6 hours PRN Agitation  loperamide 2 milliGRAM(s) Oral four times a day PRN Diarrhea    PRESENT SYMPTOMS: [ ]Unable to obtain due to poor mentation   Source if other than patient:  [ ]Family   [ ]Team     Pain (Impact on QOL):    Location -         Minimal acceptable level (0-10 scale):                    Aggravating factors -  Quality -  Radiation -  Severity (0-10 scale) -    Timing -    PAIN AD Score:     [email or web address] (press ctrl +  left click to view)    Dyspnea:                           [ ]Mild [ ]Moderate [ ]Severe  Anxiety:                             [ ]Mild [ ]Moderate [ ]Severe  Fatigue:                             [ ]Mild [ ]Moderate [ ]Severe  Nausea:                             [ ]Mild [ ]Moderate [ ]Severe  Loss of appetite:              [ ]Mild [ x]Moderate [ ]Severe  Fecal incontinence              [ ]Mild [ ]Moderate [x ]Severe    Other Symptoms:  [ ]All other review of systems negative     Karnofsky Performance Score/Palliative Performance Status Version 2:         %    http://palliative.info/resource_material/[email or web address].pdf  PHYSICAL EXAM:  Vital Signs Last 24 Hrs  T(C): 36.9 (17 Jul 2019 04:28), Max: 37.2 (16 Jul 2019 13:40)  T(F): 98.4 (17 Jul 2019 04:28), Max: 98.9 (16 Jul 2019 13:40)  HR: 96 (17 Jul 2019 04:28) (92 - 108)  BP: 91/52 (17 Jul 2019 04:42) (80/49 - 98/59)  BP(mean): --  RR: 18 (17 Jul 2019 04:28) (17 - 18)  SpO2: 94% (17 Jul 2019 04:28) (92% - 94%) I&O's Summary    16 Jul 2019 07:01  -  17 Jul 2019 07:00  --------------------------------------------------------  IN: 350 mL / OUT: 600 mL / NET: -250 mL    GENERAL:  [ x]Alert  [ ]Oriented x 0  [ ]Lethargic  [ ]Cachexia  [ ]Unarousable  [x ]Verbal  [ ]Non-Verbal  Behavioral:   [ ] Anxiety  [x ] Delirium 2/2 underlying illness [ ] Agitation [ ] Other  HEENT:  [x ]Normal   [ ]Dry mouth   [ ]ET Tube/Trach  [ ]Oral lesions  PULMONARY:   [x ]Clear [ ]Tachypnea  [ ]Audible excessive secretions   [ ]Rhonchi        [ ]Right [ ]Left [ ]Bilateral  [ ]Crackles        [ ]Right [ ]Left [ ]Bilateral  [ ]Wheezing     [ ]Right [ ]Left [ ]Bilateral  CARDIOVASCULAR:    [x ]Regular [ ]Irregular [ ]Tachy  [ ]Saravanan [ ]Murmur [ ]Other  GASTROINTESTINAL:  [x ]Soft  [ ]Distended   [x ]+BS  [ ]Non tender [ ]Tender  [ ]PEG [ ]OGT/ NGT  Last BM: today (loose)  GENITOURINARY:  [ ]Normal [ ] Incontinent   [ ]Oliguria/Anuria   [ ]Calzada  MUSCULOSKELETAL:   [ ]Normal   [c ]Weakness  [ ]Bed/Wheelchair bound [ ]Edema  NEUROLOGIC:   [ ]No focal deficits  [ x] Cognitive impairment  [ ] Dysphagia [ ]Dysarthria [ ] Paresis [ ]Other   SKIN:   [x ]Normal   [ ]Pressure ulcer(s)  [ ]Rash    LABS:                        8.1    9.54  )-----------( 140      ( 16 Jul 2019 09:55 )             26.5   07-17    143  |  111<H>  |  79<H>  ----------------------------<  98  3.5   |  17<L>  |  2.83<H>    Ca    9.9      17 Jul 2019 07:42  Phos  1.9     07-17  Mg     2.4     07-17          RADIOLOGY & ADDITIONAL STUDIES:    PROTEIN CALORIE MALNUTRITION PRESENT: [x ] Yes [ ] No (Underweight, BMI <19 w/ severe protein deficiency per dietition)  [ ] PPSV2 < or = to 30% [ ] significant weight loss  [x ] poor nutritional intake [x ] catabolic state [ ] anasarca     Albumin, Serum: 3.2 g/dL (07-11-19 @ 03:24)  Artificial Nutrition [ ]     REFERRALS:   [ ]Chaplaincy  [ ] Hospice  [ ]Child Life  [ ]Social Work  [ ]Case management [ ]Holistic Therapy   Goals of Care Discussion Document: HPI:  86yoM with PMH of carcinoid syndrome s/p left lower lobe resection, bladder disease with new calzada placed on July 1st in the ED sent from [facility] for SBPs in 70s. Patient altered. Per EMS, patient with malodorous urine. At baseline, able to maintain conversations. No fevers or chills noted. (11 Jul 2019 08:11)    Palliative care team consulted for assistance with decision making in the setting of disease progression despite first line treatment. 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 In the hospital pt has had soft blood pressures, systolic usually high 80s to low 100s. Has haldol 0.25mg IV q6 PRN for agitation, has not required any doses. His main symptom burden arises from carcinoid syndrome. Experiences flushing and frequent diarrhea. Diarrhea being treated with Imodium. Given his inadequate response to octreotide, there is discussion whether pt should undergo additional treatment options, including Everoliumus, cytotoxic chemotherapy. Pt currently has very poor functional and mental status. Palliative to help with decision-making regarding further disease-modifying treatments.     Pt was seen lying in bed. Appears mildly cachectic, fecally incontinent. Alert, verbal, not oriented. Could not answer questions regarding name of self or family members, location, etc. Would answer questions with illogical and at times confabulated responses. Was not able to adequately state symptoms. Appeared to be comfortable and not anxious. Family not at bedside at that time.              PERTINENT PM/SXH:   Neck mass  Kidney lesion  History of carcinoid syndrome    H/O carcinoid syndrome  S/P TURP    FAMILY HISTORY:  FH: lung cancer (Sibling): sister  Family history of nasopharyngeal cancer: father    ITEMS NOT CHECKED ARE NOT PRESENT    SOCIAL HISTORY:   Significant other/partner:  [x ]  Children:  [x ]  Scientologist/Spirituality:  Substance hx:  [ ]   Tobacco hx:  [ ]   Alcohol hx: [ ]   Home Opioid hx:  [ ] I-Stop Reference No:  Living Situation: [ ]Home  [ ]Long term care  [ ]Rehab [ ]Other    ADVANCE DIRECTIVES:    DNR  Yes  MOLST  [x ]  Living Will  [ ]   DECISION MAKER(s):  [x ] Health Care Proxy(s)  [ ] Surrogate(s)  [ ] Guardian           Name(s): Phone Number(s):    BASELINE (I)ADL(s) (prior to admission):  [place]: [ ]Total  [ ] Moderate [ ]Dependent    Allergies    penicillin (Swelling)    Intolerances    MEDICATIONS  (STANDING):  artificial tears (preservative free) Ophthalmic Solution 1 Drop(s) Both EYES every 2 hours  cephalexin 500 milliGRAM(s) Oral every 12 hours  chlorhexidine 2% Cloths 1 Application(s) Topical daily  heparin  Injectable 5000 Unit(s) SubCutaneous every 12 hours  melatonin 3 milliGRAM(s) Oral at bedtime  multivitamin 1 Tablet(s) Oral daily  octreotide  Injectable 200 MICROGram(s) SubCutaneous every 6 hours  sodium bicarbonate 1300 milliGRAM(s) Oral every 6 hours  sodium chloride 0.9%. 1000 milliLiter(s) (75 mL/Hr) IV Continuous <Continuous>    MEDICATIONS  (PRN):  acetaminophen   Tablet .. 650 milliGRAM(s) Oral every 6 hours PRN Temp greater or equal to 38.5C (101.3F), Mild Pain (1 - 3)  haloperidol    Injectable 0.25 milliGRAM(s) IV Push every 6 hours PRN Agitation  loperamide 2 milliGRAM(s) Oral four times a day PRN Diarrhea    PRESENT SYMPTOMS: [ x ]Unable to obtain due to poor mentation   Source if other than patient:  [ ]Family   [ ]Team Inferred    Pain (Impact on QOL):  0  Location -         Minimal acceptable level (0-10 scale):                    Aggravating factors -  Quality -  Radiation -  Severity (0-10 scale) -    Timing -    PAIN AD Score: 0    [email or web address] (press ctrl +  left click to view)    Dyspnea:                           [ ]Mild [ ]Moderate [ ]Severe  Anxiety:                             [ ]Mild [ ]Moderate [ ]Severe  Fatigue:                             [ ]Mild [ ]Moderate [ ]Severe  Nausea:                             [ ]Mild [ ]Moderate [ ]Severe  Loss of appetite:              [ ]Mild [ x]Moderate [ ]Severe  Fecal incontinence              [ ]Mild [ ]Moderate [x ]Severe    Other Symptoms:  [ ]All other review of systems negative     Karnofsky Performance Score/Palliative Performance Status Version 2:     40    %    http://palliative.info/resource_material/[email or web address].pdf  PHYSICAL EXAM:  Vital Signs Last 24 Hrs  T(C): 36.9 (17 Jul 2019 04:28), Max: 37.2 (16 Jul 2019 13:40)  T(F): 98.4 (17 Jul 2019 04:28), Max: 98.9 (16 Jul 2019 13:40)  HR: 96 (17 Jul 2019 04:28) (92 - 108)  BP: 91/52 (17 Jul 2019 04:42) (80/49 - 98/59)  BP(mean): --  RR: 18 (17 Jul 2019 04:28) (17 - 18)  SpO2: 94% (17 Jul 2019 04:28) (92% - 94%) I&O's Summary    16 Jul 2019 07:01  -  17 Jul 2019 07:00  --------------------------------------------------------  IN: 350 mL / OUT: 600 mL / NET: -250 mL    GENERAL:  [ x]Alert  [x ]Oriented x 0  [ ]Lethargic  [ ]Cachexia  [ ]Unarousable  [x ]Verbal  [ ]Non-Verbal  Behavioral:   [ ] Anxiety  [x ] Delirium 2/2 underlying illness [ ] Agitation [ ] Other  HEENT:  [x ]Normal   [ ]Dry mouth   [ ]ET Tube/Trach  [ ]Oral lesions  PULMONARY:   [x ]Clear [ ]Tachypnea  [ ]Audible excessive secretions   [ ]Rhonchi        [ ]Right [ ]Left [ ]Bilateral  [ ]Crackles        [ ]Right [ ]Left [ ]Bilateral  [ ]Wheezing     [ ]Right [ ]Left [ ]Bilateral  CARDIOVASCULAR:    [x ]Regular [ ]Irregular [ ]Tachy  [ ]Saravanan [ ]Murmur [ ]Other  GASTROINTESTINAL:  [x ]Soft  [ ]Distended   [x ]+BS  [ ]Non tender [ ]Tender  [ ]PEG [ ]OGT/ NGT  Last BM: today (loose)  GENITOURINARY:  [ ]Normal [ ] Incontinent   [ ]Oliguria/Anuria   [ ]Calzada  MUSCULOSKELETAL:   [x ]Normal   [c ]Weakness  [ ]Bed/Wheelchair bound [ ]Edema  NEUROLOGIC:   [ ]No focal deficits  [ x] Cognitive impairment  [ ] Dysphagia [ ]Dysarthria [ ] Paresis [ ]Other   SKIN:   [x ]Normal   [ ]Pressure ulcer(s)  [ ]Rash    LABS:                        8.1    9.54  )-----------( 140      ( 16 Jul 2019 09:55 )             26.5   07-17    143  |  111<H>  |  79<H>  ----------------------------<  98  3.5   |  17<L>  |  2.83<H>    Ca    9.9      17 Jul 2019 07:42  Phos  1.9     07-17  Mg     2.4     07-17          RADIOLOGY & ADDITIONAL STUDIES:    PROTEIN CALORIE MALNUTRITION PRESENT: [x ] Yes [ ] No (Underweight, BMI <19 w/ severe protein deficiency per dietition)  [ ] PPSV2 < or = to 30% [ ] significant weight loss  [x ] poor nutritional intake [x ] catabolic state [ ] anasarca     Albumin, Serum: 3.2 g/dL (07-11-19 @ 03:24)  Artificial Nutrition [ ]     REFERRALS:   [ ]Chaplaincy  [ ] Hospice  [ ]Child Life  [ ]Social Work  [ ]Case management [ ]Holistic Therapy   Goals of Care Discussion Document: HPI:  86yoM with PMH of carcinoid syndrome s/p left lower lobe resection, bladder disease with new calzada placed on July 1st in the ED sent from [facility] for SBPs in 70s. Patient altered. Per EMS, patient with malodorous urine. At baseline, able to maintain conversations. No fevers or chills noted. (11 Jul 2019 08:11)    Palliative care team consulted for assistance with decision making in the setting of disease progression despite first line treatment. 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 In the hospital pt has had soft blood pressures, systolic usually high 80s to low 100s. Has haldol 0.25mg IV q6 PRN for agitation, has not required any doses. His main symptom burden arises from carcinoid syndrome. Experiences flushing and frequent diarrhea. Diarrhea being treated with Imodium. Given his inadequate response to octreotide, there is discussion whether pt should undergo additional treatment options, including Everoliumus, cytotoxic chemotherapy. Pt currently has very poor functional and mental status. Palliative to help with decision-making regarding further disease-modifying treatments.     Pt was seen lying in bed. Appears mildly cachectic, fecally incontinent. Alert, verbal, not oriented. Could not answer questions regarding name of self or family members, location, etc. Would answer questions with illogical and at times confabulated responses. Was not able to adequately state symptoms. Appeared to be comfortable and not anxious. Family not at bedside at that time.              PERTINENT PM/SXH:   Neck mass  Kidney lesion  History of carcinoid syndrome    H/O carcinoid syndrome  S/P TURP    FAMILY HISTORY:  FH: lung cancer (Sibling): sister  Family history of nasopharyngeal cancer: father    ITEMS NOT CHECKED ARE NOT PRESENT    SOCIAL HISTORY:   Significant other/partner:  [x ]  Children:  [x ]  Bahai/Spirituality:  Substance hx:  [ ]   Tobacco hx:  [ ]   Alcohol hx: [ ]   Home Opioid hx:  [ ] I-Stop Reference No:  Living Situation: [ ]Home  [ ]Long term care  [ ]Rehab [ ]Other    ADVANCE DIRECTIVES:    DNR  Yes  MOLST  [x ]  Living Will  [ ]   DECISION MAKER(s):  [x ] Health Care Proxy(s)  [ ] Surrogate(s)  [ ] Guardian           Name(s): Phone Number(s):    BASELINE (I)ADL(s) (prior to admission):  [place]: [ ]Total  [ ] Moderate [ ]Dependent    Allergies    penicillin (Swelling)    Intolerances    MEDICATIONS  (STANDING):  artificial tears (preservative free) Ophthalmic Solution 1 Drop(s) Both EYES every 2 hours  cephalexin 500 milliGRAM(s) Oral every 12 hours  chlorhexidine 2% Cloths 1 Application(s) Topical daily  heparin  Injectable 5000 Unit(s) SubCutaneous every 12 hours  melatonin 3 milliGRAM(s) Oral at bedtime  multivitamin 1 Tablet(s) Oral daily  octreotide  Injectable 200 MICROGram(s) SubCutaneous every 6 hours  sodium bicarbonate 1300 milliGRAM(s) Oral every 6 hours  sodium chloride 0.9%. 1000 milliLiter(s) (75 mL/Hr) IV Continuous <Continuous>    MEDICATIONS  (PRN):  acetaminophen   Tablet .. 650 milliGRAM(s) Oral every 6 hours PRN Temp greater or equal to 38.5C (101.3F), Mild Pain (1 - 3)  haloperidol    Injectable 0.25 milliGRAM(s) IV Push every 6 hours PRN Agitation  loperamide 2 milliGRAM(s) Oral four times a day PRN Diarrhea    PRESENT SYMPTOMS: [ x ]Unable to obtain due to poor mentation   Source if other than patient:  [ ]Family   [ ]Team Inferred    Pain (Impact on QOL):  0  Location -         Minimal acceptable level (0-10 scale):                    Aggravating factors -  Quality -  Radiation -  Severity (0-10 scale) -    Timing -    PAIN AD Score: 0    [email or web address] (press ctrl +  left click to view)    Dyspnea:                           [ ]Mild [ ]Moderate [ ]Severe  Anxiety:                             [ ]Mild [ ]Moderate [ ]Severe  Fatigue:                             [ ]Mild [ ]Moderate [ ]Severe  Nausea:                             [ ]Mild [ ]Moderate [ ]Severe  Loss of appetite:              [ ]Mild [ x]Moderate [ ]Severe  Fecal incontinence              [ ]Mild [ ]Moderate [x ]Severe    Other Symptoms:  [ x]All other review of systems negative     Karnofsky Performance Score/Palliative Performance Status Version 2:     40    %    http://palliative.info/resource_material/[email or web address].pdf  PHYSICAL EXAM:  Vital Signs Last 24 Hrs  T(C): 36.9 (17 Jul 2019 04:28), Max: 37.2 (16 Jul 2019 13:40)  T(F): 98.4 (17 Jul 2019 04:28), Max: 98.9 (16 Jul 2019 13:40)  HR: 96 (17 Jul 2019 04:28) (92 - 108)  BP: 91/52 (17 Jul 2019 04:42) (80/49 - 98/59)  BP(mean): --  RR: 18 (17 Jul 2019 04:28) (17 - 18)  SpO2: 94% (17 Jul 2019 04:28) (92% - 94%) I&O's Summary    16 Jul 2019 07:01  -  17 Jul 2019 07:00  --------------------------------------------------------  IN: 350 mL / OUT: 600 mL / NET: -250 mL    GENERAL:  [ x]Alert  [x ]Oriented x 0  [ ]Lethargic  [ ]Cachexia  [ ]Unarousable  [x ]Verbal  [ ]Non-Verbal  Behavioral:   [ ] Anxiety  [x ] Delirium 2/2 underlying illness [ ] Agitation [ ] Other  HEENT:  [x ]Normal   [ ]Dry mouth   [ ]ET Tube/Trach  [ ]Oral lesions  PULMONARY:   [x ]Clear [ ]Tachypnea  [ ]Audible excessive secretions   [ ]Rhonchi        [ ]Right [ ]Left [ ]Bilateral  [ ]Crackles        [ ]Right [ ]Left [ ]Bilateral  [ ]Wheezing     [ ]Right [ ]Left [ ]Bilateral  CARDIOVASCULAR:    [x ]Regular [ ]Irregular [ ]Tachy  [ ]Saravanan [ ]Murmur [ ]Other  GASTROINTESTINAL:  [x ]Soft  [ ]Distended   [x ]+BS  [ ]Non tender [ ]Tender  [ ]PEG [ ]OGT/ NGT  Last BM: today (loose)  GENITOURINARY:  [ ]Normal [ ] Incontinent   [ ]Oliguria/Anuria   [ ]Calzada  MUSCULOSKELETAL:   [x ]Normal   [c ]Weakness  [ ]Bed/Wheelchair bound [ ]Edema  NEUROLOGIC:   [ ]No focal deficits  [ x] Cognitive impairment  [ ] Dysphagia [ ]Dysarthria [ ] Paresis [ ]Other   SKIN:   [x ]Normal   [ ]Pressure ulcer(s)  [ ]Rash    LABS:                        8.1    9.54  )-----------( 140      ( 16 Jul 2019 09:55 )             26.5   07-17    143  |  111<H>  |  79<H>  ----------------------------<  98  3.5   |  17<L>  |  2.83<H>    Ca    9.9      17 Jul 2019 07:42  Phos  1.9     07-17  Mg     2.4     07-17          RADIOLOGY & ADDITIONAL STUDIES:    PROTEIN CALORIE MALNUTRITION PRESENT: [x ] Yes [ ] No (Underweight, BMI <19 w/ severe protein deficiency per dietition)  [ ] PPSV2 < or = to 30% [ ] significant weight loss  [x ] poor nutritional intake [x ] catabolic state [ ] anasarca     Albumin, Serum: 3.2 g/dL (07-11-19 @ 03:24)  Artificial Nutrition [ ]     REFERRALS:   [ ]Chaplaincy  [ ] Hospice  [ ]Child Life  [ ]Social Work  [ ]Case management [ ]Holistic Therapy   Goals of Care Discussion Document: HPI:  86yoM with PMH of carcinoid syndrome s/p left lower lobe resection, bladder disease with new calzada placed on July 1st in the ED sent from [facility] for SBPs in 70s. Patient altered. Per EMS, patient with malodorous urine. At baseline, able to maintain conversations. No fevers or chills noted. (11 Jul 2019 08:11)    Palliative care team consulted for assistance with decision making in the setting of disease progression despite first line treatment. 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 In the hospital pt has had soft blood pressures, systolic usually high 80s to low 100s. Has haldol 0.25mg IV q6 PRN for agitation, has not required any doses. His main symptom burden arises from carcinoid syndrome. Experiences flushing and frequent diarrhea. Diarrhea being treated with Imodium. Given his inadequate response to octreotide, there is discussion whether pt should undergo additional treatment options, including Everoliumus, cytotoxic chemotherapy. Pt currently has very poor functional and mental status. Palliative to help with decision-making regarding further disease-modifying treatments.     Pt was seen lying in bed. Appears mildly cachectic, fecally incontinent. Alert, verbal, not oriented. Could not answer questions regarding name of self or family members, location, etc. Would answer questions with illogical and at times confabulated responses. Was not able to adequately state symptoms. Appeared to be comfortable and not anxious. Family not at bedside at that time.              PERTINENT PM/SXH:   Neck mass  Kidney lesion  History of carcinoid syndrome    H/O carcinoid syndrome  S/P TURP    FAMILY HISTORY:  FH: lung cancer (Sibling): sister  Family history of nasopharyngeal cancer: father    ITEMS NOT CHECKED ARE NOT PRESENT    SOCIAL HISTORY:   Significant other/partner:  [x ]  Children:  [x ]  Christian/Spirituality:  Substance hx:  [ ]   Tobacco hx:  [ ]   Alcohol hx: [ ]   Home Opioid hx:  [ ] I-Stop Reference No:  Living Situation: [ ]Home  [ ]Long term care  [ ]Rehab [ ]Other    ADVANCE DIRECTIVES:    DNR  Yes  MOLST  [x ]  Living Will  [ ]   DECISION MAKER(s):  [x ] Health Care Proxy(s)  [ ] Surrogate(s)  [ ] Guardian           Name(s): Phone Number(s):    BASELINE (I)ADL(s) (prior to admission):  [place]: [ ]Total  [ ] Moderate [ ]Dependent    Allergies    penicillin (Swelling)    Intolerances    MEDICATIONS  (STANDING):  artificial tears (preservative free) Ophthalmic Solution 1 Drop(s) Both EYES every 2 hours  cephalexin 500 milliGRAM(s) Oral every 12 hours  chlorhexidine 2% Cloths 1 Application(s) Topical daily  heparin  Injectable 5000 Unit(s) SubCutaneous every 12 hours  melatonin 3 milliGRAM(s) Oral at bedtime  multivitamin 1 Tablet(s) Oral daily  octreotide  Injectable 200 MICROGram(s) SubCutaneous every 6 hours  sodium bicarbonate 1300 milliGRAM(s) Oral every 6 hours  sodium chloride 0.9%. 1000 milliLiter(s) (75 mL/Hr) IV Continuous <Continuous>    MEDICATIONS  (PRN):  acetaminophen   Tablet .. 650 milliGRAM(s) Oral every 6 hours PRN Temp greater or equal to 38.5C (101.3F), Mild Pain (1 - 3)  haloperidol    Injectable 0.25 milliGRAM(s) IV Push every 6 hours PRN Agitation  loperamide 2 milliGRAM(s) Oral four times a day PRN Diarrhea    PRESENT SYMPTOMS: [ x ]Unable to obtain due to poor mentation   Source if other than patient:  [ ]Family   [ ]Team Inferred    Pain (Impact on QOL):  0  Location -         Minimal acceptable level (0-10 scale):                    Aggravating factors -  Quality -  Radiation -  Severity (0-10 scale) -    Timing -    PAIN AD Score: 00    [email or web address] (press ctrl +  left click to view)    Dyspnea:                           [ ]Mild [ ]Moderate [ ]Severe  Anxiety:                             [ ]Mild [ ]Moderate [ ]Severe  Fatigue:                             [ ]Mild [ ]Moderate [ ]Severe  Nausea:                             [ ]Mild [ ]Moderate [ ]Severe  Loss of appetite:              [ ]Mild [ x]Moderate [ ]Severe  Fecal incontinence              [ ]Mild [ ]Moderate [x ]Severe    Other Symptoms:  s  [ x]All other review of systems negative     Karnofsky Performance Score/Palliative Performance Status Version 2:     40    %    http://palliative.info/resource_material/[email or web address].pdf  PHYSICAL EXAM:  Vital Signs Last 24 Hrs  T(C): 36.9 (17 Jul 2019 04:28), Max: 37.2 (16 Jul 2019 13:40)  T(F): 98.4 (17 Jul 2019 04:28), Max: 98.9 (16 Jul 2019 13:40)  HR: 96 (17 Jul 2019 04:28) (92 - 108)  BP: 91/52 (17 Jul 2019 04:42) (80/49 - 98/59)  BP(mean): --  RR: 18 (17 Jul 2019 04:28) (17 - 18)  SpO2: 94% (17 Jul 2019 04:28) (92% - 94%) I&O's Summary    16 Jul 2019 07:01  -  17 Jul 2019 07:00  --------------------------------------------------------  IN: 350 mL / OUT: 600 mL / NET: -250 mL    GENERAL:  [ x]Alert  [x ]Oriented x 0  [ ]Lethargic  [ ]Cachexia  [ ]Unarousable  [x ]Verbal  [ ]Non-Verbal  Behavioral:   [ ] Anxiety  [x ] Delirium 2/2 underlying illness [ ] Agitation [ ] Other  HEENT:  [x ]Normal   [ ]Dry mouth   [ ]ET Tube/Trach  [ ]Oral lesions  PULMONARY:   [x ]Clear [ ]Tachypnea  [ ]Audible excessive secretions   [ ]Rhonchi        [ ]Right [ ]Left [ ]Bilateral  [ ]Crackles        [ ]Right [ ]Left [ ]Bilateral  [ ]Wheezing     [ ]Right [ ]Left [ ]Bilateral  CARDIOVASCULAR:    [x ]Regular [ ]Irregular [ ]Tachy  [ ]Saravanan [ ]Murmur [ ]Other  GASTROINTESTINAL:  [x ]Soft  [ ]Distended   [x ]+BS  [ ]Non tender [ ]Tender  [ ]PEG [ ]OGT/ NGT  Last BM: today (loose)  GENITOURINARY:  [ ]Normal [ ] Incontinent   [ ]Oliguria/Anuria   [ ]Calzada  MUSCULOSKELETAL:   [x ]Normal   [c ]Weakness  [ ]Bed/Wheelchair bound [ ]Edema  NEUROLOGIC:   [ ]No focal deficits  [ x] Cognitive impairment  [ ] Dysphagia [ ]Dysarthria [ ] Paresis [ ]Other   SKIN:   [x ]Normal   [ ]Pressure ulcer(s)  [ ]Rash    LABS:                        8.1    9.54  )-----------( 140      ( 16 Jul 2019 09:55 )             26.5   07-17    143  |  111<H>  |  79<H>  ----------------------------<  98  3.5   |  17<L>  |  2.83<H>    Ca    9.9      17 Jul 2019 07:42  Phos  1.9     07-17  Mg     2.4     07-17          RADIOLOGY & ADDITIONAL STUDIES:    PROTEIN CALORIE MALNUTRITION PRESENT: [x ] Yes [ ] No (Underweight, BMI <19 w/ severe protein deficiency per dietition)  [ ] PPSV2 < or = to 30% [ ] significant weight loss  [x ] poor nutritional intake [x ] catabolic state [ ] anasarca     Albumin, Serum: 3.2 g/dL (07-11-19 @ 03:24)  Artificial Nutrition [ ]     REFERRALS:   [ ]Chaplaincy  [ ] Hospice  [ ]Child Life  [ ]Social Work  [ ]Case management [ ]Holistic Therapy   Goals of Care Discussion Document:

## 2019-07-17 NOTE — DIETITIAN INITIAL EVALUATION ADULT. - FEEDING SKILL
total assistance/Pt requires total assistance with feeds as pt currently with wrist restraints in place-pt previously pulling at IV lines and calzada

## 2019-07-17 NOTE — CHART NOTE - NSCHARTNOTEFT_GEN_A_CORE
Molst form in chart signed,  DNR ordered entered Dr Hardy monaco   Department of Medicine   LYNSEY Ibrahim [facility]-c [id number]

## 2019-07-17 NOTE — PROGRESS NOTE ADULT - SUBJECTIVE AND OBJECTIVE BOX
Patient is a 86y old  Male who presents with a chief complaint of mental status jamil (17 Jul 2019 17:12)      SUBJECTIVE / OVERNIGHT EVENTS: weak, more alert.  Review of Systems  chest pain no  palpitations no  sob no  nausea no  headache no    MEDICATIONS  (STANDING):  artificial tears (preservative free) Ophthalmic Solution 1 Drop(s) Both EYES every 2 hours  cephalexin 500 milliGRAM(s) Oral every 12 hours  chlorhexidine 2% Cloths 1 Application(s) Topical daily  cholestyramine Powder (Sugar-Free) 4 Gram(s) Oral every 12 hours  heparin  Injectable 5000 Unit(s) SubCutaneous every 12 hours  melatonin 3 milliGRAM(s) Oral at bedtime  multivitamin 1 Tablet(s) Oral daily  octreotide  Injectable 200 MICROGram(s) SubCutaneous every 6 hours  sodium bicarbonate 1300 milliGRAM(s) Oral every 6 hours  sodium chloride 0.9%. 1000 milliLiter(s) (75 mL/Hr) IV Continuous <Continuous>    MEDICATIONS  (PRN):  acetaminophen   Tablet .. 650 milliGRAM(s) Oral every 6 hours PRN Temp greater or equal to 38.5C (101.3F), Mild Pain (1 - 3)  haloperidol    Injectable 0.25 milliGRAM(s) IV Push every 6 hours PRN Agitation  loperamide 2 milliGRAM(s) Oral four times a day PRN Diarrhea      Vital Signs Last 24 Hrs  T(C): 36.6 (17 Jul 2019 16:22), Max: 36.9 (16 Jul 2019 23:37)  T(F): 97.8 (17 Jul 2019 16:22), Max: 98.5 (16 Jul 2019 23:37)  HR: 108 (17 Jul 2019 20:17) (95 - 108)  BP: 92/57 (17 Jul 2019 20:17) (80/49 - 98/59)  BP(mean): --  RR: 18 (17 Jul 2019 16:22) (18 - 18)  SpO2: 99% (17 Jul 2019 16:22) (92% - 99%)    PHYSICAL EXAM:  GENERAL: NAD  HEAD:  Atraumatic, Normocephalic  EYES: EOMI, PERRLA, conjunctiva and sclera flushed  NECK: Supple, No JVD  CHEST/LUNG: Clear to auscultation bilaterally; No wheeze  HEART: Regular rate and rhythm; No murmurs, rubs, or gallops  ABDOMEN: Soft, Nontender, Nondistended; Bowel sounds present  EXTREMITIES:  2+ Peripheral Pulses, No clubbing, cyanosis, or edema  PSYCH: confused  NEUROLOGY: non-focal  SKIN:  flushed    LABS:                        7.9    9.33  )-----------( 146      ( 17 Jul 2019 09:30 )             26.3     07-17    143  |  111<H>  |  79<H>  ----------------------------<  98  3.5   |  17<L>  |  2.83<H>    Ca    9.9      17 Jul 2019 07:42  Phos  1.9     07-17  Mg     2.4     07-17      Chromogranin A (07.12.19 @ 11:00)    Chromogranin A: 75584: Impaired renal or hepatic function or treatment with proton  pump inhibitors may result in artifactual elevations of  Chromogranin A.  -------------------ADDITIONAL INFORMATION-------------------  This test was developed and its performance characteristics  determined by [facility] in a manner consistent with CLIA  requirements. This test has not been cleared or approved by  the U.[place]. Food and Drug Administration.  The testing method is a homogeneous time-resolved  immunofluorescent assay.  Values obtained with different assay methods or kits may be  different and cannot be used interchangeably.  Test results cannot be interpreted as absolute evidence for  the presence or absence of malignant disease.  Test Performed by:  [facility]  [address] [place] [address] ng/mL                RADIOLOGY & ADDITIONAL TESTS:    Imaging Personally Reviewed:    Consultant(s) Notes Reviewed:      Care Discussed with Consultants/Other Providers:

## 2019-07-17 NOTE — PROGRESS NOTE ADULT - SUBJECTIVE AND OBJECTIVE BOX
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 Noted to have malodorous urine and treated for UTI. On this admission patient has been extremely agitated. Nursing reports he has pulled out numerous IVs, pulled on calzada, frequently agitated even in the presence of enhanced supervision. On previous visits was reportedly aaxo3. Nursing reports no sleep for many nights. Patient otherwise unable to provide any further history,.   RRT called on 7/14 for low bp and inc HR  no new overnight events. patient confused states wishes to have relatives visit but does not know how to  call them.     Past Medical History:  Neck mass (R22.1): was resected, reportedly bening  Kidney lesion (N28.9): partial resction- reportedly &quot;not active lesion&quot;  History of carcinoid syndrome (Z86.39)      Past Surgical History:  H/O carcinoid syndrome (Z86.39)  S/P TURP (Z90.79)      Social History:  No toxic habits    Family History:  FAMILY HISTORY:  FH: lung cancer (Sibling): sister  Family history of nasopharyngeal cancer: father      Allergies:  penicillin (Swelling)      ROS:  Patient unable to provide.     Advanced care planning reviewed and noted in the chart.    Medications:  acetaminophen   Tablet .. 650 milliGRAM(s) Oral every 6 hours PRN  artificial tears (preservative free) Ophthalmic Solution 1 Drop(s) Both EYES every 2 hours  cephalexin 500 milliGRAM(s) Oral every 12 hours  chlorhexidine 2% Cloths 1 Application(s) Topical daily  haloperidol    Injectable 0.25 milliGRAM(s) IV Push every 6 hours PRN  heparin  Injectable 5000 Unit(s) SubCutaneous every 12 hours  loperamide 2 milliGRAM(s) Oral four times a day PRN  melatonin 3 milliGRAM(s) Oral at bedtime  multivitamin 1 Tablet(s) Oral daily  octreotide  Injectable 200 MICROGram(s) SubCutaneous every 6 hours  sodium bicarbonate 1300 milliGRAM(s) Oral every 6 hours  sodium chloride 0.9%. 1000 milliLiter(s) IV Continuous <Continuous>      Labs:  CBC Full  -  ( 16 Jul 2019 09:55 )  WBC Count : 9.54 K/uL  RBC Count : 2.57 M/uL  Hemoglobin : 8.1 g/dL  Hematocrit : 26.5 %  Platelet Count - Automated : 140 K/uL  Mean Cell Volume : 103.1 fl  Mean Cell Hemoglobin : 31.5 pg  Mean Cell Hemoglobin Concentration : 30.6 gm/dL  Auto Neutrophil # : x  Auto Lymphocyte # : x  Auto Monocyte # : x  Auto Eosinophil # : x  Auto Basophil # : x  Auto Neutrophil % : x  Auto Lymphocyte % : x  Auto Monocyte % : x  Auto Eosinophil % : x  Auto Basophil % : x    07-17    143  |  111<H>  |  79<H>  ----------------------------<  98  3.5   |  17<L>  |  2.83<H>    Ca    9.9      17 Jul 2019 07:42  Phos  1.9     07-17  Mg     2.4     07-17      CAPILLARY BLOOD GLUCOSE                  Vitals:  Vital Signs Last 24 Hrs  T(C): 36.9 (17 Jul 2019 04:28), Max: 37.2 (16 Jul 2019 13:40)  T(F): 98.4 (17 Jul 2019 04:28), Max: 98.9 (16 Jul 2019 13:40)  HR: 96 (17 Jul 2019 04:28) (92 - 108)  BP: 91/52 (17 Jul 2019 04:42) (80/49 - 98/59)  BP(mean): --  RR: 18 (17 Jul 2019 04:28) (17 - 18)  SpO2: 94% (17 Jul 2019 04:28) (92% - 94%)    NEUROLOGICAL EXAM:    Mental status: Lethargic but eyes open, today keeps open and speaks more than yesterday's interaction.  attends with reinforcement,  follows only simple commands selectively.     Cranial Nerves: Pupils were equal, round, reactive to light. Extraocular movements with slight left exophoria. Visual field were full.  Facial sensation was intact to light touch. There was no facial asymmetry. The palate was upgoing symmetrically and tongue was midline.     Motor exam: Bulk and tone were normal. not cooperative with formal exam    Reflexes: Absent in the bilateral upper extremities. Absent in the bilateral lower extremities. Toes were mute.     Sensation: Intact to light touch x 4    Coordination: Unable to assess    Gait: Unable to assess    < from: CT Head No Cont (07.11.19 @ 10:17) >    EXAM:  CT BRAIN                            PROCEDURE DATE:  07/11/2019            INTERPRETATION:  CLINICAL INFORMATION: Confusion and sepsis. Lethargy.    TECHNIQUE: Noncontrast axial CT images were acquired through the head.   Two-dimensional sagittal and coronal reformats were generated.    COMPARISON STUDY: CT head 3/4/2009    FINDINGS:     No acute intracranial hemorrhage, mass effect, or midline shift. No   abnormal extra-axial collections. The basal cisterns are patent without   evidenceof central herniation.     Mild cerebral volume loss with proportional prominence of the sulci and   ventricles. Moderate patchy periventricular white matter hypoattenuation,   likely the sequela of chronic microvascular ischemic disease.    The right maxillary sinus is extensively opacified with thickened walls   consistent with chronic sinusitis, partially visualized. Mild mucosal   thickening and bony wall thickening is partially visualized in the left   maxillary sinus, also consistent with chronic sinusitis. The maxillary   sinuses are not covered on the prior exam.    IMPRESSION:     No CT evidence of acute intracranial hemorrhage, mass effect, or midline   shift.     Chronic maxillary sinusitis.                FEROZ PURVIS M.D.,RADIOLOGY RESIDENT  This document has been electronically signed.  JARRETT BERGER M.D., ATTENDING RADIOLOGIST  This document has been electronically signed. Jul 11 2019 10:30AM      < from: MR Head w/wo IV Cont (07.14.19 @ 18:20) >  EXAM:  MR BRAIN WAW IC                            PROCEDURE DATE:  07/14/2019            INTERPRETATION:  CLINICAL INDICATION: ADMDIAG1: T83.511A LETHARGY/.   Carcinoid syndrome, with altered mental status.86y year old Male    carcinoid syndrome, s/p left lower lobe resection, bladder disease with   calzada placed on  July 1st in the ED sent from [facility] for SBPs in 70s with malodorous urine   and treated for UTI.     TECHNIQUE: MRI of the brain was performed without and with contrast using   the following sequences: Axial DWI/ADC map, axial SWI, axial gradient   echo, axial SPGR, sagittal T1 FLAIR, axial T2 FLAIR, axial T2 FSE axial   T1 SPGR postcontrast and axial/coronal/sagittal T1 spin-echo postcontrast.    5 mls of Gadavist was administered intravenously without complication and   2.5 mls were discarded.    COMPARISON: CT head dated 7/11/2019, and 3/4/2009, PET/CT with dotatate   6/4/2019    FINDINGS:    Severely motion limited study on all sequences, suggest repeat imaging   with contrast and patient can hold still.    There is enlargement of ventricles and sulci greater expected for age   consistent with moderate volume loss. There are multiple nonspecific   white matter T2 hyperintensities,, likely microvascular disease with 5 mm   bifrontal subdural hygromas extending toward the vertex unchanged from   prior CT. There is no  restricted diffusion to suggest new territorial   infarction. There is no  acute intra-axial or extra-axial hemorrhage or   midline shift. There are no new extra-axial  collections. Basal cisterns   remain patent.    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 Postcontrast images are severely motion limited with   questionable enhancement along the adjacent leptomeninges, suggest repeat   imaging when patient can hold still.    Flow voids are noted within the intraventricular vasculature indicative   patency with a dominantintradural left and poorly seen intradural right   vertebral artery, and tortuous arterial vessels may reflect hypertension.    Orbital globes remain unchanged and unremarkable there is demonstration   of complete opacification and retraction of the right maxillary sinus   with increased downward sloping of the right orbital floor which may be   seen with a right silent sinus syndrome. There is mucosal thickening with   bubbly secretions and air-fluid level in the left maxillary sinus,   scattered mucosal thickening in the ethmoid and frontal sinuses. There is   opacification of the bilateral mastoid air cells and middle ears.      IMPRESSION:     Motion limited study, enhancing foci of decreased T1 and hyperintense DWI   signal in the clivusconcerning for osseous metastasis, postcontrast   images are limited by motion, 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    Volume loss and microvascular disease without obvious restricted   diffusion to suggest new territorial infarction, hemorrhage or midline   shift.    Opacified retracted right maxillary sinus correlate with right silent   sinus syndrome, and left maxillary sinus air-fluid level. Opacified   mastoids and middle ears.                DIANA STEWART M.D., RADIOLOGY FELLOW  This document has been electronically signed.  PRAVEEN MACKEY M.D., ATTENDING RADIOLOGIST  This document has been electronically signed. Jul 15 2019  3:03PM                < end of copied text >            < end of copied text >

## 2019-07-18 LAB
ANION GAP SERPL CALC-SCNC: 13 MMOL/L — SIGNIFICANT CHANGE UP (ref 5–17)
BUN SERPL-MCNC: 79 MG/DL — HIGH (ref 7–23)
CALCIUM SERPL-MCNC: 9.6 MG/DL — SIGNIFICANT CHANGE UP (ref 8.4–10.5)
CGA FLD-MCNC: HIGH NG/ML
CHLORIDE SERPL-SCNC: 116 MMOL/L — HIGH (ref 96–108)
CO2 SERPL-SCNC: 17 MMOL/L — LOW (ref 22–31)
CREAT SERPL-MCNC: 3 MG/DL — HIGH (ref 0.5–1.3)
GLUCOSE SERPL-MCNC: 112 MG/DL — HIGH (ref 70–99)
HCT VFR BLD CALC: 25 % — LOW (ref 39–50)
HGB BLD-MCNC: 7.6 G/DL — LOW (ref 13–17)
MCHC RBC-ENTMCNC: 30.4 GM/DL — LOW (ref 32–36)
MCHC RBC-ENTMCNC: 32.1 PG — SIGNIFICANT CHANGE UP (ref 27–34)
MCV RBC AUTO: 105.5 FL — HIGH (ref 80–100)
NRBC # BLD: SIGNIFICANT CHANGE UP (ref 0–0)
PLATELET # BLD AUTO: 140 K/UL — LOW (ref 150–400)
POTASSIUM SERPL-MCNC: 3.2 MMOL/L — LOW (ref 3.5–5.3)
POTASSIUM SERPL-SCNC: 3.2 MMOL/L — LOW (ref 3.5–5.3)
RBC # BLD: 2.37 M/UL — LOW (ref 4.2–5.8)
RBC # FLD: 20.2 % — HIGH (ref 10.3–14.5)
SEROTONIN SER-MCNC: 32 NG/ML — SIGNIFICANT CHANGE UP
SODIUM SERPL-SCNC: 146 MMOL/L — HIGH (ref 135–145)
WBC # BLD: 8.36 K/UL — SIGNIFICANT CHANGE UP (ref 3.8–10.5)
WBC # FLD AUTO: 8.36 K/UL — SIGNIFICANT CHANGE UP (ref 3.8–10.5)

## 2019-07-18 PROCEDURE — 99233 SBSQ HOSP IP/OBS HIGH 50: CPT

## 2019-07-18 RX ORDER — POTASSIUM CHLORIDE 20 MEQ
40 PACKET (EA) ORAL ONCE
Refills: 0 | Status: COMPLETED | OUTPATIENT
Start: 2019-07-18 | End: 2019-07-18

## 2019-07-18 RX ADMIN — SODIUM CHLORIDE 75 MILLILITER(S): 9 INJECTION INTRAMUSCULAR; INTRAVENOUS; SUBCUTANEOUS at 21:19

## 2019-07-18 RX ADMIN — Medication 1 DROP(S): at 18:25

## 2019-07-18 RX ADMIN — Medication 1 DROP(S): at 10:17

## 2019-07-18 RX ADMIN — CHOLESTYRAMINE 4 GRAM(S): 4 POWDER, FOR SUSPENSION ORAL at 10:17

## 2019-07-18 RX ADMIN — Medication 1 DROP(S): at 04:43

## 2019-07-18 RX ADMIN — HEPARIN SODIUM 5000 UNIT(S): 5000 INJECTION INTRAVENOUS; SUBCUTANEOUS at 05:40

## 2019-07-18 RX ADMIN — Medication 1 DROP(S): at 12:47

## 2019-07-18 RX ADMIN — Medication 3 MILLIGRAM(S): at 21:18

## 2019-07-18 RX ADMIN — CHOLESTYRAMINE 4 GRAM(S): 4 POWDER, FOR SUSPENSION ORAL at 21:19

## 2019-07-18 RX ADMIN — SODIUM CHLORIDE 75 MILLILITER(S): 9 INJECTION INTRAMUSCULAR; INTRAVENOUS; SUBCUTANEOUS at 10:17

## 2019-07-18 RX ADMIN — Medication 1 DROP(S): at 19:44

## 2019-07-18 RX ADMIN — OCTREOTIDE ACETATE 200 MICROGRAM(S): 200 INJECTION, SOLUTION INTRAVENOUS; SUBCUTANEOUS at 02:26

## 2019-07-18 RX ADMIN — Medication 1 DROP(S): at 21:18

## 2019-07-18 RX ADMIN — CHLORHEXIDINE GLUCONATE 1 APPLICATION(S): 213 SOLUTION TOPICAL at 12:47

## 2019-07-18 RX ADMIN — Medication 1 DROP(S): at 05:40

## 2019-07-18 RX ADMIN — SODIUM CHLORIDE 75 MILLILITER(S): 9 INJECTION INTRAMUSCULAR; INTRAVENOUS; SUBCUTANEOUS at 21:17

## 2019-07-18 RX ADMIN — Medication 1 TABLET(S): at 12:46

## 2019-07-18 RX ADMIN — Medication 1 DROP(S): at 02:26

## 2019-07-18 RX ADMIN — Medication 40 MILLIEQUIVALENT(S): at 12:46

## 2019-07-18 RX ADMIN — Medication 1300 MILLIGRAM(S): at 05:41

## 2019-07-18 RX ADMIN — Medication 500 MILLIGRAM(S): at 18:25

## 2019-07-18 RX ADMIN — Medication 500 MILLIGRAM(S): at 05:40

## 2019-07-18 RX ADMIN — Medication 1 DROP(S): at 00:35

## 2019-07-18 RX ADMIN — HEPARIN SODIUM 5000 UNIT(S): 5000 INJECTION INTRAVENOUS; SUBCUTANEOUS at 18:25

## 2019-07-18 RX ADMIN — Medication 1300 MILLIGRAM(S): at 18:25

## 2019-07-18 RX ADMIN — OCTREOTIDE ACETATE 200 MICROGRAM(S): 200 INJECTION, SOLUTION INTRAVENOUS; SUBCUTANEOUS at 18:25

## 2019-07-18 RX ADMIN — Medication 1300 MILLIGRAM(S): at 00:36

## 2019-07-18 RX ADMIN — Medication 2 MILLIGRAM(S): at 00:37

## 2019-07-18 RX ADMIN — OCTREOTIDE ACETATE 200 MICROGRAM(S): 200 INJECTION, SOLUTION INTRAVENOUS; SUBCUTANEOUS at 12:47

## 2019-07-18 RX ADMIN — Medication 2 MILLIGRAM(S): at 10:26

## 2019-07-18 RX ADMIN — Medication 1300 MILLIGRAM(S): at 12:46

## 2019-07-18 NOTE — PROGRESS NOTE ADULT - SUBJECTIVE AND OBJECTIVE BOX
Patient is a 86y old  Male who presents with a chief complaint of mental status change (11 Jul 2019 21:31)       Pt is seen and examined  pt is  lying in bed  cts to have diarrhea  remains confused       REVIEW OF SYSTEMS:    limited  PHYSICAL EXAM  General: adult  chronically ill, flushed face   HEENT: clear oropharynx, anicteric sclera, pale conjunctiva  Neck: supple  CV: normal S1/S2 with no murmur rubs or gallops  Lungs: positive air movement b/l ant lungs,clear to auscultation, no wheezes, no rales  Abdomen: soft non-tender non-distended  Ext: no clubbing cyanosis or edema  Skin: no rashes and no petechiae  Neuro: awake          MEDICATIONS  (STANDING):  artificial tears (preservative free) Ophthalmic Solution 1 Drop(s) Both EYES every 2 hours  cephalexin 500 milliGRAM(s) Oral every 12 hours  chlorhexidine 2% Cloths 1 Application(s) Topical daily  cholestyramine Powder (Sugar-Free) 4 Gram(s) Oral every 12 hours  heparin  Injectable 5000 Unit(s) SubCutaneous every 12 hours  melatonin 3 milliGRAM(s) Oral at bedtime  multivitamin 1 Tablet(s) Oral daily  octreotide  Injectable 200 MICROGram(s) SubCutaneous every 6 hours  sodium bicarbonate 1300 milliGRAM(s) Oral every 6 hours  sodium chloride 0.9%. 1000 milliLiter(s) (75 mL/Hr) IV Continuous <Continuous>    MEDICATIONS  (PRN):  acetaminophen   Tablet .. 650 milliGRAM(s) Oral every 6 hours PRN Temp greater or equal to 38.5C (101.3F), Mild Pain (1 - 3)  haloperidol    Injectable 0.25 milliGRAM(s) IV Push every 6 hours PRN Agitation  loperamide 2 milliGRAM(s) Oral four times a day PRN Diarrhea      Allergies    penicillin (Swelling)    Intolerances        Vital Signs Last 24 Hrs  T(C): 37 (18 Jul 2019 08:12), Max: 37 (18 Jul 2019 08:12)  T(F): 98.6 (18 Jul 2019 08:12), Max: 98.6 (18 Jul 2019 08:12)  HR: 76 (18 Jul 2019 08:12) (76 - 108)  BP: 115/46 (18 Jul 2019 08:12) (82/53 - 115/46)  BP(mean): 69 (18 Jul 2019 08:12) (69 - 69)  RR: 18 (18 Jul 2019 08:12) (18 - 18)  SpO2: 95% (18 Jul 2019 08:12) (93% - 99%)      LABS:                          7.9    9.33  )-----------( 146      ( 17 Jul 2019 09:30 )             26.3         Mean Cell Volume : 104.0 fl  Mean Cell Hemoglobin : 31.2 pg  Mean Cell Hemoglobin Concentration : 30.0 gm/dL        07-18    146<H>  |  116<H>  |  79<H>  ----------------------------<  112<H>  3.2<L>   |  17<L>  |  3.00<H>    Ca    9.6      18 Jul 2019 07:21  Phos  1.9     07-17  Mg     2.4     07-17      Culture - Blood (07.11.19 @ 05:11)    Specimen Source: .Blood    Culture Results:   No growth at 5 days.    Chromogranin A (07.12.19 @ 11:00)    Chromogranin A: [id number]  Culture - Urine (07.11.19 @ 05:01)    -  Cefazolin: S <=2 (MIC_CL_[id number]_[id number]) For uncomplicated UTI with K. pneumoniae, E. coli, or P. mirablis: MAYRA <=16 is sensitive and MAYRA >=32 is resistant. This also predicts results for oral agents cefaclor, cefdinir, cefpodoxime, cefprozil, cefuroxime axetil, cephalexin and locarbef for uncomplicated UTI. Note that some isolates may be susceptible to these agents while testing resistant to cefazolin.    -  Aztreonam: S <=4    -  Ampicillin: S <=2 These ampicillin results predict results for amoxicillin    -  Ampicillin/Sulbactam: S <=4/2 Enterobacter, Citrobacter, and Serratia may develop resistance during prolonged therapy (3-4 days)    -  Amikacin: S <=8    -  Amoxicillin/Clavulanic Acid: S <=8/4    -  Trimethoprim/Sulfamethoxazole: S <=0.5/9.5    -  Tobramycin: S <=2    -  Piperacillin/Tazobactam: S <=8    -  Tigecycline: S <=1    -  Nitrofurantoin: S <=32 Should not be used to treat pyelonephritis    -  Meropenem: S <=1    -  Ertapenem: S <=0.5    -  Gentamicin: S <=1    -  Imipenem: S <=1    -  Levofloxacin: S <=1    -  Ciprofloxacin: S <=0.5    -  Ceftriaxone: S <=1 Enterobacter, Citrobacter, and Serratia may develop resistance during prolonged therapy    -  Cefoxitin: S <=4    -  Cefepime: S <=2    Specimen Source: .Urine    Culture Results:   >100,000 CFU/ml Escherichia coli    Organism Identification: Escherichia coli    Organism: Escherichia coli    Method Type: MAYRA      Chromogranin A (06.23.19 @ 11:57)    Chromogranin A: [id number]    Rapid Respiratory Viral Panel (07.11.19 @ 22:22)    Rapid RVP Result: NotDetec: This Respiratory Panel uses polymerase chain reaction (PCR) to detect for  adenovirus; coronavirus (HKU1, NL63, 229E, OC43); human metapneumovirus  (hMPV); human enterovirus/rhinovirus (Entero/RV); influenza A; influenza  A/H1; influenza A/H3; influenza A/H1-2009; influenza B; parainfluenza  viruses 1, 2, 3, 4; respiratory syncytial virus; Mycoplasma pneumoniae;  and Chlamydophila pneumoniae.    < from: CT Chest No Cont (07.14.19 @ 12:01) >  IMPRESSION:     Mild pulmonary edema.    < end of copied text >      RADIOLOGY & ADDITIONAL STUDIES:    EXAM:  XR CHEST AP OR PA 1V                            PROCEDURE DATE:  07/11/2019        INTERPRETATION:  CLINICAL INFORMATION: Sepsis.    COMPARISON:  Chest x-ray 6/10/2019    TECHNIQUE:   Frontal radiograph of the chest.     FINDINGS:    Thelungs are clear. There is no pleural effusion or pneumothorax. The   cardiac silhouette is unremarkable. No acute osseous abnormality.    IMPRESSION: Clear lungs.    < from: CT Head No Cont (07.11.19 @ 10:17) >  IMPRESSION:     No CT evidence of acute intracranial hemorrhage, mass effect, or midline   shift.     Chronic maxillary sinusitis.      < end of copied text >      Chromogranin A (06.23.19 @ 11:57)    Chromogranin A: [id number]    < from: MR Head w/wo IV Cont (07.14.19 @ 18:20) >  IMPRESSION:     Motion limited study, enhancing foci of decreased T1 and hyperintense DWI   signal in the clivusconcerning for osseous metastasis, postcontrast   images are limited by motion, 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    Volume loss and microvascular disease without obvious restricted   diffusion to suggest new territorial infarction, hemorrhage or midline   shift.    Opacified retracted right maxillary sinus correlate with right silent   sinus syndrome, and left maxillary sinus air-fluid level. Opacified   mastoids and middle ears.    < end of copied text >

## 2019-07-18 NOTE — PROGRESS NOTE ADULT - SUBJECTIVE AND OBJECTIVE BOX
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   Patient reportedly found with hypotension and altered mental status. Urine was maladarous and appears to be a UTI on admission labs.    Rest of review of symptoms are unable to obtain due to hearing difficulties/lethargy.   No reported worsening CP/Palps/SOB    ===========================  Interval Events  - unreliable history but reesponive to commands  - lower bp   - rising Cr  ===========================            acetaminophen   Tablet .. 650 milliGRAM(s) Oral every 6 hours PRN  artificial tears (preservative free) Ophthalmic Solution 1 Drop(s) Both EYES every 2 hours  cefepime   IVPB 500 milliGRAM(s) IV Intermittent every 12 hours  heparin  Injectable 5000 Unit(s) SubCutaneous every 12 hours  loperamide 2 milliGRAM(s) Oral four times a day PRN  multivitamin 1 Tablet(s) Oral daily  octreotide  Injectable 200 MICROGram(s) SubCutaneous every 6 hours  sodium bicarbonate  Infusion 0.179 mEq/kG/Hr IV Continuous <Continuous>        Allergies    penicillin (Swelling)    Intolerances    	    MEDICATIONS:  heparin  Injectable 5000 Unit(s) SubCutaneous every 12 hours    cefepime   IVPB 500 milliGRAM(s) IV Intermittent every 12 hours      acetaminophen   Tablet .. 650 milliGRAM(s) Oral every 6 hours PRN    loperamide 2 milliGRAM(s) Oral four times a day PRN    octreotide  Injectable 200 MICROGram(s) SubCutaneous every 6 hours    artificial tears (preservative free) Ophthalmic Solution 1 Drop(s) Both EYES every 2 hours  multivitamin 1 Tablet(s) Oral daily  sodium bicarbonate  Infusion 0.179 mEq/kG/Hr IV Continuous <Continuous>      PAST MEDICAL & SURGICAL HISTORY:  Neck mass: was resected, reportedly bening  Kidney lesion: partial resction- reportedly &quot;not active lesion&quot;  History of carcinoid syndrome  H/O carcinoid syndrome  S/P TURP      FAMILY HISTORY:  FH: lung cancer (Sibling): sister  Family history of nasopharyngeal cancer: father    Mother - HTN      SOCIAL HISTORY:    [ x] Non-smoker  [x] No Illicit Drug Use  [ x] No Excess EtOH Use      REVIEW OF SYSTEMS: (Unless + Before Symptom, it is negative)  Constitutional: [] Fever, [x]Fatigue, []Weight Changes  Eyes:  []Recent Vision Changes, []Eye Pain  ENT: []Congestion, []Sore Throat  Endocrine: []Excess Sweating, []Temperature Intolerance  Cardiovascular:  []Chest Pain, []Palpitations, []Shortness of Breath, []Pre-syncope, []Syncope,[] LE Swelling  Respiratory:[] Cough, []Congestion,[] Wheezing  Gastrointestinal: [] Abdominal Pain,[] Nausea, []Vomiting  Genitourinary: []Dysuria,[] hematuria  Musculoskeletal: []Joint Pain, []Hip/Knee Injury  Neurologic: []Headaches,[] Imbalance, []Weakness  Skin: []Rashes, []hematoma, []purprura    ================================    PHYSICAL EXAM:  T(C): 36.4 (07-11-19 @ 07:53), Max: 36.8 (07-11-19 @ 02:58)  HR: 96 (07-11-19 @ 14:01) (84 - 98)  BP: 95/54 (07-11-19 @ 14:01) (94/40 - 100/54)  RR: 17 (07-11-19 @ 07:53) (17 - 18)  SpO2: 96% (07-11-19 @ 14:01) (96% - 100%)  Wt(kg): --  I&O's Summary    Appearance: Normal; +Lethargic +[place] +Responsive to commands  Psychiatry: Orientation and mood difficult to assess due to lethargy +[place]  HEENT:   Normal oral mucosa, EOMI	  Lymphatic: No lymphadenopathy  Cardiovascular: Normal S1 S2, No JVD, No murmurs, No edema  Respiratory: Lungs clear to auscultation, no use of accessory muscles	  Gastrointestinal:  Soft, Non-tender	  Skin: No rashes, No ecchymoses, No cyanosis	  Neurologic: Non-focal, No Focal Deficits  Extremities: Normal range of motion, No clubbing, cyanosis  Vascular: Peripheral pulses palpable 2+ bilaterally, no prominent varicosities    ============================    LABS:	   Labs [id number]     CBC Full  -  ( 11 Jul 2019 03:24 )  WBC Count : 7.8 K/uL  Hemoglobin : 8.7 g/dL  Hematocrit : 24.5 %  Platelet Count - Automated : 177 K/uL  Mean Cell Volume : 99.8 fl  Mean Cell Hemoglobin : 35.3 pg  Mean Cell Hemoglobin Concentration : 35.4 gm/dL  Auto Neutrophil # : 5.2 K/uL  Auto Lymphocyte # : 1.9 K/uL  Auto Monocyte # : 0.7 K/uL  Auto Eosinophil # : 0.1 K/uL  Auto Basophil # : 0.0 K/uL  Auto Neutrophil % : 66.0 %  Auto Lymphocyte % : 23.9 %  Auto Monocyte % : 8.9 %  Auto Eosinophil % : 0.7 %  Auto Basophil % : 0.6 %    07-11    142  |  117<H>  |  97<H>  ----------------------------<  123<H>  3.2<L>   |  12<L>  |  3.37<H>    Ca    11.4<H>      11 Jul 2019 03:24    TPro  6.0  /  Alb  3.2<L>  /  TBili  0.3  /  DBili  x   /  AST  37  /  ALT  18  /  AlkPhos  63  07-11    =============================  < from: Transthoracic Echocardiogram (07.12.19 @ 15:11) >  Derived variables:  LVMI: 84 g/m2  RWT: 0.33  EF (Visual Estimate): 70 %  ------------------------------------------------------------------------  Observations:  Mitral Valve: Normal mitral valve. Mild mitral  regurgitation.  Aortic Valve/Aorta: Calcified aortic valve without  stenosis.  Mild to moderate aortic regurgitation.  Normal aortic root size. (Ao: 3.4 cm at the sinuses of  Valsalva).  Left Atrium: Normal left atrium.  LA volume index = 26  cc/m2.  Left Ventricle: Normal left ventricular systolic function.  No segmental wall motion abnormalities. Normal left  ventricular internal dimensions and wall thicknesses.  Right Heart: Normal right atrium. Normal right ventricular  size and function. Moderate tricuspid regurgitation. Normal  pulmonic valve. Minimal pulmonic regurgitation.  Pericardium/Pleura: Normal pericardium with moderate  pericardial effusion.  Mild right atrial inversion.  Approximately 25% variation in mitral inflow velocity with  respiration.  No evidence of right ventricular diastolic collapse.  Hemodynamic: Estimated right atrial pressure is normal.  Mild pulmonary hypertension. Estimated PASP 37 mmHg.  ------------------------------------------------------------------------  Conclusions:  Normal left ventricular systolic function. No segmental  wall motion abnormalities.  Moderate tricuspid regurgitation.  Mild pulmonary hypertension.  Moderate circumferential pericardial effusion. Mild right  atrial inversion.  Approximately 25% variation in mitral inflow velocity with  respiration.  No evidence of right ventricular diastolic collapse.  IVC collapses with respiration.  *** Compared with echocardiogram of 6/27/2019, no  significant changes noted.  ------------------------------------------------------------------------  Confirmed on  7/12/2019 - 17:30:07 by Zheng Denise M.D.  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     Dhruv Paz MD, [facility]  [phone]

## 2019-07-18 NOTE — PROGRESS NOTE ADULT - SUBJECTIVE AND OBJECTIVE BOX
[facility]   [phone]  RENAL FOLLOW UP NOTE  --------------------------------------------------------------------------------  Chief Complaint:    24 hour events/subjective:    confused when seen earlier.     PAST HISTORY  --------------------------------------------------------------------------------  No significant changes to PMH, PSH, FHx, SHx, unless otherwise noted    ALLERGIES & MEDICATIONS  --------------------------------------------------------------------------------  Allergies    penicillin (Swelling)    Intolerances      Standing Inpatient Medications  artificial tears (preservative free) Ophthalmic Solution 1 Drop(s) Both EYES every 2 hours  cephalexin 500 milliGRAM(s) Oral every 12 hours  chlorhexidine 2% Cloths 1 Application(s) Topical daily  cholestyramine Powder (Sugar-Free) 4 Gram(s) Oral every 12 hours  heparin  Injectable 5000 Unit(s) SubCutaneous every 12 hours  melatonin 3 milliGRAM(s) Oral at bedtime  multivitamin 1 Tablet(s) Oral daily  octreotide  Injectable 200 MICROGram(s) SubCutaneous every 6 hours  sodium bicarbonate 1300 milliGRAM(s) Oral every 6 hours  sodium chloride 0.9%. 1000 milliLiter(s) IV Continuous <Continuous>    PRN Inpatient Medications  acetaminophen   Tablet .. 650 milliGRAM(s) Oral every 6 hours PRN  haloperidol    Injectable 0.25 milliGRAM(s) IV Push every 6 hours PRN  loperamide 2 milliGRAM(s) Oral four times a day PRN      REVIEW OF SYSTEMS  --------------------------------------------------------------------------------    not giving ros today       VITALS/PHYSICAL EXAM  --------------------------------------------------------------------------------  T(C): 37.1 (07-18-19 @ 15:56), Max: 37.1 (07-18-19 @ 15:56)  HR: 69 (07-18-19 @ 15:56) (58 - 93)  BP: 93/56 (07-18-19 @ 15:56) (88/51 - 115/46)  RR: 18 (07-18-19 @ 15:56) (18 - 18)  SpO2: 96% (07-18-19 @ 15:56) (94% - 96%)  Wt(kg): --        07-17-19 @ 07:01  -  07-18-19 @ 07:00  --------------------------------------------------------  IN: 1075 mL / OUT: 500 mL / NET: 575 mL    07-18-19 @ 07:01  -  07-18-19 @ 23:16  --------------------------------------------------------  IN: 180 mL / OUT: 800 mL / NET: -620 mL      Physical Exam:  	  Chronically ill appearing frail m    	no jvd   	Pulm: decrease bs  no rales or ronchi or wheezing  	CV: RRR, S1S2; no rub  	Abd: +BS, soft, nontender/nondistended  	: No suprapubic tenderness  	UE: Warm, no cyanosis  no clubbing,  no edema  	LE: Warm, no cyanosis  no clubbing, trace    edema  	    LABS/STUDIES  --------------------------------------------------------------------------------              7.6    8.36  >-----------<  140      [07-18-19 @ 09:50]              25.0     146  |  116  |  79  ----------------------------<  112      [07-18-19 @ 07:21]  3.2   |  17  |  3.00        Ca     9.6     [07-18-19 @ 07:21]      Mg     2.4     [07-17-19 @ 07:42]      Phos  1.9     [07-17-19 @ 07:42]            Creatinine Trend:  SCr 3.00 [07-18 @ 07:21]  SCr 2.83 [07-17 @ 07:42]  SCr 2.80 [07-16 @ 07:00]  SCr 2.83 [07-15 @ 10:05]  SCr 2.93 [07-14 @ 07:19]              Urinalysis - [07-14-19 @ 09:42]      Color Light Yellow / Appearance Slightly Turbid / SG 1.016 / pH 6.0      Gluc Negative / Ketone Negative  / Bili Negative / Urobili Negative       Blood Moderate / Protein 100 / Leuk Est Large / Nitrite Positive      RBC 19 /  / Hyaline 10 / Gran 4 / Sq Epi  / Non Sq Epi 3 / Bacteria Moderate      PTH -- (Ca 10.4)      [06-11-19 @ 09:51]   15  Vitamin D (25OH) 9.7      [06-11-19 @ 10:02]  TSH 1.15      [07-14-19 @ 10:04]    Syphilis Screen (Treponema Pallidum Ab) Negative      [07-15-19 @ 04:20]

## 2019-07-18 NOTE — PROGRESS NOTE ADULT - PROBLEM SELECTOR PLAN 3
- likely due to Urosepsis, metabolic changes  - CT head neg  MRI non contributory  clinically better today  eeg - no e/o seizure activity

## 2019-07-18 NOTE — PROGRESS NOTE ADULT - PROBLEM SELECTOR PLAN 1
ct octreotide at current dose  If diarrhea worsens, will need to r/o c.diff, abxa assoc diarrhea - prn probiotics  F/up chromogranin level - increased - concern for tumor progression - can repeat Ct abdomen to assess liver disease or PET for whole body disease if family desires  If POD on octreotide - need to decide with help of palliative team re. GOC - additional treatment options include everolimus, cytotoxic chemo  POOR PS makes benefit frm aggressive therapy less likely  Patient has outpatient DNR  Plan d/w dr. arnold as well

## 2019-07-18 NOTE — PROGRESS NOTE ADULT - SUBJECTIVE AND OBJECTIVE BOX
Patient is a 86y old  Male who presents with a chief complaint of mental status chanhe (18 Jul 2019 15:22)      SUBJECTIVE / OVERNIGHT EVENTS: No new complaints.   Review of Systems  chest pain no  palpitations no  sob no  nausea no  headache no    MEDICATIONS  (STANDING):  artificial tears (preservative free) Ophthalmic Solution 1 Drop(s) Both EYES every 2 hours  cephalexin 500 milliGRAM(s) Oral every 12 hours  chlorhexidine 2% Cloths 1 Application(s) Topical daily  cholestyramine Powder (Sugar-Free) 4 Gram(s) Oral every 12 hours  heparin  Injectable 5000 Unit(s) SubCutaneous every 12 hours  melatonin 3 milliGRAM(s) Oral at bedtime  multivitamin 1 Tablet(s) Oral daily  octreotide  Injectable 200 MICROGram(s) SubCutaneous every 6 hours  sodium bicarbonate 1300 milliGRAM(s) Oral every 6 hours  sodium chloride 0.9%. 1000 milliLiter(s) (75 mL/Hr) IV Continuous <Continuous>    MEDICATIONS  (PRN):  acetaminophen   Tablet .. 650 milliGRAM(s) Oral every 6 hours PRN Temp greater or equal to 38.5C (101.3F), Mild Pain (1 - 3)  haloperidol    Injectable 0.25 milliGRAM(s) IV Push every 6 hours PRN Agitation  loperamide 2 milliGRAM(s) Oral four times a day PRN Diarrhea      Vital Signs Last 24 Hrs  T(C): 37.1 (18 Jul 2019 15:56), Max: 37.1 (18 Jul 2019 15:56)  T(F): 98.7 (18 Jul 2019 15:56), Max: 98.7 (18 Jul 2019 15:56)  HR: 69 (18 Jul 2019 15:56) (58 - 93)  BP: 93/56 (18 Jul 2019 15:56) (88/51 - 115/46)  BP(mean): 77 (18 Jul 2019 13:03) (69 - 77)  RR: 18 (18 Jul 2019 15:56) (18 - 18)  SpO2: 96% (18 Jul 2019 15:56) (94% - 96%)    PHYSICAL EXAM:  GENERAL: NAD  HEAD:  Atraumatic, Normocephalic  EYES: EOMI, PERRLA, conjunctiva and sclera flushed  NECK: Supple, No JVD  CHEST/LUNG: Clear to auscultation bilaterally; No wheeze  HEART: Regular rate and rhythm; No murmurs, rubs, or gallops  ABDOMEN: Soft, Nontender, Nondistended; Bowel sounds present  EXTREMITIES:  2+ Peripheral Pulses, No clubbing, cyanosis, or edema  PSYCH: AAOx3  NEUROLOGY: non-focal  SKIN: No rashes or lesions Flushed    LABS:                        7.6    8.36  )-----------( 140      ( 18 Jul 2019 09:50 )             25.0     07-18    146<H>  |  116<H>  |  79<H>  ----------------------------<  112<H>  3.2<L>   |  17<L>  |  3.00<H>    Ca    9.6      18 Jul 2019 07:21  Phos  1.9     07-17  Mg     2.4     07-17                  RADIOLOGY & ADDITIONAL TESTS:    Imaging Personally Reviewed:    Consultant(s) Notes Reviewed:      Care Discussed with Consultants/Other Providers:

## 2019-07-18 NOTE — PROGRESS NOTE ADULT - SUBJECTIVE AND OBJECTIVE BOX
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 Noted to have malodorous urine and treated for UTI. On this admission patient has been extremely agitated. Nursing reports he has pulled out numerous IVs, pulled on calzada, frequently agitated even in the presence of enhanced supervision. On previous visits was reportedly aaxo3. Nursing reports no sleep for many nights. Patient otherwise unable to provide any further history,.   RRT called on 7/14 for low bp and inc HR  pt in restraints    Past Medical History:  Neck mass (R22.1): was resected, reportedly bening  Kidney lesion (N28.9): partial resction- reportedly &quot;not active lesion&quot;  History of carcinoid syndrome (Z86.39)      Past Surgical History:  H/O carcinoid syndrome (Z86.39)  S/P TURP (Z90.79)      Social History:  No toxic habits    Family History:  FAMILY HISTORY:  FH: lung cancer (Sibling): sister  Family history of nasopharyngeal cancer: father      Allergies:  penicillin (Swelling)      ROS:  Patient unable to provide.     Advanced care planning reviewed and noted in the chart.      Medications:  acetaminophen   Tablet .. 650 milliGRAM(s) Oral every 6 hours PRN  artificial tears (preservative free) Ophthalmic Solution 1 Drop(s) Both EYES every 2 hours  cephalexin 500 milliGRAM(s) Oral every 12 hours  chlorhexidine 2% Cloths 1 Application(s) Topical daily  cholestyramine Powder (Sugar-Free) 4 Gram(s) Oral every 12 hours  haloperidol    Injectable 0.25 milliGRAM(s) IV Push every 6 hours PRN  heparin  Injectable 5000 Unit(s) SubCutaneous every 12 hours  loperamide 2 milliGRAM(s) Oral four times a day PRN  melatonin 3 milliGRAM(s) Oral at bedtime  multivitamin 1 Tablet(s) Oral daily  octreotide  Injectable 200 MICROGram(s) SubCutaneous every 6 hours  sodium bicarbonate 1300 milliGRAM(s) Oral every 6 hours  sodium chloride 0.9%. 1000 milliLiter(s) IV Continuous <Continuous>      Labs:  CBC Full  -  ( 17 Jul 2019 09:30 )  WBC Count : 9.33 K/uL  RBC Count : 2.53 M/uL  Hemoglobin : 7.9 g/dL  Hematocrit : 26.3 %  Platelet Count - Automated : 146 K/uL  Mean Cell Volume : 104.0 fl  Mean Cell Hemoglobin : 31.2 pg  Mean Cell Hemoglobin Concentration : 30.0 gm/dL  Auto Neutrophil # : x  Auto Lymphocyte # : x  Auto Monocyte # : x  Auto Eosinophil # : x  Auto Basophil # : x  Auto Neutrophil % : x  Auto Lymphocyte % : x  Auto Monocyte % : x  Auto Eosinophil % : x  Auto Basophil % : x    07-18    146<H>  |  116<H>  |  79<H>  ----------------------------<  112<H>  3.2<L>   |  17<L>  |  3.00<H>    Ca    9.6      18 Jul 2019 07:21  Phos  1.9     07-17  Mg     2.4     07-17      CAPILLARY BLOOD GLUCOSE                  Vitals:  Vital Signs Last 24 Hrs  T(C): 37 (18 Jul 2019 08:12), Max: 37 (18 Jul 2019 08:12)  T(F): 98.6 (18 Jul 2019 08:12), Max: 98.6 (18 Jul 2019 08:12)  HR: 76 (18 Jul 2019 08:12) (76 - 108)  BP: 115/46 (18 Jul 2019 08:12) (82/53 - 115/46)  BP(mean): 69 (18 Jul 2019 08:12) (69 - 69)  RR: 18 (18 Jul 2019 08:12) (18 - 18)  SpO2: 95% (18 Jul 2019 08:12) (93% - 99%)  NEUROLOGICAL EXAM:    Mental status: awake, attends, not answer questiosn but conversant, followed some commands    Cranial Nerves: Pupils were equal, round, reactive to light. Extraocular movements with slight left exophoria. Visual field were full.  Facial sensation was intact to light touch. There was no facial asymmetry. The palate was upgoing symmetrically and tongue was midline.     Motor exam: Bulk and tone were normal. moving extremities    Reflexes: Absent in the bilateral upper extremities. Absent in the bilateral lower extremities. Toes were mute.     Sensation: Intact to light touch x 4    Coordination: Unable to assess    Gait: Unable to assess    < from: CT Head No Cont (07.11.19 @ 10:17) >    EXAM:  CT BRAIN                            PROCEDURE DATE:  07/11/2019            INTERPRETATION:  CLINICAL INFORMATION: Confusion and sepsis. Lethargy.    TECHNIQUE: Noncontrast axial CT images were acquired through the head.   Two-dimensional sagittal and coronal reformats were generated.    COMPARISON STUDY: CT head 3/4/2009    FINDINGS:     No acute intracranial hemorrhage, mass effect, or midline shift. No   abnormal extra-axial collections. The basal cisterns are patent without   evidenceof central herniation.     Mild cerebral volume loss with proportional prominence of the sulci and   ventricles. Moderate patchy periventricular white matter hypoattenuation,   likely the sequela of chronic microvascular ischemic disease.    The right maxillary sinus is extensively opacified with thickened walls   consistent with chronic sinusitis, partially visualized. Mild mucosal   thickening and bony wall thickening is partially visualized in the left   maxillary sinus, also consistent with chronic sinusitis. The maxillary   sinuses are not covered on the prior exam.    IMPRESSION:     No CT evidence of acute intracranial hemorrhage, mass effect, or midline   shift.     Chronic maxillary sinusitis.                FEROZ PURVIS M.D.,RADIOLOGY RESIDENT  This document has been electronically signed.  JARRETT BERGER M.D., ATTENDING RADIOLOGIST  This document has been electronically signed. Jul 11 2019 10:30AM      < from: MR Head w/wo IV Cont (07.14.19 @ 18:20) >  EXAM:  MR BRAIN WAW IC                            PROCEDURE DATE:  07/14/2019            INTERPRETATION:  CLINICAL INDICATION: ADMDIAG1: T83.511A LETHARGY/.   Carcinoid syndrome, with altered mental status.86y year old Male    carcinoid syndrome, s/p left lower lobe resection, bladder disease with   calzada placed on  July 1st in the ED sent from [facility] for SBPs in 70s with malodorous urine   and treated for UTI.     TECHNIQUE: MRI of the brain was performed without and with contrast using   the following sequences: Axial DWI/ADC map, axial SWI, axial gradient   echo, axial SPGR, sagittal T1 FLAIR, axial T2 FLAIR, axial T2 FSE axial   T1 SPGR postcontrast and axial/coronal/sagittal T1 spin-echo postcontrast.    5 mls of Gadavist was administered intravenously without complication and   2.5 mls were discarded.    COMPARISON: CT head dated 7/11/2019, and 3/4/2009, PET/CT with dotatate   6/4/2019    FINDINGS:    Severely motion limited study on all sequences, suggest repeat imaging   with contrast and patient can hold still.    There is enlargement of ventricles and sulci greater expected for age   consistent with moderate volume loss. There are multiple nonspecific   white matter T2 hyperintensities,, likely microvascular disease with 5 mm   bifrontal subdural hygromas extending toward the vertex unchanged from   prior CT. There is no  restricted diffusion to suggest new territorial   infarction. There is no  acute intra-axial or extra-axial hemorrhage or   midline shift. There are no new extra-axial  collections. Basal cisterns   remain patent.    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 Postcontrast images are severely motion limited with   questionable enhancement along the adjacent leptomeninges, suggest repeat   imaging when patient can hold still.    Flow voids are noted within the intraventricular vasculature indicative   patency with a dominantintradural left and poorly seen intradural right   vertebral artery, and tortuous arterial vessels may reflect hypertension.    Orbital globes remain unchanged and unremarkable there is demonstration   of complete opacification and retraction of the right maxillary sinus   with increased downward sloping of the right orbital floor which may be   seen with a right silent sinus syndrome. There is mucosal thickening with   bubbly secretions and air-fluid level in the left maxillary sinus,   scattered mucosal thickening in the ethmoid and frontal sinuses. There is   opacification of the bilateral mastoid air cells and middle ears.      IMPRESSION:     Motion limited study, enhancing foci of decreased T1 and hyperintense DWI   signal in the clivusconcerning for osseous metastasis, postcontrast   images are limited by motion, 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    Volume loss and microvascular disease without obvious restricted   diffusion to suggest new territorial infarction, hemorrhage or midline   shift.    Opacified retracted right maxillary sinus correlate with right silent   sinus syndrome, and left maxillary sinus air-fluid level. Opacified   mastoids and middle ears.                DIANA STEWART M.D., RADIOLOGY FELLOW  This document has been electronically signed.  PRAVEEN MACKEY M.D., ATTENDING RADIOLOGIST  This document has been electronically signed. Jul 15 2019  3:03PM                < end of copied text >            < end of copied text >

## 2019-07-19 LAB
ANION GAP SERPL CALC-SCNC: 13 MMOL/L — SIGNIFICANT CHANGE UP (ref 5–17)
BUN SERPL-MCNC: 73 MG/DL — HIGH (ref 7–23)
CALCIUM SERPL-MCNC: 9.5 MG/DL — SIGNIFICANT CHANGE UP (ref 8.4–10.5)
CHLORIDE SERPL-SCNC: 114 MMOL/L — HIGH (ref 96–108)
CO2 SERPL-SCNC: 17 MMOL/L — LOW (ref 22–31)
CREAT SERPL-MCNC: 3.03 MG/DL — HIGH (ref 0.5–1.3)
GLUCOSE SERPL-MCNC: 124 MG/DL — HIGH (ref 70–99)
HCT VFR BLD CALC: 26.2 % — LOW (ref 39–50)
HGB BLD-MCNC: 8 G/DL — LOW (ref 13–17)
MCHC RBC-ENTMCNC: 30.5 GM/DL — LOW (ref 32–36)
MCHC RBC-ENTMCNC: 31.7 PG — SIGNIFICANT CHANGE UP (ref 27–34)
MCV RBC AUTO: 104 FL — HIGH (ref 80–100)
NRBC # BLD: SIGNIFICANT CHANGE UP (ref 0–0)
PLATELET # BLD AUTO: 149 K/UL — LOW (ref 150–400)
POTASSIUM SERPL-MCNC: 3.6 MMOL/L — SIGNIFICANT CHANGE UP (ref 3.5–5.3)
POTASSIUM SERPL-SCNC: 3.6 MMOL/L — SIGNIFICANT CHANGE UP (ref 3.5–5.3)
RBC # BLD: 2.52 M/UL — LOW (ref 4.2–5.8)
RBC # FLD: 20.1 % — HIGH (ref 10.3–14.5)
SODIUM SERPL-SCNC: 144 MMOL/L — SIGNIFICANT CHANGE UP (ref 135–145)
WBC # BLD: 7.58 K/UL — SIGNIFICANT CHANGE UP (ref 3.8–10.5)
WBC # FLD AUTO: 7.58 K/UL — SIGNIFICANT CHANGE UP (ref 3.8–10.5)

## 2019-07-19 PROCEDURE — 99498 ADVNCD CARE PLAN ADDL 30 MIN: CPT

## 2019-07-19 PROCEDURE — 99233 SBSQ HOSP IP/OBS HIGH 50: CPT

## 2019-07-19 PROCEDURE — 99497 ADVNCD CARE PLAN 30 MIN: CPT

## 2019-07-19 RX ORDER — ONDANSETRON 8 MG/1
4 TABLET, FILM COATED ORAL EVERY 8 HOURS
Refills: 0 | Status: DISCONTINUED | OUTPATIENT
Start: 2019-07-19 | End: 2019-07-22

## 2019-07-19 RX ADMIN — Medication 1 DROP(S): at 19:48

## 2019-07-19 RX ADMIN — OCTREOTIDE ACETATE 200 MICROGRAM(S): 200 INJECTION, SOLUTION INTRAVENOUS; SUBCUTANEOUS at 23:54

## 2019-07-19 RX ADMIN — Medication 1 DROP(S): at 21:06

## 2019-07-19 RX ADMIN — Medication 500 MILLIGRAM(S): at 05:12

## 2019-07-19 RX ADMIN — Medication 1 DROP(S): at 00:05

## 2019-07-19 RX ADMIN — Medication 1300 MILLIGRAM(S): at 18:46

## 2019-07-19 RX ADMIN — Medication 1 DROP(S): at 09:36

## 2019-07-19 RX ADMIN — Medication 1 DROP(S): at 12:54

## 2019-07-19 RX ADMIN — Medication 1300 MILLIGRAM(S): at 23:53

## 2019-07-19 RX ADMIN — Medication 1 DROP(S): at 05:26

## 2019-07-19 RX ADMIN — CHLORHEXIDINE GLUCONATE 1 APPLICATION(S): 213 SOLUTION TOPICAL at 12:55

## 2019-07-19 RX ADMIN — ONDANSETRON 4 MILLIGRAM(S): 8 TABLET, FILM COATED ORAL at 21:17

## 2019-07-19 RX ADMIN — OCTREOTIDE ACETATE 200 MICROGRAM(S): 200 INJECTION, SOLUTION INTRAVENOUS; SUBCUTANEOUS at 00:05

## 2019-07-19 RX ADMIN — OCTREOTIDE ACETATE 200 MICROGRAM(S): 200 INJECTION, SOLUTION INTRAVENOUS; SUBCUTANEOUS at 12:54

## 2019-07-19 RX ADMIN — Medication 1 DROP(S): at 05:10

## 2019-07-19 RX ADMIN — HEPARIN SODIUM 5000 UNIT(S): 5000 INJECTION INTRAVENOUS; SUBCUTANEOUS at 18:46

## 2019-07-19 RX ADMIN — Medication 1 DROP(S): at 03:31

## 2019-07-19 RX ADMIN — Medication 3 MILLIGRAM(S): at 21:07

## 2019-07-19 RX ADMIN — OCTREOTIDE ACETATE 200 MICROGRAM(S): 200 INJECTION, SOLUTION INTRAVENOUS; SUBCUTANEOUS at 18:46

## 2019-07-19 RX ADMIN — HEPARIN SODIUM 5000 UNIT(S): 5000 INJECTION INTRAVENOUS; SUBCUTANEOUS at 05:10

## 2019-07-19 RX ADMIN — Medication 1300 MILLIGRAM(S): at 12:54

## 2019-07-19 RX ADMIN — Medication 1300 MILLIGRAM(S): at 05:11

## 2019-07-19 RX ADMIN — Medication 1300 MILLIGRAM(S): at 00:05

## 2019-07-19 RX ADMIN — Medication 1 DROP(S): at 23:53

## 2019-07-19 RX ADMIN — OCTREOTIDE ACETATE 200 MICROGRAM(S): 200 INJECTION, SOLUTION INTRAVENOUS; SUBCUTANEOUS at 05:25

## 2019-07-19 RX ADMIN — Medication 500 MILLIGRAM(S): at 18:47

## 2019-07-19 RX ADMIN — SODIUM CHLORIDE 75 MILLILITER(S): 9 INJECTION INTRAMUSCULAR; INTRAVENOUS; SUBCUTANEOUS at 21:07

## 2019-07-19 RX ADMIN — Medication 2 MILLIGRAM(S): at 19:47

## 2019-07-19 RX ADMIN — Medication 1 TABLET(S): at 12:54

## 2019-07-19 RX ADMIN — CHOLESTYRAMINE 4 GRAM(S): 4 POWDER, FOR SUSPENSION ORAL at 19:48

## 2019-07-19 RX ADMIN — CHOLESTYRAMINE 4 GRAM(S): 4 POWDER, FOR SUSPENSION ORAL at 09:36

## 2019-07-19 RX ADMIN — Medication 1 DROP(S): at 18:46

## 2019-07-19 NOTE — PROGRESS NOTE ADULT - SUBJECTIVE AND OBJECTIVE BOX
Patient is a 86y old  Male who presents with a chief complaint of mental status change (11 Jul 2019 21:31)       Pt is seen and examined  pt is  lying in bed  cts to have diarrhea  remains confused       REVIEW OF SYSTEMS:    limited  PHYSICAL EXAM  General: adult  chronically ill, flushed face   HEENT: clear oropharynx, anicteric sclera, pale conjunctiva  Neck: supple  CV: normal S1/S2 with no murmur rubs or gallops  Lungs: positive air movement b/l ant lungs,clear to auscultation, no wheezes, no rales  Abdomen: soft non-tender non-distended  Ext: no clubbing cyanosis or edema  Skin: no rashes and no petechiae  Neuro: awake          MEDICATIONS  (STANDING):  artificial tears (preservative free) Ophthalmic Solution 1 Drop(s) Both EYES every 2 hours  cephalexin 500 milliGRAM(s) Oral every 12 hours  chlorhexidine 2% Cloths 1 Application(s) Topical daily  cholestyramine Powder (Sugar-Free) 4 Gram(s) Oral every 12 hours  heparin  Injectable 5000 Unit(s) SubCutaneous every 12 hours  melatonin 3 milliGRAM(s) Oral at bedtime  multivitamin 1 Tablet(s) Oral daily  octreotide  Injectable 200 MICROGram(s) SubCutaneous every 6 hours  sodium bicarbonate 1300 milliGRAM(s) Oral every 6 hours  sodium chloride 0.9%. 1000 milliLiter(s) (75 mL/Hr) IV Continuous <Continuous>    MEDICATIONS  (PRN):  acetaminophen   Tablet .. 650 milliGRAM(s) Oral every 6 hours PRN Temp greater or equal to 38.5C (101.3F), Mild Pain (1 - 3)  haloperidol    Injectable 0.25 milliGRAM(s) IV Push every 6 hours PRN Agitation  loperamide 2 milliGRAM(s) Oral four times a day PRN Diarrhea      Allergies    penicillin (Swelling)    Intolerances        Vital Signs Last 24 Hrs  T(C): 36.5 (19 Jul 2019 07:10), Max: 37.1 (18 Jul 2019 15:56)  T(F): 97.7 (19 Jul 2019 07:10), Max: 98.7 (18 Jul 2019 15:56)  HR: 98 (19 Jul 2019 07:10) (58 - 98)  BP: 85/53 (19 Jul 2019 07:10) (85/53 - 101/58)  BP(mean): 77 (18 Jul 2019 13:03) (77 - 77)  RR: 18 (19 Jul 2019 07:10) (18 - 18)  SpO2: 93% (19 Jul 2019 07:10) (93% - 96%)      LABS:                          7.6    8.36  )-----------( 140      ( 18 Jul 2019 09:50 )             25.0         Mean Cell Volume : 105.5 fl  Mean Cell Hemoglobin : 32.1 pg  Mean Cell Hemoglobin Concentration : 30.4 gm/dL          07-19    144  |  114<H>  |  73<H>  ----------------------------<  124<H>  3.6   |  17<L>  |  3.03<H>    Ca    9.5      19 Jul 2019 07:20        Culture - Blood (07.11.19 @ 05:11)    Specimen Source: .Blood    Culture Results:   No growth at 5 days.    Chromogranin A (07.12.19 @ 11:00)    Chromogranin A: [id number]  Culture - Urine (07.11.19 @ 05:01)    -  Cefazolin: S <=2 (MIC_CL_COM_ENTERIC_CEFAZU) For uncomplicated UTI with K. pneumoniae, E. coli, or P. mirablis: MAYRA <=16 is sensitive and MAYRA >=32 is resistant. This also predicts results for oral agents cefaclor, cefdinir, cefpodoxime, cefprozil, cefuroxime axetil, cephalexin and locarbef for uncomplicated UTI. Note that some isolates may be susceptible to these agents while testing resistant to cefazolin.    -  Aztreonam: S <=4    -  Ampicillin: S <=2 These ampicillin results predict results for amoxicillin    -  Ampicillin/Sulbactam: S <=4/2 Enterobacter, Citrobacter, and Serratia may develop resistance during prolonged therapy (3-4 days)    -  Amikacin: S <=8    -  Amoxicillin/Clavulanic Acid: S <=8/4    -  Trimethoprim/Sulfamethoxazole: S <=0.5/9.5    -  Tobramycin: S <=2    -  Piperacillin/Tazobactam: S <=8    -  Tigecycline: S <=1    -  Nitrofurantoin: S <=32 Should not be used to treat pyelonephritis    -  Meropenem: S <=1    -  Ertapenem: S <=0.5    -  Gentamicin: S <=1    -  Imipenem: S <=1    -  Levofloxacin: S <=1    -  Ciprofloxacin: S <=0.5    -  Ceftriaxone: S <=1 Enterobacter, Citrobacter, and Serratia may develop resistance during prolonged therapy    -  Cefoxitin: S <=4    -  Cefepime: S <=2    Specimen Source: .Urine    Culture Results:   >100,000 CFU/ml Escherichia coli    Organism Identification: Escherichia coli    Organism: Escherichia coli    Method Type: MAYRA      Chromogranin A (06.23.19 @ 11:57)    Chromogranin A: [id number]    Rapid Respiratory Viral Panel (07.11.19 @ 22:22)    Rapid RVP Result: NotDetec: This Respiratory Panel uses polymerase chain reaction (PCR) to detect for  adenovirus; coronavirus (HKU1, NL63, 229E, OC43); human metapneumovirus  (hMPV); human enterovirus/rhinovirus (Entero/RV); influenza A; influenza  A/H1; influenza A/H3; influenza A/H1-2009; influenza B; parainfluenza  viruses 1, 2, 3, 4; respiratory syncytial virus; Mycoplasma pneumoniae;  and Chlamydophila pneumoniae.    < from: CT Chest No Cont (07.14.19 @ 12:01) >  IMPRESSION:     Mild pulmonary edema.    < end of copied text >      RADIOLOGY & ADDITIONAL STUDIES:    EXAM:  XR CHEST AP OR PA 1V                            PROCEDURE DATE:  07/11/2019        INTERPRETATION:  CLINICAL INFORMATION: Sepsis.    COMPARISON:  Chest x-ray 6/10/2019    TECHNIQUE:   Frontal radiograph of the chest.     FINDINGS:    Thelungs are clear. There is no pleural effusion or pneumothorax. The   cardiac silhouette is unremarkable. No acute osseous abnormality.    IMPRESSION: Clear lungs.    < from: CT Head No Cont (07.11.19 @ 10:17) >  IMPRESSION:     No CT evidence of acute intracranial hemorrhage, mass effect, or midline   shift.     Chronic maxillary sinusitis.      < end of copied text >      Chromogranin A (06.23.19 @ 11:57)    Chromogranin A: [id number]    < from: MR Head w/wo IV Cont (07.14.19 @ 18:20) >  IMPRESSION:     Motion limited study, enhancing foci of decreased T1 and hyperintense DWI   signal in the clivusconcerning for osseous metastasis, postcontrast   images are limited by motion, 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    Volume loss and microvascular disease without obvious restricted   diffusion to suggest new territorial infarction, hemorrhage or midline   shift.    Opacified retracted right maxillary sinus correlate with right silent   sinus syndrome, and left maxillary sinus air-fluid level. Opacified   mastoids and middle ears.    < end of copied text > Patient is a 86y old  Male who presents with a chief complaint of mental status change (11 Jul 2019 21:31)       Pt is seen and examined  pt is  lying in bed  diarrhea +  remains confused   denies pain    REVIEW OF SYSTEMS:    limited  PHYSICAL EXAM  General: adult  chronically ill, flushed face   HEENT: clear oropharynx, anicteric sclera, pale conjunctiva  Neck: supple  CV: normal S1/S2 with no murmur rubs or gallops  Lungs: positive air movement b/l ant lungs,clear to auscultation, no wheezes, no rales  Abdomen: soft non-tender non-distended  Ext: no clubbing cyanosis or edema  Skin: no rashes and no petechiae  Neuro: awake    MEDICATIONS  (STANDING):  artificial tears (preservative free) Ophthalmic Solution 1 Drop(s) Both EYES every 2 hours  cephalexin 500 milliGRAM(s) Oral every 12 hours  chlorhexidine 2% Cloths 1 Application(s) Topical daily  cholestyramine Powder (Sugar-Free) 4 Gram(s) Oral every 12 hours  heparin  Injectable 5000 Unit(s) SubCutaneous every 12 hours  melatonin 3 milliGRAM(s) Oral at bedtime  multivitamin 1 Tablet(s) Oral daily  octreotide  Injectable 200 MICROGram(s) SubCutaneous every 6 hours  sodium bicarbonate 1300 milliGRAM(s) Oral every 6 hours  sodium chloride 0.9%. 1000 milliLiter(s) (75 mL/Hr) IV Continuous <Continuous>    MEDICATIONS  (PRN):  acetaminophen   Tablet .. 650 milliGRAM(s) Oral every 6 hours PRN Temp greater or equal to 38.5C (101.3F), Mild Pain (1 - 3)  haloperidol    Injectable 0.25 milliGRAM(s) IV Push every 6 hours PRN Agitation  loperamide 2 milliGRAM(s) Oral four times a day PRN Diarrhea      Allergies    penicillin (Swelling)    Intolerances        Vital Signs Last 24 Hrs  T(C): 36.5 (19 Jul 2019 07:10), Max: 37.1 (18 Jul 2019 15:56)  T(F): 97.7 (19 Jul 2019 07:10), Max: 98.7 (18 Jul 2019 15:56)  HR: 98 (19 Jul 2019 07:10) (58 - 98)  BP: 85/53 (19 Jul 2019 07:10) (85/53 - 101/58)  BP(mean): 77 (18 Jul 2019 13:03) (77 - 77)  RR: 18 (19 Jul 2019 07:10) (18 - 18)  SpO2: 93% (19 Jul 2019 07:10) (93% - 96%)      LABS:                          7.6    8.36  )-----------( 140      ( 18 Jul 2019 09:50 )             25.0         Mean Cell Volume : 105.5 fl  Mean Cell Hemoglobin : 32.1 pg  Mean Cell Hemoglobin Concentration : 30.4 gm/dL          07-19    144  |  114<H>  |  73<H>  ----------------------------<  124<H>  3.6   |  17<L>  |  3.03<H>    Ca    9.5      19 Jul 2019 07:20        Culture - Blood (07.11.19 @ 05:11)    Specimen Source: .Blood    Culture Results:   No growth at 5 days.    Chromogranin A (07.12.19 @ 11:00)    Chromogranin A: [id number]  Culture - Urine (07.11.19 @ 05:01)    -  Cefazolin: S <=2 (MIC_CL_COM_ENTERIC_CEFAZU) For uncomplicated UTI with K. pneumoniae, E. coli, or P. mirablis: MAYRA <=16 is sensitive and MAYRA >=32 is resistant. This also predicts results for oral agents cefaclor, cefdinir, cefpodoxime, cefprozil, cefuroxime axetil, cephalexin and locarbef for uncomplicated UTI. Note that some isolates may be susceptible to these agents while testing resistant to cefazolin.    -  Aztreonam: S <=4    -  Ampicillin: S <=2 These ampicillin results predict results for amoxicillin    -  Ampicillin/Sulbactam: S <=4/2 Enterobacter, Citrobacter, and Serratia may develop resistance during prolonged therapy (3-4 days)    -  Amikacin: S <=8    -  Amoxicillin/Clavulanic Acid: S <=8/4    -  Trimethoprim/Sulfamethoxazole: S <=0.5/9.5    -  Tobramycin: S <=2    -  Piperacillin/Tazobactam: S <=8    -  Tigecycline: S <=1    -  Nitrofurantoin: S <=32 Should not be used to treat pyelonephritis    -  Meropenem: S <=1    -  Ertapenem: S <=0.5    -  Gentamicin: S <=1    -  Imipenem: S <=1    -  Levofloxacin: S <=1    -  Ciprofloxacin: S <=0.5    -  Ceftriaxone: S <=1 Enterobacter, Citrobacter, and Serratia may develop resistance during prolonged therapy    -  Cefoxitin: S <=4    -  Cefepime: S <=2    Specimen Source: .Urine    Culture Results:   >100,000 CFU/ml Escherichia coli    Organism Identification: Escherichia coli    Organism: Escherichia coli    Method Type: MAYRA      Chromogranin A (06.23.19 @ 11:57)    Chromogranin A: [id number]    Rapid Respiratory Viral Panel (07.11.19 @ 22:22)    Rapid RVP Result: NotDetec: This Respiratory Panel uses polymerase chain reaction (PCR) to detect for  adenovirus; coronavirus (HKU1, NL63, 229E, OC43); human metapneumovirus  (hMPV); human enterovirus/rhinovirus (Entero/RV); influenza A; influenza  A/H1; influenza A/H3; influenza A/H1-2009; influenza B; parainfluenza  viruses 1, 2, 3, 4; respiratory syncytial virus; Mycoplasma pneumoniae;  and Chlamydophila pneumoniae.    < from: CT Chest No Cont (07.14.19 @ 12:01) >  IMPRESSION:     Mild pulmonary edema.    < end of copied text >      RADIOLOGY & ADDITIONAL STUDIES:    EXAM:  XR CHEST AP OR PA 1V                            PROCEDURE DATE:  07/11/2019        INTERPRETATION:  CLINICAL INFORMATION: Sepsis.    COMPARISON:  Chest x-ray 6/10/2019    TECHNIQUE:   Frontal radiograph of the chest.     FINDINGS:    Thelungs are clear. There is no pleural effusion or pneumothorax. The   cardiac silhouette is unremarkable. No acute osseous abnormality.    IMPRESSION: Clear lungs.    < from: CT Head No Cont (07.11.19 @ 10:17) >  IMPRESSION:     No CT evidence of acute intracranial hemorrhage, mass effect, or midline   shift.     Chronic maxillary sinusitis.      < end of copied text >      Chromogranin A (06.23.19 @ 11:57)    Chromogranin A: [id number]    < from: MR Head w/wo IV Cont (07.14.19 @ 18:20) >  IMPRESSION:     Motion limited study, enhancing foci of decreased T1 and hyperintense DWI   signal in the clivusconcerning for osseous metastasis, postcontrast   images are limited by motion, 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    Volume loss and microvascular disease without obvious restricted   diffusion to suggest new territorial infarction, hemorrhage or midline   shift.    Opacified retracted right maxillary sinus correlate with right silent   sinus syndrome, and left maxillary sinus air-fluid level. Opacified   mastoids and middle ears.    < end of copied text >

## 2019-07-19 NOTE — PROGRESS NOTE ADULT - SUBJECTIVE AND OBJECTIVE BOX
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 Noted to have malodorous urine and treated for UTI. On this admission patient has been extremely agitated. Nursing reports he has pulled out numerous IVs, pulled on calzada, frequently agitated even in the presence of enhanced supervision. On previous visits was reportedly aaxo3. Nursing reports no sleep for many nights. Patient otherwise unable to provide any further history,.   RRT called on 7/14 for low bp and inc HR  pt in bed, no new complaints    Past Medical History:  Neck mass (R22.1): was resected, reportedly bening  Kidney lesion (N28.9): partial resction- reportedly &quot;not active lesion&quot;  History of carcinoid syndrome (Z86.39)      Past Surgical History:  H/O carcinoid syndrome (Z86.39)  S/P TURP (Z90.79)      Social History:  No toxic habits    Family History:  FAMILY HISTORY:  FH: lung cancer (Sibling): sister  Family history of nasopharyngeal cancer: father      Allergies:  penicillin (Swelling)      ROS:  Patient unable to provide.     Advanced care planning reviewed and noted in the chart.    Medications:  acetaminophen   Tablet .. 650 milliGRAM(s) Oral every 6 hours PRN  artificial tears (preservative free) Ophthalmic Solution 1 Drop(s) Both EYES every 2 hours  cephalexin 500 milliGRAM(s) Oral every 12 hours  chlorhexidine 2% Cloths 1 Application(s) Topical daily  cholestyramine Powder (Sugar-Free) 4 Gram(s) Oral every 12 hours  haloperidol    Injectable 0.25 milliGRAM(s) IV Push every 6 hours PRN  heparin  Injectable 5000 Unit(s) SubCutaneous every 12 hours  loperamide 2 milliGRAM(s) Oral four times a day PRN  melatonin 3 milliGRAM(s) Oral at bedtime  multivitamin 1 Tablet(s) Oral daily  octreotide  Injectable 200 MICROGram(s) SubCutaneous every 6 hours  sodium bicarbonate 1300 milliGRAM(s) Oral every 6 hours  sodium chloride 0.9%. 1000 milliLiter(s) IV Continuous <Continuous>      Labs:  CBC Full  -  ( 18 Jul 2019 09:50 )  WBC Count : 8.36 K/uL  RBC Count : 2.37 M/uL  Hemoglobin : 7.6 g/dL  Hematocrit : 25.0 %  Platelet Count - Automated : 140 K/uL  Mean Cell Volume : 105.5 fl  Mean Cell Hemoglobin : 32.1 pg  Mean Cell Hemoglobin Concentration : 30.4 gm/dL  Auto Neutrophil # : x  Auto Lymphocyte # : x  Auto Monocyte # : x  Auto Eosinophil # : x  Auto Basophil # : x  Auto Neutrophil % : x  Auto Lymphocyte % : x  Auto Monocyte % : x  Auto Eosinophil % : x  Auto Basophil % : x    07-19    144  |  114<H>  |  73<H>  ----------------------------<  124<H>  3.6   |  17<L>  |  3.03<H>    Ca    9.5      19 Jul 2019 07:20      CAPILLARY BLOOD GLUCOSE                  Vitals:  Vital Signs Last 24 Hrs  T(C): 36.8 (19 Jul 2019 04:09), Max: 37.1 (18 Jul 2019 15:56)  T(F): 98.3 (19 Jul 2019 04:09), Max: 98.7 (18 Jul 2019 15:56)  HR: 90 (19 Jul 2019 04:09) (58 - 92)  BP: 93/54 (19 Jul 2019 04:09) (93/54 - 101/58)  BP(mean): 77 (18 Jul 2019 13:03) (77 - 77)  RR: 18 (19 Jul 2019 04:09) (18 - 18)  SpO2: 96% (19 Jul 2019 04:09) (94% - 96%)    NEUROLOGICAL EXAM:    Mental status: awake, attends, not answer questions but conversant, followed some commands cannot say where he is    Cranial Nerves: Pupils were equal, round, reactive to light. Extraocular movements with slight left exophoria. Visual field were full.  Facial sensation was intact to light touch. There was no facial asymmetry. The palate was upgoing symmetrically and tongue was midline.     Motor exam: Bulk and tone were normal. moving extremities    Reflexes: Absent in the bilateral upper extremities. Absent in the bilateral lower extremities. Toes were mute.     Sensation: Intact to light touch x 4    Coordination: Unable to assess    Gait: Unable to assess    < from: CT Head No Cont (07.11.19 @ 10:17) >    EXAM:  CT BRAIN                            PROCEDURE DATE:  07/11/2019            INTERPRETATION:  CLINICAL INFORMATION: Confusion and sepsis. Lethargy.    TECHNIQUE: Noncontrast axial CT images were acquired through the head.   Two-dimensional sagittal and coronal reformats were generated.    COMPARISON STUDY: CT head 3/4/2009    FINDINGS:     No acute intracranial hemorrhage, mass effect, or midline shift. No   abnormal extra-axial collections. The basal cisterns are patent without   evidenceof central herniation.     Mild cerebral volume loss with proportional prominence of the sulci and   ventricles. Moderate patchy periventricular white matter hypoattenuation,   likely the sequela of chronic microvascular ischemic disease.    The right maxillary sinus is extensively opacified with thickened walls   consistent with chronic sinusitis, partially visualized. Mild mucosal   thickening and bony wall thickening is partially visualized in the left   maxillary sinus, also consistent with chronic sinusitis. The maxillary   sinuses are not covered on the prior exam.    IMPRESSION:     No CT evidence of acute intracranial hemorrhage, mass effect, or midline   shift.     Chronic maxillary sinusitis.                FEROZ PURVIS M.D.,RADIOLOGY RESIDENT  This document has been electronically signed.  JARRETT BERGER M.D., ATTENDING RADIOLOGIST  This document has been electronically signed. Jul 11 2019 10:30AM      < from: MR Head w/wo IV Cont (07.14.19 @ 18:20) >  EXAM:  MR BRAIN WAW IC                            PROCEDURE DATE:  07/14/2019            INTERPRETATION:  CLINICAL INDICATION: ADMDIAG1: T83.511A LETHARGY/.   Carcinoid syndrome, with altered mental status.86y year old Male    carcinoid syndrome, s/p left lower lobe resection, bladder disease with   calzada placed on  July 1st in the ED sent from [facility] for SBPs in 70s with malodorous urine   and treated for UTI.     TECHNIQUE: MRI of the brain was performed without and with contrast using   the following sequences: Axial DWI/ADC map, axial SWI, axial gradient   echo, axial SPGR, sagittal T1 FLAIR, axial T2 FLAIR, axial T2 FSE axial   T1 SPGR postcontrast and axial/coronal/sagittal T1 spin-echo postcontrast.    5 mls of Gadavist was administered intravenously without complication and   2.5 mls were discarded.    COMPARISON: CT head dated 7/11/2019, and 3/4/2009, PET/CT with dotatate   6/4/2019    FINDINGS:    Severely motion limited study on all sequences, suggest repeat imaging   with contrast and patient can hold still.    There is enlargement of ventricles and sulci greater expected for age   consistent with moderate volume loss. There are multiple nonspecific   white matter T2 hyperintensities,, likely microvascular disease with 5 mm   bifrontal subdural hygromas extending toward the vertex unchanged from   prior CT. There is no  restricted diffusion to suggest new territorial   infarction. There is no  acute intra-axial or extra-axial hemorrhage or   midline shift. There are no new extra-axial  collections. Basal cisterns   remain patent.    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 Postcontrast images are severely motion limited with   questionable enhancement along the adjacent leptomeninges, suggest repeat   imaging when patient can hold still.    Flow voids are noted within the intraventricular vasculature indicative   patency with a dominantintradural left and poorly seen intradural right   vertebral artery, and tortuous arterial vessels may reflect hypertension.    Orbital globes remain unchanged and unremarkable there is demonstration   of complete opacification and retraction of the right maxillary sinus   with increased downward sloping of the right orbital floor which may be   seen with a right silent sinus syndrome. There is mucosal thickening with   bubbly secretions and air-fluid level in the left maxillary sinus,   scattered mucosal thickening in the ethmoid and frontal sinuses. There is   opacification of the bilateral mastoid air cells and middle ears.      IMPRESSION:     Motion limited study, enhancing foci of decreased T1 and hyperintense DWI   signal in the clivusconcerning for osseous metastasis, postcontrast   images are limited by motion, 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    Volume loss and microvascular disease without obvious restricted   diffusion to suggest new territorial infarction, hemorrhage or midline   shift.    Opacified retracted right maxillary sinus correlate with right silent   sinus syndrome, and left maxillary sinus air-fluid level. Opacified   mastoids and middle ears.                DIANA STEWART M.D., RADIOLOGY FELLOW  This document has been electronically signed.  PRAVEEN MACKEY M.D., ATTENDING RADIOLOGIST  This document has been electronically signed. Jul 15 2019  3:03PM                < end of copied text >            < end of copied text >

## 2019-07-19 NOTE — PROGRESS NOTE ADULT - PROBLEM SELECTOR PLAN 1
ct octreotide at current dose  If diarrhea worsens, will need to r/o c.diff, abxa assoc diarrhea - prn probiotics  F/up chromogranin level - increased - concern for tumor progression - can repeat Ct abdomen to assess liver disease or PET for whole body disease if family desires  If POD on octreotide - need to decide with help of palliative team re. GOC - additional treatment options include everolimus, cytotoxic chemo  POOR PS makes benefit frm aggressive therapy less likely  Patient has outpatient DNR  Plan d/w dr. arnold as well ct octreotide at current dose  F/up chromogranin level - increased - concern for tumor progression - can repeat Ct abdomen to assess liver disease or PET for whole body disease if family desires  If POD on octreotide - need to decide with help of palliative team re. GOC - additional treatment options include everolimus, cytotoxic chemo  POOR PS makes benefit from aggressive therapy less likely  Patient has outpatient DNR  Palliative meeting with family to define GOC - will proceed per meeting to help meet family goals

## 2019-07-19 NOTE — PROGRESS NOTE ADULT - SUBJECTIVE AND OBJECTIVE BOX
Sleeping comfortably  Nurse reports improvement in diarrheal episodes and encephalopathy        PERTINENT PM/SXH:   Neck mass  Kidney lesion  History of carcinoid syndrome    H/O carcinoid syndrome  S/P TURP    FAMILY HISTORY:  FH: lung cancer (Sibling): sister  Family history of nasopharyngeal cancer: father    ITEMS NOT CHECKED ARE NOT PRESENT    SOCIAL HISTORY:   Significant other/partner:  [x ]  Children:  [x ]  Roman Catholic/Spirituality:  Substance hx:  [ ]   Tobacco hx:  [ ]   Alcohol hx: [ ]   Home Opioid hx:  [ ] I-Stop Reference No:  Living Situation: [ ]Home  [ ]Long term care  [ ]Rehab [ ]Other    ADVANCE DIRECTIVES:    DNR  Yes  MOLST  [x ]  Living Will  [ ]   DECISION MAKER(s):  [x ] Health Care Proxy(s)  [ ] Surrogate(s)  [ ] Guardian           Name(s): Phone Number(s):    BASELINE (I)ADL(s) (prior to admission):  Morris: [ ]Total  [ ] Moderate [ ]Dependent    Allergies    penicillin (Swelling)    Intolerances    MEDICATIONS  (STANDING):  artificial tears (preservative free) Ophthalmic Solution 1 Drop(s) Both EYES every 2 hours  cephalexin 500 milliGRAM(s) Oral every 12 hours  chlorhexidine 2% Cloths 1 Application(s) Topical daily  heparin  Injectable 5000 Unit(s) SubCutaneous every 12 hours  melatonin 3 milliGRAM(s) Oral at bedtime  multivitamin 1 Tablet(s) Oral daily  octreotide  Injectable 200 MICROGram(s) SubCutaneous every 6 hours  sodium bicarbonate 1300 milliGRAM(s) Oral every 6 hours  sodium chloride 0.9%. 1000 milliLiter(s) (75 mL/Hr) IV Continuous <Continuous>    MEDICATIONS  (PRN):  acetaminophen   Tablet .. 650 milliGRAM(s) Oral every 6 hours PRN Temp greater or equal to 38.5C (101.3F), Mild Pain (1 - 3)  haloperidol    Injectable 0.25 milliGRAM(s) IV Push every 6 hours PRN Agitation  loperamide 2 milliGRAM(s) Oral four times a day PRN Diarrhea    PRESENT SYMPTOMS: [ x ]Unable to obtain due to poor mentation   Source if other than patient:  [ ]Family   [ ]Team Inferred    Pain (Impact on QOL):  0  Location -         Minimal acceptable level (0-10 scale):                    Aggravating factors -  Quality -  Radiation -  Severity (0-10 scale) -    Timing -    PAIN AD Score: 00    [email or web address] (press ctrl +  left click to view)    Dyspnea:                           [ ]Mild [ ]Moderate [ ]Severe  Anxiety:                             [ ]Mild [ ]Moderate [ ]Severe  Fatigue:                             [ ]Mild [ ]Moderate [ ]Severe  Nausea:                             [ ]Mild [ ]Moderate [ ]Severe  Loss of appetite:              [ ]Mild [ x]Moderate [ ]Severe  Fecal incontinence              [ ]Mild [ ]Moderate [x ]Severe    Other Symptoms:  s  [ x]All other review of systems negative     Karnofsky Performance Score/Palliative Performance Status Version 2:     40    %    http://palliative.info/resource_material/[email or web address]  PHYSICAL EXAM:  Vital Signs Last 24 Hrs  T(C): 36.9 (17 Jul 2019 04:28), Max: 37.2 (16 Jul 2019 13:40)  T(F): 98.4 (17 Jul 2019 04:28), Max: 98.9 (16 Jul 2019 13:40)  HR: 96 (17 Jul 2019 04:28) (92 - 108)  BP: 91/52 (17 Jul 2019 04:42) (80/49 - 98/59)  BP(mean): --  RR: 18 (17 Jul 2019 04:28) (17 - 18)  SpO2: 94% (17 Jul 2019 04:28) (92% - 94%) I&O's Summary    16 Jul 2019 07:01  -  17 Jul 2019 07:00  --------------------------------------------------------  IN: 350 mL / OUT: 600 mL / NET: -250 mL    GENERAL:  [ x]Alert  [x ]Oriented x 0  [ ]Lethargic  [ ]Cachexia  [ ]Unarousable  [x ]Verbal  [ ]Non-Verbal  Behavioral:   [ ] Anxiety  [x ] Delirium 2/2 underlying illness [ ] Agitation [ ] Other  HEENT:  [x ]Normal   [ ]Dry mouth   [ ]ET Tube/Trach  [ ]Oral lesions  PULMONARY:   [x ]Clear [ ]Tachypnea  [ ]Audible excessive secretions   [ ]Rhonchi        [ ]Right [ ]Left [ ]Bilateral  [ ]Crackles        [ ]Right [ ]Left [ ]Bilateral  [ ]Wheezing     [ ]Right [ ]Left [ ]Bilateral  CARDIOVASCULAR:    [x ]Regular [ ]Irregular [ ]Tachy  [ ]Saravanan [ ]Murmur [ ]Other  GASTROINTESTINAL:  [x ]Soft  [ ]Distended   [x ]+BS  [ ]Non tender [ ]Tender  [ ]PEG [ ]OGT/ NGT  Last BM: today (loose)  GENITOURINARY:  [ ]Normal [ ] Incontinent   [ ]Oliguria/Anuria   [ ]Rae  MUSCULOSKELETAL:   [x ]Normal   [c ]Weakness  [ ]Bed/Wheelchair bound [ ]Edema  NEUROLOGIC:   [ ]No focal deficits  [ x] Cognitive impairment  [ ] Dysphagia [ ]Dysarthria [ ] Paresis [ ]Other   SKIN:   [x ]Normal   [ ]Pressure ulcer(s)  [ ]Rash    LABS:                        8.1    9.54  )-----------( 140      ( 16 Jul 2019 09:55 )             26.5   07-17    143  |  111<H>  |  79<H>  ----------------------------<  98  3.5   |  17<L>  |  2.83<H>    Ca    9.9      17 Jul 2019 07:42  Phos  1.9     07-17  Mg     2.4     07-17          RADIOLOGY & ADDITIONAL STUDIES:    PROTEIN CALORIE MALNUTRITION PRESENT: [x ] Yes [ ] No (Underweight, BMI <19 w/ severe protein deficiency per dietition)  [ ] PPSV2 < or = to 30% [ ] significant weight loss  [x ] poor nutritional intake [x ] catabolic state [ ] anasarca     Albumin, Serum: 3.2 g/dL (07-11-19 @ 03:24)  Artificial Nutrition [ ]     REFERRALS:   [ ]Chaplaincy  [ ] Hospice  [ ]Child Life  [ ]Social Work  [ ]Case management [ ]Holistic Therapy   Goals of Care Discussion Document:

## 2019-07-19 NOTE — PROGRESS NOTE ADULT - PROBLEM SELECTOR PLAN 3
- likely due to Urosepsis, metabolic changes  - CT head neg  MRI non contributory  clinically better today  eeg - no e/o seizure activity neuro w/up unrevealing - typical carcinoid unlikely to  cause leptomeningeal disease - if  aggressive small cell like component, then possible - however not documented on biopsy   - CT head neg  MRI non contributory  eeg - no e/o seizure activity

## 2019-07-19 NOTE — PROGRESS NOTE ADULT - SUBJECTIVE AND OBJECTIVE BOX
[facility]   [phone]  RENAL FOLLOW UP NOTE  --------------------------------------------------------------------------------  Chief Complaint:    24 hour events/subjective:    seen earlier.  confused    PAST HISTORY  --------------------------------------------------------------------------------  No significant changes to PMH, PSH, FHx, SHx, unless otherwise noted    ALLERGIES & MEDICATIONS  --------------------------------------------------------------------------------  Allergies    penicillin (Swelling)    Intolerances      Standing Inpatient Medications  artificial tears (preservative free) Ophthalmic Solution 1 Drop(s) Both EYES every 2 hours  cephalexin 500 milliGRAM(s) Oral every 12 hours  chlorhexidine 2% Cloths 1 Application(s) Topical daily  cholestyramine Powder (Sugar-Free) 4 Gram(s) Oral every 12 hours  heparin  Injectable 5000 Unit(s) SubCutaneous every 12 hours  melatonin 3 milliGRAM(s) Oral at bedtime  multivitamin 1 Tablet(s) Oral daily  octreotide  Injectable 200 MICROGram(s) SubCutaneous every 6 hours  ondansetron Injectable 4 milliGRAM(s) IV Push every 8 hours  sodium bicarbonate 1300 milliGRAM(s) Oral every 6 hours  sodium chloride 0.9%. 1000 milliLiter(s) IV Continuous <Continuous>    PRN Inpatient Medications  acetaminophen   Tablet .. 650 milliGRAM(s) Oral every 6 hours PRN  haloperidol    Injectable 0.25 milliGRAM(s) IV Push every 6 hours PRN  loperamide 2 milliGRAM(s) Oral four times a day PRN      REVIEW OF SYSTEMS  --------------------------------------------------------------------------------  confused     VITALS/PHYSICAL EXAM  --------------------------------------------------------------------------------  T(C): 36.9 (07-19-19 @ 16:43), Max: 36.9 (07-19-19 @ 00:09)  HR: 100 (07-19-19 @ 21:20) (90 - 100)  BP: 98/55 (07-19-19 @ 21:20) (85/53 - 108/66)  RR: 17 (07-19-19 @ 16:43) (17 - 18)  SpO2: 94% (07-19-19 @ 16:43) (93% - 96%)  Wt(kg): --        07-18-19 @ 07:01  -  07-19-19 @ 07:00  --------------------------------------------------------  IN: 968 mL / OUT: 1100 mL / NET: -132 mL    07-19-19 @ 07:01  -  07-19-19 @ 21:50  --------------------------------------------------------  IN: 340 mL / OUT: 300 mL / NET: 40 mL      Physical Exam:  	  Chronically ill appearing frail m    	no jvd   	Pulm: decrease bs  no rales or ronchi or wheezing  	CV: RRR, S1S2; no rub  	Abd: +BS, soft, nontender/nondistended  	: No suprapubic tenderness  	UE: Warm, no cyanosis  no clubbing,  no edema  	LE: Warm, no cyanosis  no clubbing, trace    edema  		        LABS/STUDIES  --------------------------------------------------------------------------------              8.0    7.58  >-----------<  149      [07-19-19 @ 09:31]              26.2     144  |  114  |  73  ----------------------------<  124      [07-19-19 @ 07:20]  3.6   |  17  |  3.03        Ca     9.5     [07-19-19 @ 07:20]            Creatinine Trend:  SCr 3.03 [07-19 @ 07:20]  SCr 3.00 [07-18 @ 07:21]  SCr 2.83 [07-17 @ 07:42]  SCr 2.80 [07-16 @ 07:00]  SCr 2.83 [07-15 @ 10:05]              Urinalysis - [07-14-19 @ 09:42]      Color Light Yellow / Appearance Slightly Turbid / SG 1.016 / pH 6.0      Gluc Negative / Ketone Negative  / Bili Negative / Urobili Negative       Blood Moderate / Protein 100 / Leuk Est Large / Nitrite Positive      RBC 19 /  / Hyaline 10 / Gran 4 / Sq Epi  / Non Sq Epi 3 / Bacteria Moderate      PTH -- (Ca 10.4)      [06-11-19 @ 09:51]   15  Vitamin D (25OH) 9.7      [06-11-19 @ 10:02]  TSH 1.15      [07-14-19 @ 10:04]    Syphilis Screen (Treponema Pallidum Ab) Negative      [07-15-19 @ 04:20]

## 2019-07-19 NOTE — PROGRESS NOTE ADULT - PROBLEM SELECTOR PLAN 1
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

## 2019-07-19 NOTE — PROGRESS NOTE ADULT - SUBJECTIVE AND OBJECTIVE BOX
Patient is a 86y old  Male who presents with a chief complaint of mental status chanmichelle (19 Jul 2019 21:50)      SUBJECTIVE / OVERNIGHT EVENTS: No new complaints.   Review of Systems  chest pain no  palpitations no  sob no  nausea no  headache no    MEDICATIONS  (STANDING):  artificial tears (preservative free) Ophthalmic Solution 1 Drop(s) Both EYES every 2 hours  cephalexin 500 milliGRAM(s) Oral every 12 hours  chlorhexidine 2% Cloths 1 Application(s) Topical daily  cholestyramine Powder (Sugar-Free) 4 Gram(s) Oral every 12 hours  heparin  Injectable 5000 Unit(s) SubCutaneous every 12 hours  melatonin 3 milliGRAM(s) Oral at bedtime  multivitamin 1 Tablet(s) Oral daily  octreotide  Injectable 200 MICROGram(s) SubCutaneous every 6 hours  ondansetron Injectable 4 milliGRAM(s) IV Push every 8 hours  sodium bicarbonate 1300 milliGRAM(s) Oral every 6 hours  sodium chloride 0.9%. 1000 milliLiter(s) (75 mL/Hr) IV Continuous <Continuous>    MEDICATIONS  (PRN):  acetaminophen   Tablet .. 650 milliGRAM(s) Oral every 6 hours PRN Temp greater or equal to 38.5C (101.3F), Mild Pain (1 - 3)  haloperidol    Injectable 0.25 milliGRAM(s) IV Push every 6 hours PRN Agitation  loperamide 2 milliGRAM(s) Oral four times a day PRN Diarrhea      Vital Signs Last 24 Hrs  T(C): 36.9 (19 Jul 2019 16:43), Max: 36.9 (19 Jul 2019 00:09)  T(F): 98.4 (19 Jul 2019 16:43), Max: 98.4 (19 Jul 2019 00:09)  HR: 100 (19 Jul 2019 21:20) (90 - 100)  BP: 98/55 (19 Jul 2019 21:20) (85/53 - 108/66)  BP(mean): --  RR: 17 (19 Jul 2019 16:43) (17 - 18)  SpO2: 94% (19 Jul 2019 16:43) (93% - 96%)    PHYSICAL EXAM:  GENERAL: NAD,   HEAD:  Atraumatic, Normocephalic  EYES: EOMI, PERRLA, conjunctiva and sclera flushed  NECK: Supple, No JVD  CHEST/LUNG: Clear to auscultation bilaterally; No wheeze  HEART: Regular rate and rhythm; No murmurs, rubs, or gallops  ABDOMEN: Soft, Nontender, Nondistended; Bowel sounds present  EXTREMITIES:  2+ Peripheral Pulses, No clubbing, cyanosis, or edema  PSYCH: AAOx3  NEUROLOGY: non-focal  SKIN: flushed    LABS:                        8.0    7.58  )-----------( 149      ( 19 Jul 2019 09:31 )             26.2     07-19    144  |  114<H>  |  73<H>  ----------------------------<  124<H>  3.6   |  17<L>  |  3.03<H>    Ca    9.5      19 Jul 2019 07:20                  RADIOLOGY & ADDITIONAL TESTS:    Imaging Personally Reviewed:    Consultant(s) Notes Reviewed:      Care Discussed with Consultants/Other Providers:

## 2019-07-19 NOTE — PROGRESS NOTE ADULT - SUBJECTIVE AND OBJECTIVE BOX
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   Patient reportedly found with hypotension and altered mental status. Urine was maladarous and appears to be a UTI on admission labs.    Rest of review of symptoms are unable to obtain due to hearing difficulties/lethargy.   No reported worsening CP/Palps/SOB    ===========================  Interval Events  - lower bp   - rising Cr  ===========================            acetaminophen   Tablet .. 650 milliGRAM(s) Oral every 6 hours PRN  artificial tears (preservative free) Ophthalmic Solution 1 Drop(s) Both EYES every 2 hours  cefepime   IVPB 500 milliGRAM(s) IV Intermittent every 12 hours  heparin  Injectable 5000 Unit(s) SubCutaneous every 12 hours  loperamide 2 milliGRAM(s) Oral four times a day PRN  multivitamin 1 Tablet(s) Oral daily  octreotide  Injectable 200 MICROGram(s) SubCutaneous every 6 hours  sodium bicarbonate  Infusion 0.179 mEq/kG/Hr IV Continuous <Continuous>        Allergies    penicillin (Swelling)    Intolerances    	    MEDICATIONS:  heparin  Injectable 5000 Unit(s) SubCutaneous every 12 hours    cefepime   IVPB 500 milliGRAM(s) IV Intermittent every 12 hours      acetaminophen   Tablet .. 650 milliGRAM(s) Oral every 6 hours PRN    loperamide 2 milliGRAM(s) Oral four times a day PRN    octreotide  Injectable 200 MICROGram(s) SubCutaneous every 6 hours    artificial tears (preservative free) Ophthalmic Solution 1 Drop(s) Both EYES every 2 hours  multivitamin 1 Tablet(s) Oral daily  sodium bicarbonate  Infusion 0.179 mEq/kG/Hr IV Continuous <Continuous>      PAST MEDICAL & SURGICAL HISTORY:  Neck mass: was resected, reportedly bening  Kidney lesion: partial resction- reportedly &quot;not active lesion&quot;  History of carcinoid syndrome  H/O carcinoid syndrome  S/P TURP      FAMILY HISTORY:  FH: lung cancer (Sibling): sister  Family history of nasopharyngeal cancer: father    Mother - HTN      SOCIAL HISTORY:    [ x] Non-smoker  [x] No Illicit Drug Use  [ x] No Excess EtOH Use      REVIEW OF SYSTEMS: (Unless + Before Symptom, it is negative)  Constitutional: [] Fever, [x]Fatigue, []Weight Changes  Eyes:  []Recent Vision Changes, []Eye Pain  ENT: []Congestion, []Sore Throat  Endocrine: []Excess Sweating, []Temperature Intolerance  Cardiovascular:  []Chest Pain, []Palpitations, []Shortness of Breath, []Pre-syncope, []Syncope,[] LE Swelling  Respiratory:[] Cough, []Congestion,[] Wheezing  Gastrointestinal: [] Abdominal Pain,[] Nausea, []Vomiting  Genitourinary: []Dysuria,[] hematuria  Musculoskeletal: []Joint Pain, []Hip/Knee Injury  Neurologic: []Headaches,[] Imbalance, []Weakness  Skin: []Rashes, []hematoma, []purprura    ================================    PHYSICAL EXAM:  T(C): 36.4 (07-11-19 @ 07:53), Max: 36.8 (07-11-19 @ 02:58)  HR: 96 (07-11-19 @ 14:01) (84 - 98)  BP: 95/54 (07-11-19 @ 14:01) (94/40 - 100/54)  RR: 17 (07-11-19 @ 07:53) (17 - 18)  SpO2: 96% (07-11-19 @ 14:01) (96% - 100%)  Wt(kg): --  I&O's Summary    Appearance: Normal; +Lethargic +[place] +Responsive to commands  Psychiatry: Orientation and mood difficult to assess due to lethargy +[place]  HEENT:   Normal oral mucosa, EOMI	  Lymphatic: No lymphadenopathy  Cardiovascular: Normal S1 S2, No JVD, No murmurs, No edema  Respiratory: Lungs clear to auscultation, no use of accessory muscles	  Gastrointestinal:  Soft, Non-tender	  Skin: No rashes, No ecchymoses, No cyanosis	  Neurologic: Non-focal, No Focal Deficits  Extremities: Normal range of motion, No clubbing, cyanosis  Vascular: Peripheral pulses palpable 2+ bilaterally, no prominent varicosities    ============================    LABS:	   Labs [id number]     CBC Full  -  ( 11 Jul 2019 03:24 )  WBC Count : 7.8 K/uL  Hemoglobin : 8.7 g/dL  Hematocrit : 24.5 %  Platelet Count - Automated : 177 K/uL  Mean Cell Volume : 99.8 fl  Mean Cell Hemoglobin : 35.3 pg  Mean Cell Hemoglobin Concentration : 35.4 gm/dL  Auto Neutrophil # : 5.2 K/uL  Auto Lymphocyte # : 1.9 K/uL  Auto Monocyte # : 0.7 K/uL  Auto Eosinophil # : 0.1 K/uL  Auto Basophil # : 0.0 K/uL  Auto Neutrophil % : 66.0 %  Auto Lymphocyte % : 23.9 %  Auto Monocyte % : 8.9 %  Auto Eosinophil % : 0.7 %  Auto Basophil % : 0.6 %    07-11    142  |  117<H>  |  97<H>  ----------------------------<  123<H>  3.2<L>   |  12<L>  |  3.37<H>    Ca    11.4<H>      11 Jul 2019 03:24    TPro  6.0  /  Alb  3.2<L>  /  TBili  0.3  /  DBili  x   /  AST  37  /  ALT  18  /  AlkPhos  63  07-11    =============================  < from: Transthoracic Echocardiogram (07.12.19 @ 15:11) >  Derived variables:  LVMI: 84 g/m2  RWT: 0.33  EF (Visual Estimate): 70 %  ------------------------------------------------------------------------  Observations:  Mitral Valve: Normal mitral valve. Mild mitral  regurgitation.  Aortic Valve/Aorta: Calcified aortic valve without  stenosis.  Mild to moderate aortic regurgitation.  Normal aortic root size. (Ao: 3.4 cm at the sinuses of  Valsalva).  Left Atrium: Normal left atrium.  LA volume index = 26  cc/m2.  Left Ventricle: Normal left ventricular systolic function.  No segmental wall motion abnormalities. Normal left  ventricular internal dimensions and wall thicknesses.  Right Heart: Normal right atrium. Normal right ventricular  size and function. Moderate tricuspid regurgitation. Normal  pulmonic valve. Minimal pulmonic regurgitation.  Pericardium/Pleura: Normal pericardium with moderate  pericardial effusion.  Mild right atrial inversion.  Approximately 25% variation in mitral inflow velocity with  respiration.  No evidence of right ventricular diastolic collapse.  Hemodynamic: Estimated right atrial pressure is normal.  Mild pulmonary hypertension. Estimated PASP 37 mmHg.  ------------------------------------------------------------------------  Conclusions:  Normal left ventricular systolic function. No segmental  wall motion abnormalities.  Moderate tricuspid regurgitation.  Mild pulmonary hypertension.  Moderate circumferential pericardial effusion. Mild right  atrial inversion.  Approximately 25% variation in mitral inflow velocity with  respiration.  No evidence of right ventricular diastolic collapse.  IVC collapses with respiration.  *** Compared with echocardiogram of 6/27/2019, no  significant changes noted.  ------------------------------------------------------------------------  Confirmed on  7/12/2019 - 17:30:07 by Zheng Denise M.D.  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     Dhruv Paz MD, [facility]  [phone]

## 2019-07-19 NOTE — CHART NOTE - NSCHARTNOTEFT_GEN_A_CORE
Pt seen for malnutrition follow up. Pt with carcinoid syndrome, concern for tumor progression admitted with systolic blood pressure in 70s, pt found to have UTI, acidosis, CONSTANZA and pericardial effusion, pt with ongoing mental status changes. Palliative following for GOC. Information obtained from wife and daughter at bedside.       Diet : Regular diet with ensure clear 3 daily and rickey 2 packets daily.       Per family pt with no N+V, pt with ongoing diarrhea on cholestyramine, imodium and octreotide, noted with 5 BM yesterday, 1 thus far today     PO intake:  < 50% [x ] 50-75% [ ]   % [ ]  other : Pt with ongoing decreased appetite, per wife pt had bites of dinner last night which had been more than what he has eaten before, overall noted with 5-10% po intake per nursing flowsheets. Pt has not tried ensure clear but tried rickey and drank that. Pt will drink better than he eats, today family ordered mostly liquids such as juices and yogurt.       Current Weight: 129.6 lbs (7/11), 133.3 lbs (7/17), weight slightly increased, pt noted with 1+ L arm, 2+otto leg/ankle and 3+ otto foot edema  % Weight Change    Pertinent Medications: MEDICATIONS  (STANDING):  artificial tears (preservative free) Ophthalmic Solution 1 Drop(s) Both EYES every 2 hours  cephalexin 500 milliGRAM(s) Oral every 12 hours  chlorhexidine 2% Cloths 1 Application(s) Topical daily  cholestyramine Powder (Sugar-Free) 4 Gram(s) Oral every 12 hours  heparin  Injectable 5000 Unit(s) SubCutaneous every 12 hours  melatonin 3 milliGRAM(s) Oral at bedtime  multivitamin 1 Tablet(s) Oral daily  octreotide  Injectable 200 MICROGram(s) SubCutaneous every 6 hours  ondansetron Injectable 4 milliGRAM(s) IV Push every 8 hours  sodium bicarbonate 1300 milliGRAM(s) Oral every 6 hours  sodium chloride 0.9%. 1000 milliLiter(s) (75 mL/Hr) IV Continuous <Continuous>    MEDICATIONS  (PRN):  acetaminophen   Tablet .. 650 milliGRAM(s) Oral every 6 hours PRN Temp greater or equal to 38.5C (101.3F), Mild Pain (1 - 3)  haloperidol    Injectable 0.25 milliGRAM(s) IV Push every 6 hours PRN Agitation  loperamide 2 milliGRAM(s) Oral four times a day PRN Diarrhea    Pertinent Labs:  07-19 Na144 mmol/L Glu 124 mg/dL<H> K+ 3.6 mmol/L Cr  3.03 mg/dL<H> BUN 73 mg/dL<H> 07-17 Phos 1.9 mg/dL<L> 07-02 PAB 16 mg/dL<L>      Skin: noted with stage 2 pressure injury to sacral spine per nursing flowsheets.     Estimated Needs:   [ x] no change since previous assessment  [ ] recalculated:       Previous Nutrition Diagnosis:     [x ] Malnutrition (severe)  [x] Increased nutrient needs        Nutrition Diagnosis is [x ] ongoing  [ ] resolved [ ] not applicable          New Nutrition Diagnosis: [ x] not applicable       Interventions:     Recommend    1. Continue regular diet as ordered, RD informed family that they can order pureed or mechanical soft foods if they feel pt may tolerate these better, pt at times tolerates solid textured foods so did not wish to downgrade fully at this time-communicated with diet office  2. Continue daily multivitamin, ensure clear 3 daily and rickey 2 daily for wound healing   3. Continue to encourage po intake and obtain/honor food preferences as able        Monitoring and Evaluation:     [x ] PO intake [x ] Tolerance to diet prescription [x ] weights [x ] follow up per protocol    [ ] other:

## 2019-07-20 RX ADMIN — Medication 1 DROP(S): at 10:59

## 2019-07-20 RX ADMIN — OCTREOTIDE ACETATE 200 MICROGRAM(S): 200 INJECTION, SOLUTION INTRAVENOUS; SUBCUTANEOUS at 05:19

## 2019-07-20 RX ADMIN — Medication 1300 MILLIGRAM(S): at 23:32

## 2019-07-20 RX ADMIN — Medication 1300 MILLIGRAM(S): at 05:18

## 2019-07-20 RX ADMIN — Medication 1 DROP(S): at 23:31

## 2019-07-20 RX ADMIN — Medication 1 DROP(S): at 05:19

## 2019-07-20 RX ADMIN — OCTREOTIDE ACETATE 200 MICROGRAM(S): 200 INJECTION, SOLUTION INTRAVENOUS; SUBCUTANEOUS at 13:15

## 2019-07-20 RX ADMIN — ONDANSETRON 4 MILLIGRAM(S): 8 TABLET, FILM COATED ORAL at 05:18

## 2019-07-20 RX ADMIN — OCTREOTIDE ACETATE 200 MICROGRAM(S): 200 INJECTION, SOLUTION INTRAVENOUS; SUBCUTANEOUS at 23:31

## 2019-07-20 RX ADMIN — Medication 1 TABLET(S): at 13:16

## 2019-07-20 RX ADMIN — Medication 2 MILLIGRAM(S): at 05:21

## 2019-07-20 RX ADMIN — HEPARIN SODIUM 5000 UNIT(S): 5000 INJECTION INTRAVENOUS; SUBCUTANEOUS at 05:20

## 2019-07-20 RX ADMIN — Medication 500 MILLIGRAM(S): at 18:52

## 2019-07-20 RX ADMIN — Medication 1 DROP(S): at 22:14

## 2019-07-20 RX ADMIN — Medication 3 MILLIGRAM(S): at 22:13

## 2019-07-20 RX ADMIN — ONDANSETRON 4 MILLIGRAM(S): 8 TABLET, FILM COATED ORAL at 22:13

## 2019-07-20 RX ADMIN — Medication 1 DROP(S): at 13:16

## 2019-07-20 RX ADMIN — Medication 1 DROP(S): at 09:00

## 2019-07-20 RX ADMIN — ONDANSETRON 4 MILLIGRAM(S): 8 TABLET, FILM COATED ORAL at 13:17

## 2019-07-20 RX ADMIN — Medication 500 MILLIGRAM(S): at 05:20

## 2019-07-20 RX ADMIN — CHOLESTYRAMINE 4 GRAM(S): 4 POWDER, FOR SUSPENSION ORAL at 22:12

## 2019-07-20 RX ADMIN — Medication 1 DROP(S): at 14:34

## 2019-07-20 RX ADMIN — Medication 1 DROP(S): at 16:42

## 2019-07-20 RX ADMIN — Medication 1 DROP(S): at 20:14

## 2019-07-20 RX ADMIN — CHOLESTYRAMINE 4 GRAM(S): 4 POWDER, FOR SUSPENSION ORAL at 08:59

## 2019-07-20 RX ADMIN — SODIUM CHLORIDE 75 MILLILITER(S): 9 INJECTION INTRAMUSCULAR; INTRAVENOUS; SUBCUTANEOUS at 22:12

## 2019-07-20 RX ADMIN — HEPARIN SODIUM 5000 UNIT(S): 5000 INJECTION INTRAVENOUS; SUBCUTANEOUS at 18:51

## 2019-07-20 RX ADMIN — Medication 1 DROP(S): at 18:51

## 2019-07-20 RX ADMIN — Medication 1300 MILLIGRAM(S): at 13:16

## 2019-07-20 RX ADMIN — CHLORHEXIDINE GLUCONATE 1 APPLICATION(S): 213 SOLUTION TOPICAL at 13:17

## 2019-07-20 RX ADMIN — Medication 1300 MILLIGRAM(S): at 18:52

## 2019-07-20 NOTE — PROGRESS NOTE ADULT - SUBJECTIVE AND OBJECTIVE BOX
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       Pt is seen and examined  pt is awake and lying in bed  Pt lethargic, opens eyes to name but does not engage in conversation      REVIEW OF SYSTEMS:    Unable to obtain due to pt's lethargy     MEDICATIONS  (STANDING):  artificial tears (preservative free) Ophthalmic Solution 1 Drop(s) Both EYES every 2 hours  cephalexin 500 milliGRAM(s) Oral every 12 hours  chlorhexidine 2% Cloths 1 Application(s) Topical daily  cholestyramine Powder (Sugar-Free) 4 Gram(s) Oral every 12 hours  heparin  Injectable 5000 Unit(s) SubCutaneous every 12 hours  melatonin 3 milliGRAM(s) Oral at bedtime  multivitamin 1 Tablet(s) Oral daily  octreotide  Injectable 200 MICROGram(s) SubCutaneous every 6 hours  ondansetron Injectable 4 milliGRAM(s) IV Push every 8 hours  sodium bicarbonate 1300 milliGRAM(s) Oral every 6 hours  sodium chloride 0.9%. 1000 milliLiter(s) (75 mL/Hr) IV Continuous <Continuous>    MEDICATIONS  (PRN):  acetaminophen   Tablet .. 650 milliGRAM(s) Oral every 6 hours PRN Temp greater or equal to 38.5C (101.3F), Mild Pain (1 - 3)  haloperidol    Injectable 0.25 milliGRAM(s) IV Push every 6 hours PRN Agitation  loperamide 2 milliGRAM(s) Oral four times a day PRN Diarrhea      Allergies    penicillin (Swelling)    Intolerances        Vital Signs Last 24 Hrs  T(C): 36.8 (20 Jul 2019 09:05), Max: 37.1 (20 Jul 2019 00:27)  T(F): 98.3 (20 Jul 2019 09:05), Max: 98.7 (20 Jul 2019 00:27)  HR: 105 (20 Jul 2019 09:05) (92 - 105)  BP: 96/54 (20 Jul 2019 09:19) (87/50 - 108/66)  BP(mean): --  RR: 18 (20 Jul 2019 09:05) (17 - 18)  SpO2: 91% (20 Jul 2019 09:05) (91% - 98%)    PHYSICAL EXAM  General: adult in NAD  HEENT: clear oropharynx, anicteric sclera, pink conjunctiva  Neck: supple  CV: normal S1/S2 with no murmur rubs or gallops  Lungs: positive air movement b/l ant lungs,clear to auscultation, no wheezes, no rales  Abdomen: soft non-tender non-distended, no hepatosplenomegaly  Ext: no clubbing cyanosis or edema  Skin: no rashes and no petechiae  Neuro: lethargic, opens eyes to name; otherwise non-verbal      LABS:                          8.0    7.58  )-----------( 149      ( 19 Jul 2019 09:31 )             26.2       Mean Cell Volume : 104.0 fl  Mean Cell Hemoglobin : 31.7 pg  Mean Cell Hemoglobin Concentration : 30.5 gm/dL    Serial CBC's  07-19 @ 09:31  Hct-26.2 / Hgb-8.0 / Plat-149 / RBC-2.52 / WBC-7.58    Serial CBC's  07-18 @ 09:50  Hct-25.0 / Hgb-7.6 / Plat-140 / RBC-2.37 / WBC-8.36    Serial CBC's  07-17 @ 09:30  Hct-26.3 / Hgb-7.9 / Plat-146 / RBC-2.53 / WBC-9.33        07-19    144  |  114<H>  |  73<H>  ----------------------------<  124<H>  3.6   |  17<L>  |  3.03<H>    Ca    9.5      19 Jul 2019 07:20        RADIOLOGY & ADDITIONAL STUDIES:    Assessment

## 2019-07-20 NOTE — PROGRESS NOTE ADULT - PROBLEM SELECTOR PLAN 1
- cont octreotide therapy  - will discuss with pt's family re: re-staging disease with ctt scans -- last ones done in June

## 2019-07-20 NOTE — PROGRESS NOTE ADULT - SUBJECTIVE AND OBJECTIVE BOX
Patient is a 86y old  Male who presents with a chief complaint of mental status changes (20 Jul 2019 10:46)      SUBJECTIVE / OVERNIGHT EVENTS: No new complaints.   Review of Systems  chest pain no  palpitations no  sob no  nausea no  headache no    MEDICATIONS  (STANDING):  artificial tears (preservative free) Ophthalmic Solution 1 Drop(s) Both EYES every 2 hours  cephalexin 500 milliGRAM(s) Oral every 12 hours  chlorhexidine 2% Cloths 1 Application(s) Topical daily  cholestyramine Powder (Sugar-Free) 4 Gram(s) Oral every 12 hours  heparin  Injectable 5000 Unit(s) SubCutaneous every 12 hours  melatonin 3 milliGRAM(s) Oral at bedtime  multivitamin 1 Tablet(s) Oral daily  octreotide  Injectable 200 MICROGram(s) SubCutaneous every 6 hours  ondansetron Injectable 4 milliGRAM(s) IV Push every 8 hours  sodium bicarbonate 1300 milliGRAM(s) Oral every 6 hours  sodium chloride 0.9%. 1000 milliLiter(s) (75 mL/Hr) IV Continuous <Continuous>    MEDICATIONS  (PRN):  acetaminophen   Tablet .. 650 milliGRAM(s) Oral every 6 hours PRN Temp greater or equal to 38.5C (101.3F), Mild Pain (1 - 3)  haloperidol    Injectable 0.25 milliGRAM(s) IV Push every 6 hours PRN Agitation  loperamide 2 milliGRAM(s) Oral four times a day PRN Diarrhea      Vital Signs Last 24 Hrs  T(C): 36.8 (20 Jul 2019 09:05), Max: 37.1 (20 Jul 2019 00:27)  T(F): 98.3 (20 Jul 2019 09:05), Max: 98.7 (20 Jul 2019 00:27)  HR: 105 (20 Jul 2019 09:05) (92 - 105)  BP: 96/54 (20 Jul 2019 09:19) (87/50 - 108/66)  BP(mean): --  RR: 18 (20 Jul 2019 09:05) (17 - 18)  SpO2: 91% (20 Jul 2019 09:05) (91% - 98%)    PHYSICAL EXAM:  GENERAL: NAD  HEAD:  Atraumatic, Normocephalic  EYES: EOMI, PERRLA, conjunctiva and sclera flushed  NECK: Supple, No JVD  CHEST/LUNG: Clear to auscultation bilaterally; No wheeze  HEART: Regular rate and rhythm; No murmurs, rubs, or gallops  ABDOMEN: Soft, Nontender, Nondistended; Bowel sounds present  EXTREMITIES:  2+ Peripheral Pulses, No clubbing, cyanosis, or edema  PSYCH: confused  NEUROLOGY: non-focal  SKIN: flushed    LABS:                        8.0    7.58  )-----------( 149      ( 19 Jul 2019 09:31 )             26.2     07-19    144  |  114<H>  |  73<H>  ----------------------------<  124<H>  3.6   |  17<L>  |  3.03<H>    Ca    9.5      19 Jul 2019 07:20                  RADIOLOGY & ADDITIONAL TESTS:    Imaging Personally Reviewed:    Consultant(s) Notes Reviewed:      Care Discussed with Consultants/Other Providers:

## 2019-07-21 DIAGNOSIS — R50.9 FEVER, UNSPECIFIED: ICD-10-CM

## 2019-07-21 LAB
ALBUMIN SERPL ELPH-MCNC: 2.6 G/DL — LOW (ref 3.3–5)
ALP SERPL-CCNC: 54 U/L — SIGNIFICANT CHANGE UP (ref 40–120)
ALT FLD-CCNC: 12 U/L — SIGNIFICANT CHANGE UP (ref 10–45)
ANION GAP SERPL CALC-SCNC: 11 MMOL/L — SIGNIFICANT CHANGE UP (ref 5–17)
ANION GAP SERPL CALC-SCNC: 13 MMOL/L — SIGNIFICANT CHANGE UP (ref 5–17)
APPEARANCE UR: ABNORMAL
AST SERPL-CCNC: 35 U/L — SIGNIFICANT CHANGE UP (ref 10–40)
BACTERIA # UR AUTO: ABNORMAL
BILIRUB SERPL-MCNC: 0.3 MG/DL — SIGNIFICANT CHANGE UP (ref 0.2–1.2)
BILIRUB UR-MCNC: NEGATIVE — SIGNIFICANT CHANGE UP
BLD GP AB SCN SERPL QL: NEGATIVE — SIGNIFICANT CHANGE UP
BUN SERPL-MCNC: 57 MG/DL — HIGH (ref 7–23)
BUN SERPL-MCNC: 81 MG/DL — HIGH (ref 7–23)
CALCIUM SERPL-MCNC: 8.9 MG/DL — SIGNIFICANT CHANGE UP (ref 8.4–10.5)
CALCIUM SERPL-MCNC: 9.1 MG/DL — SIGNIFICANT CHANGE UP (ref 8.4–10.5)
CHLORIDE SERPL-SCNC: 118 MMOL/L — HIGH (ref 96–108)
CHLORIDE SERPL-SCNC: 122 MMOL/L — HIGH (ref 96–108)
CO2 SERPL-SCNC: 13 MMOL/L — LOW (ref 22–31)
CO2 SERPL-SCNC: 14 MMOL/L — LOW (ref 22–31)
COLOR SPEC: YELLOW — SIGNIFICANT CHANGE UP
COMMENT - URINE: SIGNIFICANT CHANGE UP
CREAT SERPL-MCNC: 3.84 MG/DL — HIGH (ref 0.5–1.3)
CREAT SERPL-MCNC: 3.87 MG/DL — HIGH (ref 0.5–1.3)
DIFF PNL FLD: ABNORMAL
EPI CELLS # UR: >27 /HPF — HIGH (ref 0–5)
GLUCOSE SERPL-MCNC: 103 MG/DL — HIGH (ref 70–99)
GLUCOSE SERPL-MCNC: 96 MG/DL — SIGNIFICANT CHANGE UP (ref 70–99)
GLUCOSE UR QL: NEGATIVE — SIGNIFICANT CHANGE UP
GRAN CASTS # UR COMP ASSIST: 8 /LPF — HIGH
HCT VFR BLD CALC: 21.6 % — LOW (ref 39–50)
HCT VFR BLD CALC: 22.5 % — LOW (ref 39–50)
HGB BLD-MCNC: 6.9 G/DL — CRITICAL LOW (ref 13–17)
HGB BLD-MCNC: 7.3 G/DL — LOW (ref 13–17)
HYALINE CASTS # UR AUTO: 1 /LPF — SIGNIFICANT CHANGE UP (ref 0–7)
KETONES UR-MCNC: NEGATIVE — SIGNIFICANT CHANGE UP
LEUKOCYTE ESTERASE UR-ACNC: ABNORMAL
MCHC RBC-ENTMCNC: 30.7 GM/DL — LOW (ref 32–36)
MCHC RBC-ENTMCNC: 32.9 PG — SIGNIFICANT CHANGE UP (ref 27–34)
MCHC RBC-ENTMCNC: 33.8 GM/DL — SIGNIFICANT CHANGE UP (ref 32–36)
MCHC RBC-ENTMCNC: 35.3 PG — HIGH (ref 27–34)
MCV RBC AUTO: 104 FL — HIGH (ref 80–100)
MCV RBC AUTO: 107.1 FL — HIGH (ref 80–100)
NITRITE UR-MCNC: POSITIVE
PH UR: 6 — SIGNIFICANT CHANGE UP (ref 5–8)
PLATELET # BLD AUTO: 134 K/UL — LOW (ref 150–400)
PLATELET # BLD AUTO: 145 K/UL — LOW (ref 150–400)
POTASSIUM SERPL-MCNC: 3.9 MMOL/L — SIGNIFICANT CHANGE UP (ref 3.5–5.3)
POTASSIUM SERPL-MCNC: 4.1 MMOL/L — SIGNIFICANT CHANGE UP (ref 3.5–5.3)
POTASSIUM SERPL-SCNC: 3.9 MMOL/L — SIGNIFICANT CHANGE UP (ref 3.5–5.3)
POTASSIUM SERPL-SCNC: 4.1 MMOL/L — SIGNIFICANT CHANGE UP (ref 3.5–5.3)
PROT SERPL-MCNC: 5.6 G/DL — LOW (ref 6–8.3)
PROT UR-MCNC: ABNORMAL
RBC # BLD: 2.07 M/UL — LOW (ref 4.2–5.8)
RBC # BLD: 2.1 M/UL — LOW (ref 4.2–5.8)
RBC # FLD: 17.5 % — HIGH (ref 10.3–14.5)
RBC # FLD: 20.6 % — HIGH (ref 10.3–14.5)
RBC CASTS # UR COMP ASSIST: 324 /HPF — HIGH (ref 0–4)
RH IG SCN BLD-IMP: POSITIVE — SIGNIFICANT CHANGE UP
SODIUM SERPL-SCNC: 145 MMOL/L — SIGNIFICANT CHANGE UP (ref 135–145)
SODIUM SERPL-SCNC: 146 MMOL/L — HIGH (ref 135–145)
SP GR SPEC: 1.02 — SIGNIFICANT CHANGE UP (ref 1.01–1.02)
UROBILINOGEN FLD QL: ABNORMAL
WBC # BLD: 8.2 K/UL — SIGNIFICANT CHANGE UP (ref 3.8–10.5)
WBC # BLD: 9.44 K/UL — SIGNIFICANT CHANGE UP (ref 3.8–10.5)
WBC # FLD AUTO: 8.2 K/UL — SIGNIFICANT CHANGE UP (ref 3.8–10.5)
WBC # FLD AUTO: 9.44 K/UL — SIGNIFICANT CHANGE UP (ref 3.8–10.5)
WBC UR QL: 248 /HPF — HIGH (ref 0–5)

## 2019-07-21 PROCEDURE — 71045 X-RAY EXAM CHEST 1 VIEW: CPT | Mod: 26

## 2019-07-21 RX ADMIN — ONDANSETRON 4 MILLIGRAM(S): 8 TABLET, FILM COATED ORAL at 06:04

## 2019-07-21 RX ADMIN — Medication 1 DROP(S): at 06:03

## 2019-07-21 RX ADMIN — SODIUM CHLORIDE 75 MILLILITER(S): 9 INJECTION INTRAMUSCULAR; INTRAVENOUS; SUBCUTANEOUS at 13:25

## 2019-07-21 RX ADMIN — Medication 1 DROP(S): at 21:22

## 2019-07-21 RX ADMIN — OCTREOTIDE ACETATE 200 MICROGRAM(S): 200 INJECTION, SOLUTION INTRAVENOUS; SUBCUTANEOUS at 18:39

## 2019-07-21 RX ADMIN — OCTREOTIDE ACETATE 200 MICROGRAM(S): 200 INJECTION, SOLUTION INTRAVENOUS; SUBCUTANEOUS at 06:04

## 2019-07-21 RX ADMIN — Medication 650 MILLIGRAM(S): at 11:48

## 2019-07-21 RX ADMIN — Medication 1 DROP(S): at 13:24

## 2019-07-21 RX ADMIN — Medication 1 TABLET(S): at 13:17

## 2019-07-21 RX ADMIN — Medication 1 DROP(S): at 02:04

## 2019-07-21 RX ADMIN — Medication 1 DROP(S): at 13:17

## 2019-07-21 RX ADMIN — SODIUM CHLORIDE 75 MILLILITER(S): 9 INJECTION INTRAMUSCULAR; INTRAVENOUS; SUBCUTANEOUS at 21:24

## 2019-07-21 RX ADMIN — Medication 3 MILLIGRAM(S): at 21:23

## 2019-07-21 RX ADMIN — Medication 1 DROP(S): at 18:40

## 2019-07-21 RX ADMIN — Medication 500 MILLIGRAM(S): at 06:04

## 2019-07-21 RX ADMIN — ONDANSETRON 4 MILLIGRAM(S): 8 TABLET, FILM COATED ORAL at 21:23

## 2019-07-21 RX ADMIN — Medication 1300 MILLIGRAM(S): at 18:40

## 2019-07-21 RX ADMIN — Medication 1300 MILLIGRAM(S): at 06:05

## 2019-07-21 RX ADMIN — Medication 1300 MILLIGRAM(S): at 13:16

## 2019-07-21 RX ADMIN — OCTREOTIDE ACETATE 200 MICROGRAM(S): 200 INJECTION, SOLUTION INTRAVENOUS; SUBCUTANEOUS at 13:16

## 2019-07-21 RX ADMIN — Medication 1 DROP(S): at 13:14

## 2019-07-21 RX ADMIN — OCTREOTIDE ACETATE 200 MICROGRAM(S): 200 INJECTION, SOLUTION INTRAVENOUS; SUBCUTANEOUS at 23:26

## 2019-07-21 RX ADMIN — Medication 1300 MILLIGRAM(S): at 23:26

## 2019-07-21 RX ADMIN — CHOLESTYRAMINE 4 GRAM(S): 4 POWDER, FOR SUSPENSION ORAL at 21:22

## 2019-07-21 RX ADMIN — HEPARIN SODIUM 5000 UNIT(S): 5000 INJECTION INTRAVENOUS; SUBCUTANEOUS at 18:40

## 2019-07-21 RX ADMIN — ONDANSETRON 4 MILLIGRAM(S): 8 TABLET, FILM COATED ORAL at 13:25

## 2019-07-21 RX ADMIN — CHLORHEXIDINE GLUCONATE 1 APPLICATION(S): 213 SOLUTION TOPICAL at 13:16

## 2019-07-21 RX ADMIN — Medication 1 DROP(S): at 20:00

## 2019-07-21 RX ADMIN — Medication 1 DROP(S): at 08:28

## 2019-07-21 RX ADMIN — CHOLESTYRAMINE 4 GRAM(S): 4 POWDER, FOR SUSPENSION ORAL at 08:28

## 2019-07-21 RX ADMIN — Medication 1 DROP(S): at 23:27

## 2019-07-21 RX ADMIN — Medication 500 MILLIGRAM(S): at 18:40

## 2019-07-21 RX ADMIN — Medication 1 DROP(S): at 04:03

## 2019-07-21 RX ADMIN — HEPARIN SODIUM 5000 UNIT(S): 5000 INJECTION INTRAVENOUS; SUBCUTANEOUS at 06:04

## 2019-07-21 NOTE — PROGRESS NOTE ADULT - SUBJECTIVE AND OBJECTIVE BOX
Patient is a 86y old  Male who presents with a chief complaint of mental status jamil (21 Jul 2019 11:07)      SUBJECTIVE / OVERNIGHT EVENTS: No new complaints.   Review of Systems  unobtainable       MEDICATIONS  (STANDING):  artificial tears (preservative free) Ophthalmic Solution 1 Drop(s) Both EYES every 2 hours  cephalexin 500 milliGRAM(s) Oral every 12 hours  chlorhexidine 2% Cloths 1 Application(s) Topical daily  cholestyramine Powder (Sugar-Free) 4 Gram(s) Oral every 12 hours  heparin  Injectable 5000 Unit(s) SubCutaneous every 12 hours  melatonin 3 milliGRAM(s) Oral at bedtime  multivitamin 1 Tablet(s) Oral daily  octreotide  Injectable 200 MICROGram(s) SubCutaneous every 6 hours  ondansetron Injectable 4 milliGRAM(s) IV Push every 8 hours  sodium bicarbonate 1300 milliGRAM(s) Oral every 6 hours  sodium chloride 0.9%. 1000 milliLiter(s) (75 mL/Hr) IV Continuous <Continuous>    MEDICATIONS  (PRN):  acetaminophen   Tablet .. 650 milliGRAM(s) Oral every 6 hours PRN Temp greater or equal to 38.5C (101.3F), Mild Pain (1 - 3)  haloperidol    Injectable 0.25 milliGRAM(s) IV Push every 6 hours PRN Agitation  loperamide 2 milliGRAM(s) Oral four times a day PRN Diarrhea      Vital Signs Last 24 Hrs  T(C): 36.8 (21 Jul 2019 16:00), Max: 38.5 (21 Jul 2019 11:05)  T(F): 98.2 (21 Jul 2019 16:00), Max: 101.3 (21 Jul 2019 11:05)  HR: 95 (21 Jul 2019 16:00) (95 - 106)  BP: 98/50 (21 Jul 2019 16:00) (90/50 - 98/50)  BP(mean): --  RR: 16 (21 Jul 2019 16:00) (16 - 18)  SpO2: 95% (21 Jul 2019 16:00) (90% - 95%)    PHYSICAL EXAM:  GENERAL: NAD  HEAD:  Atraumatic, Normocephalic  EYES: EOMI, PERRLA, conjunctiva and sclera clear  NECK: Supple, No JVD  CHEST/LUNG: Clear to auscultation bilaterally; No wheeze  HEART: Regular rate and rhythm; No murmurs, rubs, or gallops  ABDOMEN: Soft, Nontender, Nondistended; Bowel sounds present  EXTREMITIES:  2+ Peripheral Pulses, No clubbing, cyanosis, or edema  PSYCH: confused  NEUROLOGY: non-focal  SKIN: No rashes or lesions, flushed    LABS:                        6.9    9.44  )-----------( 145      ( 21 Jul 2019 13:48 )             22.5     07-21    146<H>  |  122<H>  |  81<H>  ----------------------------<  103<H>  4.1   |  13<L>  |  3.87<H>    Ca    9.1      21 Jul 2019 11:09    TPro  5.6<L>  /  Alb  2.6<L>  /  TBili  0.3  /  DBili  x   /  AST  35  /  ALT  12  /  AlkPhos  54  07-21                RADIOLOGY & ADDITIONAL TESTS:    Imaging Personally Reviewed:    Consultant(s) Notes Reviewed:      Care Discussed with Consultants/Other Providers:

## 2019-07-21 NOTE — PROGRESS NOTE ADULT - PROBLEM SELECTOR PLAN 1
-- Pt is still having diarrhea despite octreotide 200 mcg q 6 hr --- (total dose 800 Mcg) -- will increase to 1000mcg/day ---     Will need to discuss with family further treatment with chemotherapy given poor PS

## 2019-07-21 NOTE — PROGRESS NOTE ADULT - SUBJECTIVE AND OBJECTIVE BOX
Patient is a 86y old  Male who presents with a chief complaint of mental status change (20 Jul 2019 15:26)       Pt is seen and examined  pt is awake and lying in bed, lethargic  As per nursing staff, had 6 bm's yesterday; this morning has temp 101.5 rectally;       REVIEW OF SYSTEMS:    CONSTITUTIONAL: No weakness, fevers or chills  EYES/ENT: No visual changes;  No vertigo or throat pain   NECK: No pain or stiffness  RESPIRATORY: No cough, wheezing, hemoptysis; No shortness of breath  CARDIOVASCULAR: No chest pain or palpitations  GASTROINTESTINAL: No abdominal or epigastric pain. No nausea, vomiting, or hematemesis; No diarrhea or constipation. No melena or hematochezia.  GENITOURINARY: No dysuria, frequency or hematuria  NEUROLOGICAL: No numbness or weakness  SKIN: No itching, burning, rashes, or lesions     MEDICATIONS  (STANDING):  artificial tears (preservative free) Ophthalmic Solution 1 Drop(s) Both EYES every 2 hours  cephalexin 500 milliGRAM(s) Oral every 12 hours  chlorhexidine 2% Cloths 1 Application(s) Topical daily  cholestyramine Powder (Sugar-Free) 4 Gram(s) Oral every 12 hours  heparin  Injectable 5000 Unit(s) SubCutaneous every 12 hours  melatonin 3 milliGRAM(s) Oral at bedtime  multivitamin 1 Tablet(s) Oral daily  octreotide  Injectable 200 MICROGram(s) SubCutaneous every 6 hours  ondansetron Injectable 4 milliGRAM(s) IV Push every 8 hours  sodium bicarbonate 1300 milliGRAM(s) Oral every 6 hours  sodium chloride 0.9%. 1000 milliLiter(s) (75 mL/Hr) IV Continuous <Continuous>    MEDICATIONS  (PRN):  acetaminophen   Tablet .. 650 milliGRAM(s) Oral every 6 hours PRN Temp greater or equal to 38.5C (101.3F), Mild Pain (1 - 3)  haloperidol    Injectable 0.25 milliGRAM(s) IV Push every 6 hours PRN Agitation  loperamide 2 milliGRAM(s) Oral four times a day PRN Diarrhea      Allergies    penicillin (Swelling)    Intolerances        Vital Signs Last 24 Hrs  T(C): 37 (21 Jul 2019 08:30), Max: 37.2 (20 Jul 2019 16:12)  T(F): 98.6 (21 Jul 2019 08:30), Max: 99 (20 Jul 2019 16:12)  HR: 104 (21 Jul 2019 08:30) (97 - 106)  BP: 94/58 (21 Jul 2019 08:30) (94/54 - 95/75)  BP(mean): --  RR: 18 (21 Jul 2019 08:30) (18 - 18)  SpO2: 94% (21 Jul 2019 08:30) (90% - 95%)    PHYSICAL EXAM  General: adult in NAD  HEENT: clear oropharynx, anicteric sclera, pink conjunctiva  Neck: supple  CV: normal S1/S2 with no murmur rubs or gallops  Lungs: positive air movement b/l ant lungs,clear to auscultation, no wheezes, no rales  Abdomen: soft non-tender non-distended, no hepatosplenomegaly  Ext: no clubbing cyanosis or edema  Skin: no rashes and no petechiae  Neuro: alert and oriented X 4, no focal deficits      LABS:    Serial CBC's  07-19 @ 09:31  Hct-26.2 / Hgb-8.0 / Plat-149 / RBC-2.52 / WBC-7.58    Serial CBC's  07-18 @ 09:50  Hct-25.0 / Hgb-7.6 / Plat-140 / RBC-2.37 / WBC-8.36      07-21    145  |  118<H>  |  57<H>  ----------------------------<  96  3.9   |  14<L>  |  3.84<H>    Ca    8.9      21 Jul 2019 07:34      Assessment

## 2019-07-21 NOTE — PROVIDER CONTACT NOTE (CHANGE IN STATUS NOTIFICATION) - ACTION/TREATMENT ORDERED:
tylenol given. Blood cultures X 2 sent. UA sent. urine culture can only be collected after calzada is changed by Urology. NP made aware. Will follow up

## 2019-07-22 ENCOUNTER — TRANSCRIPTION ENCOUNTER (OUTPATIENT)
Age: 84
End: 2019-07-22

## 2019-07-22 LAB
HCT VFR BLD CALC: 22.7 % — LOW (ref 39–50)
HGB BLD-MCNC: 6.8 G/DL — CRITICAL LOW (ref 13–17)
MCHC RBC-ENTMCNC: 30 GM/DL — LOW (ref 32–36)
MCHC RBC-ENTMCNC: 32.4 PG — SIGNIFICANT CHANGE UP (ref 27–34)
MCV RBC AUTO: 108.1 FL — HIGH (ref 80–100)
NRBC # BLD: SIGNIFICANT CHANGE UP (ref 0–0)
PLATELET # BLD AUTO: 149 K/UL — LOW (ref 150–400)
RBC # BLD: 2.1 M/UL — LOW (ref 4.2–5.8)
RBC # FLD: 20.3 % — HIGH (ref 10.3–14.5)
WBC # BLD: 10.28 K/UL — SIGNIFICANT CHANGE UP (ref 3.8–10.5)
WBC # FLD AUTO: 10.28 K/UL — SIGNIFICANT CHANGE UP (ref 3.8–10.5)

## 2019-07-22 PROCEDURE — 99233 SBSQ HOSP IP/OBS HIGH 50: CPT

## 2019-07-22 RX ORDER — ONDANSETRON 8 MG/1
8 TABLET, FILM COATED ORAL EVERY 8 HOURS
Refills: 0 | Status: DISCONTINUED | OUTPATIENT
Start: 2019-07-22 | End: 2019-07-23

## 2019-07-22 RX ORDER — LOPERAMIDE HCL 2 MG
2 TABLET ORAL
Refills: 0 | Status: DISCONTINUED | OUTPATIENT
Start: 2019-07-22 | End: 2019-07-23

## 2019-07-22 RX ORDER — OCTREOTIDE ACETATE 200 UG/ML
200 INJECTION, SOLUTION INTRAVENOUS; SUBCUTANEOUS THREE TIMES A DAY
Refills: 0 | Status: DISCONTINUED | OUTPATIENT
Start: 2019-07-22 | End: 2019-07-23

## 2019-07-22 RX ORDER — CHOLESTYRAMINE 4 G/9G
4 POWDER, FOR SUSPENSION ORAL EVERY 8 HOURS
Refills: 0 | Status: DISCONTINUED | OUTPATIENT
Start: 2019-07-22 | End: 2019-07-23

## 2019-07-22 RX ADMIN — OCTREOTIDE ACETATE 200 MICROGRAM(S): 200 INJECTION, SOLUTION INTRAVENOUS; SUBCUTANEOUS at 12:42

## 2019-07-22 RX ADMIN — Medication 1 DROP(S): at 04:11

## 2019-07-22 RX ADMIN — HEPARIN SODIUM 5000 UNIT(S): 5000 INJECTION INTRAVENOUS; SUBCUTANEOUS at 06:12

## 2019-07-22 RX ADMIN — ONDANSETRON 8 MILLIGRAM(S): 8 TABLET, FILM COATED ORAL at 12:39

## 2019-07-22 RX ADMIN — CHOLESTYRAMINE 4 GRAM(S): 4 POWDER, FOR SUSPENSION ORAL at 21:29

## 2019-07-22 RX ADMIN — Medication 500 MILLIGRAM(S): at 18:19

## 2019-07-22 RX ADMIN — SODIUM CHLORIDE 75 MILLILITER(S): 9 INJECTION INTRAMUSCULAR; INTRAVENOUS; SUBCUTANEOUS at 22:34

## 2019-07-22 RX ADMIN — Medication 1 DROP(S): at 21:29

## 2019-07-22 RX ADMIN — Medication 500 MILLIGRAM(S): at 06:12

## 2019-07-22 RX ADMIN — CHOLESTYRAMINE 4 GRAM(S): 4 POWDER, FOR SUSPENSION ORAL at 12:41

## 2019-07-22 RX ADMIN — ONDANSETRON 8 MILLIGRAM(S): 8 TABLET, FILM COATED ORAL at 21:35

## 2019-07-22 RX ADMIN — Medication 1 DROP(S): at 20:47

## 2019-07-22 RX ADMIN — Medication 1300 MILLIGRAM(S): at 18:20

## 2019-07-22 RX ADMIN — CHOLESTYRAMINE 4 GRAM(S): 4 POWDER, FOR SUSPENSION ORAL at 08:10

## 2019-07-22 RX ADMIN — Medication 1300 MILLIGRAM(S): at 06:15

## 2019-07-22 RX ADMIN — Medication 1 DROP(S): at 02:30

## 2019-07-22 RX ADMIN — Medication 1300 MILLIGRAM(S): at 12:40

## 2019-07-22 RX ADMIN — Medication 3 MILLIGRAM(S): at 21:29

## 2019-07-22 RX ADMIN — Medication 1 DROP(S): at 10:46

## 2019-07-22 RX ADMIN — Medication 1 DROP(S): at 08:09

## 2019-07-22 RX ADMIN — Medication 1 DROP(S): at 12:38

## 2019-07-22 RX ADMIN — Medication 1 DROP(S): at 06:10

## 2019-07-22 RX ADMIN — HEPARIN SODIUM 5000 UNIT(S): 5000 INJECTION INTRAVENOUS; SUBCUTANEOUS at 18:19

## 2019-07-22 RX ADMIN — Medication 1 TABLET(S): at 12:40

## 2019-07-22 RX ADMIN — OCTREOTIDE ACETATE 200 MICROGRAM(S): 200 INJECTION, SOLUTION INTRAVENOUS; SUBCUTANEOUS at 06:12

## 2019-07-22 RX ADMIN — CHLORHEXIDINE GLUCONATE 1 APPLICATION(S): 213 SOLUTION TOPICAL at 12:40

## 2019-07-22 RX ADMIN — Medication 1 DROP(S): at 18:19

## 2019-07-22 RX ADMIN — Medication 2 MILLIGRAM(S): at 18:19

## 2019-07-22 RX ADMIN — ONDANSETRON 4 MILLIGRAM(S): 8 TABLET, FILM COATED ORAL at 06:12

## 2019-07-22 NOTE — PROGRESS NOTE ADULT - SUBJECTIVE AND OBJECTIVE BOX
Patient is a 86y old  Male who presents with a chief complaint of mental status change (21 Jul 2019 15:59)       Pt is seen and examined  pt is awake and lying in bedpt seems comfortable and denies any complaints at this time      REVIEW OF SYSTEMS:    CONSTITUTIONAL: No weakness, fevers or chills  EYES/ENT: No visual changes;  No vertigo or throat pain   NECK: No pain or stiffness  RESPIRATORY: No cough, wheezing, hemoptysis; No shortness of breath  CARDIOVASCULAR: No chest pain or palpitations  GASTROINTESTINAL: No abdominal or epigastric pain. No nausea, vomiting, or hematemesis; No diarrhea or constipation. No melena or hematochezia.  GENITOURINARY: No dysuria, frequency or hematuria  NEUROLOGICAL: No numbness or weakness  SKIN: No itching, burning, rashes, or lesions     MEDICATIONS  (STANDING):  artificial tears (preservative free) Ophthalmic Solution 1 Drop(s) Both EYES every 2 hours  cephalexin 500 milliGRAM(s) Oral every 12 hours  chlorhexidine 2% Cloths 1 Application(s) Topical daily  cholestyramine Powder (Sugar-Free) 4 Gram(s) Oral every 12 hours  heparin  Injectable 5000 Unit(s) SubCutaneous every 12 hours  melatonin 3 milliGRAM(s) Oral at bedtime  multivitamin 1 Tablet(s) Oral daily  octreotide  Injectable 200 MICROGram(s) SubCutaneous three times a day  ondansetron Injectable 4 milliGRAM(s) IV Push every 8 hours  sodium bicarbonate 1300 milliGRAM(s) Oral every 6 hours  sodium chloride 0.9%. 1000 milliLiter(s) (75 mL/Hr) IV Continuous <Continuous>    MEDICATIONS  (PRN):  acetaminophen   Tablet .. 650 milliGRAM(s) Oral every 6 hours PRN Temp greater or equal to 38.5C (101.3F), Mild Pain (1 - 3)  haloperidol    Injectable 0.25 milliGRAM(s) IV Push every 6 hours PRN Agitation  loperamide 2 milliGRAM(s) Oral four times a day PRN Diarrhea      Allergies    penicillin (Swelling)    Intolerances        Vital Signs Last 24 Hrs  T(C): 36.3 (22 Jul 2019 07:59), Max: 38.5 (21 Jul 2019 11:05)  T(F): 97.3 (22 Jul 2019 07:59), Max: 101.3 (21 Jul 2019 11:05)  HR: 94 (22 Jul 2019 07:59) (90 - 99)  BP: 93/51 (22 Jul 2019 07:59) (90/50 - 98/50)  BP(mean): --  RR: 18 (22 Jul 2019 07:59) (16 - 18)  SpO2: 95% (22 Jul 2019 07:59) (94% - 96%)    PHYSICAL EXAM  General: adult in NAD  HEENT: clear oropharynx, anicteric sclera, pink conjunctiva; (+) discharge from both eyes  Neck: supple  CV: normal S1/S2 with no murmur rubs or gallops  Lungs: positive air movement b/l ant lungs,clear to auscultation, no wheezes, no rales  Abdomen: soft non-tender non-distended, no hepatosplenomegaly  Ext: no clubbing cyanosis or edema  Skin: no rashes and no petechiae  Neuro: alert and oriented to person, no focal deficits      LABS:                          7.3    8.2   )-----------( 134      ( 21 Jul 2019 17:17 )             21.6         Mean Cell Volume : 104.0 fl  Mean Cell Hemoglobin : 35.3 pg  Mean Cell Hemoglobin Concentration : 33.8 gm/dL  Auto Neutrophil # : x  Auto Lymphocyte # : x  Auto Monocyte # : x  Auto Eosinophil # : x  Auto Basophil # : x  Auto Neutrophil % : x  Auto Lymphocyte % : x  Auto Monocyte % : x  Auto Eosinophil % : x  Auto Basophil % : x    Serial CBC's  07-21 @ 17:17  Hct-21.6 / Hgb-7.3 / Plat-134 / RBC-2.07 / WBC-8.2    Serial CBC's  07-21 @ 13:48  Hct-22.5 / Hgb-6.9 / Plat-145 / RBC-2.10 / WBC-9.44      Serial CBC's  07-19 @ 09:31  Hct-26.2 / Hgb-8.0 / Plat-149 / RBC-2.52 / WBC-7.58      Serial CBC's  07-18 @ 09:50  Hct-25.0 / Hgb-7.6 / Plat-140 / RBC-2.37 / WBC-8.36        07-21    146<H>  |  122<H>  |  81<H>  ----------------------------<  103<H>  4.1   |  13<L>  |  3.87<H>    Ca    9.1      21 Jul 2019 11:09    TPro  5.6<L>  /  Alb  2.6<L>  /  TBili  0.3  /  DBili  x   /  AST  35  /  ALT  12  /  AlkPhos  54  07-21    Urinalysis (07.21.19 @ 17:16)    Blood, Urine: Moderate    Glucose Qualitative, Urine: Negative    pH Urine: 6.0    Color: Yellow    Urine Appearance: Slightly Turbid    Bilirubin: Negative    Ketone - Urine: Negative    Specific Gravity: 1.018    Protein, Urine: 100 mg/dL    Urobilinogen: 3 mg/dL    Nitrite: Positive    Leukocyte Esterase Concentration: Large          RADIOLOGY & ADDITIONAL STUDIES:    EXAM:  XR CHEST PORTABLE ROUTINE 1V                            PROCEDURE DATE:  07/21/2019      INTERPRETATION:  Indication: Fevers, carcinoid.    Technique: Single portable view of the chest.    Comparison: 7/14/2019    Findings: The cardiacsilhouette is enlarged. There is a small left   pleural effusion. There is mild pulmonary edema. No focal consolidation   is seen.    Impression: Mild pulmonary edema. Small left pleural effusion. No focal   consolidation is visualized.        ODILON VIZCAINO M.D., ATTENDING RADIOLOGIST  This document has been electronically signed. Jul 21 2019 12:29PM          Assessment

## 2019-07-22 NOTE — PROGRESS NOTE ADULT - SUBJECTIVE AND OBJECTIVE BOX
[facility]   [phone]  RENAL FOLLOW UP NOTE  --------------------------------------------------------------------------------  Chief Complaint:    24 hour events/subjective:    pt seen earlier.   wife at bedside.   non communicative     PAST HISTORY  --------------------------------------------------------------------------------  No significant changes to PMH, PSH, FHx, SHx, unless otherwise noted    ALLERGIES & MEDICATIONS  --------------------------------------------------------------------------------  Allergies    penicillin (Swelling)    Intolerances      Standing Inpatient Medications  artificial tears (preservative free) Ophthalmic Solution 1 Drop(s) Both EYES every 2 hours  cephalexin 500 milliGRAM(s) Oral every 12 hours  chlorhexidine 2% Cloths 1 Application(s) Topical daily  cholestyramine Powder (Sugar-Free) 4 Gram(s) Oral every 8 hours  heparin  Injectable 5000 Unit(s) SubCutaneous every 12 hours  loperamide 2 milliGRAM(s) Oral four times a day  melatonin 3 milliGRAM(s) Oral at bedtime  multivitamin 1 Tablet(s) Oral daily  octreotide  Injectable 200 MICROGram(s) SubCutaneous three times a day  ondansetron   Disintegrating Tablet 8 milliGRAM(s) Oral every 8 hours  sodium bicarbonate 1300 milliGRAM(s) Oral every 6 hours  sodium chloride 0.9%. 1000 milliLiter(s) IV Continuous <Continuous>    PRN Inpatient Medications  acetaminophen   Tablet .. 650 milliGRAM(s) Oral every 6 hours PRN  haloperidol    Injectable 0.25 milliGRAM(s) IV Push every 6 hours PRN      REVIEW OF SYSTEMS  --------------------------------------------------------------------------------    pt unable     VITALS/PHYSICAL EXAM  --------------------------------------------------------------------------------  T(C): 36.3 (07-22-19 @ 07:59), Max: 37 (07-22-19 @ 00:33)  HR: 94 (07-22-19 @ 07:59) (90 - 97)  BP: 93/51 (07-22-19 @ 07:59) (93/50 - 94/50)  RR: 18 (07-22-19 @ 07:59) (18 - 18)  SpO2: 95% (07-22-19 @ 07:59) (95% - 96%)  Wt(kg): --        07-21-19 @ 07:01  -  07-22-19 @ 07:00  --------------------------------------------------------  IN: 50 mL / OUT: 200 mL / NET: -150 mL    07-22-19 @ 07:01  -  07-22-19 @ 18:37  --------------------------------------------------------  IN: 300 mL / OUT: 300 mL / NET: 0 mL      Physical Exam:  	  Chronically ill appearing frail m    	no jvd   	Pulm: decrease bs  no rales or ronchi or wheezing  	CV: RRR, S1S2; no rub  	Abd: +BS, soft, nontender/nondistended  	: No suprapubic tenderness  	UE: Warm, no cyanosis  no clubbing,  no edema  	LE: Warm, no cyanosis  no clubbing, trace    edema  			        LABS/STUDIES  --------------------------------------------------------------------------------              6.8    10.28 >-----------<  149      [07-22-19 @ 09:28]              22.7     146  |  122  |  81  ----------------------------<  103      [07-21-19 @ 11:09]  4.1   |  13  |  3.87        Ca     9.1     [07-21-19 @ 11:09]    TPro  5.6  /  Alb  2.6  /  TBili  0.3  /  DBili  x   /  AST  35  /  ALT  12  /  AlkPhos  54  [07-21-19 @ 11:09]          Creatinine Trend:  SCr 3.87 [07-21 @ 11:09]  SCr 3.84 [07-21 @ 07:34]  SCr 3.03 [07-19 @ 07:20]  SCr 3.00 [07-18 @ 07:21]  SCr 2.83 [07-17 @ 07:42]              Urinalysis - [07-21-19 @ 17:16]      Color Yellow / Appearance Slightly Turbid / SG 1.018 / pH 6.0      Gluc Negative / Ketone Negative  / Bili Negative / Urobili 3 mg/dL       Blood Moderate / Protein 100 mg/dL / Leuk Est Large / Nitrite Positive       /  / Hyaline 1 / Gran 8 / Sq Epi  / Non Sq Epi >27 / Bacteria Few      PTH -- (Ca 10.4)      [06-11-19 @ 09:51]   15  Vitamin D (25OH) 9.7      [06-11-19 @ 10:02]  TSH 1.15      [07-14-19 @ 10:04]    Syphilis Screen (Treponema Pallidum Ab) Negative      [07-15-19 @ 04:20]

## 2019-07-22 NOTE — PROGRESS NOTE ADULT - SUBJECTIVE AND OBJECTIVE BOX
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   Patient reportedly found with hypotension and altered mental status. Urine was maladarous and appears to be a UTI on admission labs.    Rest of review of symptoms are unable to obtain due to hearing difficulties/lethargy.   No reported worsening CP/Palps/SOB    ===========================  Interval Events  - lower bp   - rising Cr  - noncommunicative  - anemic  ===========================            acetaminophen   Tablet .. 650 milliGRAM(s) Oral every 6 hours PRN  artificial tears (preservative free) Ophthalmic Solution 1 Drop(s) Both EYES every 2 hours  cefepime   IVPB 500 milliGRAM(s) IV Intermittent every 12 hours  heparin  Injectable 5000 Unit(s) SubCutaneous every 12 hours  loperamide 2 milliGRAM(s) Oral four times a day PRN  multivitamin 1 Tablet(s) Oral daily  octreotide  Injectable 200 MICROGram(s) SubCutaneous every 6 hours  sodium bicarbonate  Infusion 0.179 mEq/kG/Hr IV Continuous <Continuous>        Allergies    penicillin (Swelling)    Intolerances    	    MEDICATIONS:  heparin  Injectable 5000 Unit(s) SubCutaneous every 12 hours    cefepime   IVPB 500 milliGRAM(s) IV Intermittent every 12 hours      acetaminophen   Tablet .. 650 milliGRAM(s) Oral every 6 hours PRN    loperamide 2 milliGRAM(s) Oral four times a day PRN    octreotide  Injectable 200 MICROGram(s) SubCutaneous every 6 hours    artificial tears (preservative free) Ophthalmic Solution 1 Drop(s) Both EYES every 2 hours  multivitamin 1 Tablet(s) Oral daily  sodium bicarbonate  Infusion 0.179 mEq/kG/Hr IV Continuous <Continuous>      PAST MEDICAL & SURGICAL HISTORY:  Neck mass: was resected, reportedly bening  Kidney lesion: partial resction- reportedly &quot;not active lesion&quot;  History of carcinoid syndrome  H/O carcinoid syndrome  S/P TURP      FAMILY HISTORY:  FH: lung cancer (Sibling): sister  Family history of nasopharyngeal cancer: father    Mother - HTN      SOCIAL HISTORY:    [ x] Non-smoker  [x] No Illicit Drug Use  [ x] No Excess EtOH Use      REVIEW OF SYSTEMS: Unable to obtain below - noncommunicative  Constitutional: [] Fever, []Fatigue, []Weight Changes  Eyes:  []Recent Vision Changes, []Eye Pain  ENT: []Congestion, []Sore Throat  Endocrine: []Excess Sweating, []Temperature Intolerance  Cardiovascular:  []Chest Pain, []Palpitations, []Shortness of Breath, []Pre-syncope, []Syncope,[] LE Swelling  Respiratory:[] Cough, []Congestion,[] Wheezing  Gastrointestinal: [] Abdominal Pain,[] Nausea, []Vomiting  Genitourinary: []Dysuria,[] hematuria  Musculoskeletal: []Joint Pain, []Hip/Knee Injury  Neurologic: []Headaches,[] Imbalance, []Weakness  Skin: []Rashes, []hematoma, []purprura    ================================    PHYSICAL EXAM:  T(C): 36.4 (07-11-19 @ 07:53), Max: 36.8 (07-11-19 @ 02:58)  HR: 96 (07-11-19 @ 14:01) (84 - 98)  BP: 95/54 (07-11-19 @ 14:01) (94/40 - 100/54)  RR: 17 (07-11-19 @ 07:53) (17 - 18)  SpO2: 96% (07-11-19 @ 14:01) (96% - 100%)  Wt(kg): --  I&O's Summary    Appearance: Noncommunicative  Psychiatry: Noncommunicative  HEENT:   Normal oral mucosa, EOMI	  Lymphatic: No lymphadenopathy  Cardiovascular: Normal S1 S2, No JVD, No murmurs, No edema  Respiratory: Lungs clear to auscultation, no use of accessory muscles	  Gastrointestinal:  Soft, Non-tender	  Skin: No rashes, No ecchymoses, No cyanosis	  Neurologic: Non-focal; but limited  Extremities: Normal range of motion, No clubbing, cyanosis  Vascular: Peripheral pulses palpable 2+ bilaterally, no prominent varicosities    ============================    LABS:	   Labs Reviewed07-22-19     CBC Full  -  ( 11 Jul 2019 03:24 )  WBC Count : 7.8 K/uL  Hemoglobin : 8.7 g/dL  Hematocrit : 24.5 %  Platelet Count - Automated : 177 K/uL  Mean Cell Volume : 99.8 fl  Mean Cell Hemoglobin : 35.3 pg  Mean Cell Hemoglobin Concentration : 35.4 gm/dL  Auto Neutrophil # : 5.2 K/uL  Auto Lymphocyte # : 1.9 K/uL  Auto Monocyte # : 0.7 K/uL  Auto Eosinophil # : 0.1 K/uL  Auto Basophil # : 0.0 K/uL  Auto Neutrophil % : 66.0 %  Auto Lymphocyte % : 23.9 %  Auto Monocyte % : 8.9 %  Auto Eosinophil % : 0.7 %  Auto Basophil % : 0.6 %    07-11    142  |  117<H>  |  97<H>  ----------------------------<  123<H>  3.2<L>   |  12<L>  |  3.37<H>    Ca    11.4<H>      11 Jul 2019 03:24    TPro  6.0  /  Alb  3.2<L>  /  TBili  0.3  /  DBili  x   /  AST  37  /  ALT  18  /  AlkPhos  63  07-11    =============================  < from: Transthoracic Echocardiogram (07.12.19 @ 15:11) >  Derived variables:  LVMI: 84 g/m2  RWT: 0.33  EF (Visual Estimate): 70 %  ------------------------------------------------------------------------  Observations:  Mitral Valve: Normal mitral valve. Mild mitral  regurgitation.  Aortic Valve/Aorta: Calcified aortic valve without  stenosis.  Mild to moderate aortic regurgitation.  Normal aortic root size. (Ao: 3.4 cm at the sinuses of  Valsalva).  Left Atrium: Normal left atrium.  LA volume index = 26  cc/m2.  Left Ventricle: Normal left ventricular systolic function.  No segmental wall motion abnormalities. Normal left  ventricular internal dimensions and wall thicknesses.  Right Heart: Normal right atrium. Normal right ventricular  size and function. Moderate tricuspid regurgitation. Normal  pulmonic valve. Minimal pulmonic regurgitation.  Pericardium/Pleura: Normal pericardium with moderate  pericardial effusion.  Mild right atrial inversion.  Approximately 25% variation in mitral inflow velocity with  respiration.  No evidence of right ventricular diastolic collapse.  Hemodynamic: Estimated right atrial pressure is normal.  Mild pulmonary hypertension. Estimated PASP 37 mmHg.  ------------------------------------------------------------------------  Conclusions:  Normal left ventricular systolic function. No segmental  wall motion abnormalities.  Moderate tricuspid regurgitation.  Mild pulmonary hypertension.  Moderate circumferential pericardial effusion. Mild right  atrial inversion.  Approximately 25% variation in mitral inflow velocity with  respiration.  No evidence of right ventricular diastolic collapse.  IVC collapses with respiration.  *** Compared with echocardiogram of 6/27/2019, no  significant changes noted.  ------------------------------------------------------------------------  Confirmed on  7/12/2019 - 17:30:07 by Zheng Denise M.D.  ------------------------------------------------------------------------    < end of copied text >      =========================================================================  ASSESSMENT:  86yMale with a history of left lower lobe resection, carcinoid syndrome c/b frequent episodes of persistent diarrhea, persistent moderate pericardial effusion, worsening renal function, and likely uti    Recommendations  - acidosis and uti per renal/ID  - Fluids   - No need for cardiac intervention  - poor px  - Will likely sign off         Please call with questions.     Dhruv Paz MD, [facility]  [phone]

## 2019-07-22 NOTE — DISCHARGE NOTE PROVIDER - NSDCCPCAREPLAN_GEN_ALL_CORE_FT
PRINCIPAL DISCHARGE DIAGNOSIS  Diagnosis: Urinary tract infection associated with indwelling urethral catheter, initial encounter  Assessment and Plan of Treatment:       SECONDARY DISCHARGE DIAGNOSES  Diagnosis: Carcinoid syndrome  Assessment and Plan of Treatment: Carcinoid syndrome PRINCIPAL DISCHARGE DIAGNOSIS  Diagnosis: Carcinoid syndrome  Assessment and Plan of Treatment: DC home with hospice care      SECONDARY DISCHARGE DIAGNOSES  Diagnosis: Diarrhea, unspecified type  Assessment and Plan of Treatment: continue immodium, f/u with hospice care team    Diagnosis: Carcinoid syndrome  Assessment and Plan of Treatment: Carcinoid syndrome

## 2019-07-22 NOTE — PROGRESS NOTE ADULT - PROBLEM SELECTOR PLAN 1
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

## 2019-07-22 NOTE — PROGRESS NOTE ADULT - SUBJECTIVE AND OBJECTIVE BOX
Patient is a 86y old  Male who presents with a chief complaint of mental status jamil (2019 20:44)      SUBJECTIVE / OVERNIGHT EVENTS: No new complaints.   Review of Systems  unobtainable     MEDICATIONS  (STANDING):  artificial tears (preservative free) Ophthalmic Solution 1 Drop(s) Both EYES every 2 hours  cephalexin 500 milliGRAM(s) Oral every 12 hours  chlorhexidine 2% Cloths 1 Application(s) Topical daily  cholestyramine Powder (Sugar-Free) 4 Gram(s) Oral every 8 hours  heparin  Injectable 5000 Unit(s) SubCutaneous every 12 hours  loperamide 2 milliGRAM(s) Oral four times a day  melatonin 3 milliGRAM(s) Oral at bedtime  multivitamin 1 Tablet(s) Oral daily  octreotide  Injectable 200 MICROGram(s) SubCutaneous three times a day  ondansetron   Disintegrating Tablet 8 milliGRAM(s) Oral every 8 hours  sodium bicarbonate 1300 milliGRAM(s) Oral every 6 hours  sodium chloride 0.9%. 1000 milliLiter(s) (75 mL/Hr) IV Continuous <Continuous>    MEDICATIONS  (PRN):  acetaminophen   Tablet .. 650 milliGRAM(s) Oral every 6 hours PRN Temp greater or equal to 38.5C (101.3F), Mild Pain (1 - 3)  haloperidol    Injectable 0.25 milliGRAM(s) IV Push every 6 hours PRN Agitation      Vital Signs Last 24 Hrs  T(C): 36.7 (2019 21:01), Max: 37 (2019 00:33)  T(F): 98 (2019 21:01), Max: 98.6 (2019 00:33)  HR: 100 (2019 21:01) (90 - 100)  BP: 96/966 (2019 21:01) (93/50 - 96/966)  BP(mean): --  RR: 18 (2019 21:01) (18 - 18)  SpO2: 95% (2019 21:01) (95% - 96%)    PHYSICAL EXAM:  GENERAL: NAD  HEAD:  Atraumatic, Normocephalic  EYES: EOMI, PERRLA, conjunctiva and sclera clear  NECK: Supple, No JVD  CHEST/LUNG: Clear to auscultation bilaterally; No wheeze  HEART: Regular rate and rhythm; No murmurs, rubs, or gallops  ABDOMEN: Soft, Nontender, Nondistended; Bowel sounds present  EXTREMITIES:  2+ Peripheral Pulses, No clubbing, cyanosis, or edema  PSYCH: confused  NEUROLOGY: non-focal  SKIN: flushing rash    LABS:                        6.8    10.28 )-----------( 149      ( 2019 09:28 )             22.7     07-    146<H>  |  122<H>  |  81<H>  ----------------------------<  103<H>  4.1   |  13<L>  |  3.87<H>    Ca    9.1      2019 11:09    TPro  5.6<L>  /  Alb  2.6<L>  /  TBili  0.3  /  DBili  x   /  AST  35  /  ALT  12  /  AlkPhos  54            Urinalysis Basic - ( 2019 17:16 )    Color: Yellow / Appearance: Slightly Turbid / S.018 / pH: x  Gluc: x / Ketone: Negative  / Bili: Negative / Urobili: 3 mg/dL   Blood: x / Protein: 100 mg/dL / Nitrite: Positive   Leuk Esterase: Large / RBC: 324 /HPF /  /HPF   Sq Epi: x / Non Sq Epi: >27 /HPF / Bacteria: Few        Culture - Blood (collected 2019 17:51)  Source: .Blood  Preliminary Report (2019 18:01):    No growth to date.    Culture - Blood (collected 2019 15:06)  Source: .Blood  Preliminary Report (2019 16:01):    No growth to date.        RADIOLOGY & ADDITIONAL TESTS:    Imaging Personally Reviewed:    Consultant(s) Notes Reviewed:      Care Discussed with Consultants/Other Providers:

## 2019-07-22 NOTE — PROGRESS NOTE ADULT - SUBJECTIVE AND OBJECTIVE BOX
Fever work up initiated yesterday  Pt is calm  No other events reported  Still having diarrhea        RECENT VITALS/LABS/MEDICATIONS/ASSAYS    T(C): 36.3 (19 @ 07:59), Max: 37 (19 @ 00:33)  HR: 94 (19 @ 07:59) (90 - 97)  BP: 93/51 (19 @ 07:59) (93/50 - 98/50)  RR: 18 (19 @ 07:59) (16 - 18)  SpO2: 95% (19 @ 07:59) (95% - 96%)  Wt(kg): --      2019 07:  -  2019 07:00  --------------------------------------------------------  IN:    Oral Fluid: 50 mL  Total IN: 50 mL    OUT:    Indwelling Catheter - Urethral: 200 mL  Total OUT: 200 mL    Total NET: -150 mL      2019 07:  -  2019 15:11  --------------------------------------------------------  IN:    Oral Fluid: 300 mL  Total IN: 300 mL    OUT:    Indwelling Catheter - Urethral: 300 mL  Total OUT: 300 mL    Total NET: 0 mL           @ 07:  -   @ 07:00  --------------------------------------------------------  IN: 50 mL / OUT: 200 mL / NET: -150 mL     @ 07:  -   @ 15:11  --------------------------------------------------------  IN: 300 mL / OUT: 300 mL / NET: 0 mL      CAPILLARY BLOOD GLUCOSE            acetaminophen   Tablet .. 650 milliGRAM(s) Oral every 6 hours PRN  artificial tears (preservative free) Ophthalmic Solution 1 Drop(s) Both EYES every 2 hours  cephalexin 500 milliGRAM(s) Oral every 12 hours  chlorhexidine 2% Cloths 1 Application(s) Topical daily  cholestyramine Powder (Sugar-Free) 4 Gram(s) Oral every 8 hours  haloperidol    Injectable 0.25 milliGRAM(s) IV Push every 6 hours PRN  heparin  Injectable 5000 Unit(s) SubCutaneous every 12 hours  loperamide 2 milliGRAM(s) Oral four times a day PRN  melatonin 3 milliGRAM(s) Oral at bedtime  multivitamin 1 Tablet(s) Oral daily  octreotide  Injectable 200 MICROGram(s) SubCutaneous three times a day  ondansetron   Disintegrating Tablet 8 milliGRAM(s) Oral every 8 hours  sodium bicarbonate 1300 milliGRAM(s) Oral every 6 hours  sodium chloride 0.9%. 1000 milliLiter(s) IV Continuous <Continuous>              146<H>  |  122<H>  |  81<H>  ----------------------------<  103<H>  4.1   |  13<L>  |  3.87<H>    Ca    9.1      2019 11:09    TPro  5.6<L>  /  Alb  2.6<L>  /  TBili  0.3  /  DBili  x   /  AST  35  /  ALT  12  /  AlkPhos  54  07      Procalc  BNP  ABG                          6.8    10.28 )-----------( 149      ( 2019 09:28 )             22.7         Urinalysis Basic - ( 2019 17:16 )    Color: Yellow / Appearance: Slightly Turbid / S.018 / pH: x  Gluc: x / Ketone: Negative  / Bili: Negative / Urobili: 3 mg/dL   Blood: x / Protein: 100 mg/dL / Nitrite: Positive   Leuk Esterase: Large / RBC: 324 /HPF /  /HPF   Sq Epi: x / Non Sq Epi: >27 /HPF / Bacteria: Few      blood and urine cultures              PERTINENT PM/SXH:   Neck mass  Kidney lesion  History of carcinoid syndrome    H/O carcinoid syndrome  S/P TURP    FAMILY HISTORY:  FH: lung cancer (Sibling): sister  Family history of nasopharyngeal cancer: father    ITEMS NOT CHECKED ARE NOT PRESENT    SOCIAL HISTORY:   Significant other/partner:  [x ]  Children:  [x ]  Presybeterian/Spirituality:  Substance hx:  [ ]   Tobacco hx:  [ ]   Alcohol hx: [ ]   Home Opioid hx:  [ ] I-Stop Reference No:  Living Situation: [ ]Home  [ ]Long term care  [ ]Rehab [ ]Other    ADVANCE DIRECTIVES:    DNR  Yes  MOLST  [x ]  Living Will  [ ]   DECISION MAKER(s):  [x ] Health Care Proxy(s)  [ ] Surrogate(s)  [ ] Guardian           Name(s): Phone Number(s):    BASELINE (I)ADL(s) (prior to admission):  Morrison: [ ]Total  [ ] Moderate [ ]Dependent    Allergies    penicillin (Swelling)    Intolerances         PRESENT SYMPTOMS: [ x ]Unable to obtain due to poor mentation   Source if other than patient:  [ ]Family   [ ]Team Inferred    Pain (Impact on QOL):  0  Location -         Minimal acceptable level (0-10 scale):                    Aggravating factors -  Quality -  Radiation -  Severity (0-10 scale) -    Timing -    PAIN AD Score: 00    [email or web address] (press ctrl +  left click to view)    Dyspnea:                           [ ]Mild [ ]Moderate [ ]Severe  Anxiety:                             [ ]Mild [ ]Moderate [ ]Severe  Fatigue:                             [ ]Mild [ ]Moderate [ ]Severe  Nausea:                             [ ]Mild [ ]Moderate [ ]Severe  Loss of appetite:              [ ]Mild [ x]Moderate [ ]Severe  Fecal incontinence              [ ]Mild [ ]Moderate [x ]Severe    Other Symptoms:  diarrhea  [ x]All other review of systems negative     Karnofsky Performance Score/Palliative Performance Status Version 2:     40    %    http://palliative.info/resource_material/[email or web address]  PHYSICAL EXAM:       GENERAL:  [  ]Alert  [x ]Oriented x 0  [x ]Lethargic  [ ]Cachexia  [ ]Unarousable  [x ]Verbal  [ ]Non-Verbal  Behavioral:   [ ] Anxiety  [x ] Delirium 2/2 underlying illness [ ] Agitation [ ] Other  HEENT:  [x ]Normal   [ ]Dry mouth   [ ]ET Tube/Trach  [ ]Oral lesions  PULMONARY:   [x ]Clear [ ]Tachypnea  [ ]Audible excessive secretions   [ ]Rhonchi        [ ]Right [ ]Left [ ]Bilateral  [ ]Crackles        [ ]Right [ ]Left [ ]Bilateral  [ ]Wheezing     [ ]Right [ ]Left [ ]Bilateral  CARDIOVASCULAR:    [x ]Regular [ ]Irregular [ ]Tachy  [ ]Saravanan [ ]Murmur [ ]Other  GASTROINTESTINAL:  [x ]Soft  [ ]Distended   [x ]+BS  [ ]Non tender [ ]Tender  [ ]PEG [ ]OGT/ NGT  Last BM:diarrheal   GENITOURINARY:  [ ]Normal [ ] Incontinent   [ ]Oliguria/Anuria   [ ]Rae  MUSCULOSKELETAL:   [x ]Normal   [c ]Weakness  [ ]Bed/Wheelchair bound [ ]Edema  NEUROLOGIC:   [ ]No focal deficits  [ x] Cognitive impairment  [ ] Dysphagia [ ]Dysarthria [ ] Paresis [ ]Other   SKIN:   [x ]Normal   [ ]Pressure ulcer(s)  [ ]Rash             RADIOLOGY & ADDITIONAL STUDIES:    PROTEIN CALORIE MALNUTRITION PRESENT: [x ] Yes [ ] No (Underweight, BMI <19 w/ severe protein deficiency per dietition)  [ ] PPSV2 < or = to 30% [ ] significant weight loss  [x ] poor nutritional intake [x ] catabolic state [ ] anasarca     Albumin, Serum: 3.2 g/dL (19 @ 03:24)  Artificial Nutrition [ ]     REFERRALS:   [ ]Chaplaincy  [ ] Hospice  [ ]Child Life  [ ]Social Work  [ ]Case management [ ]Holistic Therapy   Goals of Care Discussion Document:

## 2019-07-22 NOTE — PROGRESS NOTE ADULT - PROBLEM SELECTOR PLAN 1
--- Pt's wife wishes for Comfort care only --- she is presently asking to stop blood draws and all unnecessary meds  --- Reviewed med w/ wife --- she does not wish for pt to get 'shots' which could be distressing --- he was only receiving sq octreotide and heparin --- will taper down octreotide at this time over next few days   -- oral meds and prn zofran/imodium acceptable to wife

## 2019-07-22 NOTE — GOALS OF CARE CONVERSATION - PERSONAL ADVANCE DIRECTIVE - CONVERSATION DETAILS
Pt was referred by the Palliative Care Team. Met w/ the pt's spouse and dtr Sonia Sexton, hospice services reviewed at length. They are in agreement w/ home hospice services when the pt is medically cleared for d/c. Spouse is unable to care for the pt, family arranging for private hire; hospice consents signed. DME (bed, KAROL, 02, diapers & w/c) have not been ordered - family will f/u w/ hospice on Monday with a date the equipment can be delivered. Thank you.
[facility]    Spoke with wife Theresa Sexton regarding d/c. Wife would like pt to be sent home as soon as possible. Private aides set up for when pt is d/cd.  DME ordered for tonight 5pm to 9pm. Provided 24/hr [facility] number for any questions or concerns.  Possible d/c for tomorrow.     Veena Wilson RN
Bern: Introductory Meeting to discuss declining clinical status    Topic discussed previously    Yes [x]      No [ ]    Complexity        Low[x]              Medium []      High []       Unknown []    Potential barriers to expeditious decision making:    Objectives defined in premeeting huddle: None    Provider: Palliative Team present      Content:  We discussed the patient's clinical course prior to admission, his course during this hospitalization, the family's concern about the impact of his persistent diarrhea on his ability to recover and QOL, the variable clinical status, concerns about further treatment being unhelpful and possibly deleterious. We discussed globals aspects of the patient's care with a focus on reviewing his goals and values. I discussed different approaches to care: curative, restorative, or purely symptom focused.      I recommended a purely symptom focused approach including the following:  DNAR to allow for a natural death in the event of asystole or a fatal arrythmia  DNI  No routine surveillance with blood draws  No new fever work up (no imaging, no blood cultures, no care escalation, no new antibiotics)  No artificial nutrition  No gastrostomy  No hemodialysis  APAP to address fevers  pRBCs only in the event of symptomatic anemia  A trial of antibiotics to only address symptoms (e.g. dysuria)  A trial of IV fluids to address symptoms (e.g. seizure/myoclonus to address opiate induced neurotoxicity)    Regarding my recommendations, the surrogate Essence agreed to these aforementioned recommendations to address his symptoms as the primary goal/target of treatment.    We discussed the difference between hospice and palliative medicine.   I explained that these treatments do not hasten death.  Additionally, I discussed the venues where hospice care plays out, the services provided, and mechanisms for care in the event of escalating symptoms    Summary:   Hospice referral for home  Purely symptom directed approach to care decided upon by the surrogate  The patient is not a PCU candidate for the PCU at this time    Action Items:  Discussed with CM, concerns about finances    Follow up:  Hospice will meet with the family at 3pm  ---------------------------------------------------------------------------------------------------------------------------------------  Below are recommendations for symptom management delineated by category      Constipation  Medication(s):  dulcolax suppository qd, prn    Pain  Medication  IV dilaudid 0.2mg q4H PRN  Special instructions  Hold for sedation or RR < 12       Nausea or vomiting  PT is already on ATC zofran  Medication for additional n/v  IV reglan 10mg q8H prn  Special instructions      Dyspnea  Medication  IV dilaudid 0.2mg q4H PRN      Agitation  Medication: IV haldol 0.25mg q6H PRN  Special instructions    Anxiety  Medication  IV ativan 0.25 mg q6H prn  Special instructions: may be given for opiate resistant dyspnea

## 2019-07-22 NOTE — DISCHARGE NOTE PROVIDER - HOSPITAL COURSE
86yoM with PMH of Endocrine tumor, carcinoid syndrome s/p left lower lobe resection, bladder disease with new calzada placed on July 1st in the ED sent from [facility]. Found to have    UTI. Treated with antibiotic. C/O chronic diarrhea, on octreotide. DC on hospice 86yoM with PMH of Endocrine tumor, carcinoid syndrome s/p left lower lobe resection, bladder disease with new calzada placed on July 1st in the ED sent from [facility]. Found to have    UTI. Treated with antibiotic. C/O chronic diarrhea, on octreotide, but not helping. DC octreotide as per wife, onc agreed. Wife wishes to peruse with comfort care only. DC home on hospice

## 2019-07-23 ENCOUNTER — TRANSCRIPTION ENCOUNTER (OUTPATIENT)
Age: 84
End: 2019-07-23

## 2019-07-23 VITALS
OXYGEN SATURATION: 95 % | TEMPERATURE: 98 F | RESPIRATION RATE: 18 BRPM | HEART RATE: 87 BPM | SYSTOLIC BLOOD PRESSURE: 91 MMHG | DIASTOLIC BLOOD PRESSURE: 52 MMHG

## 2019-07-23 PROCEDURE — 97530 THERAPEUTIC ACTIVITIES: CPT

## 2019-07-23 PROCEDURE — 86900 BLOOD TYPING SEROLOGIC ABO: CPT

## 2019-07-23 PROCEDURE — 84132 ASSAY OF SERUM POTASSIUM: CPT

## 2019-07-23 PROCEDURE — 95816 EEG AWAKE AND DROWSY: CPT

## 2019-07-23 PROCEDURE — 84260 ASSAY OF SEROTONIN: CPT

## 2019-07-23 PROCEDURE — 84586 ASSAY OF VIP: CPT

## 2019-07-23 PROCEDURE — 82962 GLUCOSE BLOOD TEST: CPT

## 2019-07-23 PROCEDURE — 82947 ASSAY GLUCOSE BLOOD QUANT: CPT

## 2019-07-23 PROCEDURE — 81001 URINALYSIS AUTO W/SCOPE: CPT

## 2019-07-23 PROCEDURE — 84100 ASSAY OF PHOSPHORUS: CPT

## 2019-07-23 PROCEDURE — 87798 DETECT AGENT NOS DNA AMP: CPT

## 2019-07-23 PROCEDURE — 87086 URINE CULTURE/COLONY COUNT: CPT

## 2019-07-23 PROCEDURE — 71250 CT THORAX DX C-: CPT

## 2019-07-23 PROCEDURE — 99285 EMERGENCY DEPT VISIT HI MDM: CPT | Mod: 25

## 2019-07-23 PROCEDURE — 82330 ASSAY OF CALCIUM: CPT

## 2019-07-23 PROCEDURE — 97162 PT EVAL MOD COMPLEX 30 MIN: CPT

## 2019-07-23 PROCEDURE — 85730 THROMBOPLASTIN TIME PARTIAL: CPT

## 2019-07-23 PROCEDURE — 70450 CT HEAD/BRAIN W/O DYE: CPT

## 2019-07-23 PROCEDURE — 87040 BLOOD CULTURE FOR BACTERIA: CPT

## 2019-07-23 PROCEDURE — 71045 X-RAY EXAM CHEST 1 VIEW: CPT

## 2019-07-23 PROCEDURE — 86850 RBC ANTIBODY SCREEN: CPT

## 2019-07-23 PROCEDURE — 82435 ASSAY OF BLOOD CHLORIDE: CPT

## 2019-07-23 PROCEDURE — 87581 M.PNEUMON DNA AMP PROBE: CPT

## 2019-07-23 PROCEDURE — 85014 HEMATOCRIT: CPT

## 2019-07-23 PROCEDURE — 80053 COMPREHEN METABOLIC PANEL: CPT

## 2019-07-23 PROCEDURE — 96374 THER/PROPH/DIAG INJ IV PUSH: CPT

## 2019-07-23 PROCEDURE — 87186 SC STD MICRODIL/AGAR DIL: CPT

## 2019-07-23 PROCEDURE — 84295 ASSAY OF SERUM SODIUM: CPT

## 2019-07-23 PROCEDURE — 87486 CHLMYD PNEUM DNA AMP PROBE: CPT

## 2019-07-23 PROCEDURE — 82803 BLOOD GASES ANY COMBINATION: CPT

## 2019-07-23 PROCEDURE — 70553 MRI BRAIN STEM W/O & W/DYE: CPT

## 2019-07-23 PROCEDURE — A9585: CPT

## 2019-07-23 PROCEDURE — 99233 SBSQ HOSP IP/OBS HIGH 50: CPT

## 2019-07-23 PROCEDURE — 84443 ASSAY THYROID STIM HORMONE: CPT

## 2019-07-23 PROCEDURE — 86901 BLOOD TYPING SEROLOGIC RH(D): CPT

## 2019-07-23 PROCEDURE — 87633 RESP VIRUS 12-25 TARGETS: CPT

## 2019-07-23 PROCEDURE — 82607 VITAMIN B-12: CPT

## 2019-07-23 PROCEDURE — 93306 TTE W/DOPPLER COMPLETE: CPT

## 2019-07-23 PROCEDURE — 83735 ASSAY OF MAGNESIUM: CPT

## 2019-07-23 PROCEDURE — 86316 IMMUNOASSAY TUMOR OTHER: CPT

## 2019-07-23 PROCEDURE — 93005 ELECTROCARDIOGRAM TRACING: CPT

## 2019-07-23 PROCEDURE — 97110 THERAPEUTIC EXERCISES: CPT

## 2019-07-23 PROCEDURE — 83605 ASSAY OF LACTIC ACID: CPT

## 2019-07-23 PROCEDURE — 85610 PROTHROMBIN TIME: CPT

## 2019-07-23 PROCEDURE — 85027 COMPLETE CBC AUTOMATED: CPT

## 2019-07-23 PROCEDURE — 86780 TREPONEMA PALLIDUM: CPT

## 2019-07-23 PROCEDURE — 80048 BASIC METABOLIC PNL TOTAL CA: CPT

## 2019-07-23 RX ORDER — ONDANSETRON 8 MG/1
1 TABLET, FILM COATED ORAL
Qty: 90 | Refills: 0
Start: 2019-07-23 | End: 2019-08-21

## 2019-07-23 RX ORDER — SODIUM BICARBONATE 1 MEQ/ML
2 SYRINGE (ML) INTRAVENOUS
Qty: 240 | Refills: 0
Start: 2019-07-23 | End: 2019-08-21

## 2019-07-23 RX ORDER — LOPERAMIDE HCL 2 MG
1 TABLET ORAL
Qty: 120 | Refills: 0
Start: 2019-07-23 | End: 2019-08-21

## 2019-07-23 RX ORDER — CHOLESTYRAMINE 4 G/9G
4 POWDER, FOR SUSPENSION ORAL
Qty: 90 | Refills: 0
Start: 2019-07-23

## 2019-07-23 RX ORDER — LANOLIN ALCOHOL/MO/W.PET/CERES
1 CREAM (GRAM) TOPICAL
Qty: 30 | Refills: 0
Start: 2019-07-23 | End: 2019-08-21

## 2019-07-23 RX ADMIN — Medication 1 TABLET(S): at 12:38

## 2019-07-23 RX ADMIN — Medication 1 DROP(S): at 05:26

## 2019-07-23 RX ADMIN — Medication 1 DROP(S): at 02:00

## 2019-07-23 RX ADMIN — Medication 1 DROP(S): at 03:59

## 2019-07-23 RX ADMIN — OCTREOTIDE ACETATE 200 MICROGRAM(S): 200 INJECTION, SOLUTION INTRAVENOUS; SUBCUTANEOUS at 05:25

## 2019-07-23 RX ADMIN — CHOLESTYRAMINE 4 GRAM(S): 4 POWDER, FOR SUSPENSION ORAL at 05:26

## 2019-07-23 RX ADMIN — CHLORHEXIDINE GLUCONATE 1 APPLICATION(S): 213 SOLUTION TOPICAL at 12:38

## 2019-07-23 RX ADMIN — SODIUM CHLORIDE 75 MILLILITER(S): 9 INJECTION INTRAMUSCULAR; INTRAVENOUS; SUBCUTANEOUS at 08:11

## 2019-07-23 RX ADMIN — ONDANSETRON 8 MILLIGRAM(S): 8 TABLET, FILM COATED ORAL at 08:11

## 2019-07-23 RX ADMIN — Medication 1 DROP(S): at 10:19

## 2019-07-23 RX ADMIN — Medication 500 MILLIGRAM(S): at 05:26

## 2019-07-23 RX ADMIN — HEPARIN SODIUM 5000 UNIT(S): 5000 INJECTION INTRAVENOUS; SUBCUTANEOUS at 05:25

## 2019-07-23 RX ADMIN — Medication 1300 MILLIGRAM(S): at 05:25

## 2019-07-23 RX ADMIN — Medication 1 DROP(S): at 00:15

## 2019-07-23 RX ADMIN — OCTREOTIDE ACETATE 200 MICROGRAM(S): 200 INJECTION, SOLUTION INTRAVENOUS; SUBCUTANEOUS at 00:02

## 2019-07-23 RX ADMIN — Medication 2 MILLIGRAM(S): at 05:25

## 2019-07-23 RX ADMIN — Medication 1 DROP(S): at 08:11

## 2019-07-23 RX ADMIN — Medication 2 MILLIGRAM(S): at 12:38

## 2019-07-23 RX ADMIN — Medication 1300 MILLIGRAM(S): at 12:38

## 2019-07-23 RX ADMIN — Medication 1 DROP(S): at 12:38

## 2019-07-23 NOTE — PROGRESS NOTE ADULT - SUBJECTIVE AND OBJECTIVE BOX
Pt seen and examined  Minimally engaging  No change in diarrhea      RECENT VITALS/LABS/MEDICATIONS/ASSAYS    T(C): 36.8 (19 @ 12:18), Max: 36.8 (19 @ 12:18)  HR: 87 (19 @ 12:18) (87 - 100)  BP: 91/52 (19 @ 12:18) (91/52 - 96/66)  RR: 18 (19 @ 12:18) (18 - 18)  SpO2: 95% (19 @ 12:18) (94% - 95%)  Wt(kg): --      2019 07:  -  2019 07:00  --------------------------------------------------------  IN:    Oral Fluid: 740 mL    sodium chloride 0.9%.: 900 mL  Total IN: 1640 mL    OUT:    Indwelling Catheter - Urethral: 500 mL  Total OUT: 500 mL    Total NET: 1140 mL      2019 07:  -  2019 13:04  --------------------------------------------------------  IN:    sodium chloride 0.9%.: 300 mL  Total IN: 300 mL    OUT:  Total OUT: 0 mL    Total NET: 300 mL           @ :  -   @ 07:00  --------------------------------------------------------  IN: 1640 mL / OUT: 500 mL / NET: 1140 mL     @ 07:  -   @ 13:04  --------------------------------------------------------  IN: 300 mL / OUT: 0 mL / NET: 300 mL      CAPILLARY BLOOD GLUCOSE            acetaminophen   Tablet .. 650 milliGRAM(s) Oral every 6 hours PRN  artificial tears (preservative free) Ophthalmic Solution 1 Drop(s) Both EYES every 2 hours  chlorhexidine 2% Cloths 1 Application(s) Topical daily  cholestyramine Powder (Sugar-Free) 4 Gram(s) Oral every 8 hours  haloperidol    Injectable 0.25 milliGRAM(s) IV Push every 6 hours PRN  heparin  Injectable 5000 Unit(s) SubCutaneous every 12 hours  loperamide 2 milliGRAM(s) Oral four times a day  melatonin 3 milliGRAM(s) Oral at bedtime  multivitamin 1 Tablet(s) Oral daily  octreotide  Injectable 200 MICROGram(s) SubCutaneous three times a day  ondansetron   Disintegrating Tablet 8 milliGRAM(s) Oral every 8 hours  sodium bicarbonate 1300 milliGRAM(s) Oral every 6 hours  sodium chloride 0.9%. 1000 milliLiter(s) IV Continuous <Continuous>                  Procalc  BNP  ABG                          6.8    10.28 )-----------( 149      ( 2019 09:28 )             22.7         Urinalysis Basic - ( 2019 17:16 )    Color: Yellow / Appearance: Slightly Turbid / S.018 / pH: x  Gluc: x / Ketone: Negative  / Bili: Negative / Urobili: 3 mg/dL   Blood: x / Protein: 100 mg/dL / Nitrite: Positive   Leuk Esterase: Large / RBC: 324 /HPF /  /HPF   Sq Epi: x / Non Sq Epi: >27 /HPF / Bacteria: Few      blood and urine cultures              PERTINENT PM/SXH:   Neck mass  Kidney lesion  History of carcinoid syndrome    H/O carcinoid syndrome  S/P TURP    FAMILY HISTORY:  FH: lung cancer (Sibling): sister  Family history of nasopharyngeal cancer: father    ITEMS NOT CHECKED ARE NOT PRESENT    SOCIAL HISTORY:   Significant other/partner:  [x ]  Children:  [x ]  Presybeterian/Spirituality:  Substance hx:  [ ]   Tobacco hx:  [ ]   Alcohol hx: [ ]   Home Opioid hx:  [ ] I-Stop Reference No:  Living Situation: [ ]Home  [ ]Long term care  [ ]Rehab [ ]Other    ADVANCE DIRECTIVES:    DNR  Yes  MOLST  [x ]  Living Will  [ ]   DECISION MAKER(s):  [x ] Health Care Proxy(s)  [ ] Surrogate(s)  [ ] Guardian           Name(s): Phone Number(s):    BASELINE (I)ADL(s) (prior to admission):  [place]: [ ]Total  [ ] Moderate [ ]Dependent    Allergies    penicillin (Swelling)    Intolerances         PRESENT SYMPTOMS: [ x ]Unable to obtain due to poor mentation   Source if other than patient:  [ ]Family   [ ]Team Inferred    Pain (Impact on QOL):  0  Location -         Minimal acceptable level (0-10 scale):                    Aggravating factors -  Quality -  Radiation -  Severity (0-10 scale) -    Timing -    PAIN AD Score: 00    [email or web address] (press ctrl +  left click to view)    Dyspnea:                           [ ]Mild [ ]Moderate [ ]Severe  Anxiety:                             [ ]Mild [ ]Moderate [ ]Severe  Fatigue:                             [ ]Mild [ ]Moderate [ ]Severe  Nausea:                             [ ]Mild [ ]Moderate [ ]Severe  Loss of appetite:              [ ]Mild [ x]Moderate [ ]Severe  Fecal incontinence              [ ]Mild [ ]Moderate [x ]Severe    Other Symptoms:  diarrhea  [ x]All other review of systems negative     Karnofsky Performance Score/Palliative Performance Status Version 2:     40    %    http://[email or web address].[email or web address]/resource_[email or web address]  PHYSICAL EXAM:       GENERAL:  [  ]Alert  [x ]Oriented x 0  [x ]Lethargic  [ ]Cachexia  [ ]Unarousable  [x ]Verbal  [ ]Non-Verbal  Behavioral:   [ ] Anxiety  [x ] Delirium 2/2 underlying illness [ ] Agitation [ ] Other  HEENT:  [x ]Normal   [ ]Dry mouth   [ ]ET Tube/Trach  [ ]Oral lesions  PULMONARY:   [x ]Clear [ ]Tachypnea  [ ]Audible excessive secretions   [ ]Rhonchi        [ ]Right [ ]Left [ ]Bilateral  [ ]Crackles        [ ]Right [ ]Left [ ]Bilateral  [ ]Wheezing     [ ]Right [ ]Left [ ]Bilateral  CARDIOVASCULAR:    [x ]Regular [ ]Irregular [ ]Tachy  [ ]Saravanan [ ]Murmur [ ]Other  GASTROINTESTINAL:  [x ]Soft  [ ]Distended   [x ]+BS  [ ]Non tender [ ]Tender  [ ]PEG [ ]OGT/ NGT  Last BM:diarrheal   GENITOURINARY:  [ ]Normal [ ] Incontinent   [ ]Oliguria/Anuria   [ ]Rae  MUSCULOSKELETAL:   [x ]Normal   [c ]Weakness  [ ]Bed/Wheelchair bound [ ]Edema  NEUROLOGIC:   [ ]No focal deficits  [ x] Cognitive impairment  [ ] Dysphagia [ ]Dysarthria [ ] Paresis [ ]Other   SKIN:   [x ]Normal   [ ]Pressure ulcer(s)  [ ]Rash             RADIOLOGY & ADDITIONAL STUDIES:    PROTEIN CALORIE MALNUTRITION PRESENT: [x ] Yes [ ] No (Underweight, BMI <19 w/ severe protein deficiency per dietition)  [ ] PPSV2 < or = to 30% [ ] significant weight loss  [x ] poor nutritional intake [x ] catabolic state [ ] anasarca     Albumin, Serum: 3.2 g/dL (19 @ 03:24)  Artificial Nutrition [ ]     REFERRALS:   [ ]Chaplaincy  [ ] Hospice  [ ]Child Life  [ ]Social Work  [ ]Case management [ ]Holistic Therapy   Goals of Care Discussion Document:

## 2019-07-23 NOTE — PROGRESS NOTE ADULT - PROBLEM SELECTOR PLAN 1
-- no significant benefit with octreotide  -- if pt is being d/c'd home with home hospice today would stop octreotide and continue imodium at home  -- no plans for any chemotherapy

## 2019-07-23 NOTE — DISCHARGE NOTE NURSING/CASE MANAGEMENT/SOCIAL WORK - NSDCDPATPORTLINK_GEN_ALL_CORE
You can access the [facility] Patient Portal, offered by [facility], by registering with the following website: [email or web address]

## 2019-07-23 NOTE — PROVIDER CONTACT NOTE (OTHER) - ACTION/TREATMENT ORDERED:
arrived after testing and vs done by PCA- mews score of 7-PA aware monitoring pt.
ELMIRA Calvillo made aware, no new orders as of yet.
ELMIRA Cervantes made aware, no new orders at this time-  will continue to monitor.
ELMIRA Hare made aware. enhanced supervision and haldol ordered
NP aware. With patient at the bedside at this time. No further orders.
PRN IM zyprexia given Continue to monitor Np aware
pa notified, pt has history of orthostatic hypotension, pt given 500cc bolus and returned to baseline BP. will continue to monitor.
physician made aware, legs kept elevated on pillow supports

## 2019-07-23 NOTE — PROGRESS NOTE ADULT - PROBLEM SELECTOR PLAN 1
likely due to neuroendocrine tumor  Wife feels as if he has gotten little improvement from octreotide and communicated to the hospice team, and  will be discontinued upon discharge  On cholestyramine 4mg tid  Maintain ATC ODT zofran 8mg q8H  ATC  lomotil standing 2mg q6H ATC

## 2019-07-23 NOTE — PROGRESS NOTE ADULT - ASSESSMENT
86y year old Male with the below past medical history significant for carcinoid syndrome s/p left lower lobe resection, bladder disease with calzada placed on July 1st in the ED sent from [facility] for [facility] in 70s. Noted to have malodorous urine and treated for UTI. On this admission patient has been extremely agitated, pulling out multiple IVs and on calzada despite enhanced supervision. Now in restraints.     1. ams multifactorial, now haldol prn as per behavior medicine    2. 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 suggest repeat MRI w/ REBEKAH when patient can cooperate better.  - per heme/onc if patient/family want to pursue, leptomeningeal spread not excluded.    3. Would prefer enhanced supervision over restraints which may exacerbate delirium.     4. Abx for UTI as per medicine/ID. Note cefepime can contribute to delirium.     5. Frequent re-orientation    6. Avoid delirium provoking agents    7. Likely component of hyperuremia as well. Baseline BUNs in 60's, now ~90. As per renal    Will follow.
86yoM with PMH of carcinoid syndrome s/p left lower lobe resection, bladder outlet obstruction  with pericardial effusion sent from [facility] for SBPs in 70s.x with UTI, acidosis and constanza     1- CONSTANZA   2- acidosis   3- carcinoid  4- pericardial effusion   5- hypotension on admission   6- uti   7- hypercalcemia   8- anemia         cr rising slowly  calcium seems better however check alb   acidosis worsening nahco3- 1300 mg q 6 now  abx - cephlexin   octreotide for carcinoid  to continue   trend hb
86 m with    Sepsis probable UTI  - antibiotics  - ID follow    Carcinoid  - continue Octreotide  - Oncology evaluation noted. Follow chromogranin level. if chromogranin increased will get Palliative evaluation re GOC    CONSTANZA on CKD  - Rae  - follow Cr, lytes  - Nephrology evaluation Dr Sisi Morse    Hypercalcemia  - follow  - Aredia  - Nephrology follow    Hyponatremia  - supplement NaCl    Pericardial effusion  - Echo noted. No intervention.  - Cardiology follow    Anemia  - follow, Tx support    Mental status change  - probably from sepsis  - CT head with no acute findings.    Urinary retention  - Rae    Will readress DNR.  d/w wife and daughter at length  Phong Dash MD pager [phone]
86 m with    Sepsis probable UTI resolving  - antibiotics  - ID follow    Carcinoid  - continue Octreotide  - Oncology follow noted.   - chromogranin increased.   - Palliative evaluation re GOC    CONSTANZA on CKD  - Rae  - follow Cr, lytes  - Nephrology follow Dr Sisi Morse    Hypercalcemia  - follow  - Aredia  - Nephrology follow    Hyponatremia  - supplement NaCl    Pericardial effusion  - Echo noted. No intervention.  - Cardiology follow    Anemia  - follow, Tx support    Mental status change  - probably from sepsis  - CT head with no acute findings.    Urinary retention  - Rae    MOLST form completed  - DNR.    DCP with probable home hospice.  Phong Dash MD pager [phone]
86 m with    Sepsis  - antibiotics  - ID follow    Carcinoid  - continue Octreotide  - Oncology evaluation called    CONSTANZA on CKD  - Rae  - follow Cr, lytes  - Nephrology evaluation Dr Sisi Morse    Hypercalcemia  - follow  - Nephrology follow    Hyponatremia  - supplement NaCl    Pericardial effusion  - Echo noted. No intervention.  - Cardiology follow    Anemia  - follow, Tx support    Mental status change  - probably from sepsis  - CT head with no acute findings.    Urinary retention  - Rae    d/w wife and daughter at length. QA They pretend that they were not aware re patient's carcinoid though I explained to wife and son multiple times during prior admission the nature and extent of his disease, prognosis and referred them to Oncology .  Further action as per clinical course  Phong Dash MD pager [phone]
86 m with    Sepsis probable UTI  - antibiotics  - ID follow    Carcinoid  - continue Octreotide  - Oncology evaluation called    CONSTANZA on CKD  - Rae  - follow Cr, lytes  - Nephrology evaluation Dr Sisi Morse    Hypercalcemia  - follow  - Aredia  - Nephrology follow    Hyponatremia  - supplement NaCl    Pericardial effusion  - Echo noted. No intervention.  - Cardiology follow    Anemia  - follow, Tx support    Mental status change  - probably from sepsis  - CT head with no acute findings.    Urinary retention  - Kyra garay DNR.  Phong Dash MD pager [phone]
86 m with    Sepsis probable UTI  - antibiotics  - ID follow    Carcinoid  - continue Octreotide  - Oncology evaluation noted.   - chromogranin increased.   - Palliative evaluation re GOC    CONSTANZA on CKD  - Rae  - follow Cr, lytes  - Nephrology evaluation Dr Sisi Morse    Hypercalcemia  - follow  - Aredia  - Nephrology follow    Hyponatremia  - supplement NaCl    Pericardial effusion  - Echo noted. No intervention.  - Cardiology follow    Anemia  - follow, Tx support    Mental status change  - probably from sepsis  - CT head with no acute findings.    Urinary retention  - Rae    MOLST form completed  - DNR.  d/w wife at bedside.  Phong Dash MD pager [phone]
86 m with    Sepsis probable UTI  - antibiotics  - ID follow    Carcinoid  - continue Octreotide  - Oncology evaluation noted.   - chromogranin increased.   - Palliative evaluation re GOC    CONSTANZA on CKD  - Rae  - follow Cr, lytes  - Nephrology follow Dr Sisi Morse    Hypercalcemia  - follow  - Aredia  - Nephrology follow    Hyponatremia  - supplement NaCl    Pericardial effusion  - Echo noted. No intervention.  - Cardiology follow    Anemia  - follow, Tx support    Mental status change  - probably from sepsis  - CT head with no acute findings.    Urinary retention  - Rae    MOLST form completed  - DNR.  d/w wife and daughter at bedside. QA  Phong Dash MD pager [phone]
86 m with    Sepsis probable UTI  - antibiotics  - ID follow    Carcinoid  - continue Octreotide  - Oncology evaluation noted. Follow chromogranin level pending .  - if chromogranin increased will get Palliative evaluation re GOC    CONSTANZA on CKD  - Rae  - follow Cr, lytes  - Nephrology evaluation Dr Sisi Morse    Hypercalcemia  - follow  - Aredia  - Nephrology follow    Hyponatremia  - supplement NaCl    Pericardial effusion  - Echo noted. No intervention.  - Cardiology follow    Anemia  - follow, Tx support    Mental status change  - probably from sepsis  - CT head with no acute findings.    Urinary retention  - Rae    Will readress DNR.  d/w wife and daughter at length  Phong Dash MD pager [phone]
86 m with    Sepsis probable UTI resolving  - antibiotics  - ID follow    Carcinoid  - continue Octreotide  - Oncology evaluation noted.   - chromogranin increased.   - Palliative evaluation re GOC    CONSTANZA on CKD  - Rae  - follow Cr, lytes  - Nephrology follow Dr Sisi Morse    Hypercalcemia  - follow  - Aredia  - Nephrology follow    Hyponatremia  - supplement NaCl    Pericardial effusion  - Echo noted. No intervention.  - Cardiology follow    Anemia  - follow, Tx support    Mental status change  - probably from sepsis  - CT head with no acute findings.    Urinary retention  - Rae    MOLST form completed  - DNR.    DCP with probable home hospice.  Phong Dash MD pager [phone]
86 m with    Sepsis probable UTI resolving  - reculture  - antibiotics  - ID follow    Carcinoid  - continue Octreotide  - Oncology follow noted.   - chromogranin increased.   - Palliative evaluation re GOC    CONSTANZA on CKD  - Rae  - follow Cr, lytes  - Nephrology follow Dr Sisi Morse    Hypercalcemia  - follow  - Aredia  - Nephrology follow    Hyponatremia  - supplement NaCl    Pericardial effusion  - Echo noted. No intervention.  - Cardiology follow    Anemia  - follow, Tx support  - repeat Hb Tx if <7    Mental status change  - probably from sepsis  - CT head with no acute findings.    Urinary retention  - Rae    MOLST form completed  - DNR.    DCP with probable home hospice.  Phong Dash MD pager [phone]
86 m with    Sepsis probable UTI resolving  - resolving    Carcinoid  - continue Octreotide, taper dose  - Oncology follow noted.   - chromogranin increased.   - Palliative evaluation re GOC noted.     CONSTANZA on CKD improving   - Rae  - Nephrology follow Dr Sisi Morse    Hypercalcemia  - s/p Aredia  - Nephrology follow    Hyponatremia  - supplement NaCl    Pericardial effusion  - Echo noted. No intervention.  - Cardiology follow    Anemia stable    Mental status change  - probably from sepsis  - CT head with no acute findings.    Urinary retention  - Rae    MOLST form completed  - DNR.    DC home with home hospice care.  Phong Dash MD pager [phone]
86y year old Male with the below past medical history significant for carcinoid syndrome s/p left lower lobe resection, bladder disease with calzada placed on July 1st in the ED sent from [facility] for [facility] in 70[facility]. Noted to have malodorous urine and treated for UTI. On this admission patient has been extremely agitated, pulling out multiple IVs and on calzada despite enhanced supervision. Now in restraints.     1. ams multifactorial, now haldol prn as per behavior medicine    2. 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 suggest repeat MRI w/ REBEKAH when patient can cooperate better.  - per heme/onc if patient/family want to pursue, leptomeningeal spread not excluded.  - do not suspect spread to CNS from carcinoid syndrome? unlikely but may consider f/u findings of radiologist with MRI contrasted study    3. Would prefer enhanced supervision over restraints which may exacerbate delirium.     4. Abx for UTI as per medicine/ID. Note cefepime can contribute to delirium. infectious encephalopathy    5. Frequent re-orientation    6. Avoid delirium provoking agents    7.  appreciate palliative note, goals of care to be defined    no further inpt neuro eval, reconsult prn
86y year old Male with the below past medical history significant for carcinoid syndrome s/p left lower lobe resection, bladder disease with calzada placed on July 1st in the ED sent from [facility] for [facility] in 70[facility]. Noted to have malodorous urine and treated for UTI. On this admission patient has been extremely agitated, pulling out multiple IVs and on calzada despite enhanced supervision. Now in restraints.     1. ams multifactorial, now haldol prn as per behavior medicine    2. MRI results pending    3. Would prefer enhanced supervision over restraints which may exacerbate delirium.     4. Abx for UTI as per medicine/ID. Note cefepime can contribute to delirium.     5. Frequent re-orientation    6. Avoid delirium provoking agents    7. Likely component of hyperuremia as well. Baseline BUNs in 60's, now ~90. As per renal    8. Will follow. Discussed with nursing
86y year old Male with the below past medical history significant for carcinoid syndrome s/p left lower lobe resection, bladder disease with calzada placed on July 1st in the ED sent from [facility] for SBPs in 70s. Noted to have malodorous urine and treated for UTI. On this admission patient has been extremely agitated, pulling out multiple IVs and on calzada despite enhanced supervision. Now in restraints.     1. ams multifactorial, now haldol prn as per behavior medicine    2. 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 suggest repeat MRI w/ REBEKAH when patient can cooperate better.  - per heme/onc if patient/family want to pursue, leptomeningeal spread not excluded.  - do not suspect spread to CNS from carcinoid syndrome? unlikely but may consider f/u findings of radiologist with MRI contrasted study    3. Would prefer enhanced supervision over restraints which may exacerbate delirium.     4. Abx for UTI as per medicine/ID. Note cefepime can contribute to delirium. infectious encephalopathy    5. Frequent re-orientation    6. Avoid delirium provoking agents    7.  appreciate palliative note, goals of care to be defined    7. Likely component of hyperuremia as well. Baseline BUNs in 60's, now ~90. As per renal    Will follow.
86y year old Male with the below past medical history significant for carcinoid syndrome s/p left lower lobe resection, bladder disease with calzada placed on July 1st in the ED sent from [facility] for SBPs in 70s. Noted to have malodorous urine and treated for UTI. On this admission patient has been extremely agitated, pulling out multiple IVs and on calzada despite enhanced supervision. Now in restraints.     1. ams multifactorial, now haldol prn as per behavior medicine    2. 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 suggest repeat MRI w/ REBEKAH when patient can cooperate better.  - per heme/onc if patient/family want to pursue, leptomeningeal spread not excluded.  - do not suspect spread to CNS from carcinoid syndrome? unlikely but may consider f/u findings of radiologist with MRI contrasted study    3. Would prefer enhanced supervision over restraints which may exacerbate delirium.     4. Abx for UTI as per medicine/ID. Note cefepime can contribute to delirium. infectious encephalopathy    5. Frequent re-orientation    6. Avoid delirium provoking agents    7. Likely component of hyperuremia as well. Baseline BUNs in 60's, now ~90. As per renal    Will follow.
86yoM with PMH of carcinoid syndrome s/p left lower lobe resection, bladder outlet obstruction  with pericardial effusion sent from [facility] for SBPs in 70s.x with UTI, acidosis and constanza     1- CONSTANZA   2- acidosis   3- carcinoid  4- pericardial effusion   5- hypotension on admission   6- anemia      acidosis worsening   renal function worsening  above notes evaluated   d./w pt wife who wishes to deescalate care. meeting with palliative care team
86yoM with PMH of carcinoid syndrome s/p left lower lobe resection, bladder outlet obstruction  with pericardial effusion sent from [facility] for SBPs in 70s.x with UTI, acidosis and constanza     1- CONSTANZA   2- acidosis   3- carcinoid  4- pericardial effusion   5- hypotension on admission   6- uti   7- hypercalcemia         cr rising again.   calcium seems better however check alb   acidosis worsening nahco3- 1300 mg q 6 now and increase in am    hypernatremia as well. increase po fluid intake   abx - cephlexin   octreotide for carcinoid  to continue   trend hb
86yoM with PMH of carcinoid syndrome s/p left lower lobe resection, bladder outlet obstruction  with pericardial effusion sent from [facility] for SBPs in 70s.x with UTI, acidosis and constanza     1- CONSTANZA   2- acidosis   3- carcinoid  4- pericardial effusion   5- hypotension on admission   6- uti   7- hypercalcemia         cr slowly improving   acidosis improving    sodium bicarbonate 2600 mg tid   abx to cephlexin   octreotide for carcinoid  to continue   trend hb
86yoM with PMH of carcinoid syndrome s/p left lower lobe resection, bladder outlet obstruction  with pericardial effusion sent from [facility] for SBPs in 70s.x with UTI, acidosis and constanza     1- CONSTANZA   2- acidosis   3- carcinoid  4- pericardial effusion   5- hypotension on admission   6- uti   7- hypercalcemia         cr stabilizing with ivf   acidosis improving   d5w + 150 meq nahco3/L @ 50 cc/hr  today   cont with cefepime   may need aredia   repeat echo   octreotide for carcinoid
86yoM with PMH of carcinoid syndrome s/p left lower lobe resection, bladder outlet obstruction  with pericardial effusion sent from [facility] for SBPs in 70s.x with UTI, acidosis and constanza     1- CONSTANZA   2- acidosis   3- carcinoid  4- pericardial effusion   5- hypotension on admission   6- uti   7- hypercalcemia         cr stabilizing with ivf   acidosis improving   d5w + 150 meq nahco3/L @ 50 cc/hr  today . acidosis improving   cont with cefepime   aredia 30 mg iv   octreotide for carcinoid   trend hb
86yoM with PMH of carcinoid syndrome s/p left lower lobe resection, bladder outlet obstruction  with pericardial effusion sent from [facility] for SBPs in 70s.x with UTI, acidosis and constanza     1- CONSTANZA   2- acidosis   3- carcinoid  4- pericardial effusion   5- hypotension on admission   6- uti   7- hypercalcemia         cr stabilizing with ivf   acidosis improving   d5w + 150 meq nahco3/L @ 50 cc/hr  today . acidosis improving  in am change to sodium bicarbonate 2600 mg tid   cont with cefepime   octreotide for carcinoid   trend hb
86yoM with PMH of carcinoid syndrome s/p left lower lobe resection, bladder outlet obstruction  with pericardial effusion sent from [facility] for SBPs in 70s.x with UTI, acidosis and constanza     1- CONSTANZA   2- acidosis   3- carcinoid  4- pericardial effusion   5- hypotension on admission   6- uti   7- hypercalcemia         cr still elevated   acidosis worsening nahco3- 1300 mg q 6  abx to cephlexin   octreotide for carcinoid  to continue   trend hb
Assessment:  86 year old male  with carcinoid syndrome admitted for lethargy, hypotension, recent calzada pull then replaced now bp stable ,   the daughter reports that he self catheterizes himself at home   he required a calzada placement, probable component of urinary retention   reviewed flow sheets he is afebrile and his white count is wnl,   micro reviewed and urine culture reported is growing E.coli pan SS     Plan:   day # 5 of antibiotics for UTI currently on Ceftriaxone   will start oral Keflex 500 mg po bid in AM for 5 more days.  continue supportive care as per Medicine, Heme-Onc and Neurology
Assessment:  86 year old male  with carcinoid syndrome admitted for lethargy, hypotension, recent calzada pull then replaced now bp stable ,   the daughter reports that he self catheterizes himself at home   he required a calzada placement, probable component of urinary retention   reviewed flow sheets he remains afebrile and white count is normal range, no signs of infection   Reviewed notes in the chart the patient is being followed by neurology for AMS  micro reviewed and urine culture reported is growing E.coli pan SS     Plan:   day # 6 of 10  antibiotics for UTI currently on Ceftriaxone   Continue oral   Keflex  4 more days. END date of antibiotics is 7/20  continue supportive care as per Medicine, Heme-Onc and Neurology   reviewed with wife and daughter at the bedside  he will need four more days of oral antibiotics to complete his course of antibiotics   will no longer follow, re consult us as needed
Recurrent carcinoid tumor with liver metastasis.  Carcinoid syndrome with suboptimal response to octreotide  Urinary tract infection with probable urosepsis
86 m with    Sepsis probable UTI  - antibiotics  - ID follow    Carcinoid  - continue Octreotide  - Oncology evaluation noted. Follow chromogranin level pending .  - if chromogranin increased will get Palliative evaluation re GOC    CONSTANZA on CKD  - Rae  - follow Cr, lytes  - Nephrology evaluation Dr Sisi Morse    Hypercalcemia  - follow  - Aredia  - Nephrology follow    Hyponatremia  - supplement NaCl    Pericardial effusion  - Echo noted. No intervention.  - Cardiology follow    Anemia  - follow, Tx support    Mental status change  - probably from sepsis  - CT head with no acute findings.    Urinary retention  - Rae    Will readress DNR.  d/w daughter over phone at length  Phong Dash MD pager [phone]
86 m with    Sepsis probable UTI resolving  - resolving    Carcinoid  - continue Octreotide, taper dose  - Oncology follow noted.   - chromogranin increased.   - Palliative evaluation re GOC noted.     CONSTANZA on CKD   - Rae  - follow Cr, lytes  - Nephrology follow Dr Sisi Morse    Hypercalcemia  - follow  - Aredia  - Nephrology follow    Hyponatremia  - supplement NaCl    Pericardial effusion  - Echo noted. No intervention.  - Cardiology follow    Anemia  - follow, Tx support  - repeat Hb Tx if <7    Mental status change  - probably from sepsis  - CT head with no acute findings.    Urinary retention  - Rae    MOLST form completed  - DNR.    DCP with probable home hospice. Wife requesting [facility].   Phong Dash MD pager [phone]

## 2019-07-23 NOTE — PROGRESS NOTE ADULT - ATTENDING COMMENTS
Iveth Chicas MD  Health system  [phone]
Iveth Chicas MD  [facility]  [phone]
Iveth Chicas MD  [facility]  [phone]

## 2019-07-23 NOTE — PROGRESS NOTE ADULT - PROBLEM SELECTOR PROBLEM 3
Altered mental status
CONSTANZA (acute kidney injury)
Altered mental status
CONSTANZA (acute kidney injury)
Encephalopathy, unspecified
Encephalopathy, unspecified
CONSTANZA (acute kidney injury)
CONSTANZA (acute kidney injury)

## 2019-07-23 NOTE — PROGRESS NOTE ADULT - SUBJECTIVE AND OBJECTIVE BOX
Ashia Bain   Preferred Name:   None  Female, 59 year old, 1957  Preferred Phone:   [phone]  Last Weight:   74.8 kg  PCP:   Georgia Bautista MD  Need Interp:   No  Language:   English  Allergies  Sulfa Drugs Cross Reactors  Primary Ins:   ALLIED B  MRN:   [id number]  [facility]:   Active  Next Appt With Me:   None    RE: oerders please  Received: 2 days ago      MD Lizzette Lorenzana                 I am at home, but the fat grafting code and the revise reconstruction code would be used.  Very minor scar adjustment L will not be billed             Previous Messages      ----- Message -----      From: Lizzette Clement      Sent: 6/12/2017   4:40 PM        To: Mehran Leal MD, *   Subject: oerdemanuel please                                   Hi Dr. RIVERS!     I am working on getting Taylor set up for surgery, but I just need orders from you when you have time please.     Thanks Much!     Lizzette           Patient is a 86y old  Male who presents with a chief complaint of mental status jamil (2019 11:31)      SUBJECTIVE / OVERNIGHT EVENTS: Comfortable.   Review of Systems  unobtainable     MEDICATIONS  (STANDING):  artificial tears (preservative free) Ophthalmic Solution 1 Drop(s) Both EYES every 2 hours  chlorhexidine 2% Cloths 1 Application(s) Topical daily  cholestyramine Powder (Sugar-Free) 4 Gram(s) Oral every 8 hours  heparin  Injectable 5000 Unit(s) SubCutaneous every 12 hours  loperamide 2 milliGRAM(s) Oral four times a day  melatonin 3 milliGRAM(s) Oral at bedtime  multivitamin 1 Tablet(s) Oral daily  octreotide  Injectable 200 MICROGram(s) SubCutaneous three times a day  ondansetron   Disintegrating Tablet 8 milliGRAM(s) Oral every 8 hours  sodium bicarbonate 1300 milliGRAM(s) Oral every 6 hours  sodium chloride 0.9%. 1000 milliLiter(s) (75 mL/Hr) IV Continuous <Continuous>    MEDICATIONS  (PRN):  acetaminophen   Tablet .. 650 milliGRAM(s) Oral every 6 hours PRN Temp greater or equal to 38.5C (101.3F), Mild Pain (1 - 3)  haloperidol    Injectable 0.25 milliGRAM(s) IV Push every 6 hours PRN Agitation      Vital Signs Last 24 Hrs  T(C): 36.3 (2019 07:24), Max: 36.7 (2019 21:01)  T(F): 97.3 (2019 07:24), Max: 98 (2019 21:01)  HR: 89 (2019 07:24) (89 - 100)  BP: 93/56 (2019 07:24) (93/56 - 96/66)  BP(mean): --  RR: 18 (2019 07:24) (18 - 18)  SpO2: 94% (2019 07:24) (94% - 95%)    PHYSICAL EXAM:  GENERAL: NAD  HEAD:  Atraumatic, Normocephalic  EYES: EOMI, PERRLA, conjunctiva and sclera clear  NECK: Supple, No JVD  CHEST/LUNG: Clear to auscultation bilaterally; No wheeze  HEART: Regular rate and rhythm; No murmurs, rubs, or gallops  ABDOMEN: Soft, Nontender, Nondistended; Bowel sounds present  EXTREMITIES:  2+ Peripheral Pulses, No clubbing, cyanosis, or edema   NEUROLOGY: non-focal  SKIN: flushed rash    LABS:                        6.8    10.28 )-----------( 149      ( 2019 09:28 )             22.7                 Urinalysis Basic - ( 2019 17:16 )    Color: Yellow / Appearance: Slightly Turbid / S.018 / pH: x  Gluc: x / Ketone: Negative  / Bili: Negative / Urobili: 3 mg/dL   Blood: x / Protein: 100 mg/dL / Nitrite: Positive   Leuk Esterase: Large / RBC: 324 /HPF /  /HPF   Sq Epi: x / Non Sq Epi: >27 /HPF / Bacteria: Few        Culture - Blood (collected 2019 17:51)  Source: .Blood  Preliminary Report (2019 18:01):    No growth to date.    Culture - Blood (collected 2019 15:06)  Source: .Blood  Preliminary Report (2019 16:01):    No growth to date.        RADIOLOGY & ADDITIONAL TESTS:    Imaging Personally Reviewed:    Consultant(s) Notes Reviewed:      Care Discussed with Consultants/Other Providers:

## 2019-07-23 NOTE — PROGRESS NOTE ADULT - PROBLEM SELECTOR PLAN 5
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

## 2019-07-23 NOTE — PROGRESS NOTE ADULT - PROBLEM SELECTOR PLAN 4
PPS 40
ct nephrology management
PPS 40
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
-- check blood culture, urine culture -- currently on cephalexin  -- no evidence of cellulitis  -- pt not coughing --- will check CXR at bedside  --cont current antibiotics
ct nephrology management
PPS 40
ct nephrology management

## 2019-07-23 NOTE — PROGRESS NOTE ADULT - PROBLEM SELECTOR PROBLEM 2
Urinary tract infection associated with indwelling urethral catheter, initial encounter
Diarrhea, unspecified type
Urinary tract infection associated with indwelling urethral catheter, initial encounter
Carcinoid syndrome
Encephalopathy, unspecified
Urinary tract infection associated with indwelling urethral catheter, initial encounter
Encephalopathy, unspecified
Encephalopathy, unspecified

## 2019-07-23 NOTE — PROGRESS NOTE ADULT - SUBJECTIVE AND OBJECTIVE BOX
Patient is a 86y old  Male who presents with a chief complaint of mental status jamil (22 Jul 2019 20:44)       Pt is seen and examined  pt is awake and lying in bed, resting comfortably, lethargic    REVIEW OF SYSTEMS:    CONSTITUTIONAL: (+) weakness,no fevers or chills overnight  EYES/ENT: No visual changes;  No vertigo or throat pain   NECK: No pain or stiffness  RESPIRATORY: No cough, wheezing, hemoptysis; No shortness of breath  CARDIOVASCULAR: No chest pain or palpitations  GASTROINTESTINAL: No abdominal or epigastric pain. No nausea, vomiting, or hematemesis; (+) diarrhea . No melena or hematochezia.  GENITOURINARY: No dysuria, frequency or hematuria  NEUROLOGICAL: No numbness or weakness  SKIN: No itching, burning, rashes, or lesions     MEDICATIONS  (STANDING):  artificial tears (preservative free) Ophthalmic Solution 1 Drop(s) Both EYES every 2 hours  cephalexin 500 milliGRAM(s) Oral every 12 hours  chlorhexidine 2% Cloths 1 Application(s) Topical daily  cholestyramine Powder (Sugar-Free) 4 Gram(s) Oral every 8 hours  heparin  Injectable 5000 Unit(s) SubCutaneous every 12 hours  loperamide 2 milliGRAM(s) Oral four times a day  melatonin 3 milliGRAM(s) Oral at bedtime  multivitamin 1 Tablet(s) Oral daily  octreotide  Injectable 200 MICROGram(s) SubCutaneous three times a day  ondansetron   Disintegrating Tablet 8 milliGRAM(s) Oral every 8 hours  sodium bicarbonate 1300 milliGRAM(s) Oral every 6 hours  sodium chloride 0.9%. 1000 milliLiter(s) (75 mL/Hr) IV Continuous <Continuous>    MEDICATIONS  (PRN):  acetaminophen   Tablet .. 650 milliGRAM(s) Oral every 6 hours PRN Temp greater or equal to 38.5C (101.3F), Mild Pain (1 - 3)  haloperidol    Injectable 0.25 milliGRAM(s) IV Push every 6 hours PRN Agitation      Allergies    penicillin (Swelling)    Intolerances        Vital Signs Last 24 Hrs  T(C): 36.3 (23 Jul 2019 07:24), Max: 36.7 (22 Jul 2019 21:01)  T(F): 97.3 (23 Jul 2019 07:24), Max: 98 (22 Jul 2019 21:01)  HR: 89 (23 Jul 2019 07:24) (89 - 100)  BP: 96/66 (22 Jul 2019 21:01) (96/66 - 96/66)  BP(mean): --  RR: 18 (23 Jul 2019 07:24) (18 - 18)  SpO2: 94% (23 Jul 2019 07:24) (94% - 95%)    PHYSICAL EXAM  General: adult in NAD  HEENT: clear oropharynx, anicteric sclera, pink conjunctiva  Neck: supple  CV: normal S1/S2 with no murmur rubs or gallops  Lungs: positive air movement b/l ant lungs,clear to auscultation, no wheezes, no rales  Abdomen: soft non-tender non-distended, no hepatosplenomegaly  Ext: no clubbing cyanosis or edema  Skin: no rashes and no petechiae  Neuro: lethargic, responsive to name      LABS:                          6.8    10.28 )-----------( 149      ( 22 Jul 2019 09:28 )             22.7         Mean Cell Volume : 108.1 fl  Mean Cell Hemoglobin : 32.4 pg  Mean Cell Hemoglobin Concentration : 30.0 gm/dL  Auto Neutrophil # : x  Auto Lymphocyte # : x  Auto Monocyte # : x  Auto Eosinophil # : x  Auto Basophil # : x  Auto Neutrophil % : x  Auto Lymphocyte % : x  Auto Monocyte % : x  Auto Eosinophil % : x  Auto Basophil % : x    Serial CBC's  07-22 @ 09:28  Hct-22.7 / Hgb-6.8 / Plat-149 / RBC-2.10 / WBC-10.28    Serial CBC's  07-21 @ 17:17  Hct-21.6 / Hgb-7.3 / Plat-134 / RBC-2.07 / WBC-8.2    Serial CBC's  07-21 @ 13:48  Hct-22.5 / Hgb-6.9 / Plat-145 / RBC-2.10 / WBC-9.44    Serial CBC's  07-19 @ 09:31  Hct-26.2 / Hgb-8.0 / Plat-149 / RBC-2.52 / WBC-7.58        07-21    146<H>  |  122<H>  |  81<H>  ----------------------------<  103<H>  4.1   |  13<L>  |  3.87<H>    Ca    9.1      21 Jul 2019 11:09    TPro  5.6<L>  /  Alb  2.6<L>  /  TBili  0.3  /  DBili  x   /  AST  35  /  ALT  12  /  AlkPhos  54  07-21      Culture - Blood (07.21.19 @ 17:51)    Specimen Source: .Blood    Culture Results:   No growth to date.    Urine culture pending    RADIOLOGY & ADDITIONAL STUDIES:    no new radiology exams    Assessment

## 2019-07-23 NOTE — PROGRESS NOTE ADULT - PROVIDER SPECIALTY LIST ADULT
Cardiology
Heme/Onc
Infectious Disease
Internal Medicine
Nephrology
Neurology
Palliative Care
Palliative Care
Internal Medicine
Cardiology
Internal Medicine
Nephrology
Neurology
Internal Medicine
Internal Medicine
Heme/Onc
Heme/Onc
Palliative Care

## 2019-07-23 NOTE — PROGRESS NOTE ADULT - PROBLEM SELECTOR PLAN 6
Rapport building and emotional support  Link to GOC Note Time spent ACP 46 min
Rapport building and emotional support
Working to help coordinate with hospice

## 2019-07-23 NOTE — PROGRESS NOTE ADULT - PROBLEM SELECTOR PROBLEM 1
Carcinoid syndrome
Diarrhea, unspecified type
Urinary tract infection associated with indwelling urethral catheter, initial encounter
Carcinoid syndrome
Diarrhea, unspecified type
Diarrhea, unspecified type

## 2019-07-23 NOTE — PROVIDER CONTACT NOTE (OTHER) - BACKGROUND
Pt was a rapid response early am
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
PAtient is an 86 year old male admitted with altered mental status
Pt admitted as a tx from [facility] for hypotension/AMS  DX: UTI
carcinoid syndrome
pt admitted for ams/uti
pt admitted for hypotension, s/p RRT 7/13 for the same
pt admitted from [facility] for AMS & hypotension
pt mildly hypotensive, slightly tachycardic normally

## 2019-07-24 LAB — VIP SERPL-MCNC: SIGNIFICANT CHANGE UP

## 2019-07-26 LAB
CULTURE RESULTS: SIGNIFICANT CHANGE UP
CULTURE RESULTS: SIGNIFICANT CHANGE UP
SPECIMEN SOURCE: SIGNIFICANT CHANGE UP
SPECIMEN SOURCE: SIGNIFICANT CHANGE UP

## 2020-09-07 NOTE — PROVIDER CONTACT NOTE (MEDICATION) - BACKGROUND
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 s/p cystoscopy during this admission with a calzada catheter in place. Pt. also had a liver biopsy done.
- - -

## 2020-09-29 NOTE — PROGRESS NOTE ADULT - REASON FOR ADMISSION
Subjective:       Patient ID: Scarlet Beasley is a 69 y.o. female.    Vitals:  height is 5' (1.524 m) and weight is 58.5 kg (129 lb). Her temperature is 97.7 °F (36.5 °C). Her blood pressure is 139/84 and her pulse is 54 (abnormal). Her respiration is 18 and oxygen saturation is 97%.     Chief Complaint: Chills and Emesis    C/o 2 episodes of vomiting over the last 7 days.  No vomiting in the last 48 hours.  She reports that she last threw up once Sunday night, she woke up in the middle of the night with this.  She does have some chills and runny nose for the last 7 days as well.  Denies fever, abdominal pain, diarrhea.  States that she is in a clinical trial for diabetes and she has 2 loose stools a day but this is unchanged.  She was around her niece 9/2/20 who had covid 19 but no new or known exposure.      Emesis   This is a new problem. The current episode started 1 to 4 weeks ago (1 week). The problem has been unchanged. The emesis has an appearance of stomach contents. There has been no fever. Associated symptoms include chills and headaches. Pertinent negatives include no abdominal pain, arthralgias, chest pain, coughing, decreased urine volume, diarrhea, dizziness, fever, myalgias, sweats or URI. She has tried acetaminophen for the symptoms. The treatment provided mild relief.       Constitution: Positive for chills. Negative for sweating, fatigue and fever.   HENT: Negative for ear pain, facial swelling, congestion, sinus pain, sinus pressure, sore throat and voice change.         Runny nose   Neck: Negative for neck pain and painful lymph nodes.   Cardiovascular: Negative for chest pain and sob on exertion.   Eyes: Negative for eye itching, eye redness and eyelid swelling.   Respiratory: Negative for chest tightness, cough, sputum production, bloody sputum, COPD, shortness of breath, stridor, wheezing and asthma.    Gastrointestinal: Positive for nausea and vomiting. Negative for abdominal pain,  constipation, diarrhea, bright red blood in stool, dark colored stools, rectal bleeding and rectal pain.   Genitourinary: Negative for urine decreased.   Musculoskeletal: Negative for joint pain, joint swelling and muscle ache.   Skin: Negative for color change, pale, rash, wound, abrasion, laceration, lesion, skin thickening/induration, puncture wound, erythema, bruising, abscess, avulsion and hives.   Allergic/Immunologic: Negative for environmental allergies, seasonal allergies, asthma, immunocompromised state and hives.   Neurological: Positive for headaches. Negative for dizziness.   Hematologic/Lymphatic: Negative for swollen lymph nodes.       Objective:      Physical Exam   Constitutional: She is oriented to person, place, and time. She appears well-developed.   HENT:   Head: Normocephalic and atraumatic.   Ears:   Right Ear: External ear normal.   Left Ear: External ear normal.   Nose: Rhinorrhea present. No mucosal edema, purulent discharge or sinus tenderness. Right sinus exhibits no maxillary sinus tenderness and no frontal sinus tenderness. Left sinus exhibits no maxillary sinus tenderness and no frontal sinus tenderness.   Mouth/Throat: Mucous membranes are normal.   Eyes: Conjunctivae and lids are normal.   Neck: Trachea normal and full passive range of motion without pain. Neck supple.   Cardiovascular: Normal rate, regular rhythm and normal heart sounds.   Pulmonary/Chest: Effort normal and breath sounds normal. No respiratory distress.   Abdominal: Soft. Normal appearance and bowel sounds are normal. She exhibits no distension, no abdominal bruit, no pulsatile midline mass and no mass. There is no abdominal tenderness. There is no rebound, no guarding, no left CVA tenderness and no right CVA tenderness.   Musculoskeletal: Normal range of motion.   Neurological: She is alert and oriented to person, place, and time. She has normal strength.   Skin: Skin is warm, dry, intact, not diaphoretic and not  "pale. erythemaPsychiatric: Her speech is normal and behavior is normal. Judgment and thought content normal.   Nursing note and vitals reviewed.    Results for orders placed or performed in visit on 09/29/20   POCT COVID-19 Rapid Screening   Result Value Ref Range    POC Rapid COVID Negative Negative     Acceptable Yes          Assessment:       1. Acute viral syndrome    2. Chills    3. Nausea        Plan:       Labs reviewed.  Acute viral syndrome    Chills  -     POCT COVID-19 Rapid Screening    Nausea  -     ondansetron (ZOFRAN-ODT) 4 MG TbDL; One tablet sublingual tid prn nausea  Dispense: 10 tablet; Refill: 0      Patient Instructions     If your condition worsens or fails to improve we recommend that you receive another evaluation at the ER immediately or contact your PCP to discuss your concerns or return here. You must understand that you've received an urgent care treatment only and that you may be released before all your medical problems are known or treated. You the patient will arrange for followup care as instructed.   Zofran as needed for nausea or vomiting.  Maintain hydration by drinking small amounts of clear fluids frequently, then soft diet, and then advance diet as tolerated. May use OTC Imodium OR Pepto Bismol if desired for any diarrhea.    Watch for any increase pain, fever, localized pain to right lower abdomen or continued vomiting or diarrhea.   Viral Syndrome (Adult)  A viral illness may cause a number of symptoms. The symptoms depend on the part of the body that the virus affects. If it settles in your nose, throat, and lungs, it may cause cough, sore throat, congestion, and sometimes headache. If it settles in your stomach and intestinal tract, it may cause vomiting and diarrhea. Sometimes it causes vague symptoms like "aching all over," feeling tired, loss of appetite, or fever.  A viral illness usually lasts 1 to 2 weeks, but sometimes it lasts longer. In some cases, a " mental status chanhe more serious infection can look like a viral syndrome in the first few days of the illness. You may need another exam and additional tests to know the difference. Watch for the warning signs listed below.  Home care  Follow these guidelines for taking care of yourself at home:  · If symptoms are severe, rest at home for the first 2 to 3 days.  · Stay away from cigarette smoke - both your smoke and the smoke from others.  · You may use over-the-counter acetaminophen or ibuprofen for fever, muscle aching, and headache, unless another medicine was prescribed for this. If you have chronic liver or kidney disease or ever had a stomach ulcer or GI bleeding, talk with your doctor before using these medicines. No one who is younger than 18 and ill with a fever should take aspirin. It may cause severe disease or death.  · Your appetite may be poor, so a light diet is fine. Avoid dehydration by drinking 8 to 12 8-ounce glasses of fluids each day. This may include water; orange juice; lemonade; apple, grape, and cranberry juice; clear fruit drinks; electrolyte replacement and sports drinks; and decaffeinated teas and coffee. If you have been diagnosed with a kidney disease, ask your doctor how much and what types of fluids you should drink to prevent dehydration. If you have kidney disease, drinking too much fluid can cause it build up in the your body and be dangerous to your health.  · Over-the-counter remedies won't shorten the length of the illness but may be helpful for cough, sore throat; and nasal and sinus congestion. Don't use decongestants if you have high blood pressure.  Follow-up care  Follow up with your healthcare provider if you do not improve over the next week.  Call 911  Get emergency medical care if any of the following occur:  · Convulsion  · Feeling weak, dizzy, or like you are going to faint  · Chest pain, shortness of breath, wheezing, or difficulty breathing  When to seek medical advice  Call your  healthcare provider right away if any of these occur:  · Cough with lots of colored sputum (mucus) or blood in your sputum  · Chest pain, shortness of breath, wheezing, or difficulty breathing  · Severe headache; face, neck, or ear pain  · Severe, constant pain in the lower right side of your belly (abdominal)  · Continued vomiting (cant keep liquids down)  · Frequent diarrhea (more than 5 times a day); blood (red or black color) or mucus in diarrhea  · Feeling weak, dizzy, or like you are going to faint  · Extreme thirst  · Fever of 100.4°F (38°C) or higher, or as directed by your healthcare provider  Date Last Reviewed: 9/25/2015  © 1043-3525 [employer]. [address] [place] [address], [place] [address]. All rights reserved. This information is not intended as a substitute for professional medical care. Always follow your healthcare professional's instructions.

## 2020-10-14 NOTE — DIETITIAN INITIAL EVALUATION ADULT. - OBTAIN CURRENT WEIGHT
Pt is at  in [place] anticipating an increase in her Sertraline dose to 100 mg .  The pharmacy does not have a new order.   Checked the notes from her visit today and sh is to increase to 100 mg.  New order placed for this to  in [place].  She will start this tomorrow.   
yes/Via zeroed bed scale or standing scale if able

## 2021-02-09 NOTE — ED ADULT NURSE NOTE - NSFALLRSKUNASSIST_ED_ALL_ED
Community Paramedics Follow-up Visit  February 9, 2021  TIME: 1400    Chaim Norman is a 47 year old male being seen at home for a follow-up visit.    Present at appointment: Patient, Care Team Member         Chief Complaint   Patient presents with     Outreach       [place] Utilization:   Clinic Utilization  Difficulty keeping appointments:: No  Compliance Concerns: No  No-Show Concerns: No  No PCP office visit in Past Year: No  Utilization    Last refreshed: 2/9/2021  8:13 AM: Hospital Admissions 1           Last refreshed: 2/9/2021  8:13 AM: ED Visits 0           Last refreshed: 2/9/2021  8:13 AM: No Show Count (past year) 3              Current as of: 2/9/2021  8:13 AM              /88   Pulse 90   Temp 98.3  F (36.8  C)   Resp 12   Wt 91.5 kg (201 lb 12.8 oz)   SpO2 94%   BMI 34.64 kg/m      Clinical Concerns:  Current Medical Concerns:  COPD    Current Behavioral Concerns: none    Education Provided to patient: none   Medication set up? No  Pill Box issued: No  Scale issued: No  Health Maintenance Reviewed: Not assessed    Clinical Pathway: None    No  Face to Face in Home / Community    Recent weights:    Review of Symptoms/PE    Skin: negative  Eyes: negative  Ears/Nose/Throat: negative  Respiratory: Shortness of breath and Dyspnea on exertion  Cardiovascular: lower extremity edema  Gastrointestinal: negative  Genitourinary: negative  Musculoskeletal: painful feet/ burning sensation  Neurologic: negative  Psychiatric: negative     Medication Reconciliation  Knowledgeable about how to use meds:: Yes  Medication side effects suspected:: No    Diet/Exercise/Sleep  Diet:: No added salt  Inadequate nutrition (GOAL):: No  Tube Feeding: No  Inadequate activity/exercise (GOAL):: Yes  Significant changes in sleep pattern (GOAL): No    Time spent with patient: 90    The patient meets one or more of the following criteria:  * Has received hospital emergency department services three or more times in four  consecutive months within a twelve month period    Acute concern/Follow-up recommendations: Follow current treatment plan    Next CP visit scheduled: 2/16/20    Issues for Provider to follow up on: Community Paramedic visited with Chaim in his apartment. Chaim is doing well. He indicates his breathing is doing ok. His feet are not so hot and painful as they were two weeks ago. He did get his duloxetine filled and is back taking this as prescribed. Bilateral feet and lower legs have no pitting edema. Chaim last wore his compression socks the day before writers visit.  He was told by his doctors that he needs to loose weight before he can have his lung transplant. Talked about lowering his intake of carbohydrates and trying to eat more fruits and vegetables. Talked also about portion control. Chaim did receive a case [facility] back [facility] the [facility]. Went through some of the mail that he had questions with.       Provider follow up visit needed: FRANCISCO       no

## 2021-04-08 NOTE — ED ADULT NURSE NOTE - DISCHARGE DATE/TIME
01-Jul-2019 07:07 Cimzia Pregnancy And Lactation Text: This medication crosses the placenta but can be considered safe in certain situations. Cimzia may be excreted in breast milk.

## 2021-11-15 NOTE — ED ADULT NURSE NOTE - CAS TRG GEN SKIN COLOR
Normal for race Star Wedge Flap Text: The defect edges were debeveled with a #15 scalpel blade.  Given the location of the defect, shape of the defect and the proximity to free margins a star wedge flap was deemed most appropriate.  Using a sterile surgical marker, an appropriate rotation flap was drawn incorporating the defect and placing the expected incisions within the relaxed skin tension lines where possible. The area thus outlined was incised deep to adipose tissue with a #15 scalpel blade.  The skin margins were undermined to an appropriate distance in all directions utilizing iris scissors.

## 2022-01-11 NOTE — PROVIDER CONTACT NOTE (MEDICATION) - SITUATION
pt refused to take medications including Potassium Chloride and Sodium Bicarb
(3) no apparent problem

## 2022-02-03 NOTE — DISCHARGE NOTE PROVIDER - NSDCFUADDAPPT_GEN_ALL_CORE_FT
Orthopedics    Patient seen and examined at bedside. Pain controlled with medications. No change compared to prior per patient. Denies fevers/chills, cp, sob, n/v, any other acute complaints. No overnight events per nursing       VITAL SIGNS:  T(C): 37 (02-02-22 @ 21:00), Max: 37.3 (02-02-22 @ 16:16)  HR: 90 (02-02-22 @ 21:00) (62 - 90)  BP: 153/45 (02-02-22 @ 21:00) (144/37 - 155/41)  RR: 16 (02-02-22 @ 21:00) (16 - 18)  SpO2: 92% (02-02-22 @ 21:00) (92% - 94%)    General: NAD, resting comfortably in bed, Alert  RLE:  Right ankle wrapped in kerlix, clean dry and intact  Able to minimally wiggle toes. Very minimal dorsi/plantarflexion present  Moderate pain with passive DF/PF of ankle, stable from yesterday  Toes are WWP  SCD in place contralaterally  No calf tenderness contralaterally      A/P:  85y f s/p right ankle open I&D POD9, sp wound vac removal    -Medical management appreciated  -OR Cx: pseudomonas, E. faecalis, proteus, bacteroides vulgatus, Porphyromas Somerae  -Clinical exam stable, aseptic at this time  -Trend CRP  -Vasc Surg recs appreciated; rec MICHELLE at this time, no intervention unless clinical status changes, agree with recs   -ID recs appreciated, continue ABx, PICC line placed in anticipation of antibiotic course after discharge  -PT  -WBAT RLE  -Pain Control PRN  -DVT ppx per primary team: Eliquis  -FU AM Labs  -Rest, ice, compress and elevate the extremity as needed  -Incentive Spirometry  -DC planning to [facility]  -No further ortho surgical intervention planned at this time. Ortho stable for discharge to [facility] when medically appropriate. Patient to follow up with Dr. Ku as outpatient for further evaluation and treatment. Plan for outpatient FU with Vasc Sx  -Will discuss with attending Dr. Ku and advise if any changes to plan   Orthopedics    Patient seen and examined at bedside. Pain controlled with medications. No change compared to prior per patient. Denies fevers/chills, cp, sob, n/v, any other acute complaints. No overnight events per nursing       VITAL SIGNS:  T(C): 37 (02-02-22 @ 21:00), Max: 37.3 (02-02-22 @ 16:16)  HR: 90 (02-02-22 @ 21:00) (62 - 90)  BP: 153/45 (02-02-22 @ 21:00) (144/37 - 155/41)  RR: 16 (02-02-22 @ 21:00) (16 - 18)  SpO2: 92% (02-02-22 @ 21:00) (92% - 94%)    General: NAD, resting comfortably in bed, Alert  RLE:  Right ankle wrapped in kerlix, clean dry and intact  Able to minimally wiggle toes. Very minimal dorsi/plantarflexion present  Moderate pain with passive DF/PF of ankle, stable from yesterday  Toes are WWP  SCD in place contralaterally  No calf tenderness contralaterally      A/P:  85y f s/p right ankle open I&D POD9, sp wound vac removal    -Medical management appreciated  -OR Cx: pseudomonas, E. faecalis, proteus, bacteroides vulgatus, Porphyromas Somerae  -Clinical exam stable, aseptic at this time  -Vasc Surg recs appreciated; rec MICHELLE at this time, no intervention unless clinical status changes, agree with recs   -ID recs appreciated, continue ABx, PICC line placed yesterday in anticipation of antibiotic course after discharge  -PT  -WBAT RLE  -Pain Control PRN  -DVT ppx per primary team: Eliquis/YULY  -FU AM Labs  -Rest, ice, compress and elevate the extremity as needed  -Incentive Spirometry  -DC planning to [facility]  -No further ortho surgical intervention planned at this time. Ortho stable for discharge to [facility] when medically appropriate. Patient to follow up with Dr. Ku as outpatient for further evaluation and treatment. Plan for outpatient FU with Vasc Sx  -Will discuss with attending Dr. Ku and advise if any changes to plan   repeat echo as inpatient or outpatient in 1st week of july--follow-up with your Cardiologist

## 2022-04-25 NOTE — PROGRESS NOTE ADULT - PROBLEM SELECTOR PROBLEM 4
Debility
CKD (chronic kidney disease)
Debility
CKD (chronic kidney disease)
CONSTANZA (acute kidney injury)
CONSTANZA (acute kidney injury)
Encephalopathy, unspecified
Fever
Debility
CKD (chronic kidney disease)
no

## 2022-09-21 NOTE — PHYSICAL THERAPY INITIAL EVALUATION ADULT - IMPAIRMENTS CONTRIBUTING TO GAIT DEVIATIONS, PT EVAL
DM (diabetes mellitus) DM (diabetes mellitus) DM (diabetes mellitus) decreased endurance/cognition/impaired balance/decreased strength

## 2023-02-01 NOTE — PROGRESS NOTE ADULT - PROBLEM SELECTOR PLAN 1
Subjective:      Patient ID: Piter Hernandez is a 80 y.o. male.    Chief Complaint: Follow-up    He returns today discuss the results of his left middle finger paronychial biopsy.  He states he was unable to return for his follow-up visit 2 weeks after the procedure.  He denies any significant complaints he states that the sutures fell out on their own.  He also states that the has noticed improvement in the area however he still has some discomfort on the ulnar distal aspect of the finger tip.      Review of Systems   All other systems reviewed and are negative.      Objective:            General    Vitals reviewed.  Constitutional: He is oriented to person, place, and time. He appears well-nourished. No distress.   Neurological: He is alert and oriented to person, place, and time.         Left Hand/Wrist Exam     Comments:  Well-healed biopsy site on the ulnar aspect of the middle finger paronychia.  There is an erythematous border extending over the eponychium with extension towards the dorsal flexion crease of the D IP joint.  No evidence of ulceration                  Assessment:       Encounter Diagnosis   Name Primary?    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  Based on the location of this and the advancing border over the eponychial fold proximally I discussed it would be best to discuss excision with a Mohs surgeon.  He was advised that I would gladly perform the reconstruction if needed.  A referral was placed for an evaluation and potential treatment with our Mohs surgeon at [facility].  The patient agree with this above assessment plan all questions concerns were  addressed             Diarrhea resolved on Octreotide  Will transition to long acting octreotide when available

## 2023-03-28 NOTE — DIETITIAN INITIAL EVALUATION ADULT. - ENERGY NEEDS
Pt with PMH including carcinoid syndrome, bladder disease S/P Rae 7/1 admitted with systolic blood pressure in 70s, pt found to have UTI, acidosis, CONSTANZA and pericardial effusion, pt with ongoing mental status changes.     Ht: 5'11", Wt: 129.6 lbs, BMI: 18.1 kg/m2, IBW: 172 lbs +/- 10%, %IBW: 75%  1+ otto ankle edema, Skin noted with stage 2 pressure injury to sacral spine per nursing flowsheets
71.7

## 2023-06-28 NOTE — PATIENT PROFILE ADULT - DO YOU FEEL LIKE HURTING OTHERS?
no

## 2024-02-02 NOTE — PATIENT PROFILE ADULT - NSTOBACCONEVERSMOKERY/N_GEN_A
[de-identified] : 02/02/2024 F/u RT shoulder. pain is improved. going to PT. motion slowly improving. stopped using sling recently. patient reports spasms and soreness in her shoulder blade and upper back region.   01/05/2024: Ff/u RT proximal humerus fracture. has been removing the sling at home. no pain medication. feels better than last visit.   Date of initial evaluation 12/21/2023 Patient age is 63 years old Body part causing symptoms is the right shoulder Symptoms began 12/17, she fell onto the right shoulder Location of pain is anterior Quality of pain is dull Pain score at rest is 0 Pain score during activity is 7 Radicular symptoms are not present Prior treatments include [facility] Sling and tylenol Patient's condition is not associated with workers compensation, no-fault or interscholastic athletics No

## 2024-06-03 NOTE — PROCEDURE NOTE - NSPROCNAME_GEN_A_CORE
Result Communication-I replied directly to the patient that based on her laboratory testing she did not take her dexamethasone pill.  Her dexamethasone level was undetectable.  Therefore the information from these laboratory studies is not useful.  My nurse is reordering another dexamethasone tablet for her as well as reordering all laboratory information and we can redo her testing to still look for Cushing's.    Resulted Orders   Adrenocorticotropic Hormone (ACTH)   Result Value Ref Range    Adrenocorticotropic Hormone (ACTH) 1.9 (L) 7.2 - 63.3 pg/mL      Comment:      INTERPRETIVE INFORMATION: Adrenocorticotropic Hormone    Reference interval based on samples collected between 7 a.m. and   10 a.m.  No reference intervals established for p.m. collections.    Pediatric reference values are the same as adults (Acta Paediatr   Scand 1981;70:341-345).  This assay measures intact ACTH 1-39;   some types of synthetic ACTH and ACTH fragments are not detected   by this assay.  Performed By: [facility]  [address] [place] [id number]  : Stanford Núñez MD, PhD  [place] Number: [id number]   Dexamethasone   Result Value Ref Range    Dexamethasone <50.0 ng/dL      Comment:      INTERPRETIVE INFORMATION: Dexamethasone, Serum or Plasma by                              LC-MS/MS    Adults baseline: Less than 50 ng/dL   8:00 AM draw following 1 mg dexamethasone between 11:00 pm and   12:00 am the previous evenin - 295 ng/dL   8:00 AM draw following 8 mg dexamethasone (4 x 2 mg doses) between   11:00 pm and 12:00 am the previous evenin - 2850 ng/dL  This test was developed and its performance characteristics   determined by [facility]. It has not been cleared or   approved by the [place] Food and Drug Administration. This test was   performed in a CLIA certified laboratory and is intended for   clinical purposes.  Performed By: [facility]  [address] [place] [address]  [place] [address]  : Stanford Núñez MD, PhD  [facility] Number: [id number]       12:40 PM      Results were successfully communicated with the patient and they did not acknowledge their understanding.

## 2025-05-21 NOTE — DISCHARGE NOTE NURSING/CASE MANAGEMENT/SOCIAL WORK - NSDCDPATPORTLINK_GEN_ALL_CORE
You can access the [facility] Patient Portal, offered by [facility], by registering with the following website: [email or web address]
[email or web address]

## 2025-06-10 NOTE — ED ADULT NURSE REASSESSMENT NOTE - NS ED NURSE REASSESS COMMENT FT1
Patient awaiting transport after transport was delayed due to room not being clean. Charge nurse aware and will follow up to see if transport is in progress. Patient resting in stretcher bed, no acute distress. Safety maintained, family at bedside. 5